# Patient Record
Sex: FEMALE | Race: WHITE | NOT HISPANIC OR LATINO
[De-identification: names, ages, dates, MRNs, and addresses within clinical notes are randomized per-mention and may not be internally consistent; named-entity substitution may affect disease eponyms.]

---

## 2018-12-28 ENCOUNTER — APPOINTMENT (OUTPATIENT)
Dept: GASTROENTEROLOGY | Facility: CLINIC | Age: 28
End: 2018-12-28

## 2019-07-17 ENCOUNTER — INPATIENT (INPATIENT)
Facility: HOSPITAL | Age: 29
LOS: 4 days | Discharge: LEFT BEFORE DISCHARGE | End: 2019-07-22
Attending: HOSPITALIST | Admitting: HOSPITALIST
Payer: MEDICAID

## 2019-07-17 VITALS
SYSTOLIC BLOOD PRESSURE: 97 MMHG | OXYGEN SATURATION: 96 % | TEMPERATURE: 101 F | HEART RATE: 121 BPM | DIASTOLIC BLOOD PRESSURE: 64 MMHG | RESPIRATION RATE: 18 BRPM

## 2019-07-17 LAB
ALBUMIN SERPL ELPH-MCNC: 3.5 G/DL — SIGNIFICANT CHANGE UP (ref 3.5–5.2)
ALP SERPL-CCNC: 78 U/L — SIGNIFICANT CHANGE UP (ref 30–115)
ALT FLD-CCNC: 9 U/L — SIGNIFICANT CHANGE UP (ref 0–41)
ANION GAP SERPL CALC-SCNC: 16 MMOL/L — HIGH (ref 7–14)
APPEARANCE UR: CLEAR — SIGNIFICANT CHANGE UP
APTT BLD: 28.6 SEC — SIGNIFICANT CHANGE UP (ref 27–39.2)
AST SERPL-CCNC: 11 U/L — SIGNIFICANT CHANGE UP (ref 0–41)
BACTERIA # UR AUTO: ABNORMAL
BASE EXCESS BLDV CALC-SCNC: 0.4 MMOL/L — SIGNIFICANT CHANGE UP (ref -2–2)
BASOPHILS # BLD AUTO: 0.02 K/UL — SIGNIFICANT CHANGE UP (ref 0–0.2)
BASOPHILS NFR BLD AUTO: 0.1 % — SIGNIFICANT CHANGE UP (ref 0–1)
BILIRUB SERPL-MCNC: 0.3 MG/DL — SIGNIFICANT CHANGE UP (ref 0.2–1.2)
BILIRUB UR-MCNC: NEGATIVE — SIGNIFICANT CHANGE UP
BUN SERPL-MCNC: 15 MG/DL — SIGNIFICANT CHANGE UP (ref 10–20)
CA-I SERPL-SCNC: 1.11 MMOL/L — LOW (ref 1.12–1.3)
CALCIUM SERPL-MCNC: 9 MG/DL — SIGNIFICANT CHANGE UP (ref 8.5–10.1)
CHLORIDE SERPL-SCNC: 99 MMOL/L — SIGNIFICANT CHANGE UP (ref 98–110)
CO2 SERPL-SCNC: 22 MMOL/L — SIGNIFICANT CHANGE UP (ref 17–32)
COLOR SPEC: SIGNIFICANT CHANGE UP
CREAT SERPL-MCNC: 0.7 MG/DL — SIGNIFICANT CHANGE UP (ref 0.7–1.5)
DIFF PNL FLD: ABNORMAL
EOSINOPHIL # BLD AUTO: 0.06 K/UL — SIGNIFICANT CHANGE UP (ref 0–0.7)
EOSINOPHIL NFR BLD AUTO: 0.4 % — SIGNIFICANT CHANGE UP (ref 0–8)
EPI CELLS # UR: 1 /HPF — SIGNIFICANT CHANGE UP (ref 0–5)
GAS PNL BLDV: 135 MMOL/L — LOW (ref 136–145)
GAS PNL BLDV: SIGNIFICANT CHANGE UP
GLUCOSE SERPL-MCNC: 108 MG/DL — HIGH (ref 70–99)
GLUCOSE UR QL: NEGATIVE — SIGNIFICANT CHANGE UP
HCG SERPL QL: NEGATIVE — SIGNIFICANT CHANGE UP
HCO3 BLDV-SCNC: 24 MMOL/L — SIGNIFICANT CHANGE UP (ref 22–29)
HCT VFR BLD CALC: 34.4 % — LOW (ref 37–47)
HCT VFR BLDA CALC: 52.4 % — HIGH (ref 34–44)
HGB BLD CALC-MCNC: 17.1 G/DL — SIGNIFICANT CHANGE UP (ref 14–18)
HGB BLD-MCNC: 10.7 G/DL — LOW (ref 12–16)
HYALINE CASTS # UR AUTO: 2 /LPF — SIGNIFICANT CHANGE UP (ref 0–7)
IMM GRANULOCYTES NFR BLD AUTO: 0.5 % — HIGH (ref 0.1–0.3)
INR BLD: 1.11 RATIO — SIGNIFICANT CHANGE UP (ref 0.65–1.3)
KETONES UR-MCNC: ABNORMAL
LACTATE BLDV-MCNC: 1.7 MMOL/L — HIGH (ref 0.5–1.6)
LEUKOCYTE ESTERASE UR-ACNC: ABNORMAL
LYMPHOCYTES # BLD AUTO: 1 K/UL — LOW (ref 1.2–3.4)
LYMPHOCYTES # BLD AUTO: 7.1 % — LOW (ref 20.5–51.1)
MCHC RBC-ENTMCNC: 22 PG — LOW (ref 27–31)
MCHC RBC-ENTMCNC: 31.1 G/DL — LOW (ref 32–37)
MCV RBC AUTO: 70.8 FL — LOW (ref 81–99)
MONOCYTES # BLD AUTO: 0.8 K/UL — HIGH (ref 0.1–0.6)
MONOCYTES NFR BLD AUTO: 5.7 % — SIGNIFICANT CHANGE UP (ref 1.7–9.3)
NEUTROPHILS # BLD AUTO: 12.04 K/UL — HIGH (ref 1.4–6.5)
NEUTROPHILS NFR BLD AUTO: 86.2 % — HIGH (ref 42.2–75.2)
NITRITE UR-MCNC: NEGATIVE — SIGNIFICANT CHANGE UP
NRBC # BLD: 0 /100 WBCS — SIGNIFICANT CHANGE UP (ref 0–0)
PCO2 BLDV: 34 MMHG — LOW (ref 41–51)
PH BLDV: 7.45 — HIGH (ref 7.26–7.43)
PH UR: 6.5 — SIGNIFICANT CHANGE UP (ref 5–8)
PLATELET # BLD AUTO: 522 K/UL — HIGH (ref 130–400)
PO2 BLDV: 21 MMHG — SIGNIFICANT CHANGE UP (ref 20–40)
POTASSIUM BLDV-SCNC: 3.5 MMOL/L — SIGNIFICANT CHANGE UP (ref 3.3–5.6)
POTASSIUM SERPL-MCNC: 4.1 MMOL/L — SIGNIFICANT CHANGE UP (ref 3.5–5)
POTASSIUM SERPL-SCNC: 4.1 MMOL/L — SIGNIFICANT CHANGE UP (ref 3.5–5)
PROT SERPL-MCNC: 7.5 G/DL — SIGNIFICANT CHANGE UP (ref 6–8)
PROT UR-MCNC: SIGNIFICANT CHANGE UP
PROTHROM AB SERPL-ACNC: 12.8 SEC — SIGNIFICANT CHANGE UP (ref 9.95–12.87)
RBC # BLD: 4.86 M/UL — SIGNIFICANT CHANGE UP (ref 4.2–5.4)
RBC # FLD: 16.2 % — HIGH (ref 11.5–14.5)
RBC CASTS # UR COMP ASSIST: 2 /HPF — SIGNIFICANT CHANGE UP (ref 0–4)
SAO2 % BLDV: 32 % — SIGNIFICANT CHANGE UP
SODIUM SERPL-SCNC: 137 MMOL/L — SIGNIFICANT CHANGE UP (ref 135–146)
SP GR SPEC: >1.05 (ref 1.01–1.02)
TROPONIN T SERPL-MCNC: <0.01 NG/ML — SIGNIFICANT CHANGE UP
UROBILINOGEN FLD QL: SIGNIFICANT CHANGE UP
WBC # BLD: 13.99 K/UL — HIGH (ref 4.8–10.8)
WBC # FLD AUTO: 13.99 K/UL — HIGH (ref 4.8–10.8)
WBC UR QL: 25 /HPF — HIGH (ref 0–5)

## 2019-07-17 PROCEDURE — 71045 X-RAY EXAM CHEST 1 VIEW: CPT | Mod: 26

## 2019-07-17 PROCEDURE — 99285 EMERGENCY DEPT VISIT HI MDM: CPT

## 2019-07-17 PROCEDURE — 93010 ELECTROCARDIOGRAM REPORT: CPT

## 2019-07-17 PROCEDURE — 74177 CT ABD & PELVIS W/CONTRAST: CPT | Mod: 26

## 2019-07-17 RX ORDER — ACETAMINOPHEN 500 MG
650 TABLET ORAL EVERY 6 HOURS
Refills: 0 | Status: DISCONTINUED | OUTPATIENT
Start: 2019-07-17 | End: 2019-07-22

## 2019-07-17 RX ORDER — METRONIDAZOLE 500 MG
TABLET ORAL
Refills: 0 | Status: DISCONTINUED | OUTPATIENT
Start: 2019-07-18 | End: 2019-07-22

## 2019-07-17 RX ORDER — ONDANSETRON 8 MG/1
4 TABLET, FILM COATED ORAL ONCE
Refills: 0 | Status: COMPLETED | OUTPATIENT
Start: 2019-07-17 | End: 2019-07-17

## 2019-07-17 RX ORDER — CIPROFLOXACIN LACTATE 400MG/40ML
400 VIAL (ML) INTRAVENOUS EVERY 12 HOURS
Refills: 0 | Status: DISCONTINUED | OUTPATIENT
Start: 2019-07-18 | End: 2019-07-22

## 2019-07-17 RX ORDER — SODIUM CHLORIDE 9 MG/ML
1000 INJECTION, SOLUTION INTRAVENOUS ONCE
Refills: 0 | Status: COMPLETED | OUTPATIENT
Start: 2019-07-17 | End: 2019-07-17

## 2019-07-17 RX ORDER — METRONIDAZOLE 500 MG
500 TABLET ORAL EVERY 8 HOURS
Refills: 0 | Status: DISCONTINUED | OUTPATIENT
Start: 2019-07-18 | End: 2019-07-22

## 2019-07-17 RX ORDER — CHLORHEXIDINE GLUCONATE 213 G/1000ML
1 SOLUTION TOPICAL
Refills: 0 | Status: DISCONTINUED | OUTPATIENT
Start: 2019-07-17 | End: 2019-07-22

## 2019-07-17 RX ORDER — CIPROFLOXACIN LACTATE 400MG/40ML
VIAL (ML) INTRAVENOUS
Refills: 0 | Status: DISCONTINUED | OUTPATIENT
Start: 2019-07-18 | End: 2019-07-22

## 2019-07-17 RX ORDER — METRONIDAZOLE 500 MG
500 TABLET ORAL ONCE
Refills: 0 | Status: COMPLETED | OUTPATIENT
Start: 2019-07-17 | End: 2019-07-18

## 2019-07-17 RX ORDER — PANTOPRAZOLE SODIUM 20 MG/1
40 TABLET, DELAYED RELEASE ORAL
Refills: 0 | Status: DISCONTINUED | OUTPATIENT
Start: 2019-07-17 | End: 2019-07-22

## 2019-07-17 RX ORDER — MORPHINE SULFATE 50 MG/1
4 CAPSULE, EXTENDED RELEASE ORAL ONCE
Refills: 0 | Status: DISCONTINUED | OUTPATIENT
Start: 2019-07-17 | End: 2019-07-18

## 2019-07-17 RX ORDER — SODIUM CHLORIDE 9 MG/ML
1000 INJECTION INTRAMUSCULAR; INTRAVENOUS; SUBCUTANEOUS
Refills: 0 | Status: DISCONTINUED | OUTPATIENT
Start: 2019-07-17 | End: 2019-07-22

## 2019-07-17 RX ORDER — CIPROFLOXACIN LACTATE 400MG/40ML
400 VIAL (ML) INTRAVENOUS ONCE
Refills: 0 | Status: COMPLETED | OUTPATIENT
Start: 2019-07-17 | End: 2019-07-17

## 2019-07-17 RX ORDER — ONDANSETRON 8 MG/1
4 TABLET, FILM COATED ORAL THREE TIMES A DAY
Refills: 0 | Status: DISCONTINUED | OUTPATIENT
Start: 2019-07-17 | End: 2019-07-22

## 2019-07-17 RX ORDER — MORPHINE SULFATE 50 MG/1
6 CAPSULE, EXTENDED RELEASE ORAL ONCE
Refills: 0 | Status: DISCONTINUED | OUTPATIENT
Start: 2019-07-17 | End: 2019-07-17

## 2019-07-17 RX ORDER — ENOXAPARIN SODIUM 100 MG/ML
40 INJECTION SUBCUTANEOUS DAILY
Refills: 0 | Status: DISCONTINUED | OUTPATIENT
Start: 2019-07-17 | End: 2019-07-19

## 2019-07-17 RX ORDER — ALBUTEROL 90 UG/1
2 AEROSOL, METERED ORAL EVERY 6 HOURS
Refills: 0 | Status: DISCONTINUED | OUTPATIENT
Start: 2019-07-17 | End: 2019-07-22

## 2019-07-17 RX ORDER — METRONIDAZOLE 500 MG
500 TABLET ORAL EVERY 8 HOURS
Refills: 0 | Status: DISCONTINUED | OUTPATIENT
Start: 2019-07-17 | End: 2019-07-17

## 2019-07-17 RX ORDER — MORPHINE SULFATE 50 MG/1
2 CAPSULE, EXTENDED RELEASE ORAL
Refills: 0 | Status: DISCONTINUED | OUTPATIENT
Start: 2019-07-17 | End: 2019-07-18

## 2019-07-17 RX ORDER — CIPROFLOXACIN LACTATE 400MG/40ML
400 VIAL (ML) INTRAVENOUS ONCE
Refills: 0 | Status: COMPLETED | OUTPATIENT
Start: 2019-07-17 | End: 2019-07-18

## 2019-07-17 RX ORDER — ACETAMINOPHEN 500 MG
650 TABLET ORAL ONCE
Refills: 0 | Status: COMPLETED | OUTPATIENT
Start: 2019-07-17 | End: 2019-07-17

## 2019-07-17 RX ADMIN — Medication 200 MILLIGRAM(S): at 16:33

## 2019-07-17 RX ADMIN — Medication 125 MILLIGRAM(S): at 20:39

## 2019-07-17 RX ADMIN — Medication 650 MILLIGRAM(S): at 15:27

## 2019-07-17 RX ADMIN — MORPHINE SULFATE 2 MILLIGRAM(S): 50 CAPSULE, EXTENDED RELEASE ORAL at 23:07

## 2019-07-17 RX ADMIN — MORPHINE SULFATE 6 MILLIGRAM(S): 50 CAPSULE, EXTENDED RELEASE ORAL at 17:56

## 2019-07-17 RX ADMIN — SODIUM CHLORIDE 1000 MILLILITER(S): 9 INJECTION, SOLUTION INTRAVENOUS at 15:25

## 2019-07-17 RX ADMIN — ONDANSETRON 4 MILLIGRAM(S): 8 TABLET, FILM COATED ORAL at 15:27

## 2019-07-17 RX ADMIN — ONDANSETRON 4 MILLIGRAM(S): 8 TABLET, FILM COATED ORAL at 17:17

## 2019-07-17 RX ADMIN — SODIUM CHLORIDE 1000 MILLILITER(S): 9 INJECTION, SOLUTION INTRAVENOUS at 16:33

## 2019-07-17 RX ADMIN — Medication 100 MILLIGRAM(S): at 17:17

## 2019-07-17 NOTE — ED ADULT NURSE NOTE - NSIMPLEMENTINTERV_GEN_ALL_ED
Implemented All Universal Safety Interventions:  [place] to call system. Call bell, personal items and telephone within reach. Instruct patient to call for assistance. Room bathroom lighting operational. Non-slip footwear when patient is off stretcher. Physically safe environment: no spills, clutter or unnecessary equipment. Stretcher in lowest position, wheels locked, appropriate side rails in place.

## 2019-07-17 NOTE — ED PROVIDER NOTE - ATTENDING CONTRIBUTION TO CARE
29 year old female hx UC, asthma presenting with n/v/d x 5 days. patient ndorses multiple episodes of nb nb vomiting and nb diarrha, no hemoptysis, no loc    CONSTITUTIONAL: Well-developed; well-nourished; in no acute distress. Sitting up and providing appropriate history and physical examination  SKIN: skin exam is warm and dry, no acute rash.  HEAD: Normocephalic; atraumatic.  EYES: PERRL, 3 mm bilateral, no nystagmus, EOM intact; conjunctiva and sclera clear.  ENT: + DRY MM, No nasal discharge; airway clear.  NECK: Supple; non tender. + full passive ROM in all directions. No JVD  CARD: S1, S2 normal; no murmurs, gallops, or rubs. Regular rate and rhythm. + Symmetric Strong Pulses  RESP: No wheezes, rales or rhonchi. Good air movement bilaterally  ABD: soft; non-distended; + LLQ and Mid epigastric tenderness. No Rebound, No Guarding, No signs of peritonitis, No CVA tenderness. No pulsatile abdominal mass. + Strong and Symmetric Pulses  EXT: Normal ROM. No clubbing, cyanosis or edema. Dp and Pt Pulses intact. Cap refill less than 3 seconds  NEURO: Alert, oriented, grossly unremarkable. No Focal deficits. GCS 15. NIH 0  PSYCH: Cooperative, appropriate.      Patient signed out to Dr. Jarrett to follow labs and imaging and reassess

## 2019-07-17 NOTE — H&P ADULT - HISTORY OF PRESENT ILLNESS
29 year old female with past medical history of asthma ( uses albuterol as needed) and history of UC (as per the patient was diagnosed on colonoscopy at [facility] or [facility]?- have asked  to bring papers from home tomorrow morning) presents with complains of nausea, vomiting and loose diarrhea for past 5 days. She says she has been continuously on prednisone 20 mg daily for UC, she says she does not follow any particular physician, does not have any GI doctor has been using 's prednisone to control UC. She also complains of having frequent chest pain sharp in nature and she usually takes aspirin (1-2tablets from her grandmother or other family members) with some partial relief.   Currently she feels tired, says she has been trying to wean off the prednisone and for 5 days has been having loose stools almost every 5 minutes, small in amount and watery. She feels abdominal pain, has been vomiting and at one occasion small amount of blood came out. Complains of mild chills and weakness, she has poor appetite and has not been eating for 2-3 days.

## 2019-07-17 NOTE — ED PROVIDER NOTE - NS ED ROS FT
Review of Systems         Constitutional: (+) fever, chills (-) weakness       EENT:  (-) sore throat       Cardiovascular: (-) chest pain (-) syncope       Respiratory: (-) cough, (-) shortness of breath       Gastrointestinal: (+) abdominal pain, vomiting, diarrhea, nausea (-) constipation       Genitourinary: (-) dysuria       Musculoskeletal: (-) neck pain (-) back pain (-) joint pain       Integumentary: (-) rash       Neurological: (-) headache (-) altered mental status (-) dizziness (-) paresthesias       Psych: (-) psych history

## 2019-07-17 NOTE — ED PROVIDER NOTE - OBJECTIVE STATEMENT
29 year old female hx UC, asthma presenting with n/v/d x 5 days. Patient admits to subj. fevers, chills, maroon blood in stool, LLQ pain. Blood streak in vomit. Decreased PO intake. No dysuria, chest pain, shortness of breath, sore throat, headache. No hx abd surgeries. Patient takes prednisone 20 mg daily for UC.

## 2019-07-17 NOTE — ED ADULT TRIAGE NOTE - CHIEF COMPLAINT QUOTE
"I have ulcerative colitis and I've been vomiting and having diarrhea for 4-5 days. I was taking Prednisone that I shouldn't have and I haven't had it in a week.

## 2019-07-17 NOTE — H&P ADULT - NSHPPHYSICALEXAM_GEN_ALL_CORE
GENERAL: In acute distress, Alert oriented X3  HEENT: Mucosa moist. No lymphadenopathy  CHEST: Clear to auscultate bilaterally  HEART: Normal S1 and S2  ABDOMEN: Tenderness to palpation in epigastrium. Guarding +  EXTREMITIES: No edema  SKIN: No rash

## 2019-07-17 NOTE — H&P ADULT - NSHPLABSRESULTS_GEN_ALL_CORE
Complete Blood Count + Automated Diff (07.17.19 @ 15:35)    WBC Count: 13.99 K/uL    RBC Count: 4.86 M/uL    Hemoglobin: 10.7 g/dL    Hematocrit: 34.4 %    Mean Cell Volume: 70.8 fL    Mean Cell Hemoglobin: 22.0 pg    Mean Cell Hemoglobin Conc: 31.1 g/dL    Red Cell Distrib Width: 16.2 %    Platelet Count - Automated: 522 K/uL    Auto Neutrophil #: 12.04 K/uL    Auto Lymphocyte #: 1.00 K/uL    Auto Monocyte #: 0.80 K/uL    Auto Eosinophil #: 0.06 K/uL    Auto Basophil #: 0.02 K/uL    Auto Neutrophil %: 86.2: Differential percentages must be correlated with absolute numbers for  clinical significance. %    Auto Lymphocyte %: 7.1 %    Auto Monocyte %: 5.7 %    Auto Eosinophil %: 0.4 %    Auto Basophil %: 0.1 %    Auto Immature Granulocyte %: 0.5 %    Nucleated RBC: 0 /100 WBCs  Comprehensive Metabolic Panel (07.17.19 @ 15:35)    Sodium, Serum: 137 mmol/L    Potassium, Serum: 4.1 mmol/L    Chloride, Serum: 99 mmol/L    Carbon Dioxide, Serum: 22 mmol/L    Anion Gap, Serum: 16 mmol/L    Blood Urea Nitrogen, Serum: 15 mg/dL    Creatinine, Serum: 0.7 mg/dL    Glucose, Serum: 108 mg/dL    Calcium, Total Serum: 9.0 mg/dL    Protein Total, Serum: 7.5 g/dL    Albumin, Serum: 3.5 g/dL    Bilirubin Total, Serum: 0.3 mg/dL    Alkaline Phosphatase, Serum: 78 U/L    Aspartate Aminotransferase (AST/SGOT): 11 U/L    Alanine Aminotransferase (ALT/SGPT): 9 U/L    eGFR if Non : 115: Interpretative comment  The units for eGFR are mL/min/1.73M2 (normalized body surface area). The  eGFR is calculated from a serum creatinine using the CKD-EPI equation.  Other variables required for calculation are race, age and sex. Among  patients with chronic kidney disease (CKD), the eGFR is useful in  determining the stage of disease according to KDOQI CKD classification.  All eGFR results are reported numerically with the following  interpretation.          GFR                    With                 Without     (ml/min/1.73 m2)    Kidney Damage       Kidney Damage        >= 90                    Stage 1                     Normal        60-89                    Stage 2                     Decreased GFR        30-59     Stage 3                     Stage 3        15-29                    Stage 4                     Stage 4        < 15                      Stage 5                     Stage 5  Each stage of CKD assumes that the associated GFR level has been in  effect for at least 3 months. Determination of stages one and two (with  eGFR > 59 ml/min/m2) requires estimation of kidney damage for at least 3  months as defined by structural or functional abnormalities.  Limitations: All estimates of GFR will be less accurate for patients at  extremes of muscle mass (including but not limited to frail elderly,  critically ill, or cancer patients), those with unusual diets, and those  with conditions associated with reduced secretion or extrarenal  elimination of creatinine. The eGFR equation is not recommended for use  in patients with unstable creatinine levels. mL/min/1.73M2    eGFR if African American: 134 mL/min/1.73M2

## 2019-07-17 NOTE — H&P ADULT - ATTENDING COMMENTS
29 year old female with past medical history of asthma ( uses albuterol as needed) and history of UC (as per the patient was [facility] have asked  to bring papers from home tomorrow morning) presents with complains of nausea, vomiting and loose diarrhea for past 5 days. She says she has been continuously on prednisone 20 mg daily for UC, she says she does not follow any particular physician, does not have any GI doctor has been using 's prednisone to control UC. She also complains of having frequent chest pain sharp in nature and she usually takes aspirin (1-2tablets from her grandmother or other family members) with some partial relief.   Currently she feels tired, says she has been trying to wean off the prednisone and for 5 days has been having loose stools almost every 5 minutes, small in amount and watery. She feels abdominal pain, has been vomiting and at one occasion small amount of blood came out. Complains of mild chills and weakness, she has poor appetite and has not been eating for 2-3 days.    REVIEW OF SYSTEMS:    CONSTITUTIONAL: No weakness, fevers or chills  EYES/ENT: No visual changes;  No vertigo or throat pain   NECK: No pain or stiffness  RESPIRATORY: No cough, wheezing, hemoptysis; No shortness of breath  CARDIOVASCULAR: No chest pain or palpitations  GASTROINTESTINAL: No abdominal or epigastric pain. No nausea, vomiting, or hematemesis; No diarrhea or constipation. No melena or hematochezia.  GENITOURINARY: No dysuria, frequency or hematuria  NEUROLOGICAL: No numbness or weakness  SKIN: No itching, rashes    Physical Exam:    General: WN/WD NAD  Neurology: A&Ox3, nonfocal, follows commands  Eyes: PERRLA/ EOMI  ENT/Neck: Neck supple, trachea midline, No JVD  Respiratory: CTA B/L, No wheezing, rales, rhonchi  CV: Normal rate regular rhythm, S1S2, no murmurs, rubs or gallops  Abdominal: Soft, NT, ND +BS,   Extremities: No edema, + peripheral pulses  Skin: No Rashes, Hematoma, Ecchymosis  Incisions:   Tubes: 29 year old female with past medical history of asthma ( uses albuterol as needed) and history of UC (as per the patient [facility]- have asked  to bring papers from home tomorrow morning) presents with complains of nausea, vomiting and loose diarrhea for past 5 days. She says she has been continuously on prednisone 20 mg daily for UC, she says she does not follow any particular physician, does not have any GI doctor has been using 's prednisone to control UC. She also complains of having frequent chest pain sharp in nature and she usually takes aspirin (1-2tablets from her grandmother or other family members) with some partial relief.   Currently she feels tired, says she has been trying to wean off the prednisone and for 5 days has been having loose stools almost every 5 minutes, small in amount and watery. She feels abdominal pain, has been vomiting and at one occasion small amount of blood came out. Complains of mild chills and weakness, she has poor appetite and has not been eating for 2-3 days.    REVIEW OF SYSTEMS: see cc/HPI  CONSTITUTIONAL: No weakness, fevers or chills  EYES/ENT: No visual changes;  No vertigo or throat pain   NECK: No pain or stiffness  RESPIRATORY: No cough, wheezing, hemoptysis; No shortness of breath  CARDIOVASCULAR: No chest pain or palpitations  GASTROINTESTINAL: (+) abdominal pain- diffuse. (+) nausea and vomiting, NO hematemesis; (+) diarrhea, NO constipation. No melena or hematochezia.  GENITOURINARY: No dysuria, frequency or hematuria  NEUROLOGICAL: No numbness or weakness  SKIN: No itching, rashes    Physical Exam:  General: WN/WD NAD  Neurology: A&Ox3, nonfocal, follows commands  Eyes: PERRLA/ EOMI  ENT/Neck: Neck supple, trachea midline, No JVD  Respiratory: CTA B/L, No wheezing, rales, rhonchi  CV: Normal rate regular rhythm, S1S2, no murmurs, rubs or gallops  Abdominal: Soft, ND +BS, tender in all quadrants but worse in the LLQ  Extremities: No edema, + peripheral pulses  Skin: No Rashes, Hematoma, Ecchymosis  Incisions:   Tubes:    A/P  Ulcerative Colitis - exacerbation   -NPO and IV fluids  -IV steroids and IV abx for now  -agree w/ checking stool samples  -GI eval inpatient   -d/w patient the need for GI and PMD care / follow up post discharge and the danger of self medicating /prolonged pred use ( using her 's supply)    h/o Epigastric / atypical chest pain - currently asymptomatic r/o PUD r/o gastritis  -PPI   -may needs EGD (+/-) cardiac w/u if persistent or re-occurs    Asthma   -PRN MDI    L4 vertebral hemangioma  -outpatient f/u PRN

## 2019-07-17 NOTE — ED PROVIDER NOTE - PHYSICAL EXAMINATION
Physical Exam    Vital Signs: I have reviewed the initial vital signs  Constitutional: well-nourished, appears stated age, no acute distress  EENT: Conjunctiva pink, Sclera clear, PERRLA, EOMI. Mucous membranes dry, no exudates or lesions noted, uvula midline.  Cardiovascular: S1 and S2 present, tachycardic, regular rhythm. Well perfused extremities, no peripheral edema  Respiratory: unlabored respiratory effort, clear to auscultation bilaterally no wheezing, rales and rhonchi  Gastrointestinal: abdomen TTP in LLQ maximally, diffuse mild tenderness. no pulsatile mass,  + guarding, no rebound tenderness  Musculoskeletal: supple nontender neck, no midline tenderness, no joint pain  Integumentary: warm, dry, no rash  Psychiatric: appropriate mood, appropriate affect Physical Exam    Vital Signs: I have reviewed the initial vital signs  Constitutional: well-nourished, crying, in pain, distressed  EENT: Conjunctiva pink, Sclera clear, PERRLA, EOMI. Mucous membranes dry, no exudates or lesions noted, uvula midline.  Cardiovascular: S1 and S2 present, tachycardic, regular rhythm. Well perfused extremities, no peripheral edema  Respiratory: unlabored respiratory effort, clear to auscultation bilaterally no wheezing, rales and rhonchi  Gastrointestinal: abdomen TTP in LLQ maximally, diffuse mild tenderness. no pulsatile mass,  + guarding, no rebound tenderness  Musculoskeletal: supple nontender neck, no midline tenderness, no joint pain  Integumentary: warm, dry, no rash  Psychiatric: appropriate mood, appropriate affect

## 2019-07-17 NOTE — ED PROVIDER NOTE - CLINICAL SUMMARY MEDICAL DECISION MAKING FREE TEXT BOX
29 year old female hx UC, asthma presenting with n/v/d, and llq abd x 5 days. Pt was noted to be febrile here and states she stopped taking her steroids 2 days prior. Vs - note to have fever. BP 98/60, HR-62. Given cipro/flagyl. steroids, and fluids. Labs noted have leukocytosis. CT revealed diffuse colitis. Admitted to [facility].

## 2019-07-17 NOTE — ED PROVIDER NOTE - NS ED ATTENDING STATEMENT MOD
I have personally performed a face to face diagnostic evaluation on this patient. I have reviewed the ACP note and agree with the history, exam and plan of care, except as noted. Adequate: hears normal conversation without difficulty

## 2019-07-18 LAB
ANION GAP SERPL CALC-SCNC: 14 MMOL/L — SIGNIFICANT CHANGE UP (ref 7–14)
BLD GP AB SCN SERPL QL: SIGNIFICANT CHANGE UP
BUN SERPL-MCNC: 10 MG/DL — SIGNIFICANT CHANGE UP (ref 10–20)
C DIFF BY PCR RESULT: NEGATIVE — SIGNIFICANT CHANGE UP
C DIFF TOX GENS STL QL NAA+PROBE: SIGNIFICANT CHANGE UP
CALCIUM SERPL-MCNC: 8.5 MG/DL — SIGNIFICANT CHANGE UP (ref 8.5–10.1)
CHLORIDE SERPL-SCNC: 101 MMOL/L — SIGNIFICANT CHANGE UP (ref 98–110)
CO2 SERPL-SCNC: 21 MMOL/L — SIGNIFICANT CHANGE UP (ref 17–32)
CREAT SERPL-MCNC: 0.6 MG/DL — LOW (ref 0.7–1.5)
CRP SERPL-MCNC: 11.61 MG/DL — HIGH (ref 0–0.4)
CULTURE RESULTS: SIGNIFICANT CHANGE UP
ERYTHROCYTE [SEDIMENTATION RATE] IN BLOOD: 58 MM/HR — HIGH (ref 0–20)
FERRITIN SERPL-MCNC: 52 NG/ML — SIGNIFICANT CHANGE UP (ref 15–150)
FOLATE SERPL-MCNC: 13.2 NG/ML — SIGNIFICANT CHANGE UP
GLUCOSE SERPL-MCNC: 122 MG/DL — HIGH (ref 70–99)
HCT VFR BLD CALC: 28.5 % — LOW (ref 37–47)
HGB BLD-MCNC: 8.6 G/DL — LOW (ref 12–16)
IRON SATN MFR SERPL: 11 UG/DL — LOW (ref 35–150)
IRON SATN MFR SERPL: 4 % — LOW (ref 15–50)
LACTATE SERPL-SCNC: 0.9 MMOL/L — SIGNIFICANT CHANGE UP (ref 0.5–2.2)
MCHC RBC-ENTMCNC: 21.6 PG — LOW (ref 27–31)
MCHC RBC-ENTMCNC: 30.2 G/DL — LOW (ref 32–37)
MCV RBC AUTO: 71.6 FL — LOW (ref 81–99)
NRBC # BLD: 0 /100 WBCS — SIGNIFICANT CHANGE UP (ref 0–0)
PLATELET # BLD AUTO: 374 K/UL — SIGNIFICANT CHANGE UP (ref 130–400)
POTASSIUM SERPL-MCNC: 4 MMOL/L — SIGNIFICANT CHANGE UP (ref 3.5–5)
POTASSIUM SERPL-SCNC: 4 MMOL/L — SIGNIFICANT CHANGE UP (ref 3.5–5)
RBC # BLD: 3.98 M/UL — LOW (ref 4.2–5.4)
RBC # FLD: 15.9 % — HIGH (ref 11.5–14.5)
SODIUM SERPL-SCNC: 136 MMOL/L — SIGNIFICANT CHANGE UP (ref 135–146)
SPECIMEN SOURCE: SIGNIFICANT CHANGE UP
TIBC SERPL-MCNC: 283 UG/DL — SIGNIFICANT CHANGE UP (ref 220–430)
TRANSFERRIN SERPL-MCNC: 232 MG/DL — SIGNIFICANT CHANGE UP (ref 200–360)
UIBC SERPL-MCNC: 272 UG/DL — SIGNIFICANT CHANGE UP (ref 110–370)
VIT B12 SERPL-MCNC: 636 PG/ML — SIGNIFICANT CHANGE UP (ref 232–1245)
WBC # BLD: 10.59 K/UL — SIGNIFICANT CHANGE UP (ref 4.8–10.8)
WBC # FLD AUTO: 10.59 K/UL — SIGNIFICANT CHANGE UP (ref 4.8–10.8)

## 2019-07-18 PROCEDURE — 99222 1ST HOSP IP/OBS MODERATE 55: CPT

## 2019-07-18 PROCEDURE — 99223 1ST HOSP IP/OBS HIGH 75: CPT

## 2019-07-18 RX ORDER — MORPHINE SULFATE 50 MG/1
2 CAPSULE, EXTENDED RELEASE ORAL EVERY 4 HOURS
Refills: 0 | Status: DISCONTINUED | OUTPATIENT
Start: 2019-07-18 | End: 2019-07-22

## 2019-07-18 RX ADMIN — Medication 100 MILLIGRAM(S): at 00:23

## 2019-07-18 RX ADMIN — Medication 200 MILLIGRAM(S): at 00:23

## 2019-07-18 RX ADMIN — PANTOPRAZOLE SODIUM 40 MILLIGRAM(S): 20 TABLET, DELAYED RELEASE ORAL at 06:44

## 2019-07-18 RX ADMIN — SODIUM CHLORIDE 100 MILLILITER(S): 9 INJECTION INTRAMUSCULAR; INTRAVENOUS; SUBCUTANEOUS at 06:51

## 2019-07-18 RX ADMIN — Medication 100 MILLIGRAM(S): at 22:29

## 2019-07-18 RX ADMIN — Medication 20 MILLIGRAM(S): at 13:55

## 2019-07-18 RX ADMIN — MORPHINE SULFATE 2 MILLIGRAM(S): 50 CAPSULE, EXTENDED RELEASE ORAL at 07:55

## 2019-07-18 RX ADMIN — Medication 40 MILLIGRAM(S): at 06:50

## 2019-07-18 RX ADMIN — Medication 100 MILLIGRAM(S): at 06:41

## 2019-07-18 RX ADMIN — Medication 200 MILLIGRAM(S): at 06:44

## 2019-07-18 RX ADMIN — CHLORHEXIDINE GLUCONATE 1 APPLICATION(S): 213 SOLUTION TOPICAL at 05:05

## 2019-07-18 RX ADMIN — PANTOPRAZOLE SODIUM 40 MILLIGRAM(S): 20 TABLET, DELAYED RELEASE ORAL at 18:17

## 2019-07-18 RX ADMIN — MORPHINE SULFATE 2 MILLIGRAM(S): 50 CAPSULE, EXTENDED RELEASE ORAL at 19:42

## 2019-07-18 RX ADMIN — Medication 100 MILLIGRAM(S): at 13:55

## 2019-07-18 RX ADMIN — Medication 20 MILLIGRAM(S): at 22:29

## 2019-07-18 RX ADMIN — MORPHINE SULFATE 2 MILLIGRAM(S): 50 CAPSULE, EXTENDED RELEASE ORAL at 14:42

## 2019-07-18 RX ADMIN — ONDANSETRON 4 MILLIGRAM(S): 8 TABLET, FILM COATED ORAL at 06:54

## 2019-07-18 RX ADMIN — Medication 200 MILLIGRAM(S): at 19:41

## 2019-07-18 NOTE — PROGRESS NOTE ADULT - ASSESSMENT
29 year old female with past medical history of asthma ( uses albuterol as needed) and history of UC (as per the patient was [facility] [place] [facility]- have asked  to bring papers from home tomorrow morning) presents with complains of nausea, vomiting and loose diarrhea for past 5 days.     # Ulcerative colitis flare  - Loose stools (=6 per day) with severe cramps accompanied with fever 100.9 C,  tachycardia 121, anemia  - Stopped taking Prednisone (20 mg from father a few weeks)  - CT scan evident for colitis.  - C. diff negative  - ESR 58,   - Decrease Solumedrol to 20 mg IV q8  - Continue Cipro and Flagyl  - Start clears and advance as tolerated  - f/u QuantiFeron, Hep B and C  - GI following - schedule for flexible sigmoidoscopy tomorrow, 7/19/19  - Pain control   - Will contact [facility] [place] for colonoscopy records    # Asthma - Controlled  - Albuterol PRN    # L4 vertebral body hemangioma  - Incidental finding on CT scan. Asymptomatic  - Outpatient follow up if required.     GI ppx - Protonix  DVT ppx - Lovenox    Dispo: from home

## 2019-07-18 NOTE — PROGRESS NOTE ADULT - SUBJECTIVE AND OBJECTIVE BOX
SUBJECTIVE:    Patient is a 29y old Female who presents with a chief complaint of Diarrhea and Vomiting (18 Jul 2019 08:16)    Currently admitted to medicine with the primary diagnosis of Colitis     Today is hospital day 1d. This morning she is resting comfortably in bed and reports no new issues or overnight events.     INTERVAL EVENTS: Improving pain    PAST MEDICAL & SURGICAL HISTORY  Asthma  Ulcerative colitis  No significant past surgical history      ALLERGIES:  penicillin (Rash)    MEDICATIONS:  STANDING MEDICATIONS  chlorhexidine 4% Liquid 1 Application(s) Topical <User Schedule>  ciprofloxacin   IVPB 400 milliGRAM(s) IV Intermittent every 12 hours  ciprofloxacin   IVPB      enoxaparin Injectable 40 milliGRAM(s) SubCutaneous daily  methylPREDNISolone sodium succinate Injectable 20 milliGRAM(s) IV Push every 8 hours  metroNIDAZOLE  IVPB      metroNIDAZOLE  IVPB 500 milliGRAM(s) IV Intermittent every 8 hours  pantoprazole  Injectable 40 milliGRAM(s) IV Push two times a day  sodium chloride 0.9%. 1000 milliLiter(s) IV Continuous <Continuous>    PRN MEDICATIONS  acetaminophen   Tablet .. 650 milliGRAM(s) Oral every 6 hours PRN  ALBUTerol    90 MICROgram(s) HFA Inhaler 2 Puff(s) Inhalation every 6 hours PRN  morphine  - Injectable 2 milliGRAM(s) IV Push every 4 hours PRN  ondansetron Injectable 4 milliGRAM(s) IV Push three times a day PRN    VITALS:   T(F): 96.2  HR: 81  BP: 91/50  RR: 18  SpO2: 100%    LABS:                        8.6    10.59 )-----------( 374      ( 18 Jul 2019 05:19 )             28.5     07-18    136  |  101  |  10  ----------------------------<  122<H>  4.0   |  21  |  0.6<L>    Ca    8.5      18 Jul 2019 05:19    TPro  7.5  /  Alb  3.5  /  TBili  0.3  /  DBili  x   /  AST  11  /  ALT  9   /  AlkPhos  78  07-17    PT/INR - ( 17 Jul 2019 15:35 )   PT: 12.80 sec;   INR: 1.11 ratio         PTT - ( 17 Jul 2019 15:35 )  PTT:28.6 sec  Urinalysis Basic - ( 17 Jul 2019 20:10 )    Color: Light Yellow / Appearance: Clear / SG: >1.050 / pH: x  Gluc: x / Ketone: Small  / Bili: Negative / Urobili: <2 mg/dL   Blood: x / Protein: Trace / Nitrite: Negative   Leuk Esterase: Small / RBC: 2 /HPF / WBC 25 /HPF   Sq Epi: x / Non Sq Epi: 1 /HPF / Bacteria: Few        Sedimentation Rate, Erythrocyte: 58 mm/Hr <H> (07-18-19 @ 05:19)  Lactate, Blood: 0.9 mmol/L (07-18-19 @ 05:19)  Troponin T, Serum: <0.01 ng/mL (07-17-19 @ 15:35)      CARDIAC MARKERS ( 17 Jul 2019 15:35 )  x     / <0.01 ng/mL / x     / x     / x        RADIOLOGY:    < from: CT Abdomen and Pelvis w/ IV Cont (07.17.19 @ 18:42) >  IMPRESSION:   Diffuse colonic wall thickening with hyper-enhanced mucosa, colitis    < end of copied text >    < from: Xray Chest 1 View-PORTABLE IMMEDIATE (07.17.19 @ 16:39) >  Impression:      No radiographic evidence of acute cardiopulmonary disease.    < end of copied text >    PHYSICAL EXAM:  GEN: No acute distress  PULM/CHEST: Clear to auscultation bilaterally, no rales, rhonchi or wheezes   CVS: Regular rate and rhythm, S1-S2, no murmurs  ABD: Soft, mildly tender in LLQ, non-distended, +BS  EXT: No edema  NEURO: AAOx3

## 2019-07-18 NOTE — CONSULT NOTE ADULT - SUBJECTIVE AND OBJECTIVE BOX
Please contact me with any questions on my pager [phone].  Hospital Day # 1d     HPI:   29 year old female with past medical history of asthma ( uses albuterol as needed) and history of UC (as per the patient was diagnosed on colonoscopy at [facility] 3 years ago ? presents with complains of nausea, vomiting and loose diarrhea for past 5 days. She says she has been continuously on prednisone 20 mg daily for UC for 3 years, she says she does not follow any particular physician, does not have any GI doctor has been using 's prednisone to control UC which she ran out one week ago , patient started having symptoms 2 days after stoping the prednisone. stool is watery to  loose, with intermittently bloody , almost every 5 minutes she has a bowel movement She feels abdominal pain, which is diffuse , moderate in intensity , which she used to take marijuana to control , has been vomiting , non bloody non biliary vomiting.  Complains of mild chills and weakness, she has poor appetite and has not been eating for 2-3 days. She also complains of having frequent chest pain sharp in nature and she usually takes aspirin (1-2tablets from her grandmother or other family members) with some partial relief.       PAST MEDICAL & SURGICAL HISTORY  Asthma  Ulcerative colitis  No significant past surgical history          SOCIAL HISTORY:  smoker: tom  Drug: Denies    ALLERGIES:  penicillin (Rash)      MEDICATIONS:  MEDICATIONS  (STANDING):  chlorhexidine 4% Liquid 1 Application(s) Topical <User Schedule>  ciprofloxacin   IVPB 400 milliGRAM(s) IV Intermittent every 12 hours  ciprofloxacin   IVPB      enoxaparin Injectable 40 milliGRAM(s) SubCutaneous daily  methylPREDNISolone sodium succinate Injectable 40 milliGRAM(s) IV Push every 8 hours  metroNIDAZOLE  IVPB      metroNIDAZOLE  IVPB 500 milliGRAM(s) IV Intermittent every 8 hours  pantoprazole  Injectable 40 milliGRAM(s) IV Push two times a day  sodium chloride 0.9%. 1000 milliLiter(s) (100 mL/Hr) IV Continuous <Continuous>    MEDICATIONS  (PRN):  acetaminophen   Tablet .. 650 milliGRAM(s) Oral every 6 hours PRN Temp greater or equal to 38C (100.4F)  ALBUTerol    90 MICROgram(s) HFA Inhaler 2 Puff(s) Inhalation every 6 hours PRN Shortness of Breath and/or Wheezing  morphine  - Injectable 2 milliGRAM(s) IV Push every 4 hours PRN Severe Pain (7 - 10)  ondansetron Injectable 4 milliGRAM(s) IV Push three times a day PRN Nausea and/or Vomiting      HOME MEDICATIONS:  Home Medications:  albuterol 90 mcg/inh inhalation aerosol with adapter: inhaled 4 times a day, As Needed (17 Jul 2019 22:13)      ROS:     REVIEW OF SYSTEMS:    CONSTITUTIONAL: low grade fever  EYES/ENT: No scleral icterus  RESPIRATORY: No shortness of breath  CARDIOVASCULAR: No chest pain  now  GASTROINTESTINAL: as listed above   GENITOURINARY: No dysuria  NEUROLOGICAL: No dizziness  SKIN: No cyanosis      VITALS:   T(F): 96.2 (07-18 @ 07:26), Max: 100.9 (07-17 @ 15:05)  HR: 81 (07-18 @ 07:26) (76 - 121)  BP: 91/50 (07-18 @ 07:26) (91/50 - 109/62)  BP(mean): --  RR: 18 (07-18 @ 07:26) (16 - 18)  SpO2: 100% (07-18 @ 07:26) (96% - 100%)    I&O's Summary      PHYSICAL EXAM:  Physical Exam:  GENERAL: pale   HEAD:  Atraumatic, Normocephalic  EYES: EOMI, PERRLA, conjunctiva and sclera clear  NECK: No thyromegaly  CHEST/LUNG: No accessory use of muscle  HEART: S1S2 Normal  ABDOMEN: Soft, mild tenderness at left lower quadrant ,  Nondistended; Bowel sounds present, no guarding or rigidity.  EXTREMITIES:  2+ Peripheral Pulses, No cyanosis  PSYCH: AAOx3  NEUROLOGY: non-focal      LABS:                        8.6    10.59 )-----------( 374      ( 18 Jul 2019 05:19 )             28.5       PT/INR - ( 17 Jul 2019 15:35 )  INR: 1.11          PTT - ( 17 Jul 2019 15:35 )  PTT:28.6   LIVER FUNCTIONS - ( 17 Jul 2019 15:35 )  Alb: 3.5 g/dL / Pro: 7.5 g/dL / ALK PHOS: 78 U/L / ALT: 9 U/L / AST: 11 U/L / GGT: x           LIVER FUNCTIONS - ( 17 Jul 2019 15:35 )  Alb: 3.5 [3.5 - 5.2] / Pro: 7.5 [6.0 - 8.0] / ALK PHOS: 78 [30 - 115] / ALT: 9 [0 - 41] / AST: 11 [0 - 41] / GGT: x       07-18    136  |  101  |  10  ----------------------------<  122<H>  4.0   |  21  |  0.6<L>    Ca    8.5      18 Jul 2019 05:19      CARDIAC MARKERS ( 17 Jul 2019 15:35 )  x     / <0.01 ng/mL / x     / x     / x          < from: CT Abdomen and Pelvis w/ IV Cont (07.17.19 @ 18:42) >  IMPRESSION:   Diffuse colonic wall thickening with hyper-enhanced mucosa, colitis        < end of copied text >    last colonoscopy : in [facility] one year ago , result not available yet

## 2019-07-18 NOTE — CONSULT NOTE ADULT - ASSESSMENT
29 year old female with past medical history of asthma ( uses albuterol as needed) and history of UC diagnosed 3 years ago , steroid dependent since 3 years , presented with 5 days of  nausea , vomiting and intermittent bloody diarrhea  after she stopped prednisone one week ago.       ct scan : reviewed 29 year old female with past medical history of asthma ( uses albuterol as needed) and history of UC diagnosed 3 years ago , steroid dependent since 3 years , presented with 5 days of  nausea , vomiting and intermittent bloody diarrhea  after she stopped prednisone one week ago.       ct scan : reviewed     ulcerative colitis flare :   please check for C. DIFF , Crp , Fecal calprotectin , ova and parasite , GI pcr ,stool culture   solumedrol 20 mg IV q 8 hrs if C. diff is negative  continue ciprofloxacin and flagyl  can resume diet , start on clear liquid and advance as tolerated  Zofran PRN for nausea  IV hydration  pain control  please check QuantiFeron , HBS AG , HBC IG , HBS AB, HIV , HCV AB   patient will need to be started on anti- TNF.  patient will need repeat of colonoscopy as outpatient after resolution of acute flare.  please obtain records from [facility]    incomplete , discussion with attending to follow. 29 year old female with past medical history of asthma ( uses albuterol as needed) and history of UC diagnosed 3 years ago , steroid dependent since 3 years , presented with 5 days of  nausea , vomiting and intermittent bloody diarrhea  after she stopped prednisone one week ago.       ct scan : reviewed   C. DIFF is negative     ulcerative colitis flare :   check  Fecal calprotectin , ova and parasite , GI PCR  , entameba serology,  stool culture   solumedrol 20 mg IV q 8 hrs   continue ciprofloxacin and flagyl  can resume diet , start on clear liquid and advance as tolerated  Zofran PRN for nausea  IV hydration  pain control  please check QuantiFeron , HBS AG , HBC IG , HBS AB  patient will need to be started on anti- TNF.  patient will need repeat of colonoscopy as outpatient after resolution of acute flare.  please obtain records from [facility] 29 year old female with past medical history of asthma ( uses albuterol as needed) and history of UC diagnosed 3 years ago , steroid dependent since 3 years , presented with 5 days of  nausea , vomiting and intermittent bloody diarrhea  after she stopped prednisone one week ago.       ct scan : reviewed   C. DIFF is negative     ulcerative colitis flare :   check  Fecal calprotectin , ova and parasite , GI PCR  , entameba serology,  stool culture   solumedrol 20 mg IV q 8 hrs   continue ciprofloxacin and flagyl  can resume diet , start on clear liquid and advance as tolerated  Zofran PRN for nausea  IV hydration  pain control  please check QuantiFeron , HBS AG , HBC IG , HBS AB  patient will need to be started on anti- TNF.  Flex Sigmoidoscopy to assses diseases activity in a.m  patient will need repeat of colonoscopy as outpatient after resolution of acute flare.  please obtain records from [facility]

## 2019-07-19 ENCOUNTER — TRANSCRIPTION ENCOUNTER (OUTPATIENT)
Age: 29
End: 2019-07-19

## 2019-07-19 ENCOUNTER — RESULT REVIEW (OUTPATIENT)
Age: 29
End: 2019-07-19

## 2019-07-19 LAB
ANION GAP SERPL CALC-SCNC: 11 MMOL/L — SIGNIFICANT CHANGE UP (ref 7–14)
BASOPHILS # BLD AUTO: 0.01 K/UL — SIGNIFICANT CHANGE UP (ref 0–0.2)
BASOPHILS NFR BLD AUTO: 0.1 % — SIGNIFICANT CHANGE UP (ref 0–1)
BUN SERPL-MCNC: 11 MG/DL — SIGNIFICANT CHANGE UP (ref 10–20)
CALCIUM SERPL-MCNC: 8.3 MG/DL — LOW (ref 8.5–10.1)
CHLORIDE SERPL-SCNC: 108 MMOL/L — SIGNIFICANT CHANGE UP (ref 98–110)
CO2 SERPL-SCNC: 20 MMOL/L — SIGNIFICANT CHANGE UP (ref 17–32)
CREAT SERPL-MCNC: 0.6 MG/DL — LOW (ref 0.7–1.5)
EOSINOPHIL # BLD AUTO: 0 K/UL — SIGNIFICANT CHANGE UP (ref 0–0.7)
EOSINOPHIL NFR BLD AUTO: 0 % — SIGNIFICANT CHANGE UP (ref 0–8)
GLUCOSE SERPL-MCNC: 139 MG/DL — HIGH (ref 70–99)
HAV IGM SER-ACNC: SIGNIFICANT CHANGE UP
HBV CORE IGM SER-ACNC: SIGNIFICANT CHANGE UP
HBV SURFACE AG SER-ACNC: SIGNIFICANT CHANGE UP
HCT VFR BLD CALC: 26.9 % — LOW (ref 37–47)
HCV AB S/CO SERPL IA: 0.1 S/CO — SIGNIFICANT CHANGE UP (ref 0–0.99)
HCV AB SERPL-IMP: SIGNIFICANT CHANGE UP
HGB BLD-MCNC: 8.1 G/DL — LOW (ref 12–16)
IMM GRANULOCYTES NFR BLD AUTO: 0.7 % — HIGH (ref 0.1–0.3)
LYMPHOCYTES # BLD AUTO: 0.47 K/UL — LOW (ref 1.2–3.4)
LYMPHOCYTES # BLD AUTO: 4.3 % — LOW (ref 20.5–51.1)
MAGNESIUM SERPL-MCNC: 1.9 MG/DL — SIGNIFICANT CHANGE UP (ref 1.8–2.4)
MCHC RBC-ENTMCNC: 21.8 PG — LOW (ref 27–31)
MCHC RBC-ENTMCNC: 30.1 G/DL — LOW (ref 32–37)
MCV RBC AUTO: 72.5 FL — LOW (ref 81–99)
MONOCYTES # BLD AUTO: 0.28 K/UL — SIGNIFICANT CHANGE UP (ref 0.1–0.6)
MONOCYTES NFR BLD AUTO: 2.6 % — SIGNIFICANT CHANGE UP (ref 1.7–9.3)
NEUTROPHILS # BLD AUTO: 10.05 K/UL — HIGH (ref 1.4–6.5)
NEUTROPHILS NFR BLD AUTO: 92.3 % — HIGH (ref 42.2–75.2)
NRBC # BLD: 0 /100 WBCS — SIGNIFICANT CHANGE UP (ref 0–0)
PLATELET # BLD AUTO: 302 K/UL — SIGNIFICANT CHANGE UP (ref 130–400)
POTASSIUM SERPL-MCNC: 3.9 MMOL/L — SIGNIFICANT CHANGE UP (ref 3.5–5)
POTASSIUM SERPL-SCNC: 3.9 MMOL/L — SIGNIFICANT CHANGE UP (ref 3.5–5)
RBC # BLD: 3.71 M/UL — LOW (ref 4.2–5.4)
RBC # FLD: 16 % — HIGH (ref 11.5–14.5)
SODIUM SERPL-SCNC: 139 MMOL/L — SIGNIFICANT CHANGE UP (ref 135–146)
WBC # BLD: 10.89 K/UL — HIGH (ref 4.8–10.8)
WBC # FLD AUTO: 10.89 K/UL — HIGH (ref 4.8–10.8)

## 2019-07-19 PROCEDURE — 99232 SBSQ HOSP IP/OBS MODERATE 35: CPT

## 2019-07-19 PROCEDURE — 88305 TISSUE EXAM BY PATHOLOGIST: CPT | Mod: 26

## 2019-07-19 PROCEDURE — 45331 SIGMOIDOSCOPY AND BIOPSY: CPT

## 2019-07-19 RX ORDER — OXYCODONE AND ACETAMINOPHEN 5; 325 MG/1; MG/1
1 TABLET ORAL ONCE
Refills: 0 | Status: DISCONTINUED | OUTPATIENT
Start: 2019-07-19 | End: 2019-07-19

## 2019-07-19 RX ORDER — IPRATROPIUM/ALBUTEROL SULFATE 18-103MCG
3 AEROSOL WITH ADAPTER (GRAM) INHALATION ONCE
Refills: 0 | Status: COMPLETED | OUTPATIENT
Start: 2019-07-19 | End: 2019-07-19

## 2019-07-19 RX ADMIN — Medication 200 MILLIGRAM(S): at 05:36

## 2019-07-19 RX ADMIN — Medication 100 MILLIGRAM(S): at 15:32

## 2019-07-19 RX ADMIN — PANTOPRAZOLE SODIUM 40 MILLIGRAM(S): 20 TABLET, DELAYED RELEASE ORAL at 05:36

## 2019-07-19 RX ADMIN — MORPHINE SULFATE 2 MILLIGRAM(S): 50 CAPSULE, EXTENDED RELEASE ORAL at 16:08

## 2019-07-19 RX ADMIN — Medication 3 MILLILITER(S): at 01:19

## 2019-07-19 RX ADMIN — OXYCODONE AND ACETAMINOPHEN 1 TABLET(S): 5; 325 TABLET ORAL at 10:39

## 2019-07-19 RX ADMIN — Medication 100 MILLIGRAM(S): at 21:30

## 2019-07-19 RX ADMIN — Medication 20 MILLIGRAM(S): at 15:33

## 2019-07-19 RX ADMIN — Medication 100 MILLIGRAM(S): at 05:35

## 2019-07-19 RX ADMIN — Medication 200 MILLIGRAM(S): at 17:23

## 2019-07-19 RX ADMIN — CHLORHEXIDINE GLUCONATE 1 APPLICATION(S): 213 SOLUTION TOPICAL at 05:35

## 2019-07-19 RX ADMIN — MORPHINE SULFATE 2 MILLIGRAM(S): 50 CAPSULE, EXTENDED RELEASE ORAL at 05:43

## 2019-07-19 RX ADMIN — PANTOPRAZOLE SODIUM 40 MILLIGRAM(S): 20 TABLET, DELAYED RELEASE ORAL at 17:22

## 2019-07-19 RX ADMIN — MORPHINE SULFATE 2 MILLIGRAM(S): 50 CAPSULE, EXTENDED RELEASE ORAL at 01:52

## 2019-07-19 RX ADMIN — SODIUM CHLORIDE 100 MILLILITER(S): 9 INJECTION INTRAMUSCULAR; INTRAVENOUS; SUBCUTANEOUS at 10:39

## 2019-07-19 RX ADMIN — Medication 20 MILLIGRAM(S): at 21:30

## 2019-07-19 RX ADMIN — MORPHINE SULFATE 2 MILLIGRAM(S): 50 CAPSULE, EXTENDED RELEASE ORAL at 15:36

## 2019-07-19 RX ADMIN — OXYCODONE AND ACETAMINOPHEN 1 TABLET(S): 5; 325 TABLET ORAL at 11:10

## 2019-07-19 RX ADMIN — MORPHINE SULFATE 2 MILLIGRAM(S): 50 CAPSULE, EXTENDED RELEASE ORAL at 23:33

## 2019-07-19 RX ADMIN — Medication 20 MILLIGRAM(S): at 05:36

## 2019-07-19 NOTE — PROGRESS NOTE ADULT - ASSESSMENT
30yo F with Past Medical History Asthma and Ulcerative Colitis (previously diagnosed via colonoscopy at [facility] or [facility]) admitted with nausea, vomiting, and loose diarrhea for x5 days secondary to ulcerative colitis exacerbation.     Nausea/vomiting/diarrhea secondary to ulcerative colitis exacerbation: the patient remains symptomatic with diffuse abdominal tenderness upon examination this AM.  GI recommendations were reviewed; the patient was kept NPO prior to sigmoid scope this afternoon.  Continue gentle IV hydration with Morphine PRN for pain.  Continue Zofran PRN for nausea.  GI recommended treatment with IV Solumedrol 20mg q8h for suspected exacerbation.  For possible treatment with biologic medications in near future, follow-up results from Quantiferon and hepatitis viral panel.  Check fecal calprotectin, ova and parasite, and GI PCR to exclude infections.   Asthma:  no active wheezing appreciated.  Add Albuterol PRN  GI/DVT prophylaxis    #Progress Note Handoff    Pending:  Consults: GI follow-up  Tests: GI stool markers, Quantiferon Gold for LTB  Test Results: Follow-up sigmoidscopy results    Future Disposition: Home

## 2019-07-19 NOTE — PROGRESS NOTE ADULT - SUBJECTIVE AND OBJECTIVE BOX
Hospital Day:  2d    Subjective:    Patient is a 29y old  Female who presents with a chief complaint of Diarrhea and Vomiting (18 Jul 2019 13:35)29 year old female with past medical history of asthma ( uses albuterol as needed) and history of UC (as per the patient [facility] have asked  to bring papers from home tomorrow morning) presents with complains of nausea, vomiting and loose diarrhea for past 5 days. She says she has been continuously on prednisone 20 mg daily for UC, she says she does not follow any particular physician, does not have any GI doctor has been using 's prednisone to control UC. She also complains of having frequent chest pain sharp in nature and she usually takes aspirin (1-2tablets from her grandmother or other family members) with some partial relief.   Currently she feels tired, says she has been trying to wean off the prednisone and for 5 days has been having loose stools almost every 5 minutes, small in amount and watery. She feels abdominal pain, has been vomiting and at one occasion small amount of blood came out. Complains of mild chills and weakness, she has poor appetite and has not been eating for 2-3 days.             Past Medical Hx:   Asthma  Ulcerative colitis    Past Sx:  No significant past surgical history    Allergies:  penicillin (Rash)    Current Meds:   Standng Meds:  chlorhexidine 4% Liquid 1 Application(s) Topical <User Schedule>  ciprofloxacin   IVPB 400 milliGRAM(s) IV Intermittent every 12 hours  ciprofloxacin   IVPB      methylPREDNISolone sodium succinate Injectable 20 milliGRAM(s) IV Push every 8 hours  metroNIDAZOLE  IVPB      metroNIDAZOLE  IVPB 500 milliGRAM(s) IV Intermittent every 8 hours  pantoprazole  Injectable 40 milliGRAM(s) IV Push two times a day  sodium chloride 0.9%. 1000 milliLiter(s) (100 mL/Hr) IV Continuous <Continuous>    PRN Meds:  acetaminophen   Tablet .. 650 milliGRAM(s) Oral every 6 hours PRN Temp greater or equal to 38C (100.4F)  ALBUTerol    90 MICROgram(s) HFA Inhaler 2 Puff(s) Inhalation every 6 hours PRN Shortness of Breath and/or Wheezing  morphine  - Injectable 2 milliGRAM(s) IV Push every 4 hours PRN Severe Pain (7 - 10)  ondansetron Injectable 4 milliGRAM(s) IV Push three times a day PRN Nausea and/or Vomiting    HOME MEDICATIONS:  albuterol 90 mcg/inh inhalation aerosol with adapter: inhaled 4 times a day, As Needed      Vital Signs:   T(F): 98 (07-19-19 @ 12:13), Max: 98 (07-19-19 @ 12:13)  HR: 61 (07-19-19 @ 14:20) (49 - 97)  BP: 121/80 (07-19-19 @ 14:20) (88/53 - 121/80)  RR: 16 (07-19-19 @ 14:20) (15 - 18)  SpO2: 100% (07-19-19 @ 14:00) (97% - 100%)      07-18-19 @ 07:01  -  07-19-19 @ 07:00  --------------------------------------------------------  IN: 100 mL / OUT: 250 mL / NET: -150 mL    07-19-19 @ 07:01  -  07-19-19 @ 15:11  --------------------------------------------------------  IN: 500 mL / OUT: 0 mL / NET: 500 mL        Physical Exam:   GENERAL: NAD  HEENT: NCAT  CHEST/LUNG: CTAB  HEART: Regular rate and rhythm; s1 s2 appreciated, No murmurs, rubs, or gallops  ABDOMEN: Soft, Nontender, Nondistended; Bowel sounds present  EXTREMITIES: No LE edema b/l  NERVOUS SYSTEM:  Alert & Oriented X3        Labs:                         8.1    10.89 )-----------( 302      ( 19 Jul 2019 05:42 )             26.9     Neutophil% 92.3, Lymphocyte% 4.3, Monocyte% 2.6, Bands% 0.7 07-19-19 @ 05:42    19 Jul 2019 05:42    139    |  108    |  11     ----------------------------<  139    3.9     |  20     |  0.6      Ca    8.3        19 Jul 2019 05:42  Mg     1.9       19 Jul 2019 05:42    TPro  7.5    /  Alb  3.5    /  TBili  0.3    /  DBili  x      /  AST  11     /  ALT  9      /  AlkPhos  78     17 Jul 2019 15:35              Troponin <0.01, CKMB --, CK -- 07-17-19 @ 15:35    Iron 11, TIBC 283, %Sat 4 Ferritin 52 07-18-19 @ 05:19      Urinalysis Basic - ( 17 Jul 2019 20:10 )    Color: Light Yellow / Appearance: Clear / SG: >1.050 / pH: x  Gluc: x / Ketone: Small  / Bili: Negative / Urobili: <2 mg/dL   Blood: x / Protein: Trace / Nitrite: Negative   Leuk Esterase: Small / RBC: 2 /HPF / WBC 25 /HPF   Sq Epi: x / Non Sq Epi: 1 /HPF / Bacteria: Few          Culture - Blood (collected 07-17-19 @ 15:14)  Source: .Blood Blood-Peripheral  Preliminary Report (07-18-19 @ 22:01):    No growth to date.    Culture - Blood (collected 07-17-19 @ 15:13)  Source: .Blood Blood-Peripheral  Preliminary Report (07-18-19 @ 22:01):    No growth to date. Hospital Day:  2d    Subjective:    Patient is a 29y old  female with past medical history of asthma (uses albuterol as needed) and UC (as per the patient was diagnosed on colonoscopy at [facility]) who came to ED with complains of nausea, vomiting and loose diarrhea for past 5 days. She says she has been continuously on prednisone 20 mg daily for UC for 3 years, she says she does not follow any particular physician, does not have any GI doctor has been using 's prednisone to control UC. States she has been trying to wean off prednisone and for 5 days has been having loose stools almost every 5 minutes, small in amount and watery. She feels abdominal pain, has been vomiting and at one occasion small amount of blood came out. Overnight the pt had an episode of asthma, for which she received Duoneb with improvement. This morning, the pt was resting comfortably and c/o of abdominal pain not controlled with morphine, endorses nausea and weakness. Denied c/p, sob, h/a, dizziness.        Past Medical Hx:   Asthma  Ulcerative colitis    Past Sx:  No significant past surgical history    Allergies:  penicillin (Rash)    Current Meds:   Standng Meds:  chlorhexidine 4% Liquid 1 Application(s) Topical <User Schedule>  ciprofloxacin   IVPB 400 milliGRAM(s) IV Intermittent every 12 hours  ciprofloxacin   IVPB      methylPREDNISolone sodium succinate Injectable 20 milliGRAM(s) IV Push every 8 hours  metroNIDAZOLE  IVPB      metroNIDAZOLE  IVPB 500 milliGRAM(s) IV Intermittent every 8 hours  pantoprazole  Injectable 40 milliGRAM(s) IV Push two times a day  sodium chloride 0.9%. 1000 milliLiter(s) (100 mL/Hr) IV Continuous <Continuous>    PRN Meds:  acetaminophen   Tablet .. 650 milliGRAM(s) Oral every 6 hours PRN Temp greater or equal to 38C (100.4F)  ALBUTerol    90 MICROgram(s) HFA Inhaler 2 Puff(s) Inhalation every 6 hours PRN Shortness of Breath and/or Wheezing  morphine  - Injectable 2 milliGRAM(s) IV Push every 4 hours PRN Severe Pain (7 - 10)  ondansetron Injectable 4 milliGRAM(s) IV Push three times a day PRN Nausea and/or Vomiting    HOME MEDICATIONS:  albuterol 90 mcg/inh inhalation aerosol with adapter: inhaled 4 times a day, As Needed      Vital Signs:   T(F): 98 (07-19-19 @ 12:13), Max: 98 (07-19-19 @ 12:13)  HR: 61 (07-19-19 @ 14:20) (49 - 97)  BP: 121/80 (07-19-19 @ 14:20) (88/53 - 121/80)  RR: 16 (07-19-19 @ 14:20) (15 - 18)  SpO2: 100% (07-19-19 @ 14:00) (97% - 100%)      07-18-19 @ 07:01  -  07-19-19 @ 07:00  --------------------------------------------------------  IN: 100 mL / OUT: 250 mL / NET: -150 mL    07-19-19 @ 07:01  -  07-19-19 @ 15:11  --------------------------------------------------------  IN: 500 mL / OUT: 0 mL / NET: 500 mL        Physical Exam:   GENERAL: NAD, thin, weak, tired, younger than stated age  HEENT: NCAT  CHEST/LUNG: CTAB  HEART: Regular rate and rhythm; s1 s2 appreciated, No murmurs, rubs, or gallops  ABDOMEN: Soft, mildly tender to palpation, Nondistended; Bowel sounds present  EXTREMITIES: No LE edema b/l  NERVOUS SYSTEM:  Alert & Oriented X3        Labs:                         8.1    10.89 )-----------( 302      ( 19 Jul 2019 05:42 )             26.9     Neutophil% 92.3, Lymphocyte% 4.3, Monocyte% 2.6, Bands% 0.7 07-19-19 @ 05:42    19 Jul 2019 05:42    139    |  108    |  11     ----------------------------<  139    3.9     |  20     |  0.6      Ca    8.3        19 Jul 2019 05:42  Mg     1.9       19 Jul 2019 05:42    TPro  7.5    /  Alb  3.5    /  TBili  0.3    /  DBili  x      /  AST  11     /  ALT  9      /  AlkPhos  78     17 Jul 2019 15:35              Troponin <0.01, CKMB --, CK -- 07-17-19 @ 15:35    Iron 11, TIBC 283, %Sat 4 Ferritin 52 07-18-19 @ 05:19      Urinalysis Basic - ( 17 Jul 2019 20:10 )    Color: Light Yellow / Appearance: Clear / SG: >1.050 / pH: x  Gluc: x / Ketone: Small  / Bili: Negative / Urobili: <2 mg/dL   Blood: x / Protein: Trace / Nitrite: Negative   Leuk Esterase: Small / RBC: 2 /HPF / WBC 25 /HPF   Sq Epi: x / Non Sq Epi: 1 /HPF / Bacteria: Few          Culture - Blood (collected 07-17-19 @ 15:14)  Source: .Blood Blood-Peripheral  Preliminary Report (07-18-19 @ 22:01):    No growth to date.    Culture - Blood (collected 07-17-19 @ 15:13)  Source: .Blood Blood-Peripheral  Preliminary Report (07-18-19 @ 22:01):    No growth to date.

## 2019-07-19 NOTE — PROGRESS NOTE ADULT - SUBJECTIVE AND OBJECTIVE BOX
GREYSON GARZA MRN-[id number]    Hospitalist Note  28yo F with Past Medical History Asthma and Ulcerative Colitis ([facility] admitted with nausea, vomiting, and loose diarrhea for x5 days secondary to ulcerative colitis exacerbation.  The patient admits that she stopped taking Prednisone x1 week prior to admission.  She has been non-compliant with regular GI follow-ups.    Overnight events/Updates: She underwent diagnostic sigmoid scope this afternoon (report pending).    Vital Signs Last 24 Hrs  T(C): 36.3 (19 Jul 2019 16:03), Max: 36.7 (19 Jul 2019 12:13)  T(F): 97.3 (19 Jul 2019 16:03), Max: 98 (19 Jul 2019 12:13)  HR: 70 (19 Jul 2019 16:03) (49 - 76)  BP: 101/70 (19 Jul 2019 16:03) (88/53 - 121/80)  BP(mean): --  RR: 17 (19 Jul 2019 16:03) (15 - 18)  SpO2: 100% (19 Jul 2019 14:00) (99% - 100%)    Physical Examination:  General: AAO x 3  HEENT: PERRLA, EOMI  CV= S1 & S2 appreciated  Lungs= CTA BL  Abdominal Examination= + BS, Soft, + lower abdominal tenderness  Extremity Examination= No C/C/E    ROS: No chest pain, no shortness of breath.  All other systems reviewed and are within normal limits except for the complaints in the HPI.    MEDICATIONS  (STANDING):  chlorhexidine 4% Liquid 1 Application(s) Topical <User Schedule>  ciprofloxacin   IVPB 400 milliGRAM(s) IV Intermittent every 12 hours   methylPREDNISolone sodium succinate Injectable 20 milliGRAM(s) IV Push every 8 hours  metroNIDAZOLE  IVPB 500 milliGRAM(s) IV Intermittent every 8 hours  pantoprazole  Injectable 40 milliGRAM(s) IV Push two times a day  sodium chloride 0.9%. 1000 milliLiter(s) (100 mL/Hr) IV Continuous <Continuous>    MEDICATIONS  (PRN):  acetaminophen   Tablet .. 650 milliGRAM(s) Oral every 6 hours PRN Temp greater or equal to 38C (100.4F)  ALBUTerol    90 MICROgram(s) HFA Inhaler 2 Puff(s) Inhalation every 6 hours PRN Shortness of Breath and/or Wheezing  morphine  - Injectable 2 milliGRAM(s) IV Push every 4 hours PRN Severe Pain (7 - 10)  ondansetron Injectable 4 milliGRAM(s) IV Push three times a day PRN Nausea and/or Vomiting                            8.1    10.89 )-----------( 302      ( 19 Jul 2019 05:42 )             26.9     07-19    139  |  108  |  11  ----------------------------<  139<H>  3.9   |  20  |  0.6<L>    Ca    8.3<L>      19 Jul 2019 05:42  Mg     1.9     07-19        Case discussed with housestaff & family  SILVIO Maciel [id number]

## 2019-07-19 NOTE — PROGRESS NOTE ADULT - ASSESSMENT
29 year old female with past medical history of asthma ( uses albuterol as needed) and history of UC (as per the patient [facility]- have asked  to bring papers from home tomorrow morning) presents with complains of nausea, vomiting and loose diarrhea for past 5 days.     # Ulcerative colitis flare  - Loose stools (=6 per day) with severe cramps accompanied with fever 100.9 C,  tachycardia 121, anemia  - Stopped taking Prednisone (20 mg from father a few weeks)  - CT scan evident for colitis.  - C. diff negative  - ESR 58,   - Decrease Solumedrol to 20 mg IV q8  - Continue Cipro and Flagyl  - Start clears and advance as tolerated  - f/u QuantiFeron, Hep B and C  - GI following - schedule for flexible sigmoidoscopy tomorrow, 7/19/19  - Pain control   - Will contact [facility] for colonoscopy records    # Asthma - Controlled  - Albuterol PRN    # L4 vertebral body hemangioma  - Incidental finding on CT scan. Asymptomatic  - Outpatient follow up if required.     GI ppx - Protonix  DVT ppx - Lovenox    Dispo: from home 29 year old female with past medical history of asthma ( uses albuterol as needed) and history of UC (as per the patient [facility]- have asked  to bring papers from home tomorrow morning) presents with complains of nausea, vomiting and loose diarrhea for past 5 days.     # Ulcerative colitis flare  - Loose stools (=6 per day) with severe cramps, afebrile since admitted to medicine  - Stopped taking Prednisone (20 mg from father a few weeks)  - CT scan evident for colitis.  - C. diff negative  - ESR 58,   - Decrease Solumedrol to 20 mg IV q8  - Continue Cipro and Flagyl  - Start clears and advance as tolerated  - f/u QuantiFeron,  - Hep B and C nonreactive  - GI following  -schedule for flexible sigmoidoscopy today  - Pain control   - Will contact [facility] for colonoscopy records  -H/H 8.1 <- 10.7, monitor cbc  -UCx neg, bl cx NTD  -UA small bac, small leuk  -Pending stool cx      # Asthma - Controlled  - Albuterol PRN    # L4 vertebral body hemangioma  - Incidental finding on CT scan. Asymptomatic  - Outpatient follow up if required.     GI ppx - Protonix  DVT ppx - Lovenox    Dispo: from home

## 2019-07-20 LAB
ANION GAP SERPL CALC-SCNC: 11 MMOL/L — SIGNIFICANT CHANGE UP (ref 7–14)
BASOPHILS # BLD AUTO: 0.01 K/UL — SIGNIFICANT CHANGE UP (ref 0–0.2)
BASOPHILS NFR BLD AUTO: 0.1 % — SIGNIFICANT CHANGE UP (ref 0–1)
BUN SERPL-MCNC: 16 MG/DL — SIGNIFICANT CHANGE UP (ref 10–20)
CALCIUM SERPL-MCNC: 7.8 MG/DL — LOW (ref 8.5–10.1)
CHLORIDE SERPL-SCNC: 107 MMOL/L — SIGNIFICANT CHANGE UP (ref 98–110)
CO2 SERPL-SCNC: 22 MMOL/L — SIGNIFICANT CHANGE UP (ref 17–32)
CREAT SERPL-MCNC: 0.5 MG/DL — LOW (ref 0.7–1.5)
EOSINOPHIL # BLD AUTO: 0 K/UL — SIGNIFICANT CHANGE UP (ref 0–0.7)
EOSINOPHIL NFR BLD AUTO: 0 % — SIGNIFICANT CHANGE UP (ref 0–8)
GAMMA INTERFERON BACKGROUND BLD IA-ACNC: 0.01 IU/ML — SIGNIFICANT CHANGE UP
GLUCOSE SERPL-MCNC: 200 MG/DL — HIGH (ref 70–99)
HCT VFR BLD CALC: 23.3 % — LOW (ref 37–47)
HGB BLD-MCNC: 7.2 G/DL — LOW (ref 12–16)
IMM GRANULOCYTES NFR BLD AUTO: 0.4 % — HIGH (ref 0.1–0.3)
LYMPHOCYTES # BLD AUTO: 0.37 K/UL — LOW (ref 1.2–3.4)
LYMPHOCYTES # BLD AUTO: 5.1 % — LOW (ref 20.5–51.1)
M TB IFN-G BLD-IMP: ABNORMAL
M TB IFN-G CD4+ BCKGRND COR BLD-ACNC: 0 IU/ML — SIGNIFICANT CHANGE UP
M TB IFN-G CD4+CD8+ BCKGRND COR BLD-ACNC: 0 IU/ML — SIGNIFICANT CHANGE UP
MCHC RBC-ENTMCNC: 22 PG — LOW (ref 27–31)
MCHC RBC-ENTMCNC: 30.9 G/DL — LOW (ref 32–37)
MCV RBC AUTO: 71.3 FL — LOW (ref 81–99)
MONOCYTES # BLD AUTO: 0.19 K/UL — SIGNIFICANT CHANGE UP (ref 0.1–0.6)
MONOCYTES NFR BLD AUTO: 2.6 % — SIGNIFICANT CHANGE UP (ref 1.7–9.3)
NEUTROPHILS # BLD AUTO: 6.64 K/UL — HIGH (ref 1.4–6.5)
NEUTROPHILS NFR BLD AUTO: 91.8 % — HIGH (ref 42.2–75.2)
NRBC # BLD: 0 /100 WBCS — SIGNIFICANT CHANGE UP (ref 0–0)
PLATELET # BLD AUTO: 266 K/UL — SIGNIFICANT CHANGE UP (ref 130–400)
POTASSIUM SERPL-MCNC: 3.4 MMOL/L — LOW (ref 3.5–5)
POTASSIUM SERPL-SCNC: 3.4 MMOL/L — LOW (ref 3.5–5)
QUANT TB PLUS MITOGEN MINUS NIL: 0.01 IU/ML — SIGNIFICANT CHANGE UP
RBC # BLD: 3.27 M/UL — LOW (ref 4.2–5.4)
RBC # FLD: 16.2 % — HIGH (ref 11.5–14.5)
SODIUM SERPL-SCNC: 140 MMOL/L — SIGNIFICANT CHANGE UP (ref 135–146)
WBC # BLD: 7.24 K/UL — SIGNIFICANT CHANGE UP (ref 4.8–10.8)
WBC # FLD AUTO: 7.24 K/UL — SIGNIFICANT CHANGE UP (ref 4.8–10.8)

## 2019-07-20 PROCEDURE — 99233 SBSQ HOSP IP/OBS HIGH 50: CPT

## 2019-07-20 PROCEDURE — 99232 SBSQ HOSP IP/OBS MODERATE 35: CPT

## 2019-07-20 RX ORDER — SENNA PLUS 8.6 MG/1
2 TABLET ORAL AT BEDTIME
Refills: 0 | Status: DISCONTINUED | OUTPATIENT
Start: 2019-07-20 | End: 2019-07-21

## 2019-07-20 RX ORDER — MESALAMINE 400 MG
800 TABLET, DELAYED RELEASE (ENTERIC COATED) ORAL
Refills: 0 | Status: DISCONTINUED | OUTPATIENT
Start: 2019-07-20 | End: 2019-07-22

## 2019-07-20 RX ORDER — PHENYLEPHRINE-SHARK LIVER OIL-MINERAL OIL-PETROLATUM RECTAL OINTMENT
1 OINTMENT (GRAM) RECTAL
Refills: 0 | Status: DISCONTINUED | OUTPATIENT
Start: 2019-07-20 | End: 2019-07-20

## 2019-07-20 RX ORDER — HYDROCORTISONE 1 %
1 OINTMENT (GRAM) TOPICAL EVERY 12 HOURS
Refills: 0 | Status: DISCONTINUED | OUTPATIENT
Start: 2019-07-20 | End: 2019-07-22

## 2019-07-20 RX ORDER — POTASSIUM CHLORIDE 20 MEQ
40 PACKET (EA) ORAL ONCE
Refills: 0 | Status: COMPLETED | OUTPATIENT
Start: 2019-07-20 | End: 2019-07-20

## 2019-07-20 RX ADMIN — SENNA PLUS 2 TABLET(S): 8.6 TABLET ORAL at 21:13

## 2019-07-20 RX ADMIN — Medication 20 MILLIGRAM(S): at 05:20

## 2019-07-20 RX ADMIN — Medication 800 MILLIGRAM(S): at 23:06

## 2019-07-20 RX ADMIN — Medication 100 MILLIGRAM(S): at 21:13

## 2019-07-20 RX ADMIN — MORPHINE SULFATE 2 MILLIGRAM(S): 50 CAPSULE, EXTENDED RELEASE ORAL at 22:30

## 2019-07-20 RX ADMIN — MORPHINE SULFATE 2 MILLIGRAM(S): 50 CAPSULE, EXTENDED RELEASE ORAL at 22:16

## 2019-07-20 RX ADMIN — PANTOPRAZOLE SODIUM 40 MILLIGRAM(S): 20 TABLET, DELAYED RELEASE ORAL at 17:21

## 2019-07-20 RX ADMIN — MORPHINE SULFATE 2 MILLIGRAM(S): 50 CAPSULE, EXTENDED RELEASE ORAL at 13:46

## 2019-07-20 RX ADMIN — Medication 200 MILLIGRAM(S): at 05:21

## 2019-07-20 RX ADMIN — Medication 1 SUPPOSITORY(S): at 17:22

## 2019-07-20 RX ADMIN — Medication 40 MILLIEQUIVALENT(S): at 13:18

## 2019-07-20 RX ADMIN — MORPHINE SULFATE 2 MILLIGRAM(S): 50 CAPSULE, EXTENDED RELEASE ORAL at 09:34

## 2019-07-20 RX ADMIN — Medication 800 MILLIGRAM(S): at 17:21

## 2019-07-20 RX ADMIN — CHLORHEXIDINE GLUCONATE 1 APPLICATION(S): 213 SOLUTION TOPICAL at 05:21

## 2019-07-20 RX ADMIN — Medication 200 MILLIGRAM(S): at 17:21

## 2019-07-20 RX ADMIN — Medication 20 MILLIGRAM(S): at 21:14

## 2019-07-20 RX ADMIN — MORPHINE SULFATE 2 MILLIGRAM(S): 50 CAPSULE, EXTENDED RELEASE ORAL at 18:10

## 2019-07-20 RX ADMIN — Medication 100 MILLIGRAM(S): at 05:21

## 2019-07-20 RX ADMIN — MORPHINE SULFATE 2 MILLIGRAM(S): 50 CAPSULE, EXTENDED RELEASE ORAL at 05:28

## 2019-07-20 RX ADMIN — Medication 20 MILLIGRAM(S): at 13:11

## 2019-07-20 RX ADMIN — PANTOPRAZOLE SODIUM 40 MILLIGRAM(S): 20 TABLET, DELAYED RELEASE ORAL at 05:27

## 2019-07-20 RX ADMIN — Medication 100 MILLIGRAM(S): at 13:10

## 2019-07-20 NOTE — PROGRESS NOTE ADULT - ASSESSMENT
Assessment and Plan:    29 year old female with past medical history of asthma ( uses albuterol as needed) and history of UC (as per the patient was [facility] presents with complains of nausea, vomiting and loose diarrhea for past 5 days admitted for UC flare.    # Ulcerative colitis flare s/p flex sigmoidoscopy, improving  - less frequent BM that are more formed  - flex sig shows:  no perirectal disease and was suggestive more of Crohn's disease rather than UC. Biopsy taken.  - patient tolerating food well, diet advanced to regular   - c/w Solumedrol to 20 mg IV q8 (previously on 20mg prednisone which she stopped taking)  - Continue Cipro and Flagyl  - CT scan evident for colitis.  - C. diff negative  - ESR 58  - f/u QuantiFeron,  - Hep B and C nonreactive  - Pain control   - Will contact [facility] for colonoscopy records  -UCx neg, bl cx NTD  -UA small bac, small leuk      # Asthma - Controlled  - Albuterol PRN    # L4 vertebral body hemangioma  - Incidental finding on CT scan. Asymptomatic  - Outpatient follow up if required.     GI ppx - Protonix  DVT ppx - Lovenox    Dispo: from home

## 2019-07-20 NOTE — PROGRESS NOTE ADULT - ASSESSMENT
29F PMHx ulcerative colitis, asthma here with SIRS, now resolved, suspected due to UC flare.    1. UC flare, pt diagnosed 2016, noncompliant with medications except for prednisone due to side effects  much improved today, advance to reg diet  cont solumedrol 20 q8 f/u GI re: change to po  s/p flex sigmoidoscopy pending report  serology sent in preparation for remicaide  stool cx pending, cont cipro, flagyl plan for 7 day course  pain control  2. Asthma  controlled on albuterol prn  3. DVT ppx  ambulation    #Progress Note Handoff:  Pending (specify):  Consults_________, Tests________, Test Results_______, Other____iv steroids_____  Family discussion:d/w pt at bedside  Disposition: Home_x__/SNF___/Other________/Unknown at this time________

## 2019-07-20 NOTE — PROGRESS NOTE ADULT - SUBJECTIVE AND OBJECTIVE BOX
Patient is a 29y old  Female who presents with a chief complaint of Diarrhea and Vomiting (20 Jul 2019 08:51)      SUBJECTIVE / OVERNIGHT EVENTS:  no cp, sob, fever  abd pain improving, sharp, crampy, nonradiating, worsen to touch    MEDICATIONS  (STANDING):  chlorhexidine 4% Liquid 1 Application(s) Topical <User Schedule>  ciprofloxacin   IVPB      ciprofloxacin   IVPB 400 milliGRAM(s) IV Intermittent every 12 hours  methylPREDNISolone sodium succinate Injectable 20 milliGRAM(s) IV Push every 8 hours  metroNIDAZOLE  IVPB      metroNIDAZOLE  IVPB 500 milliGRAM(s) IV Intermittent every 8 hours  pantoprazole  Injectable 40 milliGRAM(s) IV Push two times a day  senna 2 Tablet(s) Oral at bedtime  sodium chloride 0.9%. 1000 milliLiter(s) (100 mL/Hr) IV Continuous <Continuous>    MEDICATIONS  (PRN):  acetaminophen   Tablet .. 650 milliGRAM(s) Oral every 6 hours PRN Temp greater or equal to 38C (100.4F)  ALBUTerol    90 MICROgram(s) HFA Inhaler 2 Puff(s) Inhalation every 6 hours PRN Shortness of Breath and/or Wheezing  hemorrhoidal Ointment 1 Application(s) Rectal four times a day PRN pain  morphine  - Injectable 2 milliGRAM(s) IV Push every 4 hours PRN Severe Pain (7 - 10)  ondansetron Injectable 4 milliGRAM(s) IV Push three times a day PRN Nausea and/or Vomiting        CAPILLARY BLOOD GLUCOSE        I&O's Summary    19 Jul 2019 07:01  -  20 Jul 2019 07:00  --------------------------------------------------------  IN: 1820 mL / OUT: 400 mL / NET: 1420 mL        PHYSICAL EXAM:  GENERAL: nad, a+o x3  EYES:  NECK:   CHEST/LUNG: ctab no w/r/r  HEART: rrr no m/g/r  ABDOMEN: soft ttp LLQ, nd  EXTREMITIES:  no edema bl  PSYCH:   NEUROLOGY:   SKIN:    LABS:                        7.2    7.24  )-----------( 266      ( 20 Jul 2019 07:17 )             23.3     07-20    140  |  107  |  16  ----------------------------<  200<H>  3.4<L>   |  22  |  0.5<L>    Ca    7.8<L>      20 Jul 2019 07:17  Mg     1.9     07-19                RADIOLOGY & ADDITIONAL TESTS:    Imaging Personally Reviewed:  Yes    Consultant(s) Notes Reviewed:      Care Discussed with Consultants/Other Providers:    Assessment and Plan   PAST MEDICAL & SURGICAL HISTORY:  Asthma  Ulcerative colitis  No significant past surgical history

## 2019-07-20 NOTE — PROGRESS NOTE ADULT - ASSESSMENT
29 year old female with past medical history of UC diagnosed 3 years ago, steroid dependent since 3 years , presented with 5 days of vomiting and intermittent bloody diarrhea  after she stopped prednisone one week ago. WBC on admission 14k, now normal. Hg on admission was 10.7 and today is 7.2. C diff neg, GI PCR pending, CT abdomen showed diffuse colitis.      Ulcerative colitis flare   advance diet as tolerated  check  Fecal calprotectin , ova and parasite , GI PCR  , entameba serology,  stool culture   Zofran PRN for nausea  pain control as needed  c/w IV Cipro and Flagyl  c/w Solumedrol 20mg Q8hrs.  Screening for HAV, HBV, HCV neg, quantifieron pending  S/p Flexible sigmoidoscopy on 7/20/2019 and reports pending. 29 year old female with past medical history of UC diagnosed 3 years ago, steroid dependent since 3 years , presented with 5 days of vomiting and intermittent bloody diarrhea  after she stopped prednisone one week ago. WBC on admission 14k, now normal. Hg on admission was 10.7 and today is 7.2. C diff neg, GI PCR pending, CT abdomen showed diffuse colitis. Flex sig showed no perirectal disease and was suggestive more of Crohn's disease rather than UC. Biopsy taken.      IBD flare: Undiagnosed Crohn's vs UC flare (Undiagnosed)  advance diet as tolerated  Pending  Fecal calprotectin , ova and parasite , GI PCR .  Zofran PRN for nausea  pain control as needed  c/w IV Cipro and Flagyl  c/w Solumedrol 20mg Q8hrs.  Screening for HAV, HBV, HCV neg but quantifieron pending  will follow up. 29 year old female with past medical history of UC diagnosed 3 years ago, steroid dependent since 3 years , presented with 5 days of vomiting and intermittent bloody diarrhea  after she stopped prednisone one week ago. WBC on admission 14k, now normal. Hg on admission was 10.7 and today is 7.2. C diff neg, GI PCR pending, CT abdomen showed diffuse colitis. Flex sig showed no perirectal disease and was suggestive more of Crohn's disease rather than UC. Biopsy taken.      IBD flare: Undiagnosed Crohn's vs UC flare (Undiagnosed)  switch to clear liquids  start Delzicol 800 mg 4 times a day  Pending  Fecal calprotectin , ova and parasite , GI PCR .  Zofran PRN for nausea  pain control as needed  c/w IV Cipro and Flagyl  c/w Solumedrol 20mg Q8hrs.  Screening for HAV, HBV, HCV neg but quantifieron pending  will follow up.

## 2019-07-20 NOTE — PROGRESS NOTE ADULT - SUBJECTIVE AND OBJECTIVE BOX
Hospital Day:  3d    Subjective:    Patient is a 29y old  Female who presents with a chief complaint of Diarrhea and Vomiting admitted for UC flare. Overnight no acute events. This morning resting comfortably. She states her diarrhea is improving - less BM and more formed. She denies fevers, chills, CP, SOB, n/v, urinary sxs.      Past Medical Hx:   Asthma  Ulcerative colitis    Past Sx:  No significant past surgical history    Allergies:  penicillin (Rash)    Current Meds:   Standng Meds:  chlorhexidine 4% Liquid 1 Application(s) Topical <User Schedule>  ciprofloxacin   IVPB      ciprofloxacin   IVPB 400 milliGRAM(s) IV Intermittent every 12 hours  hydrocortisone hemorrhoidal Suppository 1 Suppository(s) Rectal every 12 hours  mesalamine DR Capsule 800 milliGRAM(s) Oral four times a day  methylPREDNISolone sodium succinate Injectable 20 milliGRAM(s) IV Push every 8 hours  metroNIDAZOLE  IVPB      metroNIDAZOLE  IVPB 500 milliGRAM(s) IV Intermittent every 8 hours  pantoprazole  Injectable 40 milliGRAM(s) IV Push two times a day  senna 2 Tablet(s) Oral at bedtime  sodium chloride 0.9%. 1000 milliLiter(s) (100 mL/Hr) IV Continuous <Continuous>    PRN Meds:  acetaminophen   Tablet .. 650 milliGRAM(s) Oral every 6 hours PRN Temp greater or equal to 38C (100.4F)  ALBUTerol    90 MICROgram(s) HFA Inhaler 2 Puff(s) Inhalation every 6 hours PRN Shortness of Breath and/or Wheezing  morphine  - Injectable 2 milliGRAM(s) IV Push every 4 hours PRN Severe Pain (7 - 10)  ondansetron Injectable 4 milliGRAM(s) IV Push three times a day PRN Nausea and/or Vomiting    HOME MEDICATIONS:  albuterol 90 mcg/inh inhalation aerosol with adapter: inhaled 4 times a day, As Needed      Vital Signs:   T(F): 96.6 (07-20-19 @ 07:36), Max: 98.3 (07-20-19 @ 00:00)  HR: 62 (07-20-19 @ 07:36) (62 - 87)  BP: 93/55 (07-20-19 @ 07:36) (93/55 - 101/52)  RR: 18 (07-20-19 @ 07:36) (17 - 18)  SpO2: --      07-19-19 @ 07:01  -  07-20-19 @ 07:00  --------------------------------------------------------  IN: 1820 mL / OUT: 400 mL / NET: 1420 mL        Physical Exam:   GENERAL: NAD  HEENT: NCAT  CHEST/LUNG: CTA bl  HEART: Regular rate and rhythm; s1 s2 appreciated, No murmurs, rubs, or gallops  ABDOMEN: tenderness to LLQ, Soft, Nondistended; Bowel sounds present  EXTREMITIES: No LE edema b/l  NERVOUS SYSTEM:  Alert & Oriented X3        Labs:                         7.2    7.24  )-----------( 266      ( 20 Jul 2019 07:17 )             23.3     Neutophil% 91.8, Lymphocyte% 5.1, Monocyte% 2.6, Bands% 0.4 07-20-19 @ 07:17    20 Jul 2019 07:17    140    |  107    |  16     ----------------------------<  200    3.4     |  22     |  0.5      Ca    7.8        20 Jul 2019 07:17  Mg     1.9       19 Jul 2019 05:42                Troponin <0.01, CKMB --, CK -- 07-17-19 @ 15:35    Iron 11, TIBC 283, %Sat 4 Ferritin 52 07-18-19 @ 05:19      Urinalysis Basic - ( 17 Jul 2019 20:10 )    Color: Light Yellow / Appearance: Clear / SG: >1.050 / pH: x  Gluc: x / Ketone: Small  / Bili: Negative / Urobili: <2 mg/dL   Blood: x / Protein: Trace / Nitrite: Negative   Leuk Esterase: Small / RBC: 2 /HPF / WBC 25 /HPF   Sq Epi: x / Non Sq Epi: 1 /HPF / Bacteria: Few          Culture - Blood (collected 07-17-19 @ 15:14)  Source: .Blood Blood-Peripheral  Preliminary Report (07-18-19 @ 22:01):    No growth to date.    Culture - Blood (collected 07-17-19 @ 15:13)  Source: .Blood Blood-Peripheral  Preliminary Report (07-18-19 @ 22:01):    No growth to date.        Radiology:

## 2019-07-20 NOTE — PROGRESS NOTE ADULT - SUBJECTIVE AND OBJECTIVE BOX
Hospital Day # 3d    Gastroenterology team is following this patient for UC flare    Interval Events:     PAST MEDICAL & SURGICAL HISTORY  Asthma  Ulcerative colitis  No significant past surgical history      FAMILY HISTORY:  No H/O IBD in family.    SOCIAL HISTORY:  Marijuana use      ALLERGIES:  penicillin (Rash)      MEDICATIONS:  MEDICATIONS  (STANDING):  chlorhexidine 4% Liquid 1 Application(s) Topical <User Schedule>  ciprofloxacin   IVPB      ciprofloxacin   IVPB 400 milliGRAM(s) IV Intermittent every 12 hours  methylPREDNISolone sodium succinate Injectable 20 milliGRAM(s) IV Push every 8 hours  metroNIDAZOLE  IVPB      metroNIDAZOLE  IVPB 500 milliGRAM(s) IV Intermittent every 8 hours  pantoprazole  Injectable 40 milliGRAM(s) IV Push two times a day  sodium chloride 0.9%. 1000 milliLiter(s) (100 mL/Hr) IV Continuous <Continuous>    MEDICATIONS  (PRN):  acetaminophen   Tablet .. 650 milliGRAM(s) Oral every 6 hours PRN Temp greater or equal to 38C (100.4F)  ALBUTerol    90 MICROgram(s) HFA Inhaler 2 Puff(s) Inhalation every 6 hours PRN Shortness of Breath and/or Wheezing  morphine  - Injectable 2 milliGRAM(s) IV Push every 4 hours PRN Severe Pain (7 - 10)  ondansetron Injectable 4 milliGRAM(s) IV Push three times a day PRN Nausea and/or Vomiting      REVIEW OF SYSTEMS:    CONSTITUTIONAL: No fever  EYES/ENT: No scleral icterus  RESPIRATORY: No shortness of breath  CARDIOVASCULAR: No chest pain   GASTROINTESTINAL: loose stools.  GENITOURINARY: No dysuria  NEUROLOGICAL: No dizziness  SKIN: No cyanosis        VITALS:   T(F): 96.6 (07-20 @ 07:36), Max: 100.9 (07-17 @ 15:05)  HR: 62 (07-20 @ 07:36) (49 - 121)  BP: 93/55 (07-20 @ 07:36) (88/53 - 121/80)  BP(mean): --  RR: 18 (07-20 @ 07:36) (15 - 18)  SpO2: 100% (07-19 @ 14:00) (96% - 100%)    I&O's Summary    19 Jul 2019 07:01  -  20 Jul 2019 07:00  --------------------------------------------------------  IN: 1820 mL / OUT: 400 mL / NET: 1420 mL        Physical Exam:  GENERAL: NAD, well-developed  HEAD:  Atraumatic, Normocephalic  EYES: EOMI, PERRLA, conjunctiva and sclera clear  NECK: No thyromegaly  CHEST/LUNG: No accessory use of muscle  HEART: S1S2 Normal  ABDOMEN: Soft, Nontender, Nondistended; Bowel sounds present, no guarding or rigidity.  EXTREMITIES:  2+ Peripheral Pulses, No cyanosis  PSYCH: AAOx3  NEUROLOGY: non-focal      LABS:                        7.2    7.24  )-----------( 266      ( 20 Jul 2019 07:17 )             23.3       PT/INR - ( 17 Jul 2019 15:35 )  INR: 1.11          PTT - ( 17 Jul 2019 15:35 )  PTT:28.6   LIVER FUNCTIONS - ( 17 Jul 2019 15:35 )  Alb: 3.5 g/dL / Pro: 7.5 g/dL / ALK PHOS: 78 U/L / ALT: 9 U/L / AST: 11 U/L / GGT: x           LIVER FUNCTIONS - ( 17 Jul 2019 15:35 )  Alb: 3.5 [3.5 - 5.2] / Pro: 7.5 [6.0 - 8.0] / ALK PHOS: 78 [30 - 115] / ALT: 9 [0 - 41] / AST: 11 [0 - 41] / GGT: x       07-20    140  |  107  |  16  ----------------------------<  200<H>  3.4<L>   |  22  |  0.5<L>    Ca    7.8<L>      20 Jul 2019 07:17  Mg     1.9     07-19                  RADIOLOGY:    CT Abdomen and Pelvis with contrast: Diffuse colonic wall thickening with hyper-enhanced mucosa, colitis. Hospital Day # 3d    Gastroenterology team is following this patient for IBD flare     Interval Events: Patient had a flex sigmoidoscopy yesterday and now her abdominal pain is better. Vomiting resolved and eating soft food.    PAST MEDICAL & SURGICAL HISTORY  Asthma  Ulcerative colitis  No significant past surgical history      FAMILY HISTORY:  Crohns diagnosed recently in mom    SOCIAL HISTORY:  Marijuana use      ALLERGIES:  penicillin (Rash)      MEDICATIONS:  MEDICATIONS  (STANDING):  chlorhexidine 4% Liquid 1 Application(s) Topical <User Schedule>  ciprofloxacin   IVPB      ciprofloxacin   IVPB 400 milliGRAM(s) IV Intermittent every 12 hours  methylPREDNISolone sodium succinate Injectable 20 milliGRAM(s) IV Push every 8 hours  metroNIDAZOLE  IVPB      metroNIDAZOLE  IVPB 500 milliGRAM(s) IV Intermittent every 8 hours  pantoprazole  Injectable 40 milliGRAM(s) IV Push two times a day  sodium chloride 0.9%. 1000 milliLiter(s) (100 mL/Hr) IV Continuous <Continuous>    MEDICATIONS  (PRN):  acetaminophen   Tablet .. 650 milliGRAM(s) Oral every 6 hours PRN Temp greater or equal to 38C (100.4F)  ALBUTerol    90 MICROgram(s) HFA Inhaler 2 Puff(s) Inhalation every 6 hours PRN Shortness of Breath and/or Wheezing  morphine  - Injectable 2 milliGRAM(s) IV Push every 4 hours PRN Severe Pain (7 - 10)  ondansetron Injectable 4 milliGRAM(s) IV Push three times a day PRN Nausea and/or Vomiting      REVIEW OF SYSTEMS:    CONSTITUTIONAL: No fever  EYES/ENT: No scleral icterus  RESPIRATORY: No shortness of breath  CARDIOVASCULAR: No chest pain   GASTROINTESTINAL: loose stools.  GENITOURINARY: No dysuria  NEUROLOGICAL: No dizziness  SKIN: No cyanosis        VITALS:   T(F): 96.6 (07-20 @ 07:36), Max: 100.9 (07-17 @ 15:05)  HR: 62 (07-20 @ 07:36) (49 - 121)  BP: 93/55 (07-20 @ 07:36) (88/53 - 121/80)  BP(mean): --  RR: 18 (07-20 @ 07:36) (15 - 18)  SpO2: 100% (07-19 @ 14:00) (96% - 100%)    I&O's Summary    19 Jul 2019 07:01  -  20 Jul 2019 07:00  --------------------------------------------------------  IN: 1820 mL / OUT: 400 mL / NET: 1420 mL        Physical Exam:  GENERAL: NAD, well-developed  HEAD:  Atraumatic, Normocephalic  EYES: EOMI, PERRLA, conjunctiva and sclera clear  NECK: No thyromegaly  CHEST/LUNG: No accessory use of muscle  HEART: S1S2 Normal  ABDOMEN: Soft, Nondistended; Bowel sounds present, no guarding or rigidity, mild tenderness in LLQ.  EXTREMITIES:  2+ Peripheral Pulses, No cyanosis  PSYCH: AAOx3  NEUROLOGY: non-focal      LABS:                        7.2    7.24  )-----------( 266      ( 20 Jul 2019 07:17 )             23.3       PT/INR - ( 17 Jul 2019 15:35 )  INR: 1.11          PTT - ( 17 Jul 2019 15:35 )  PTT:28.6   LIVER FUNCTIONS - ( 17 Jul 2019 15:35 )  Alb: 3.5 g/dL / Pro: 7.5 g/dL / ALK PHOS: 78 U/L / ALT: 9 U/L / AST: 11 U/L / GGT: x           LIVER FUNCTIONS - ( 17 Jul 2019 15:35 )  Alb: 3.5 [3.5 - 5.2] / Pro: 7.5 [6.0 - 8.0] / ALK PHOS: 78 [30 - 115] / ALT: 9 [0 - 41] / AST: 11 [0 - 41] / GGT: x       07-20    140  |  107  |  16  ----------------------------<  200<H>  3.4<L>   |  22  |  0.5<L>    Ca    7.8<L>      20 Jul 2019 07:17  Mg     1.9     07-19                  RADIOLOGY:    CT Abdomen and Pelvis with contrast: Diffuse colonic wall thickening with hyper-enhanced mucosa, colitis.

## 2019-07-21 LAB
ANION GAP SERPL CALC-SCNC: 10 MMOL/L — SIGNIFICANT CHANGE UP (ref 7–14)
BLD GP AB SCN SERPL QL: SIGNIFICANT CHANGE UP
BUN SERPL-MCNC: 13 MG/DL — SIGNIFICANT CHANGE UP (ref 10–20)
CALCIUM SERPL-MCNC: 8.4 MG/DL — LOW (ref 8.5–10.1)
CHLORIDE SERPL-SCNC: 106 MMOL/L — SIGNIFICANT CHANGE UP (ref 98–110)
CO2 SERPL-SCNC: 24 MMOL/L — SIGNIFICANT CHANGE UP (ref 17–32)
CREAT SERPL-MCNC: 0.7 MG/DL — SIGNIFICANT CHANGE UP (ref 0.7–1.5)
GLUCOSE SERPL-MCNC: 120 MG/DL — HIGH (ref 70–99)
HCT VFR BLD CALC: 27 % — LOW (ref 37–47)
HGB BLD-MCNC: 8.1 G/DL — LOW (ref 12–16)
MCHC RBC-ENTMCNC: 21.7 PG — LOW (ref 27–31)
MCHC RBC-ENTMCNC: 30 G/DL — LOW (ref 32–37)
MCV RBC AUTO: 72.2 FL — LOW (ref 81–99)
NRBC # BLD: 0 /100 WBCS — SIGNIFICANT CHANGE UP (ref 0–0)
PLATELET # BLD AUTO: 306 K/UL — SIGNIFICANT CHANGE UP (ref 130–400)
POTASSIUM SERPL-MCNC: 4.2 MMOL/L — SIGNIFICANT CHANGE UP (ref 3.5–5)
POTASSIUM SERPL-SCNC: 4.2 MMOL/L — SIGNIFICANT CHANGE UP (ref 3.5–5)
RBC # BLD: 3.74 M/UL — LOW (ref 4.2–5.4)
RBC # FLD: 16.1 % — HIGH (ref 11.5–14.5)
SODIUM SERPL-SCNC: 140 MMOL/L — SIGNIFICANT CHANGE UP (ref 135–146)
WBC # BLD: 9.09 K/UL — SIGNIFICANT CHANGE UP (ref 4.8–10.8)
WBC # FLD AUTO: 9.09 K/UL — SIGNIFICANT CHANGE UP (ref 4.8–10.8)

## 2019-07-21 PROCEDURE — 99232 SBSQ HOSP IP/OBS MODERATE 35: CPT

## 2019-07-21 RX ADMIN — MORPHINE SULFATE 2 MILLIGRAM(S): 50 CAPSULE, EXTENDED RELEASE ORAL at 05:00

## 2019-07-21 RX ADMIN — Medication 100 MILLIGRAM(S): at 21:09

## 2019-07-21 RX ADMIN — Medication 100 MILLIGRAM(S): at 05:44

## 2019-07-21 RX ADMIN — MORPHINE SULFATE 2 MILLIGRAM(S): 50 CAPSULE, EXTENDED RELEASE ORAL at 04:48

## 2019-07-21 RX ADMIN — Medication 800 MILLIGRAM(S): at 23:13

## 2019-07-21 RX ADMIN — Medication 800 MILLIGRAM(S): at 17:42

## 2019-07-21 RX ADMIN — Medication 200 MILLIGRAM(S): at 05:44

## 2019-07-21 RX ADMIN — MORPHINE SULFATE 2 MILLIGRAM(S): 50 CAPSULE, EXTENDED RELEASE ORAL at 19:34

## 2019-07-21 RX ADMIN — SODIUM CHLORIDE 100 MILLILITER(S): 9 INJECTION INTRAMUSCULAR; INTRAVENOUS; SUBCUTANEOUS at 21:09

## 2019-07-21 RX ADMIN — Medication 200 MILLIGRAM(S): at 17:46

## 2019-07-21 RX ADMIN — MORPHINE SULFATE 2 MILLIGRAM(S): 50 CAPSULE, EXTENDED RELEASE ORAL at 19:16

## 2019-07-21 RX ADMIN — PANTOPRAZOLE SODIUM 40 MILLIGRAM(S): 20 TABLET, DELAYED RELEASE ORAL at 05:42

## 2019-07-21 RX ADMIN — Medication 20 MILLIGRAM(S): at 13:43

## 2019-07-21 RX ADMIN — Medication 100 MILLIGRAM(S): at 13:45

## 2019-07-21 RX ADMIN — Medication 20 MILLIGRAM(S): at 21:09

## 2019-07-21 RX ADMIN — Medication 800 MILLIGRAM(S): at 11:20

## 2019-07-21 RX ADMIN — MORPHINE SULFATE 2 MILLIGRAM(S): 50 CAPSULE, EXTENDED RELEASE ORAL at 10:04

## 2019-07-21 RX ADMIN — Medication 800 MILLIGRAM(S): at 05:42

## 2019-07-21 RX ADMIN — PANTOPRAZOLE SODIUM 40 MILLIGRAM(S): 20 TABLET, DELAYED RELEASE ORAL at 17:41

## 2019-07-21 RX ADMIN — Medication 20 MILLIGRAM(S): at 05:42

## 2019-07-21 RX ADMIN — MORPHINE SULFATE 2 MILLIGRAM(S): 50 CAPSULE, EXTENDED RELEASE ORAL at 15:03

## 2019-07-21 NOTE — PROGRESS NOTE ADULT - ATTENDING COMMENTS
Pt seen and examined with Dr Field.  Diarrhea still present but has decreased to 2/d from 4/d.  Abdominal pain continues.  I recommend continued in house management.
Pt seen and examined with Dr Wang.  Pt has indeterminant IBD and compliance and follow up have been issues.  Currently on Solumedrol she notes an improvement in her diarrheal frequency but only a slight change in her abdominal pain.  On exam she has significant tenderness.  I recommend clear liquid diet only for now and add Delzicol.  Follow for an improvement in stool frequency and consistency.

## 2019-07-21 NOTE — PROGRESS NOTE ADULT - ASSESSMENT
29F PMHx ulcerative colitis, asthma here with SIRS, now resolved, suspected due to UC flare.    1. IBD flare,   much improved today, advance to reg diet  cont solumedrol 20 q8 f/u GI re: change to po  s/p flex sigmoidoscopy pending report  serology sent in preparation for remicaide  stool cx pending, cont cipro, flagyl plan for 7 day course  pain control  2. Asthma  controlled on albuterol prn  3. DVT ppx  ambulation    #Progress Note Handoff:  Pending (specify):  Consults_________, Tests________, Test Results_______, Other____iv steroids_____  Family discussion:d/w pt at bedside  Disposition: Home_x__/SNF___/Other________/Unknown at this time________ 29F PMHx ulcerative colitis, asthma here with SIRS, now resolved, suspected due to UC flare.    1. IBD flare  much improved although still complains of occasional abdominal discomfort and bloating with 2 nonbloodly BM today  cont solumedrol 20 q8 and Delzicol  s/p flex sigmoidoscopy pending report  Hepatitis negative, quant gold indeterminate  stool cx pending, cont cipro, flagyl plan for 7 day course  discussed with GI today: c/w IV steroids today, re-eval tomorrow if pain better    2. Asthma  controlled on albuterol prn  3. DVT ppx  ambulation    #Progress Note Handoff:  Pending (specify): resolution of pain  Family discussion: d/w pt at bedside  Disposition: Home_x__/SNF___/Other________/Unknown at this time________

## 2019-07-21 NOTE — PROGRESS NOTE ADULT - ASSESSMENT
29 year old female with past medical history of UC diagnosed 3 years ago, steroid dependent since 3 years , presented with 5 days of vomiting and intermittent bloody diarrhea  after she stopped prednisone one week ago. WBC on admission 14k, now normal. Hg on admission was 10.7 and today is 7.2. C diff neg, GI PCR pending, CT abdomen showed diffuse colitis. Flex sig showed no perirectal disease and was suggestive more of Crohn's disease rather than UC. Biopsy taken. ESR 58, CRP 11,       IBD flare: Undiagnosed Crohn's vs UC flare (Undiagnosed)  advance diet as tolerated  c/w Delzicol 800 mg 4 times a day  Pending  Fecal calprotectin, GI PCR .  Zofran PRN for nausea  pain control as needed  c/w IV Cipro and Flagyl  c/w Solumedrol 20mg Q8hrs.  Screening for HAV, HBV, HCV neg but quantifieron pending.  will follow up.

## 2019-07-21 NOTE — PROGRESS NOTE ADULT - SUBJECTIVE AND OBJECTIVE BOX
Hospital Day # 4d    Gastroenterology team is following this patient for IBD flare.    Interval Events: patient had 3 BM since last 24 hrs, soft, brown, eating regular diet.    PAST MEDICAL & SURGICAL HISTORY  Asthma  Ulcerative colitis  No significant past surgical history      FAMILY HISTORY:      SOCIAL HISTORY:  smoker:   Alcohol:  Drug:     ALLERGIES:  penicillin (Rash)      MEDICATIONS:  MEDICATIONS  (STANDING):  chlorhexidine 4% Liquid 1 Application(s) Topical <User Schedule>  ciprofloxacin   IVPB      ciprofloxacin   IVPB 400 milliGRAM(s) IV Intermittent every 12 hours  hydrocortisone hemorrhoidal Suppository 1 Suppository(s) Rectal every 12 hours  mesalamine DR Capsule 800 milliGRAM(s) Oral four times a day  methylPREDNISolone sodium succinate Injectable 20 milliGRAM(s) IV Push every 8 hours  metroNIDAZOLE  IVPB      metroNIDAZOLE  IVPB 500 milliGRAM(s) IV Intermittent every 8 hours  pantoprazole  Injectable 40 milliGRAM(s) IV Push two times a day  sodium chloride 0.9%. 1000 milliLiter(s) (100 mL/Hr) IV Continuous <Continuous>    MEDICATIONS  (PRN):  acetaminophen   Tablet .. 650 milliGRAM(s) Oral every 6 hours PRN Temp greater or equal to 38C (100.4F)  ALBUTerol    90 MICROgram(s) HFA Inhaler 2 Puff(s) Inhalation every 6 hours PRN Shortness of Breath and/or Wheezing  morphine  - Injectable 2 milliGRAM(s) IV Push every 4 hours PRN Severe Pain (7 - 10)  ondansetron Injectable 4 milliGRAM(s) IV Push three times a day PRN Nausea and/or Vomiting      REVIEW OF SYSTEMS:    CONSTITUTIONAL: No fever  EYES/ENT: No scleral icterus  RESPIRATORY: No shortness of breath  CARDIOVASCULAR: No chest pain   GASTROINTESTINAL: abdominal pain  GENITOURINARY: No dysuria  NEUROLOGICAL: No dizziness  SKIN: No cyanosis        VITALS:   T(F): 96.3 (07-21 @ 00:00), Max: 98.3 (07-20 @ 00:00)  HR: 61 (07-21 @ 00:00) (49 - 97)  BP: 103/59 (07-21 @ 00:00) (88/53 - 121/80)  BP(mean): --  RR: 18 (07-21 @ 00:00) (15 - 18)  SpO2: 100% (07-19 @ 14:00) (97% - 100%)    I&O's Summary    20 Jul 2019 07:01  -  21 Jul 2019 07:00  --------------------------------------------------------  IN: 1600 mL / OUT: 0 mL / NET: 1600 mL        Physical Exam:  GENERAL: NAD, well-developed  HEAD:  Atraumatic, Normocephalic  EYES: EOMI, PERRLA, conjunctiva and sclera clear  NECK: No thyromegaly  CHEST/LUNG: No accessory use of muscle  HEART: S1S2 Normal  ABDOMEN: Soft, Nondistended; Bowel sounds present, no guarding or rigidity, tenderness in LLQ  EXTREMITIES:  2+ Peripheral Pulses, No cyanosis  PSYCH: AAOx3  NEUROLOGY: non-focal      LABS:                        8.1    9.09  )-----------( 306      ( 21 Jul 2019 07:55 )             27.0       PT/INR - ( 17 Jul 2019 15:35 )  INR: 1.11          PTT - ( 17 Jul 2019 15:35 )  PTT:28.6   LIVER FUNCTIONS - ( 17 Jul 2019 15:35 )  Alb: 3.5 g/dL / Pro: 7.5 g/dL / ALK PHOS: 78 U/L / ALT: 9 U/L / AST: 11 U/L / GGT: x           LIVER FUNCTIONS - ( 17 Jul 2019 15:35 )  Alb: 3.5 [3.5 - 5.2] / Pro: 7.5 [6.0 - 8.0] / ALK PHOS: 78 [30 - 115] / ALT: 9 [0 - 41] / AST: 11 [0 - 41] / GGT: x       07-20    140  |  107  |  16  ----------------------------<  200<H>  3.4<L>   |  22  |  0.5<L>    Ca    7.8<L>      20 Jul 2019 07:17  Mg     1.9     07-19                  RADIOLOGY:    CT Abdomen and Pelvis:    USG Abdomen

## 2019-07-21 NOTE — PROGRESS NOTE ADULT - SUBJECTIVE AND OBJECTIVE BOX
GREYSON GARZA  29y Female    CHIEF COMPLAINT:    Patient is a 29y old  Female who presents with a chief complaint of Diarrhea and Vomiting (21 Jul 2019 15:36)      INTERVAL HPI/OVERNIGHT EVENTS:    Patient seen and examined. No acute events overnight. Pain improving    ROS: All other systems are negative.    Vital Signs:    T(F): 97.2 (07-21-19 @ 07:34), Max: 97.2 (07-21-19 @ 07:34)  HR: 67 (07-21-19 @ 07:34) (61 - 67)  BP: 115/64 (07-21-19 @ 07:34) (103/59 - 115/64)  RR: 18 (07-21-19 @ 07:34) (18 - 18)  20 Jul 2019 07:01  -  21 Jul 2019 07:00  --------------------------------------------------------  IN: 1600 mL / OUT: 0 mL / NET: 1600 mL    PHYSICAL EXAM:    GENERAL:  NAD  SKIN: No rashes or lesions  HEENT: Atraumatic. Normocephalic.  NECK: Supple, No JVD. No lymphadenopathy.  PULMONARY: CTA B/L. No wheezing. No rales  CVS: Normal S1, S2. Rate and Rhythm are regular.  ABDOMEN/GI: Soft, mild RLQ TTP, Nondistended; BS present  MSK:  No edema B/L LE. No clubbing or cyanosis  NEUROLOGIC:  No motor or sensory deficit.  PSYCH: Alert & oriented x 3, normal affect    Consultant(s) Notes Reviewed:  [x ] YES  [ ] NO  Care Discussed with Consultants/Other Providers [ x] YES  [ ] NO    LABS:                        8.1    9.09  )-----------( 306      ( 21 Jul 2019 07:55 )             27.0     07-21    140  |  106  |  13  ----------------------------<  120<H>  4.2   |  24  |  0.7    Ca    8.4<L>      21 Jul 2019 07:55    RADIOLOGY & ADDITIONAL TESTS:    Imaging or report Personally Reviewed:  [x] YES  [ ] NO  EKG reviewed: [x] YES  [ ] NO    Medications:  Standing  chlorhexidine 4% Liquid 1 Application(s) Topical <User Schedule>  ciprofloxacin   IVPB      ciprofloxacin   IVPB 400 milliGRAM(s) IV Intermittent every 12 hours  hydrocortisone hemorrhoidal Suppository 1 Suppository(s) Rectal every 12 hours  mesalamine DR Capsule 800 milliGRAM(s) Oral four times a day  methylPREDNISolone sodium succinate Injectable 20 milliGRAM(s) IV Push every 8 hours  metroNIDAZOLE  IVPB      metroNIDAZOLE  IVPB 500 milliGRAM(s) IV Intermittent every 8 hours  pantoprazole  Injectable 40 milliGRAM(s) IV Push two times a day  sodium chloride 0.9%. 1000 milliLiter(s) IV Continuous <Continuous>    PRN Meds  acetaminophen   Tablet .. 650 milliGRAM(s) Oral every 6 hours PRN  ALBUTerol    90 MICROgram(s) HFA Inhaler 2 Puff(s) Inhalation every 6 hours PRN  morphine  - Injectable 2 milliGRAM(s) IV Push every 4 hours PRN  ondansetron Injectable 4 milliGRAM(s) IV Push three times a day PRN      Charis Aquino MD  s. [phone]

## 2019-07-22 ENCOUNTER — TRANSCRIPTION ENCOUNTER (OUTPATIENT)
Age: 29
End: 2019-07-22

## 2019-07-22 VITALS
TEMPERATURE: 97 F | RESPIRATION RATE: 18 BRPM | SYSTOLIC BLOOD PRESSURE: 110 MMHG | DIASTOLIC BLOOD PRESSURE: 71 MMHG | HEART RATE: 70 BPM

## 2019-07-22 LAB
ANION GAP SERPL CALC-SCNC: 12 MMOL/L — SIGNIFICANT CHANGE UP (ref 7–14)
AUTO DIFF PNL BLD: NEGATIVE — SIGNIFICANT CHANGE UP
BUN SERPL-MCNC: 12 MG/DL — SIGNIFICANT CHANGE UP (ref 10–20)
C-ANCA SER-ACNC: NEGATIVE — SIGNIFICANT CHANGE UP
CALCIUM SERPL-MCNC: 8.2 MG/DL — LOW (ref 8.5–10.1)
CHLORIDE SERPL-SCNC: 104 MMOL/L — SIGNIFICANT CHANGE UP (ref 98–110)
CO2 SERPL-SCNC: 24 MMOL/L — SIGNIFICANT CHANGE UP (ref 17–32)
CREAT SERPL-MCNC: 0.6 MG/DL — LOW (ref 0.7–1.5)
CULTURE RESULTS: SIGNIFICANT CHANGE UP
GLUCOSE SERPL-MCNC: 162 MG/DL — HIGH (ref 70–99)
HCT VFR BLD CALC: 27.5 % — LOW (ref 37–47)
HGB BLD-MCNC: 8.4 G/DL — LOW (ref 12–16)
MCHC RBC-ENTMCNC: 21.8 PG — LOW (ref 27–31)
MCHC RBC-ENTMCNC: 30.5 G/DL — LOW (ref 32–37)
MCV RBC AUTO: 71.4 FL — LOW (ref 81–99)
NRBC # BLD: 0 /100 WBCS — SIGNIFICANT CHANGE UP (ref 0–0)
P-ANCA SER-ACNC: NEGATIVE — SIGNIFICANT CHANGE UP
PLATELET # BLD AUTO: 310 K/UL — SIGNIFICANT CHANGE UP (ref 130–400)
POTASSIUM SERPL-MCNC: 4.2 MMOL/L — SIGNIFICANT CHANGE UP (ref 3.5–5)
POTASSIUM SERPL-SCNC: 4.2 MMOL/L — SIGNIFICANT CHANGE UP (ref 3.5–5)
RBC # BLD: 3.85 M/UL — LOW (ref 4.2–5.4)
RBC # FLD: 16.4 % — HIGH (ref 11.5–14.5)
SODIUM SERPL-SCNC: 140 MMOL/L — SIGNIFICANT CHANGE UP (ref 135–146)
SPECIMEN SOURCE: SIGNIFICANT CHANGE UP
SURGICAL PATHOLOGY STUDY: SIGNIFICANT CHANGE UP
WBC # BLD: 10.47 K/UL — SIGNIFICANT CHANGE UP (ref 4.8–10.8)
WBC # FLD AUTO: 10.47 K/UL — SIGNIFICANT CHANGE UP (ref 4.8–10.8)

## 2019-07-22 PROCEDURE — 99239 HOSP IP/OBS DSCHRG MGMT >30: CPT

## 2019-07-22 RX ORDER — METRONIDAZOLE 500 MG
1 TABLET ORAL
Qty: 6 | Refills: 0
Start: 2019-07-22 | End: 2019-07-23

## 2019-07-22 RX ORDER — MESALAMINE 400 MG
2 TABLET, DELAYED RELEASE (ENTERIC COATED) ORAL
Qty: 80 | Refills: 0
Start: 2019-07-22 | End: 2019-07-31

## 2019-07-22 RX ORDER — MOXIFLOXACIN HYDROCHLORIDE TABLETS, 400 MG 400 MG/1
1 TABLET, FILM COATED ORAL
Qty: 4 | Refills: 0
Start: 2019-07-22 | End: 2019-07-23

## 2019-07-22 RX ADMIN — MORPHINE SULFATE 2 MILLIGRAM(S): 50 CAPSULE, EXTENDED RELEASE ORAL at 09:32

## 2019-07-22 RX ADMIN — Medication 100 MILLIGRAM(S): at 05:23

## 2019-07-22 RX ADMIN — MORPHINE SULFATE 2 MILLIGRAM(S): 50 CAPSULE, EXTENDED RELEASE ORAL at 01:12

## 2019-07-22 RX ADMIN — Medication 20 MILLIGRAM(S): at 05:25

## 2019-07-22 RX ADMIN — MORPHINE SULFATE 2 MILLIGRAM(S): 50 CAPSULE, EXTENDED RELEASE ORAL at 05:40

## 2019-07-22 RX ADMIN — PANTOPRAZOLE SODIUM 40 MILLIGRAM(S): 20 TABLET, DELAYED RELEASE ORAL at 05:25

## 2019-07-22 RX ADMIN — MORPHINE SULFATE 2 MILLIGRAM(S): 50 CAPSULE, EXTENDED RELEASE ORAL at 01:30

## 2019-07-22 RX ADMIN — Medication 200 MILLIGRAM(S): at 05:23

## 2019-07-22 RX ADMIN — MORPHINE SULFATE 2 MILLIGRAM(S): 50 CAPSULE, EXTENDED RELEASE ORAL at 05:25

## 2019-07-22 RX ADMIN — Medication 800 MILLIGRAM(S): at 11:04

## 2019-07-22 RX ADMIN — Medication 800 MILLIGRAM(S): at 05:25

## 2019-07-22 NOTE — PROGRESS NOTE ADULT - REASON FOR ADMISSION
Diarrhea and Vomiting

## 2019-07-22 NOTE — PROGRESS NOTE ADULT - ASSESSMENT
28yo F with Past Medical History Asthma and Ulcerative Colitis (previously diagnosed via colonoscopy at [facility] or [facility]) admitted with nausea, vomiting, and loose diarrhea for x5 days secondary to ulcerative colitis exacerbation.     Nausea/vomiting/diarrhea secondary to ulcerative colitis exacerbation: status post sigmoid scope.  Follow-up pathology results with GI as outpatient.  Continue Zofran PRN for nausea.  The patient tolerated a low residue diet.  Continue Prednisone 40mg PO q24 upon discharge, and taper to 20mg PO q24 after five days.  Continue Cipro & Flagyl for a total of 1 week.  She was advised to seek GI follow-up to assist with UC management, and requested to leave against medical advice.  Asthma:  no active wheezing appreciated.  Albuterol PRN  GI/DVT prophylaxis    #Progress Note Handoff    Pending:  Patient left against medical advice

## 2019-07-22 NOTE — DISCHARGE NOTE PROVIDER - HOSPITAL COURSE
29 year old female with past medical history of asthma (uses albuterol as needed) and history of UC (as per the patient was diagnosed on colonoscopy at [facility] presents with complains of nausea, vomiting and loose diarrhea for past 5 days admitted for UC flare.        # Ulcerative colitis flare s/p flex sigmoidoscopy, improving    - less frequent BM that are more formed    - flex sig shows:  no perirectal disease and was suggestive more of Crohn's disease rather than UC. Biopsy taken.    - patient tolerating food well, diet advanced to regular     - c/w Solumedrol to 20 mg IV q8 (previously on 20mg prednisone which she stopped taking)    - Continue Cipro and Flagyl    - CT scan evident for colitis.    - C. diff negative    - ESR 58    - f/u QuantiFeron,    - Hep B and C nonreactive    - Pain control     - Will contact [facility] for colonoscopy records    -UCx neg, bl cx NTD    -UA small bac, small leuk        # Asthma - Controlled    - Albuterol PRN        # L4 vertebral body hemangioma    - Incidental finding on CT scan. Asymptomatic    - Outpatient follow up if required.         GI ppx - Protonix    DVT ppx - Lovenox        Dispo: from home 29 year old female with past medical history of asthma (uses albuterol as needed) and history of UC (as per the patient was diagnosed on colonoscopy at [facility] or [facility]) presents with complains of nausea, vomiting and loose diarrhea for past 5 days admitted for UC flare. Patient admitted for Ulcerative colitis flare. She was being evaluated by GI.  s/p Flex sigmoidoscopy. Results show No perirectal disease and suggestive of Crohn's disease rather than UC.  Biopsy is pending. Patient had Hepatitis B, C and Quantiferon done to start on biologics. Quantiferon is indeterminate and Hep B and C is non reactive.  Pt symptoms controlled on prednisone, mesalamine, iv cipro and flagyl. On 7/22 patient requested to leave against medical advice as she had prior engagements. Prednisone, Mesalamine, Cipro and flagyl were sent to pharmacy. She was recommended to follow up with Gastroenterology to re-evaluate her treatment.  She will follow up outpatient.

## 2019-07-22 NOTE — PROGRESS NOTE ADULT - SUBJECTIVE AND OBJECTIVE BOX
GREYSON GARZA [id number]    Hospitalist Note  30yo F with Past Medical History Asthma and Ulcerative Colitis (previously diagnosed via colonoscopy at [facility] [place] or [facility]) admitted with nausea, vomiting, and loose diarrhea for x5 days secondary to ulcerative colitis exacerbation.  The patient admits that she stopped taking Prednisone x1 week prior to admission.  She has been non-compliant with regular GI follow-ups.    Overnight events/Updates: The patient reports resolution in her diarrhea from admission following treatment with steroids.  GI recommended evaluating the patient for treatment with Remicade, however, she opted to leave against medical advice    Vital Signs Last 24 Hrs  T(C): 36.3 (22 Jul 2019 07:35), Max: 36.3 (21 Jul 2019 23:00)  T(F): 97.4 (22 Jul 2019 07:35), Max: 97.4 (22 Jul 2019 07:35)  HR: 70 (22 Jul 2019 07:35) (57 - 70)  BP: 110/71 (22 Jul 2019 07:35) (110/71 - 129/74)  BP(mean): --  RR: 18 (22 Jul 2019 07:35) (18 - 18)  SpO2: --    Physical Examination:  General: AAO x 3  HEENT: PERRLA, EOMI  CV= S1 & S2 appreciated  Lungs=CTA BL  Abdominal Examination= + BS, Soft, NT/ND  Extremity Examination= No C/C/E     ROS: No chest pain, no shortness of breath.  All other systems reviewed and are within normal limits except for the complaints in the HPI.    MEDICATIONS  (STANDING):  chlorhexidine 4% Liquid 1 Application(s) Topical <User Schedule>  ciprofloxacin   IVPB      ciprofloxacin   IVPB 400 milliGRAM(s) IV Intermittent every 12 hours  hydrocortisone hemorrhoidal Suppository 1 Suppository(s) Rectal every 12 hours  mesalamine DR Capsule 800 milliGRAM(s) Oral four times a day  metroNIDAZOLE  IVPB      metroNIDAZOLE  IVPB 500 milliGRAM(s) IV Intermittent every 8 hours  pantoprazole  Injectable 40 milliGRAM(s) IV Push two times a day  predniSONE   Tablet 40 milliGRAM(s) Oral daily  sodium chloride 0.9%. 1000 milliLiter(s) (100 mL/Hr) IV Continuous <Continuous>    MEDICATIONS  (PRN):  acetaminophen   Tablet .. 650 milliGRAM(s) Oral every 6 hours PRN Temp greater or equal to 38C (100.4F)  ALBUTerol    90 MICROgram(s) HFA Inhaler 2 Puff(s) Inhalation every 6 hours PRN Shortness of Breath and/or Wheezing  morphine  - Injectable 2 milliGRAM(s) IV Push every 4 hours PRN Severe Pain (7 - 10)  ondansetron Injectable 4 milliGRAM(s) IV Push three times a day PRN Nausea and/or Vomiting                            8.4    10.47 )-----------( 310      ( 22 Jul 2019 05:39 )             27.5     07-22    140  |  104  |  12  ----------------------------<  162<H>  4.2   |  24  |  0.6<L>    Ca    8.2<L>      22 Jul 2019 05:39        Case discussed with housestaff & family  SILVIO Maciel [id number]

## 2019-07-22 NOTE — DISCHARGE NOTE PROVIDER - CARE PROVIDER_API CALL
Negrito Foote)  Gastroenterology; Internal Medicine  [address] [place] [id number]  Phone: [phone]  Fax: [phone]  Follow Up Time:

## 2019-07-22 NOTE — DISCHARGE NOTE NURSING/CASE MANAGEMENT/SOCIAL WORK - NSDCDPATPORTLINK_GEN_ALL_CORE
You can access the [facility] Patient Portal, offered by [facility], by registering with the following website: [email or web address]

## 2019-07-22 NOTE — DISCHARGE NOTE PROVIDER - NSDCCPCAREPLAN_GEN_ALL_CORE_FT
PRINCIPAL DISCHARGE DIAGNOSIS  Diagnosis: Colitis  Assessment and Plan of Treatment: You came to the hospital on 06/17 after experiencing abdominal pain and fever. You were found to have a colitis flare after imaging was performed. A scope performed (flexible sigmoidoscopy) showed no anna-rectal disease, a finding consistent with Crohn's disease. You reported a previous diagnosis of Ulcerative colitis, which is a different form of inflammatory bowel disease. Biopsy was taken during this hiospitalization. Hepatitis B and C panel was non-reactive (negative). Quantiferon testing (to test for TB) was found to be indeterminate. You were given IV steroids and later transitioned to oral steroids. You were prescribed prednisone to be taken on discharge. Please follow instructions on prescription and take as prescribed until completion. Please follow-up with Gastroenterology in one week for continued care.      SECONDARY DISCHARGE DIAGNOSES  Diagnosis: Hemangioma  Assessment and Plan of Treatment: During your hospitalization, imaging incidentally revealed a hemangioma (blood vessel growth) in a bone of your lower back (L4 vertebral body). This was incidentally found while you underwent imaging for your colitis flare and you did not complain of any symptoms. Please follow-up with your primary care provider in one week for continued care. PRINCIPAL DISCHARGE DIAGNOSIS  Diagnosis: Colitis  Assessment and Plan of Treatment: You came to the hospital on 06/17 after experiencing abdominal pain and fever. You were found to have a colitis flare after imaging was performed. A scope performed (flexible sigmoidoscopy) showed no anna-rectal disease, a finding consistent with Crohn's disease. You reported a previous diagnosis of Ulcerative colitis, which is a different form of inflammatory bowel disease. Biopsy was taken during this hiospitalization. Hepatitis B and C panel was non-reactive (negative). Quantiferon testing (to test for TB) was found to be indeterminate. You were given IV steroids and later transitioned to oral steroids. You were prescribed prednisone to be taken on discharge. Please follow instructions on prescription and take as prescribed until completion. Please follow-up with Gastroenterology AS SOON AS POSSIBLE. You have left against medical advice and it is imperative you follow up at your earliest convenience to keep your symptoms under control.      SECONDARY DISCHARGE DIAGNOSES  Diagnosis: Hemangioma  Assessment and Plan of Treatment: During your hospitalization, imaging incidentally revealed a hemangioma (blood vessel growth) in a bone of your lower back (L4 vertebral body). This was incidentally found while you underwent imaging for your colitis flare and you did not complain of any symptoms. Please follow-up with your primary care provider in one week for continued care.

## 2019-07-22 NOTE — PROGRESS NOTE ADULT - PROVIDER SPECIALTY LIST ADULT
Gastroenterology
Hospitalist
Hospitalist
Internal Medicine
Gastroenterology

## 2019-07-24 ENCOUNTER — TRANSCRIPTION ENCOUNTER (OUTPATIENT)
Age: 29
End: 2019-07-24

## 2019-07-28 DIAGNOSIS — K50.10 CROHN'S DISEASE OF LARGE INTESTINE WITHOUT COMPLICATIONS: ICD-10-CM

## 2019-07-28 DIAGNOSIS — Z53.21 PROCEDURE AND TREATMENT NOT CARRIED OUT DUE TO PATIENT LEAVING PRIOR TO BEING SEEN BY HEALTH CARE PROVIDER: ICD-10-CM

## 2019-07-28 DIAGNOSIS — D18.09 HEMANGIOMA OF OTHER SITES: ICD-10-CM

## 2019-07-28 DIAGNOSIS — K62.6 ULCER OF ANUS AND RECTUM: ICD-10-CM

## 2019-07-28 DIAGNOSIS — K63.5 POLYP OF COLON: ICD-10-CM

## 2019-07-28 DIAGNOSIS — J45.909 UNSPECIFIED ASTHMA, UNCOMPLICATED: ICD-10-CM

## 2019-07-28 DIAGNOSIS — R63.0 ANOREXIA: ICD-10-CM

## 2019-07-28 DIAGNOSIS — Z91.14 PATIENT'S OTHER NONCOMPLIANCE WITH MEDICATION REGIMEN: ICD-10-CM

## 2019-07-28 DIAGNOSIS — R10.9 UNSPECIFIED ABDOMINAL PAIN: ICD-10-CM

## 2019-08-14 PROBLEM — J45.909 UNSPECIFIED ASTHMA, UNCOMPLICATED: Chronic | Status: ACTIVE | Noted: 2019-07-17

## 2019-09-23 ENCOUNTER — INPATIENT (INPATIENT)
Facility: HOSPITAL | Age: 29
LOS: 0 days | Discharge: LEFT BEFORE DISCHARGE | End: 2019-09-24
Attending: INTERNAL MEDICINE | Admitting: INTERNAL MEDICINE
Payer: MEDICAID

## 2019-09-23 VITALS
HEART RATE: 111 BPM | RESPIRATION RATE: 18 BRPM | DIASTOLIC BLOOD PRESSURE: 60 MMHG | TEMPERATURE: 97 F | SYSTOLIC BLOOD PRESSURE: 101 MMHG | OXYGEN SATURATION: 100 %

## 2019-09-23 LAB
ALBUMIN SERPL ELPH-MCNC: 4.2 G/DL — SIGNIFICANT CHANGE UP (ref 3.5–5.2)
ALP SERPL-CCNC: 88 U/L — SIGNIFICANT CHANGE UP (ref 30–115)
ALT FLD-CCNC: 6 U/L — SIGNIFICANT CHANGE UP (ref 0–41)
ANION GAP SERPL CALC-SCNC: 23 MMOL/L — HIGH (ref 7–14)
APPEARANCE UR: CLEAR — SIGNIFICANT CHANGE UP
AST SERPL-CCNC: 13 U/L — SIGNIFICANT CHANGE UP (ref 0–41)
BACTERIA # UR AUTO: ABNORMAL
BASE EXCESS BLDV CALC-SCNC: -6 MMOL/L — LOW (ref -2–2)
BASOPHILS # BLD AUTO: 0.03 K/UL — SIGNIFICANT CHANGE UP (ref 0–0.2)
BASOPHILS NFR BLD AUTO: 0.2 % — SIGNIFICANT CHANGE UP (ref 0–1)
BILIRUB SERPL-MCNC: 0.2 MG/DL — SIGNIFICANT CHANGE UP (ref 0.2–1.2)
BILIRUB UR-MCNC: NEGATIVE — SIGNIFICANT CHANGE UP
BUN SERPL-MCNC: 11 MG/DL — SIGNIFICANT CHANGE UP (ref 10–20)
CA-I SERPL-SCNC: 1.16 MMOL/L — SIGNIFICANT CHANGE UP (ref 1.12–1.3)
CALCIUM SERPL-MCNC: 9.2 MG/DL — SIGNIFICANT CHANGE UP (ref 8.5–10.1)
CHLORIDE SERPL-SCNC: 95 MMOL/L — LOW (ref 98–110)
CO2 SERPL-SCNC: 16 MMOL/L — LOW (ref 17–32)
COLOR SPEC: YELLOW — SIGNIFICANT CHANGE UP
CREAT SERPL-MCNC: 0.6 MG/DL — LOW (ref 0.7–1.5)
DIFF PNL FLD: SIGNIFICANT CHANGE UP
EOSINOPHIL # BLD AUTO: 0.06 K/UL — SIGNIFICANT CHANGE UP (ref 0–0.7)
EOSINOPHIL NFR BLD AUTO: 0.5 % — SIGNIFICANT CHANGE UP (ref 0–8)
EPI CELLS # UR: 6 /HPF — HIGH (ref 0–5)
GAS PNL BLDV: 138 MMOL/L — SIGNIFICANT CHANGE UP (ref 136–145)
GAS PNL BLDV: SIGNIFICANT CHANGE UP
GLUCOSE SERPL-MCNC: 63 MG/DL — LOW (ref 70–99)
GLUCOSE UR QL: NEGATIVE — SIGNIFICANT CHANGE UP
HCG SERPL QL: NEGATIVE — SIGNIFICANT CHANGE UP
HCO3 BLDV-SCNC: 20 MMOL/L — LOW (ref 22–29)
HCT VFR BLD CALC: 35.3 % — LOW (ref 37–47)
HCT VFR BLDA CALC: 34.2 % — SIGNIFICANT CHANGE UP (ref 34–44)
HGB BLD CALC-MCNC: 11.2 G/DL — LOW (ref 14–18)
HGB BLD-MCNC: 10.7 G/DL — LOW (ref 12–16)
HYALINE CASTS # UR AUTO: 6 /LPF — SIGNIFICANT CHANGE UP (ref 0–7)
IMM GRANULOCYTES NFR BLD AUTO: 0.3 % — SIGNIFICANT CHANGE UP (ref 0.1–0.3)
KETONES UR-MCNC: ABNORMAL
LACTATE BLDV-MCNC: 1 MMOL/L — SIGNIFICANT CHANGE UP (ref 0.5–1.6)
LACTATE SERPL-SCNC: 2.2 MMOL/L — SIGNIFICANT CHANGE UP (ref 0.5–2.2)
LEUKOCYTE ESTERASE UR-ACNC: NEGATIVE — SIGNIFICANT CHANGE UP
LIDOCAIN IGE QN: 18 U/L — SIGNIFICANT CHANGE UP (ref 7–60)
LYMPHOCYTES # BLD AUTO: 1.41 K/UL — SIGNIFICANT CHANGE UP (ref 1.2–3.4)
LYMPHOCYTES # BLD AUTO: 11 % — LOW (ref 20.5–51.1)
MCHC RBC-ENTMCNC: 22.3 PG — LOW (ref 27–31)
MCHC RBC-ENTMCNC: 30.3 G/DL — LOW (ref 32–37)
MCV RBC AUTO: 73.5 FL — LOW (ref 81–99)
MONOCYTES # BLD AUTO: 0.65 K/UL — HIGH (ref 0.1–0.6)
MONOCYTES NFR BLD AUTO: 5.1 % — SIGNIFICANT CHANGE UP (ref 1.7–9.3)
NEUTROPHILS # BLD AUTO: 10.6 K/UL — HIGH (ref 1.4–6.5)
NEUTROPHILS NFR BLD AUTO: 82.9 % — HIGH (ref 42.2–75.2)
NITRITE UR-MCNC: NEGATIVE — SIGNIFICANT CHANGE UP
NRBC # BLD: 0 /100 WBCS — SIGNIFICANT CHANGE UP (ref 0–0)
PCO2 BLDV: 38 MMHG — LOW (ref 41–51)
PH BLDV: 7.32 — SIGNIFICANT CHANGE UP (ref 7.26–7.43)
PH UR: 6 — SIGNIFICANT CHANGE UP (ref 5–8)
PLATELET # BLD AUTO: 504 K/UL — HIGH (ref 130–400)
PO2 BLDV: 23 MMHG — SIGNIFICANT CHANGE UP (ref 20–40)
POTASSIUM BLDV-SCNC: 3.4 MMOL/L — SIGNIFICANT CHANGE UP (ref 3.3–5.6)
POTASSIUM SERPL-MCNC: 3.8 MMOL/L — SIGNIFICANT CHANGE UP (ref 3.5–5)
POTASSIUM SERPL-SCNC: 3.8 MMOL/L — SIGNIFICANT CHANGE UP (ref 3.5–5)
PROT SERPL-MCNC: 7.8 G/DL — SIGNIFICANT CHANGE UP (ref 6–8)
PROT UR-MCNC: ABNORMAL
RBC # BLD: 4.8 M/UL — SIGNIFICANT CHANGE UP (ref 4.2–5.4)
RBC # FLD: 18.5 % — HIGH (ref 11.5–14.5)
RBC CASTS # UR COMP ASSIST: 2 /HPF — SIGNIFICANT CHANGE UP (ref 0–4)
SAO2 % BLDV: 24 % — SIGNIFICANT CHANGE UP
SODIUM SERPL-SCNC: 134 MMOL/L — LOW (ref 135–146)
SP GR SPEC: 1.03 — HIGH (ref 1.01–1.02)
UROBILINOGEN FLD QL: ABNORMAL
WBC # BLD: 12.79 K/UL — HIGH (ref 4.8–10.8)
WBC # FLD AUTO: 12.79 K/UL — HIGH (ref 4.8–10.8)
WBC UR QL: 7 /HPF — HIGH (ref 0–5)

## 2019-09-23 PROCEDURE — 71045 X-RAY EXAM CHEST 1 VIEW: CPT | Mod: 26

## 2019-09-23 PROCEDURE — 74177 CT ABD & PELVIS W/CONTRAST: CPT | Mod: 26

## 2019-09-23 PROCEDURE — 99285 EMERGENCY DEPT VISIT HI MDM: CPT

## 2019-09-23 RX ORDER — MORPHINE SULFATE 50 MG/1
2 CAPSULE, EXTENDED RELEASE ORAL ONCE
Refills: 0 | Status: DISCONTINUED | OUTPATIENT
Start: 2019-09-23 | End: 2019-09-23

## 2019-09-23 RX ORDER — MAGNESIUM SULFATE 500 MG/ML
1 VIAL (ML) INJECTION ONCE
Refills: 0 | Status: COMPLETED | OUTPATIENT
Start: 2019-09-23 | End: 2019-09-23

## 2019-09-23 RX ORDER — CHLORHEXIDINE GLUCONATE 213 G/1000ML
1 SOLUTION TOPICAL
Refills: 0 | Status: DISCONTINUED | OUTPATIENT
Start: 2019-09-23 | End: 2019-09-24

## 2019-09-23 RX ORDER — SODIUM CHLORIDE 9 MG/ML
1000 INJECTION INTRAMUSCULAR; INTRAVENOUS; SUBCUTANEOUS ONCE
Refills: 0 | Status: COMPLETED | OUTPATIENT
Start: 2019-09-23 | End: 2019-09-23

## 2019-09-23 RX ORDER — OXYCODONE AND ACETAMINOPHEN 5; 325 MG/1; MG/1
1 TABLET ORAL ONCE
Refills: 0 | Status: DISCONTINUED | OUTPATIENT
Start: 2019-09-23 | End: 2019-09-23

## 2019-09-23 RX ORDER — POTASSIUM CHLORIDE 20 MEQ
20 PACKET (EA) ORAL ONCE
Refills: 0 | Status: COMPLETED | OUTPATIENT
Start: 2019-09-23 | End: 2019-09-23

## 2019-09-23 RX ORDER — CIPROFLOXACIN LACTATE 400MG/40ML
400 VIAL (ML) INTRAVENOUS ONCE
Refills: 0 | Status: COMPLETED | OUTPATIENT
Start: 2019-09-23 | End: 2019-09-23

## 2019-09-23 RX ORDER — METRONIDAZOLE 500 MG
500 TABLET ORAL ONCE
Refills: 0 | Status: COMPLETED | OUTPATIENT
Start: 2019-09-23 | End: 2019-09-23

## 2019-09-23 RX ORDER — ALBUTEROL 90 UG/1
2 AEROSOL, METERED ORAL EVERY 6 HOURS
Refills: 0 | Status: DISCONTINUED | OUTPATIENT
Start: 2019-09-23 | End: 2019-09-24

## 2019-09-23 RX ORDER — ONDANSETRON 8 MG/1
4 TABLET, FILM COATED ORAL ONCE
Refills: 0 | Status: COMPLETED | OUTPATIENT
Start: 2019-09-23 | End: 2019-09-23

## 2019-09-23 RX ORDER — CIPROFLOXACIN LACTATE 400MG/40ML
400 VIAL (ML) INTRAVENOUS EVERY 12 HOURS
Refills: 0 | Status: DISCONTINUED | OUTPATIENT
Start: 2019-09-23 | End: 2019-09-24

## 2019-09-23 RX ORDER — SODIUM CHLORIDE 9 MG/ML
1000 INJECTION INTRAMUSCULAR; INTRAVENOUS; SUBCUTANEOUS
Refills: 0 | Status: DISCONTINUED | OUTPATIENT
Start: 2019-09-23 | End: 2019-09-24

## 2019-09-23 RX ORDER — IOHEXOL 300 MG/ML
30 INJECTION, SOLUTION INTRAVENOUS ONCE
Refills: 0 | Status: COMPLETED | OUTPATIENT
Start: 2019-09-23 | End: 2019-09-23

## 2019-09-23 RX ORDER — OXYCODONE AND ACETAMINOPHEN 5; 325 MG/1; MG/1
1 TABLET ORAL EVERY 4 HOURS
Refills: 0 | Status: DISCONTINUED | OUTPATIENT
Start: 2019-09-23 | End: 2019-09-24

## 2019-09-23 RX ORDER — SODIUM CHLORIDE 9 MG/ML
1000 INJECTION, SOLUTION INTRAVENOUS ONCE
Refills: 0 | Status: COMPLETED | OUTPATIENT
Start: 2019-09-23 | End: 2019-09-23

## 2019-09-23 RX ORDER — MESALAMINE 400 MG
800 TABLET, DELAYED RELEASE (ENTERIC COATED) ORAL
Refills: 0 | Status: DISCONTINUED | OUTPATIENT
Start: 2019-09-23 | End: 2019-09-24

## 2019-09-23 RX ORDER — METRONIDAZOLE 500 MG
500 TABLET ORAL EVERY 8 HOURS
Refills: 0 | Status: DISCONTINUED | OUTPATIENT
Start: 2019-09-23 | End: 2019-09-24

## 2019-09-23 RX ORDER — IPRATROPIUM/ALBUTEROL SULFATE 18-103MCG
3 AEROSOL WITH ADAPTER (GRAM) INHALATION ONCE
Refills: 0 | Status: COMPLETED | OUTPATIENT
Start: 2019-09-23 | End: 2019-09-23

## 2019-09-23 RX ORDER — ASPIRIN/CALCIUM CARB/MAGNESIUM 324 MG
81 TABLET ORAL DAILY
Refills: 0 | Status: DISCONTINUED | OUTPATIENT
Start: 2019-09-23 | End: 2019-09-24

## 2019-09-23 RX ORDER — MORPHINE SULFATE 50 MG/1
4 CAPSULE, EXTENDED RELEASE ORAL ONCE
Refills: 0 | Status: DISCONTINUED | OUTPATIENT
Start: 2019-09-23 | End: 2019-09-23

## 2019-09-23 RX ADMIN — SODIUM CHLORIDE 2000 MILLILITER(S): 9 INJECTION INTRAMUSCULAR; INTRAVENOUS; SUBCUTANEOUS at 16:48

## 2019-09-23 RX ADMIN — Medication 100 MILLIGRAM(S): at 21:55

## 2019-09-23 RX ADMIN — ONDANSETRON 4 MILLIGRAM(S): 8 TABLET, FILM COATED ORAL at 13:40

## 2019-09-23 RX ADMIN — OXYCODONE AND ACETAMINOPHEN 1 TABLET(S): 5; 325 TABLET ORAL at 19:05

## 2019-09-23 RX ADMIN — SODIUM CHLORIDE 75 MILLILITER(S): 9 INJECTION INTRAMUSCULAR; INTRAVENOUS; SUBCUTANEOUS at 21:55

## 2019-09-23 RX ADMIN — Medication 50 MILLIEQUIVALENT(S): at 16:19

## 2019-09-23 RX ADMIN — MORPHINE SULFATE 4 MILLIGRAM(S): 50 CAPSULE, EXTENDED RELEASE ORAL at 13:40

## 2019-09-23 RX ADMIN — MORPHINE SULFATE 2 MILLIGRAM(S): 50 CAPSULE, EXTENDED RELEASE ORAL at 23:38

## 2019-09-23 RX ADMIN — Medication 3 MILLILITER(S): at 13:41

## 2019-09-23 RX ADMIN — SODIUM CHLORIDE 75 MILLILITER(S): 9 INJECTION INTRAMUSCULAR; INTRAVENOUS; SUBCUTANEOUS at 23:38

## 2019-09-23 RX ADMIN — ONDANSETRON 4 MILLIGRAM(S): 8 TABLET, FILM COATED ORAL at 23:40

## 2019-09-23 RX ADMIN — MORPHINE SULFATE 2 MILLIGRAM(S): 50 CAPSULE, EXTENDED RELEASE ORAL at 20:56

## 2019-09-23 RX ADMIN — Medication 125 MILLIGRAM(S): at 18:13

## 2019-09-23 RX ADMIN — IOHEXOL 30 MILLILITER(S): 300 INJECTION, SOLUTION INTRAVENOUS at 13:40

## 2019-09-23 RX ADMIN — Medication 200 MILLIGRAM(S): at 19:24

## 2019-09-23 RX ADMIN — Medication 20 MILLIEQUIVALENT(S): at 18:15

## 2019-09-23 RX ADMIN — OXYCODONE AND ACETAMINOPHEN 1 TABLET(S): 5; 325 TABLET ORAL at 18:07

## 2019-09-23 RX ADMIN — SODIUM CHLORIDE 1000 MILLILITER(S): 9 INJECTION, SOLUTION INTRAVENOUS at 13:41

## 2019-09-23 RX ADMIN — Medication 100 MILLIGRAM(S): at 18:13

## 2019-09-23 NOTE — ED PROVIDER NOTE - CARE PLAN
Principal Discharge DX:	Pancolitis  Secondary Diagnosis:	Intractable abdominal pain  Secondary Diagnosis:	Ketosis

## 2019-09-23 NOTE — ED PROVIDER NOTE - PHYSICAL EXAMINATION
CONSTITUTIONAL: Well-developed; well-nourished; in acute distress.   SKIN: warm, dry.  HEAD: Normocephalic; atraumatic.  EYES: PERRL, EOMI, no conjunctival erythema.  ENT: No nasal discharge; airway clear.  NECK: Supple; non tender.  CARD: S1, S2 normal; no murmurs, gallops, or rubs. tachycardic.   RESP: diffuse expiratory wheeze; normal respiratory effort.   ABD: soft, nondistended, very tender to palpation in the LLQ with guarding, no rebound.   : Left CVA tenderness.   EXT: Normal ROM.  No clubbing, cyanosis or edema.   NEURO: Alert, oriented, grossly unremarkable  PSYCH: Cooperative, appropriate.

## 2019-09-23 NOTE — H&P ADULT - ATTENDING COMMENTS
29 years old female from home with PMHx of IBD, asthma, presented with persistent abdominal pain, vomiting and bloody diarrhea for 1 week due to acute flare of IBD    Pt seen and examined in ER- uncomfortable; no recent bleeding- just loose stool, and no fever or vomiting    Chart reviewed- agree with above    IBD meds    IVF    GI eval today    monitor CBC    diet as tolerated    Surgery/ICU eval if any change in status

## 2019-09-23 NOTE — H&P ADULT - HISTORY OF PRESENT ILLNESS
30 yo F w/ hx of UC, asthma p/w abdominal pain. Patient states he has had LLQ abdominal pain, radiating to the LUQ, intermittent cramps, severe, x 2 weeks associated with over 10 episodes of watery bloody diarrhea and not tolerating PO x 1 week. Patient states she finished prednisone; could not afford mesalamine and has not followed up with GI since discharged 07/2019. No dysuria, no vaginal discharge. No fevers. Endorses mild SOB x 1 day; given 2 duonebs enroute to ED by EMS 29 years old female from home with PMHx of IBD, asthma, presented with persistent abdominal pain, vomiting and bloody diarrhea for 1 week. Patient reports LLQ abdominal pain, 10/10, non-radiating, intermittent cramps, severe, associated with over 10 episodes of watery dark diarrhea and not tolerating PO diet for 1 week. Patient states that she stopped her prednisone 2 weeks ago because she ran out of medication however did not have a chance to renew her meds. She was also discharged with mesalamine but she could not afford it. She also has not followed up with GI. She reports fever/chills. but denies dysuria, no vaginal discharge, urinary symptoms.       T(C): 35.8 (23 Sep 2019 15:18), Max: 36.1 (23 Sep 2019 10:37)  T(F): 96.4 (23 Sep 2019 15:18), Max: 97 (23 Sep 2019 10:37)  HR: 84 (23 Sep 2019 17:34) (84 - 111)  BP: 93/67 (23 Sep 2019 17:34) (93/67 - 101/60)  RR: 18 (23 Sep 2019 15:18) (18 - 18)  SpO2: 98% (23 Sep 2019 15:18) (98% - 100%)

## 2019-09-23 NOTE — H&P ADULT - NSHPREVIEWOFSYSTEMS_GEN_ALL_CORE
CONSTITUTIONAL: No unintentional weight loss; no weakness; no fevers or chills  RESPIRATORY: No cough, wheezing, hemoptysis; no shortness of breath/shortness of breath on exertion; no orthopnea  CARDIOVASCULAR: No chest pain or palpitations  GASTROINTESTINAL: No abdominal or epigastric pain; no change in appetite; no early satiety; no nausea, vomiting, or hematemesis; no diarrhea or constipation; no melena or hematochezia; no change of stool caliber  GENITOURINARY: No dysuria, increased in urinary frequency or hematuria; no urethral discharge  NEUROLOGICAL: No headache/dizziness; no focal numbness or motor weakness  SKIN: No itching, rashes; no recent insect bites CONSTITUTIONAL: Reports fever/chills, no unintentional weight loss; no weakness  RESPIRATORY: No cough, wheezing, hemoptysis; no shortness of breath/shortness of breath on exertion; no orthopnea  CARDIOVASCULAR: No chest pain or palpitations  GASTROINTESTINAL: Reports LLQ pain, vomiting, and diarrhea with dark stool  GENITOURINARY: No dysuria, increased in urinary frequency or hematuria; no urethral discharge  NEUROLOGICAL: No headache/dizziness; no focal numbness or motor weakness  SKIN: No itching, rashes; no recent insect bites

## 2019-09-23 NOTE — H&P ADULT - NSHPLABSRESULTS_GEN_ALL_CORE
10.7   12.79 )-----------( 504      ( 23 Sep 2019 12:02 )             35.3           134<L>  |  95<L>  |  11  ----------------------------<  63<L>  3.8   |  16<L>  |  0.6<L>    Ca    9.2      23 Sep 2019 12:02    TPro  7.8  /  Alb  4.2  /  TBili  0.2  /  DBili  x   /  AST  13  /  ALT  6   /  AlkPhos  88          Urinalysis Basic - ( 23 Sep 2019 12:02 )    Color: Yellow / Appearance: Clear / S.031 / pH: x  Gluc: x / Ketone: Large  / Bili: Negative / Urobili: 3 mg/dL   Blood: x / Protein: 100 mg/dL / Nitrite: Negative   Leuk Esterase: Negative / RBC: 2 /HPF / WBC 7 /HPF   Sq Epi: x / Non Sq Epi: 6 /HPF / Bacteria: Few      Lactate Trend   @ 12:02 Lactate:2.2     POCT Blood Glucose.: 71 mg/dL (23 Sep 2019 15:19)      < from: CT Abdomen and Pelvis w/ Oral Cont and w/ IV Cont (19 @ 16:08) >    Colonic wall thickening of the entire colon consistent with pancolitis.   Appearing similar compared with 2019, rule out IBD.    < end of copied text >

## 2019-09-23 NOTE — H&P ADULT - NSHPSOCIALHISTORY_GEN_ALL_CORE
Denies tobacco, alcohol, or illicit drug use      Lives at home with family, functionally independent

## 2019-09-23 NOTE — ED PROVIDER NOTE - OBJECTIVE STATEMENT
Patient is a 28 yo F w/ hx of UC, asthma p/w abdominal pain. Patient states he has had LLQ abdominal pain, radiating to the LUQ, intermittent cramps, severe, x 2 weeks associated with over 10 episodes of watery bloody diarrhea and not tolerating PO x 1 week. Patient states she finished prednisone; could not afford mesalamine and has not followed up with GI since discharged 07/2019. No dysuria, no vaginal discharge. No fevers. Endorses mild SOB x 1 day; given 2 duonebs enroute to ED by EMS.

## 2019-09-23 NOTE — ED PROVIDER NOTE - ATTENDING CONTRIBUTION TO CARE
30 yo f with pmh of UC hasn't taken meds in a few weeks, asthma presents with 2 weeks of worsening abd pain.  + nausea and vomiting and diarrhe.a  no cp, no sob.  no headache.  no fever.  awake, alert.  abd soft with diffuse tenderness and guarding.  no rebound.  mmm.    p:  labs, cxr, ct, supportive care, likely admission

## 2019-09-23 NOTE — H&P ADULT - NSHPPHYSICALEXAM_GEN_ALL_CORE
GENERAL: NAD, lying in bed comfortably  HEAD: Atraumatic, Normocephalic  EYES: EOMI, PERRLA, conjunctiva pink and cornea white  ENT: Normal external ears and nose, no discharges; moist mucous membranes, no erythema on posterior oropharynx  NECK: Supple, nontender to palpation; no JVD  CHEST/LUNG: Clear to auscultation bilaterally; No rales, rhonchi, wheezing, or rubs. Unlabored respirations  HEART: Regular rate and rhythm; No murmurs, rubs, or gallops  ABDOMEN: Soft, nontender, nondistended; no rebound tenderness, no guarding; no hepatomegaly; normoactive/hyperactive/hypoactive bowel sounds  EXTREMITIES:  2+ Peripheral Pulses, brisk capillary refill. No clubbing, cyanosis, or petal edema  NERVOUS SYSTEM: Alert and oriented to person, time, place and situation, speech clear. No focal deficits   MSK: FROM all 4 extremities, full and equal strength  SKIN: No rashes or lesions GENERAL: NAD, lying in bed in pain  HEAD: Atraumatic, Normocephalic  EYES: EOMI, PERRLA, conjunctiva pink and cornea white  ENT: Normal external ears and nose, no discharges; moist mucous membranes, no oral lesion  NECK: Supple, nontender to palpation; no JVD  CHEST/LUNG: Clear to auscultation bilaterally; No rales, rhonchi, wheezing, or rubs. Unlabored respirations  HEART: Regular rate and rhythm; No murmurs, rubs, or gallops  ABDOMEN: Patient refused to be examined due to severe pain  EXTREMITIES:  2+ Peripheral Pulses, brisk capillary refill. No clubbing, cyanosis, or petal edema  NERVOUS SYSTEM: Alert and oriented to person, time, place and situation, speech clear. No focal deficits   MSK: FROM all 4 extremities, full and equal strength  SKIN: No rashes or lesions

## 2019-09-23 NOTE — ED PROVIDER NOTE - CLINICAL SUMMARY MEDICAL DECISION MAKING FREE TEXT BOX
pt with history of IBD.  pt hasn't taken medications in 2 weeks.  pt with abd pain, nausea, vomiting.  no cp, no sob, no fevers.  CT with pancolitis.  pt with presumed IBD flare.  pt given ivf, iv abx, steroids.  pt admitted to medicine for further management and treatment.  Pt can get inpatient gi evaluation as well.
95

## 2019-09-23 NOTE — H&P ADULT - ASSESSMENT
# Acute flare of IBD  - C. Diff, fecal calprotectin, GI PCR  - Solumedrol  - Delzicol  - Pain control    # Asthma # Acute flare of IBD  - As per GI, undiagnosed Crohn's vs UC  - CT shows pancolitis, WBC 12.79, nonseptic on admission  - Continue Cipro 400mg IV q8hrs and Flagyl 500mg IV q8hrs for pancolitis  - Get C. Diff, GI PCR, then start solumedrol 60mg IV q24hrs  - Start mesalamine 800mg q6hrs  - Check ESR, CRP  - Pain control with Percocet and Morphine PRN  - Consult GI (Dr. Foote)    # Asthma  - Not in acute exacerbation  - Albuterol PRN    # High anion gap metabolic acidosis  - Ketone in urine, AG 23, blood glucose 63  - Blood Gas Profile - Venous (09.23.19 @ 13:26)    pH, Venous: 7.32    pCO2, Venous: 38 mmHg    pO2, Venous: 23 mmHg    HCO3, Venous: 20 mmoL/L  - Suspect alcohol use, however patient denies  - Will continue to monitor and continue hydration      DVT ppx: subQ heparin  GI ppx: Protonix  Diet: clear liquid diet for now  Activity: ambulate as tolerated  Lines: Peripheral IVs  Code status: full code  Dispo: acute, pending GI recs 29 years old female from home with PMHx of IBD, asthma, presented with persistent abdominal pain, vomiting and bloody diarrhea for 1 week    # Acute flare of IBD  - As per GI, undiagnosed Crohn's vs UC  - CT shows pancolitis, WBC 12.79, nonseptic on admission  - Continue Cipro 400mg IV q12hrs and Flagyl 500mg IV q8hrs for pancolitis  - Get C. Diff, GI PCR, then start solumedrol 60mg IV q24hrs  - Start mesalamine 800mg q6hrs  - Check ESR, CRP  - Pain control with Percocet and Morphine PRN  - Consult GI (Dr. Foote)    # Asthma  - Not in acute exacerbation  - Albuterol PRN    # High anion gap metabolic acidosis  - Ketone in urine, AG 23, blood glucose 63  - Blood Gas Profile - Venous (09.23.19 @ 13:26)    pH, Venous: 7.32    pCO2, Venous: 38 mmHg    pO2, Venous: 23 mmHg    HCO3, Venous: 20 mmoL/L  - Suspect alcohol use, however patient denies  - Will continue to monitor and continue hydration      DVT ppx: subQ heparin  GI ppx: Protonix  Diet: clear liquid diet for now  Activity: ambulate as tolerated  Lines: Peripheral IVs  Code status: full code  Dispo: acute, pending GI recs

## 2019-09-23 NOTE — ED PROVIDER NOTE - NS ED ROS FT
Constitutional: No fevers.   Eyes:  No visual changes, eye pain or discharge.  ENMT:  No hearing changes, pain, no sore throat or runny nose, no difficulty swallowing  Cardiac:  No chest pain, SOB or edema. No chest pain with exertion.  Respiratory:  +SOB. Hx of asthma.   GI:  +abdominal pain. +bloody diarrhea. +vomiting.   :  No dysuria, frequency or burning.  MS:  No myalgia, muscle weakness, joint pain or back pain.  Neuro:  No headache or weakness.  No LOC.  Skin:  No skin rash.   Endocrine: No history of thyroid disease or diabetes.

## 2019-09-23 NOTE — ED PROVIDER NOTE - PROGRESS NOTE DETAILS
Patient still uncomfortable in tears; states her BP is always low.  States only morphine or percocet work for her pain.

## 2019-09-24 VITALS
HEART RATE: 53 BPM | DIASTOLIC BLOOD PRESSURE: 54 MMHG | OXYGEN SATURATION: 100 % | RESPIRATION RATE: 18 BRPM | SYSTOLIC BLOOD PRESSURE: 82 MMHG

## 2019-09-24 LAB
ALBUMIN SERPL ELPH-MCNC: 3 G/DL — LOW (ref 3.5–5.2)
ALP SERPL-CCNC: 60 U/L — SIGNIFICANT CHANGE UP (ref 30–115)
ALT FLD-CCNC: <5 U/L — SIGNIFICANT CHANGE UP (ref 0–41)
ANION GAP SERPL CALC-SCNC: 13 MMOL/L — SIGNIFICANT CHANGE UP (ref 7–14)
AST SERPL-CCNC: 8 U/L — SIGNIFICANT CHANGE UP (ref 0–41)
BASOPHILS # BLD AUTO: 0.02 K/UL — SIGNIFICANT CHANGE UP (ref 0–0.2)
BASOPHILS NFR BLD AUTO: 0.2 % — SIGNIFICANT CHANGE UP (ref 0–1)
BILIRUB SERPL-MCNC: 0.2 MG/DL — SIGNIFICANT CHANGE UP (ref 0.2–1.2)
BUN SERPL-MCNC: <3 MG/DL — LOW (ref 10–20)
C DIFF BY PCR RESULT: NEGATIVE — SIGNIFICANT CHANGE UP
C DIFF TOX GENS STL QL NAA+PROBE: SIGNIFICANT CHANGE UP
CALCIUM SERPL-MCNC: 8.2 MG/DL — LOW (ref 8.5–10.1)
CHLORIDE SERPL-SCNC: 104 MMOL/L — SIGNIFICANT CHANGE UP (ref 98–110)
CO2 SERPL-SCNC: 16 MMOL/L — LOW (ref 17–32)
CREAT SERPL-MCNC: 0.6 MG/DL — LOW (ref 0.7–1.5)
CRP SERPL-MCNC: 9.95 MG/DL — HIGH (ref 0–0.4)
CULTURE RESULTS: SIGNIFICANT CHANGE UP
CULTURE RESULTS: SIGNIFICANT CHANGE UP
EOSINOPHIL # BLD AUTO: 0.08 K/UL — SIGNIFICANT CHANGE UP (ref 0–0.7)
EOSINOPHIL NFR BLD AUTO: 0.9 % — SIGNIFICANT CHANGE UP (ref 0–8)
ERYTHROCYTE [SEDIMENTATION RATE] IN BLOOD: 54 MM/HR — HIGH (ref 0–20)
GLUCOSE SERPL-MCNC: 108 MG/DL — HIGH (ref 70–99)
HCT VFR BLD CALC: 28.6 % — LOW (ref 37–47)
HGB BLD-MCNC: 8.6 G/DL — LOW (ref 12–16)
IMM GRANULOCYTES NFR BLD AUTO: 0.4 % — HIGH (ref 0.1–0.3)
LYMPHOCYTES # BLD AUTO: 0.41 K/UL — LOW (ref 1.2–3.4)
LYMPHOCYTES # BLD AUTO: 4.5 % — LOW (ref 20.5–51.1)
MAGNESIUM SERPL-MCNC: 1.4 MG/DL — LOW (ref 1.8–2.4)
MCHC RBC-ENTMCNC: 22.1 PG — LOW (ref 27–31)
MCHC RBC-ENTMCNC: 30.1 G/DL — LOW (ref 32–37)
MCV RBC AUTO: 73.3 FL — LOW (ref 81–99)
MONOCYTES # BLD AUTO: 0.37 K/UL — SIGNIFICANT CHANGE UP (ref 0.1–0.6)
MONOCYTES NFR BLD AUTO: 4.1 % — SIGNIFICANT CHANGE UP (ref 1.7–9.3)
NEUTROPHILS # BLD AUTO: 8.1 K/UL — HIGH (ref 1.4–6.5)
NEUTROPHILS NFR BLD AUTO: 89.9 % — HIGH (ref 42.2–75.2)
NRBC # BLD: 0 /100 WBCS — SIGNIFICANT CHANGE UP (ref 0–0)
PLATELET # BLD AUTO: 323 K/UL — SIGNIFICANT CHANGE UP (ref 130–400)
POTASSIUM SERPL-MCNC: 4.1 MMOL/L — SIGNIFICANT CHANGE UP (ref 3.5–5)
POTASSIUM SERPL-SCNC: 4.1 MMOL/L — SIGNIFICANT CHANGE UP (ref 3.5–5)
PROT SERPL-MCNC: 5.4 G/DL — LOW (ref 6–8)
RBC # BLD: 3.9 M/UL — LOW (ref 4.2–5.4)
RBC # FLD: 17.5 % — HIGH (ref 11.5–14.5)
SODIUM SERPL-SCNC: 133 MMOL/L — LOW (ref 135–146)
SPECIMEN SOURCE: SIGNIFICANT CHANGE UP
SPECIMEN SOURCE: SIGNIFICANT CHANGE UP
WBC # BLD: 9.02 K/UL — SIGNIFICANT CHANGE UP (ref 4.8–10.8)
WBC # FLD AUTO: 9.02 K/UL — SIGNIFICANT CHANGE UP (ref 4.8–10.8)

## 2019-09-24 PROCEDURE — 99223 1ST HOSP IP/OBS HIGH 75: CPT

## 2019-09-24 RX ORDER — MAGNESIUM SULFATE 500 MG/ML
2 VIAL (ML) INJECTION ONCE
Refills: 0 | Status: COMPLETED | OUTPATIENT
Start: 2019-09-24 | End: 2019-09-24

## 2019-09-24 RX ORDER — MORPHINE SULFATE 50 MG/1
2 CAPSULE, EXTENDED RELEASE ORAL ONCE
Refills: 0 | Status: DISCONTINUED | OUTPATIENT
Start: 2019-09-24 | End: 2019-09-24

## 2019-09-24 RX ORDER — TUBERCULIN PURIFIED PROTEIN DERIVATIVE 5 [IU]/.1ML
5 INJECTION, SOLUTION INTRADERMAL ONCE
Refills: 0 | Status: COMPLETED | OUTPATIENT
Start: 2019-09-24 | End: 2019-09-24

## 2019-09-24 RX ORDER — SODIUM CHLORIDE 9 MG/ML
1000 INJECTION INTRAMUSCULAR; INTRAVENOUS; SUBCUTANEOUS
Refills: 0 | Status: DISCONTINUED | OUTPATIENT
Start: 2019-09-24 | End: 2019-09-24

## 2019-09-24 RX ORDER — MORPHINE SULFATE 50 MG/1
2 CAPSULE, EXTENDED RELEASE ORAL EVERY 6 HOURS
Refills: 0 | Status: DISCONTINUED | OUTPATIENT
Start: 2019-09-24 | End: 2019-09-24

## 2019-09-24 RX ADMIN — Medication 200 MILLIGRAM(S): at 05:21

## 2019-09-24 RX ADMIN — Medication 800 MILLIGRAM(S): at 06:19

## 2019-09-24 RX ADMIN — Medication 25 GRAM(S): at 10:36

## 2019-09-24 RX ADMIN — Medication 20 MILLIGRAM(S): at 14:17

## 2019-09-24 RX ADMIN — Medication 81 MILLIGRAM(S): at 14:17

## 2019-09-24 RX ADMIN — MORPHINE SULFATE 2 MILLIGRAM(S): 50 CAPSULE, EXTENDED RELEASE ORAL at 05:21

## 2019-09-24 RX ADMIN — Medication 800 MILLIGRAM(S): at 14:16

## 2019-09-24 RX ADMIN — Medication 60 MILLIGRAM(S): at 05:22

## 2019-09-24 RX ADMIN — Medication 100 MILLIGRAM(S): at 05:21

## 2019-09-24 RX ADMIN — SODIUM CHLORIDE 75 MILLILITER(S): 9 INJECTION INTRAMUSCULAR; INTRAVENOUS; SUBCUTANEOUS at 06:19

## 2019-09-24 RX ADMIN — SODIUM CHLORIDE 125 MILLILITER(S): 9 INJECTION INTRAMUSCULAR; INTRAVENOUS; SUBCUTANEOUS at 14:21

## 2019-09-24 RX ADMIN — Medication 800 MILLIGRAM(S): at 00:24

## 2019-09-24 RX ADMIN — SODIUM CHLORIDE 75 MILLILITER(S): 9 INJECTION INTRAMUSCULAR; INTRAVENOUS; SUBCUTANEOUS at 05:23

## 2019-09-24 RX ADMIN — MORPHINE SULFATE 2 MILLIGRAM(S): 50 CAPSULE, EXTENDED RELEASE ORAL at 10:37

## 2019-09-24 RX ADMIN — SODIUM CHLORIDE 125 MILLILITER(S): 9 INJECTION INTRAMUSCULAR; INTRAVENOUS; SUBCUTANEOUS at 10:36

## 2019-09-24 NOTE — PROGRESS NOTE ADULT - SUBJECTIVE AND OBJECTIVE BOX
DAILY PROGRESS NOTE  ===========================================================  Patient Information:   Hospital Day:</GREYSON KAYLA  /  29y  /  Female  /b> 1d /  MRN#: [id number]  Working / Admitting Diagnosis:  1. IBD flare    |:::::::::::::::::::::::::::::| SUBJECTIVE |:::::::::::::::::::::::::::::::|  OVERNIGHT EVENTS:   No acute events noted.  Denies chest pain / SOB / palpitations / Nausea / Vomitting / constipation / diarrhea or abdominal pain.   ROS otherwise negative.    Past Medical History:   Asthma   Ulcerative colitis.   ALLERGIES:  penicillin (Rash)    HOME MEDICATIONS:  albuterol 90 mcg/inh inhalation aerosol with adapter: inhaled 4 times a day, As Needed (23 Sep 2019 20:55)  aspirin 81 mg oral tablet, chewable: 1 tab(s) orally once a day (23 Sep 2019 20:55)      |:::::::::::::::::::::::::::| OBJECTIVE |:::::::::::::::::::::::::::|    VITAL SIGNS: Last 24 Hours  T(C): 36.3 (24 Sep 2019 00:34), Max: 36.7 (23 Sep 2019 21:59)  T(F): 97.3 (24 Sep 2019 00:34), Max: 98.1 (23 Sep 2019 21:59)  HR: 53 (24 Sep 2019 07:42) (53 - 111)  BP: 82/54 (24 Sep 2019 07:42) (82/54 - 102/50)  RR: 18 (24 Sep 2019 07:42) (18 - 18)  SpO2: 100% (24 Sep 2019 07:42) (98% - 100%)    PHYSICAL EXAM:  GENERAL:   Awake, alert; NAD.  HEENT:  Head NC/AT; Conjunctivae pink, Sclera anicteric & non-injected; Oral mucosa moist.  NECK:   Supple.  CARDIO:   RRR; S1 & S2 audible  RESP:  No respiratory distress or accessory muscle use. CTAB  GI:   Soft/ NT/ND / No guarding; No rebound tenderness.  EXT:   Without any cyanosis, clubbing, rash, lesions or edema.     LAB RESULTS:                        8.6    9.02  )-----------( 323      ( 24 Sep 2019 06:48 )             28.6         133<L>  |  104  |  <3<L>  ----------------------------<  108<H>  4.1   |  16<L>  |  0.6<L>    Ca    8.2<L>      24 Sep 2019 06:48  Mg     1.4         TPro  5.4<L>  /  Alb  3.0<L>  /  TBili  0.2  /  DBili  x   /  AST  8   /  ALT  <5  /  AlkPhos  60        Urinalysis Basic - ( 23 Sep 2019 12:02 )    Color: Yellow / Appearance: Clear / S.031 / pH: x  Gluc: x / Ketone: Large  / Bili: Negative / Urobili: 3 mg/dL   Blood: x / Protein: 100 mg/dL / Nitrite: Negative   Leuk Esterase: Negative / RBC: 2 /HPF / WBC 7 /HPF   Sq Epi: x / Non Sq Epi: 6 /HPF / Bacteria: Few    Sedimentation Rate, Erythrocyte: 54 mm/Hr <H> (19 @ 06:48)  Lactate, Blood: 2.2 mmol/L (19 @ 12:02)    MICROBIOLOGY:  C Diff by PCR Result: Negative (19 @ 05:09)    RADIOLOGY:  < from: CT Abdomen and Pelvis w/ Oral Cont and w/ IV Cont (19 @ 16:08) >  IMPRESSION:   Colonic wall thickening of the entire colon consistent with pancolitis.   Appearing similar compared with 2019, rule out IBD.      INPATIENT MEDICATIONS:  aspirin  chewable 81 milliGRAM(s) Oral daily  chlorhexidine 4% Liquid 1 Application(s) Topical <User Schedule>  ciprofloxacin   IVPB 400 milliGRAM(s) IV Intermittent every 12 hours  magnesium sulfate  IVPB 2 Gram(s) IV Intermittent once  mesalamine DR Capsule 800 milliGRAM(s) Oral four times a day  methylPREDNISolone sodium succinate Injectable 60 milliGRAM(s) IV Push daily  metroNIDAZOLE  IVPB 500 milliGRAM(s) IV Intermittent every 8 hours  sodium chloride 0.9%. 1000 milliLiter(s) IV Continuous <Continuous>    PRN MEDICATIONS  ALBUTerol    90 MICROgram(s) HFA Inhaler 2 Puff(s) Inhalation every 6 hours PRN  oxyCODONE    5 mG/acetaminophen 325 mG 1 Tablet(s) Oral every 4 hours PRN    ------------------------------------------------------------------------------------------------------------

## 2019-09-24 NOTE — PROGRESS NOTE ADULT - ASSESSMENT
29 years old female from home with PMHx of IBD, asthma, presented with persistent abdominal pain, vomiting and bloody diarrhea for 1 week    # Severe ulcerative colitis flare  - CT shows pancolitis, WBC 12.79, C. Diff neg, continue solumedrol 20mg IV q8 hrs   - dc abx per gi  - Mesalamine 800mg q6hrs  - ESr elevated, CRP pending . nutrition eval  - Pain control with Percocet and Morphine PRN      # Asthma - Not in acute exacerbation. Albuterol PRN  # High anion gap metabolic acidosis - IVF for now  # DVT ppx: subQ heparin  # GI ppx: Protonix  # Diet: clear liquid diet for now  # Activity: ambulate as tolerated  # Code status: full code  # Dispo: acute

## 2019-09-24 NOTE — CONSULT NOTE ADULT - SUBJECTIVE AND OBJECTIVE BOX
Gastroenterology Consultation: IBD Flare    Patient is a 29y old  Female who presents with a chief complaint of abdominal pain and bloody diarrhea (23 Sep 2019 20:13)    Admitted on: 09-23-19  HPI:  29 years old female from home with PMHx of UC , asthma, presented with persistent abdominal pain, vomiting and bloody diarrhea for 1 week. Patient reports LLQ abdominal pain, 10/10, non-radiating, intermittent cramps, severe, associated with over 10 episodes of watery dark diarrhea and not tolerating PO diet for 1 week. she reports subjective fever, chills, nausea and vomiting. she was unable to tolerate po intake. denies urgency,  recent antibiotics, sick contact or recent travel  Patient has UC for 3 years now, she was steroids dependant. last flare in july. s/p sigmoidoscopy: skip lesions (was on steroids). pathology showed focal acute cryptitis with crypt abscess but no granuloma. she was discharged on prednisone and mesalamine. she ran out of her prednisone 2 weeks ago and could not fill it. she could not afford mesalamine. she has appointment with GI on 9/27th.     this morning her pain is more diffuse (after taking mesalamine), she went once to the bathroom (green, no blood)    T(C): 35.8 (23 Sep 2019 15:18), Max: 36.1 (23 Sep 2019 10:37)  T(F): 96.4 (23 Sep 2019 15:18), Max: 97 (23 Sep 2019 10:37)  HR: 84 (23 Sep 2019 17:34) (84 - 111)  BP: 93/67 (23 Sep 2019 17:34) (93/67 - 101/60)  RR: 18 (23 Sep 2019 15:18) (18 - 18)  SpO2: 98% (23 Sep 2019 15:18) (98% - 100%) (23 Sep 2019 20:13)    Prior records Reviewed: yes, flare july 2019  History obtained from person other than patient (Y/N): N    Prior EGD: none  Prior Colonoscopy: reports having 2 colonoscopies, none in the system  < from: Flexible Sigmoidoscopy (07.19.19 @ 12:15) >  Impressions:    The maximum extent of this study was 60cm.    The rectum appeared normal up until 15 cm. Biopsies of the rectum were taken.  However on retroflexion there was small (<1cm) ulceration in the perianal area.  No fistulas could be seen (Biopsy).    Between 15 and 20 cm, mild colitis (erythema) without guillaume ulceration was seen  (Biopsy).    From 20cm to 60cm, severe linear ulcerations with skipped areas and pseudopolyp  formationwithout bleeding was seen. Multiple biopsies were taken (Biopsy).    These findings are suggestive of IBD favoring Crohn's disease over ulcerative  colitis.     < end of copied text >      PAST MEDICAL & SURGICAL HISTORY:  Asthma  Ulcerative colitis  No significant past surgical history      FAMILY HISTORY:  IBD in maternal cousin      Social History:  Tobacco:neg  Alcohol:neg  Drugs:neg    Home Medications:  albuterol 90 mcg/inh inhalation aerosol with adapter: inhaled 4 times a day, As Needed (23 Sep 2019 20:55)  aspirin 81 mg oral tablet, chewable: 1 tab(s) orally once a day (23 Sep 2019 20:55)    MEDICATIONS  (STANDING):  aspirin  chewable 81 milliGRAM(s) Oral daily (primary prophylaxis, not prescribed)  chlorhexidine 4% Liquid 1 Application(s) Topical <User Schedule>  ciprofloxacin   IVPB 400 milliGRAM(s) IV Intermittent every 12 hours  mesalamine DR Capsule 800 milliGRAM(s) Oral four times a day  methylPREDNISolone sodium succinate Injectable 60 milliGRAM(s) IV Push daily  metroNIDAZOLE  IVPB 500 milliGRAM(s) IV Intermittent every 8 hours  sodium chloride 0.9%. 1000 milliLiter(s) (75 mL/Hr) IV Continuous <Continuous>    MEDICATIONS  (PRN):  ALBUTerol    90 MICROgram(s) HFA Inhaler 2 Puff(s) Inhalation every 6 hours PRN Shortness of Breath and/or Wheezing  oxyCODONE    5 mG/acetaminophen 325 mG 1 Tablet(s) Oral every 4 hours PRN Severe Pain (7 - 10)      Allergies  penicillin (Rash)      Review of Systems:   Constitutional: + Fever, No Chills  ENT/Mouth:  No Hearing Changes,  No Difficulty Swallowing  Eyes:  No Eye Pain, No Vision Changes  Cardiovascular:  intermittent Chest Pain, No Palpitations  Respiratory:  No Cough, No Dyspnea  Gastrointestinal:  As described in HPI  Musculoskeletal:  No Joint Swelling, No Back Pain  Skin:  No Skin Lesions, No Jaundice  Neuro:  No Syncope, No Dizziness  Heme/Lymph:  No Bruising, No Bleeding.    Physical Examination:  T(C): 36.3 (09-24-19 @ 00:34), Max: 36.7 (09-23-19 @ 21:59)  HR: 53 (09-24-19 @ 07:42) (53 - 111)  BP: 82/54 (09-24-19 @ 07:42) (82/54 - 102/50)  RR: 18 (09-24-19 @ 07:42) (18 - 18)  SpO2: 100% (09-24-19 @ 07:42) (98% - 100%)    Constitutional: No acute distress.  Eyes:. Conjunctivae are clear, Sclera is non-icteric.  Ears Nose and Throat: The external ears are normal appearing,  Oral mucosa is dry.  Respiratory:  No signs of respiratory distress. Lung sounds are clear bilaterally.  Cardiovascular:  S1 S2, Regular rate and rhythm.  GI: Abdomen is soft, symmetric, and tender mainly LLQ. There are no visible lesions or scars. Bowel sounds are hypoactive. No masses, hepatomegaly, or splenomegaly are noted.   Neuro: No Tremor, No involuntary movements  Skin: No rashes, No Jaundice.    Data: (reviewed by attending)                        8.6    9.02  )-----------( 323      ( 24 Sep 2019 06:48 )             28.6     Hgb Trend:  8.6  09-24-19 @ 06:48  10.7  09-23-19 @ 12:02      09-24    133<L>  |  104  |  <3<L>  ----------------------------<  108<H>  4.1   |  16<L>  |  0.6<L>    Ca    8.2<L>      24 Sep 2019 06:48  Mg     1.4     09-24    TPro  5.4<L>  /  Alb  3.0<L>  /  TBili  0.2  /  DBili  x   /  AST  8   /  ALT  <5  /  AlkPhos  60  09-24    Liver panel trend:  TBili 0.2   /   AST 8   /   ALT <5   /   AlkP 60   /   Tptn 5.4   /   Alb 3.0    /   DBili --      09-24  TBili 0.2   /   AST 13   /   ALT 6   /   AlkP 88   /   Tptn 7.8   /   Alb 4.2    /   DBili --      09-23      Radiology:(reviewed by attending)  CT Abdomen and Pelvis w/ Oral Cont and w/ IV Cont:   EXAM:  CT ABDOMEN AND PELVIS OC IC          PROCEDURE DATE:  09/23/2019      INTERPRETATION:  CLINICAL STATEMENT: Left lower quadrant pain.      TECHNIQUE: Contiguous axial CT images were obtained from the lower chest   to the pubic symphysis following administration of 100cc Optiray 320   intravenous contrast.  Oral contrast was not administered.  Reformatted   images in the coronal and sagittal planes were acquired.    COMPARISON CT: 7/17/2019.    OTHER STUDIES USED FOR CORRELATION: None.       FINDINGS:    LOWER CHEST: Unremarkable.    HEPATOBILIARY: Unremarkable.    SPLEEN: Unremarkable.    PANCREAS: Unremarkable.    ADRENAL GLANDS: Unremarkable.    KIDNEYS: Unremarkable.    ABDOMINOPELVIC NODES: Prominent right lower quadrant lymph nodes likely   reactive.    PELVIC ORGANS: Unremarkable.    PERITONEUM/MESENTERY/BOWEL: Colonic wall thickening of the entire colon.   The appendix is mildly prominent however this is more reflective of the   colonic inflammation. No evidence of bowel obstruction.    BONES/SOFT TISSUES: L4 hemangioma. No acute abnormalities. Rectus   abdominis diastases.    OTHER:      IMPRESSION:     Colonic wall thickening of the entire colon consistent with pancolitis.   Appearing similar compared with 7/17/2019, rule out IBD.    AMPARO SULLIVAN M.D., ATTENDING RADIOLOGIST  This document has been electronically signed. Sep 23 2019  4:17PM     (09-23-19 @ 16:08) Gastroenterology Consultation: IBD Flare    Patient is a 29y old  Female who presents with a chief complaint of abdominal pain and bloody diarrhea (23 Sep 2019 20:13)    Admitted on: 09-23-19  HPI:  29 years old female from home with PMHx of UC , asthma, presented with persistent abdominal pain, vomiting and bloody diarrhea for 1 week. Patient reports LLQ abdominal pain, 10/10, non-radiating, intermittent cramps, severe, associated with over 10 episodes of watery dark diarrhea and not tolerating PO diet for 1 week. she reports subjective fever, chills, nausea and vomiting. she was unable to tolerate po intake. denies urgency,  recent antibiotics, sick contact or recent travel  Patient has UC for 3 years now, she is steroids dependant. last flare in july. s/p sigmoidoscopy: skip lesions (was on steroids). pathology showed focal acute cryptitis with crypt abscess but no granuloma. she was discharged on prednisone and mesalamine. she ran out of her prednisone 2 weeks ago . she could not afford mesalamine. she has appointment with GI on 9/27th.     this morning her pain is more diffuse (after taking mesalamine), she went once to the bathroom (green, no blood)    T(C): 35.8 (23 Sep 2019 15:18), Max: 36.1 (23 Sep 2019 10:37)  T(F): 96.4 (23 Sep 2019 15:18), Max: 97 (23 Sep 2019 10:37)  HR: 84 (23 Sep 2019 17:34) (84 - 111)  BP: 93/67 (23 Sep 2019 17:34) (93/67 - 101/60)  RR: 18 (23 Sep 2019 15:18) (18 - 18)  SpO2: 98% (23 Sep 2019 15:18) (98% - 100%) (23 Sep 2019 20:13)    Prior records Reviewed: yes, flare july 2019  History obtained from person other than patient (Y/N): N    Prior EGD: none  Prior Colonoscopy: reports having 2 colonoscopies, none in the system  < from: Flexible Sigmoidoscopy (07.19.19 @ 12:15) >  Impressions:    The maximum extent of this study was 60cm.    The rectum appeared normal up until 15 cm. Biopsies of the rectum were taken.  However on retroflexion there was small (<1cm) ulceration in the perianal area.  No fistulas could be seen (Biopsy).    Between 15 and 20 cm, mild colitis (erythema) without guillaume ulceration was seen  (Biopsy).    From 20cm to 60cm, severe linear ulcerations with skipped areas and pseudopolyp  formationwithout bleeding was seen. Multiple biopsies were taken (Biopsy).    These findings are suggestive of IBD favoring Crohn's disease over ulcerative  colitis.     < end of copied text >      PAST MEDICAL & SURGICAL HISTORY:  Asthma  Ulcerative colitis  No significant past surgical history      FAMILY HISTORY:  IBD in maternal cousin      Social History:  Tobacco:neg  Alcohol:neg  Drugs:neg    Home Medications:  albuterol 90 mcg/inh inhalation aerosol with adapter: inhaled 4 times a day, As Needed (23 Sep 2019 20:55)  aspirin 81 mg oral tablet, chewable: 1 tab(s) orally once a day (23 Sep 2019 20:55)    MEDICATIONS  (STANDING):  aspirin  chewable 81 milliGRAM(s) Oral daily (primary prophylaxis, not prescribed)  chlorhexidine 4% Liquid 1 Application(s) Topical <User Schedule>  ciprofloxacin   IVPB 400 milliGRAM(s) IV Intermittent every 12 hours  mesalamine DR Capsule 800 milliGRAM(s) Oral four times a day  methylPREDNISolone sodium succinate Injectable 60 milliGRAM(s) IV Push daily  metroNIDAZOLE  IVPB 500 milliGRAM(s) IV Intermittent every 8 hours  sodium chloride 0.9%. 1000 milliLiter(s) (75 mL/Hr) IV Continuous <Continuous>    MEDICATIONS  (PRN):  ALBUTerol    90 MICROgram(s) HFA Inhaler 2 Puff(s) Inhalation every 6 hours PRN Shortness of Breath and/or Wheezing  oxyCODONE    5 mG/acetaminophen 325 mG 1 Tablet(s) Oral every 4 hours PRN Severe Pain (7 - 10)      Allergies  penicillin (Rash)      Review of Systems:   Constitutional: + Fever, No Chills  ENT/Mouth:  No Hearing Changes,  No Difficulty Swallowing  Eyes:  No Eye Pain, No Vision Changes  Cardiovascular:  intermittent Chest Pain, No Palpitations  Respiratory:  No Cough, No Dyspnea  Gastrointestinal:  As described in HPI  Musculoskeletal:  No Joint Swelling, No Back Pain  Skin:  No Skin Lesions, No Jaundice  Neuro:  No Syncope, No Dizziness  Heme/Lymph:  No Bruising, No Bleeding.    Physical Examination:  T(C): 36.3 (09-24-19 @ 00:34), Max: 36.7 (09-23-19 @ 21:59)  HR: 53 (09-24-19 @ 07:42) (53 - 111)  BP: 82/54 (09-24-19 @ 07:42) (82/54 - 102/50)  RR: 18 (09-24-19 @ 07:42) (18 - 18)  SpO2: 100% (09-24-19 @ 07:42) (98% - 100%)    Constitutional: No acute distress.  Eyes:. Conjunctivae are clear, Sclera is non-icteric.  Ears Nose and Throat: The external ears are normal appearing,  Oral mucosa is dry.  Respiratory:  No signs of respiratory distress. Lung sounds are clear bilaterally.  Cardiovascular:  S1 S2, Regular rate and rhythm.  GI: Abdomen is soft, symmetric, and tender mainly LLQ. There are no visible lesions or scars. Bowel sounds are hypoactive. No masses, hepatomegaly, or splenomegaly are noted.   Neuro: No Tremor, No involuntary movements  Skin: No rashes, No Jaundice.    Data: (reviewed by attending)                        8.6    9.02  )-----------( 323      ( 24 Sep 2019 06:48 )             28.6     Hgb Trend:  8.6  09-24-19 @ 06:48  10.7  09-23-19 @ 12:02      09-24    133<L>  |  104  |  <3<L>  ----------------------------<  108<H>  4.1   |  16<L>  |  0.6<L>    Ca    8.2<L>      24 Sep 2019 06:48  Mg     1.4     09-24    TPro  5.4<L>  /  Alb  3.0<L>  /  TBili  0.2  /  DBili  x   /  AST  8   /  ALT  <5  /  AlkPhos  60  09-24    Liver panel trend:  TBili 0.2   /   AST 8   /   ALT <5   /   AlkP 60   /   Tptn 5.4   /   Alb 3.0    /   DBili --      09-24  TBili 0.2   /   AST 13   /   ALT 6   /   AlkP 88   /   Tptn 7.8   /   Alb 4.2    /   DBili --      09-23      Radiology:(reviewed by attending)  CT Abdomen and Pelvis w/ Oral Cont and w/ IV Cont:   EXAM:  CT ABDOMEN AND PELVIS OC IC          PROCEDURE DATE:  09/23/2019      INTERPRETATION:  CLINICAL STATEMENT: Left lower quadrant pain.      TECHNIQUE: Contiguous axial CT images were obtained from the lower chest   to the pubic symphysis following administration of 100cc Optiray 320   intravenous contrast.  Oral contrast was not administered.  Reformatted   images in the coronal and sagittal planes were acquired.    COMPARISON CT: 7/17/2019.    OTHER STUDIES USED FOR CORRELATION: None.       FINDINGS:    LOWER CHEST: Unremarkable.    HEPATOBILIARY: Unremarkable.    SPLEEN: Unremarkable.    PANCREAS: Unremarkable.    ADRENAL GLANDS: Unremarkable.    KIDNEYS: Unremarkable.    ABDOMINOPELVIC NODES: Prominent right lower quadrant lymph nodes likely   reactive.    PELVIC ORGANS: Unremarkable.    PERITONEUM/MESENTERY/BOWEL: Colonic wall thickening of the entire colon.   The appendix is mildly prominent however this is more reflective of the   colonic inflammation. No evidence of bowel obstruction.    BONES/SOFT TISSUES: L4 hemangioma. No acute abnormalities. Rectus   abdominis diastases.    OTHER:      IMPRESSION:     Colonic wall thickening of the entire colon consistent with pancolitis.   Appearing similar compared with 7/17/2019, rule out IBD.    AMPARO SULLIVAN M.D., ATTENDING RADIOLOGIST  This document has been electronically signed. Sep 23 2019  4:17PM     (09-23-19 @ 16:08)

## 2019-09-24 NOTE — PATIENT PROFILE ADULT - TOBACCO USE
HPI     Odessa Vaughn is a 72 y.o. female who returns  To have her glasses   rechecked.  Her last exam with me was on 5/11/18.  She was given a new   prescription for glasses at that time. She had the prescription filled in   June and has been having trouble with the vision in her left eye. She adds   that vision through left lens of her previous glasses is significantly   better.     (+)blurred vision --> left eye   (--)Headaches  (--)diplopia  (--)flashes  (--)floaters  (--)pain  (--)Itching  (--)tearing  (--)burning  (--)Dryness  (--) OTC Drops  (--)Photophobia      Last edited by Thais Culp, OD on 8/30/2018  9:33 AM. (History)        Review of Systems   Constitutional: Negative for chills, fever and malaise/fatigue.   HENT: Negative for congestion and hearing loss.    Eyes: Positive for blurred vision. Negative for double vision, photophobia, pain, discharge and redness.   Respiratory: Negative.    Cardiovascular: Negative.    Gastrointestinal: Negative.    Genitourinary: Negative.    Musculoskeletal: Negative.    Skin: Negative.    Neurological: Negative for seizures.   Endo/Heme/Allergies: Negative for environmental allergies.   Psychiatric/Behavioral: Negative.        Assessment /Plan     For exam results, see Encounter Report.    Blurred vision, left eye --> lens made incorrectly  - Remake left lens per spec rx below  Glasses Prescription (8/30/2018)        Sphere Cylinder Add    Right +3.50 Sphere +3.25    Left +2.50 Sphere +3.25    Type:  PAL    Expiration Date:  8/31/2019    Comments:  Remake - Left lens made incorrectly              Patient education; RTC as scheduled in May 2019, sooner, as needed                 
Never smoker

## 2019-09-24 NOTE — PROVIDER CONTACT NOTE (OTHER) - SITUATION
Pt does not wait for the paperwork for AMA and d/c instruction despite education. IV removed per pt's request. Pt eloped despite education. MD Benson made aware. Pt a&ox4, ambulated as tolerated.

## 2019-09-24 NOTE — CONSULT NOTE ADULT - ASSESSMENT
29 years old female from home with PMHx of UC steroids dependant , asthma, presented with persistent abdominal pain, vomiting and bloody diarrhea for 1 week.     *Severe ulcerative colitis flare  -anemia < 10.5, > 6 episodes of diarrhea, ESR=54 (elevated for age), was tachycardic 111 upon admission, BP borderline (at baseline per patient but feels dizzy when out of bed)  -IV hydration  -electrolytes correction (hypomagnesemia)  -check CRP (pending)  -check C diff (pending), GI PCR and stool culture  -continue solumedrol but change to 20mg IV every 8 hours  -discontinue mesalamine (used for mild to moderate)  -no need for antibiotics  -nutrition evaluation  -please document BM, frequency and consistency  -if worsening pain, consider abdominal xray to r/o toxic megacolon  -DVT prophylaxis (there is increased risk of VTE in severe UC) 29 years old female from home with PMHx of steroids dependant pan ulcerative colitis , asthma, presented with persistent abdominal pain, vomiting and bloody diarrhea for 1 week.     *Severe pan ulcerative colitis flare, steroids dependant   -anemia < 10.5, > 6 episodes of diarrhea, ESR=54 (elevated for age), was tachycardic 111 upon admission, BP borderline (at baseline per patient but feels dizzy when out of bed)  -Clostridium difficile negative   -IV hydration  -electrolytes correction (hypomagnesemia)  -check CRP (pending)  -check GI PCR and stool culture  -continue solumedrol but change to 20mg IV every 8 hours  -no benefit from mesalamine now (used for mild to moderate)  -no need for antibiotics  -nutrition evaluation  -please document BM, frequency and consistency  -if worsening pain, consider abdominal xray to r/o toxic megacolon  -OP colonoscopy  -QuantiFeron indeterminate july 18, hep B negative, check TPMT ENZYME, will need biologic as OP  -DVT prophylaxis (there is increased risk of VTE in severe UC)    discussed with the patient and medical team 29 years old female from home with PMHx of steroids dependant pan ulcerative colitis , asthma, presented with persistent abdominal pain, vomiting and bloody diarrhea for 1 week.     *Severe pan ulcerative colitis flare, steroids dependant   -anemia < 10.5, > 6 episodes of diarrhea, ESR=54 (elevated for age), was tachycardic 111 upon admission, BP borderline (at baseline per patient but feels dizzy when out of bed)  -Clostridium difficile negative   -IV hydration  -electrolytes correction (hypomagnesemia)  -check CRP (pending)  -check GI PCR and stool culture  -Can change solumedrol to prednisone 40mg daily   -no benefit from mesalamine now (used for mild to moderate)  -no need for antibiotics  -nutrition evaluation  -please document BM, frequency and consistency  -if worsening pain, consider abdominal xray to r/o toxic megacolon  -OP colonoscopy  -QuantiFeron indeterminate july 18 --> check PPD so we can check it on friday (has an appointment in [facility])  -hep B negative, check TPMT ENZYME, will need biologic as OP  -DVT prophylaxis (there is increased risk of VTE in severe UC)    discussed with the patient and medical team 29 years old female from home with PMHx of steroids dependant pan ulcerative colitis , asthma, presented with persistent abdominal pain, vomiting and bloody diarrhea for 1 week.     *Severe pan ulcerative colitis flare, steroids dependant   -anemia < 10.5, > 6 episodes of diarrhea, ESR=54 (elevated for age), was tachycardic 111 upon admission, BP borderline (at baseline per patient but feels dizzy when out of bed)  -Clostridium difficile negative   -IV hydration, low residue lactose free diet  -electrolytes correction (hypomagnesemia)  -check CRP (pending)  -check GI PCR and stool culture  -Can change solumedrol to prednisone 40mg daily   -no benefit from mesalamine now (used for mild to moderate)  -no need for antibiotics  -nutrition evaluation  -please document BM, frequency and consistency  -if worsening pain, consider abdominal xray to r/o toxic megacolon  -OP colonoscopy  -QuantiFeron indeterminate july 18 --> check PPD so we can check it on friday (has an appointment in [facility])  -hep B negative, check TPMT ENZYME, will need biologic as OP  -DVT prophylaxis (there is increased risk of VTE in severe UC)    discussed with the patient and medical team 29 years old female from home with PMHx of steroids dependant pan ulcerative colitis , asthma, presented with persistent abdominal pain, vomiting and bloody diarrhea for 1 week.     *Severe pan ulcerative colitis flare, steroids dependant   -anemia < 10.5, > 6 episodes of diarrhea, ESR=54  , was tachycardic 111 upon admission, BP borderline (at baseline per patient but feels dizzy when out of bed)  -Clostridium difficile negative     -IV hydration, low residue lactose free diet  -electrolytes correction (hypomagnesemia)  -check GI PCR  -Can change solumedrol to prednisone 40mg daily   -no benefit from mesalamine now (used for mild to moderate) and patient won't be able to get it as an outpatient  -no need for antibiotics  -nutrition evaluation  -please document BM, frequency and consistency  -if worsening pain, consider abdominal xray to r/o toxic megacolon  -consider OP colonoscopy  -QuantiFeron indeterminate july 18 --> place PPD so we can check it on friday (has an appointment in [facility])  -hep B negative, check TPMT ENZYME ACTIVITY, in anticipation of advanced therapy (infection risks discussed with patient  - Patient is interested in a colectomy and may request a referral to colorectal surgery when she is seen in the [facility] clinic  -DVT prophylaxis (there is increased risk of VTE in severe UC)    discussed with the patient and medical team

## 2019-09-24 NOTE — PROVIDER CONTACT NOTE (OTHER) - SITUATION
MD Benson made aware pt requesting to leave AMA despite education. MD stated he will follow up with pt after the conference. Will cont to monitor.

## 2019-09-26 DIAGNOSIS — D64.9 ANEMIA, UNSPECIFIED: ICD-10-CM

## 2019-09-26 DIAGNOSIS — E87.2 ACIDOSIS: ICD-10-CM

## 2019-09-26 DIAGNOSIS — K52.9 NONINFECTIVE GASTROENTERITIS AND COLITIS, UNSPECIFIED: ICD-10-CM

## 2019-09-26 DIAGNOSIS — Z79.52 LONG TERM (CURRENT) USE OF SYSTEMIC STEROIDS: ICD-10-CM

## 2019-09-26 DIAGNOSIS — J45.909 UNSPECIFIED ASTHMA, UNCOMPLICATED: ICD-10-CM

## 2019-09-26 DIAGNOSIS — Z88.0 ALLERGY STATUS TO PENICILLIN: ICD-10-CM

## 2019-09-26 DIAGNOSIS — K51.00 ULCERATIVE (CHRONIC) PANCOLITIS WITHOUT COMPLICATIONS: ICD-10-CM

## 2019-09-26 DIAGNOSIS — Z79.82 LONG TERM (CURRENT) USE OF ASPIRIN: ICD-10-CM

## 2019-09-26 DIAGNOSIS — Z91.14 PATIENT'S OTHER NONCOMPLIANCE WITH MEDICATION REGIMEN: ICD-10-CM

## 2019-09-26 LAB — CALPROTECTIN STL-MCNT: 304 UG/G — HIGH (ref 0–120)

## 2019-11-01 ENCOUNTER — OUTPATIENT (OUTPATIENT)
Dept: OUTPATIENT SERVICES | Facility: HOSPITAL | Age: 29
LOS: 1 days | Discharge: HOME | End: 2019-11-01

## 2019-11-01 ENCOUNTER — APPOINTMENT (OUTPATIENT)
Dept: GASTROENTEROLOGY | Facility: CLINIC | Age: 29
End: 2019-11-01
Payer: MEDICAID

## 2019-11-01 PROCEDURE — 99214 OFFICE O/P EST MOD 30 MIN: CPT

## 2019-11-01 NOTE — HISTORY OF PRESENT ILLNESS
[de-identified] : 29 years old female from home with PMHx of steroids dependant pan ulcerative colitis , asthma, presented with persistent abdominal pain, she was recently hospitalized in September for an UC flare, severe, and left AMA.\par her pain is constant, worse than before, LLQ, RLQ and RUQ, associated with BM 5-10 per day (5 is her normal for 3 years now) associated with dark blood 3 times per month. she is loosing weight, no appetite (unless she drinks CBD oil) \par \par \par during the last hospitalization, hb < 10.5, > 6 episodes of diarrhea, ESR=54, was tachycardic 111 upon admission, BP borderline, Clostridium difficile negative and GI PCR negative.\par quantiferon was indeterminate but declined PPD \par \par Prior EGD: none\par Prior Colonoscopy: reports having 2 colonoscopies, none in the system\par < from: Flexible Sigmoidoscopy (07.19.19 @ 12:15) >\par Impressions: \par  The maximum extent of this study was 60cm. \par  The rectum appeared normal up until 15 cm. Biopsies of the rectum were taken.\par However on retroflexion there was small (<1cm) ulceration in the perianal area.\par No fistulas could be seen (Biopsy). \par  Between 15 and 20 cm, mild colitis (erythema) without guillaume ulceration was seen\par (Biopsy). \par  From 20cm to 60cm, severe linear ulcerations with skipped areas and pseudopolyp\par formationwithout bleeding was seen. Multiple biopsies were taken (Biopsy). \par  These findings are suggestive of IBD favoring Crohn's disease over ulcerative\par colitis. \par \par < end of copied text >\par \par 1. Colon biopsy at 55 cm:\par - Fragments of colonic mucosa showing moderate architectural changes, focal acute cryptitis and crypt abscess formation.\par - No granulomata are seen.\par - No dysplasia is seen.\par \par 2. Sigmoid biopsy:\par - Fragments of colonic mucosa showing moderate architectural\par changes.\par - No granulomata are seen.\par - No dysplasia is seen.\par \par 3. Rectum biopsy:\par - Fragments of colorectal mucosa showing mild to moderate\par architectural changes.\par - No granulomata are seen.\par - No dysplasia is seen.\par

## 2019-11-01 NOTE — ASSESSMENT
[FreeTextEntry1] : 29 years old female from home with PMHx of steroids dependant pan ulcerative colitis , asthma, presented with persistent abdominal pain, she was recently hospitalized in September for an UC flare, severe, and left AMA.\par \par *Pan Ulcerative colitis versus Crohn's disease on prednisone\par -non compliant\par -MR enterography\par -CBC, BMP, vitamin D, TPMT, hep AigG, hep B sAb \par -QuantiFeron indeterminate july 18 --> PPD\par -GI PCR, Cdiff, calprotectin...\par -follow up in 1 week to r/o infection\par -keep prednisone for now\par -declined vaccination (flu)\par -plan for complete colonoscopy later on\par -consider [facility] SGI IBD\par \par

## 2019-11-01 NOTE — END OF VISIT
[] : Resident [FreeTextEntry3] : 28 y/o F with IBD (was told UC in the past, though rectum normal from anus to 15 cm from the anus), currently with worsening diarrhea and abd pain. Still on 40 mg prednisone since last hospitalization. Send stool studies today to r/o infection. If negative will need increase steroids to 60 mg. Need to differentiate whether pt has crohn's vs. UC - will perform colonoscopy and MRE and IBD SGI. Send of Hep serologies, PPD today and immunize prn. Likely will need to be started on biologics.

## 2019-11-01 NOTE — REVIEW OF SYSTEMS
[Feeling Poorly] : feeling poorly [Feeling Tired] : feeling tired [Recent Weight Loss (___ Lbs)] : recent [unfilled] ~Ulb weight loss [Palpitations] : palpitations [As Noted in HPI] : as noted in HPI [Arthralgias] : arthralgias [Dizziness] : dizziness [Sleep Disturbances] : sleep disturbances [Easy Bruising] : a tendency for easy bruising [Fever] : no fever [Chills] : no chills [Dry Eyes] : no dryness of the eyes [Loss Of Hearing] : no hearing loss [Sore Throat] : no sore throat [Chest Pain] : no chest pain [Lower Ext Edema] : no lower extremity edema [Wheezing] : no wheezing [Cough] : no cough [Dysuria] : no dysuria [Incontinence] : no incontinence [Skin Lesions] : no skin lesions [Confused] : no confusion [Fainting] : no fainting [Suicidal] : not suicidal [Proptosis] : no proptosis

## 2020-08-25 ENCOUNTER — EMERGENCY (EMERGENCY)
Facility: HOSPITAL | Age: 30
LOS: 0 days | Discharge: HOME | End: 2020-08-25
Attending: STUDENT IN AN ORGANIZED HEALTH CARE EDUCATION/TRAINING PROGRAM | Admitting: STUDENT IN AN ORGANIZED HEALTH CARE EDUCATION/TRAINING PROGRAM
Payer: MEDICAID

## 2020-08-25 VITALS
HEART RATE: 110 BPM | OXYGEN SATURATION: 98 % | DIASTOLIC BLOOD PRESSURE: 84 MMHG | RESPIRATION RATE: 18 BRPM | SYSTOLIC BLOOD PRESSURE: 135 MMHG | TEMPERATURE: 99 F

## 2020-08-25 VITALS — HEART RATE: 90 BPM

## 2020-08-25 DIAGNOSIS — Z79.52 LONG TERM (CURRENT) USE OF SYSTEMIC STEROIDS: ICD-10-CM

## 2020-08-25 DIAGNOSIS — H57.11 OCULAR PAIN, RIGHT EYE: ICD-10-CM

## 2020-08-25 DIAGNOSIS — J45.909 UNSPECIFIED ASTHMA, UNCOMPLICATED: ICD-10-CM

## 2020-08-25 DIAGNOSIS — H57.10 OCULAR PAIN, UNSPECIFIED EYE: ICD-10-CM

## 2020-08-25 DIAGNOSIS — Z88.0 ALLERGY STATUS TO PENICILLIN: ICD-10-CM

## 2020-08-25 PROCEDURE — 99283 EMERGENCY DEPT VISIT LOW MDM: CPT

## 2020-08-25 RX ORDER — FLUORESCEIN SODIUM 9 MG
1 STRIP OPHTHALMIC (EYE) ONCE
Refills: 0 | Status: DISCONTINUED | OUTPATIENT
Start: 2020-08-25 | End: 2020-08-25

## 2020-08-25 RX ORDER — POLYMYXIN B SULF/TRIMETHOPRIM 10000-1/ML
1 DROPS OPHTHALMIC (EYE) ONCE
Refills: 0 | Status: DISCONTINUED | OUTPATIENT
Start: 2020-08-25 | End: 2020-08-25

## 2020-08-25 NOTE — ED PROVIDER NOTE - PROGRESS NOTE DETAILS
I, LILIANE Santos, was directly involved in the care of this patient. PA Fellow Ivory note/plan reviewed and agreed. pt is refusing tetanus vaccine.

## 2020-08-25 NOTE — ED PROVIDER NOTE - CLINICAL SUMMARY MEDICAL DECISION MAKING FREE TEXT BOX
30 year old female with a pmh of asthma & ulcerative colitis presents here c/o right eye pain. VS reviewed. patient with conjunctival injection no blurry vision no corneal abrasion seen on fluorecin testing. Patient given prophylactic antibiotics and discussed follow up with optho clinic. Return precautions given.

## 2020-08-25 NOTE — ED PROVIDER NOTE - PHYSICAL EXAMINATION
Physical Exam    Vital Signs: I have reviewed the initial vital signs.  Constitutional: well-nourished, appears stated age, no acute distress  Eyes: Conjunctiva pink, Sclera clear, PERRLA, EOMI without pain. negative hyphema. negative subconjunctival hemorrhage. visual fields intact. visual acuity is: 20/20 b/l  fluoroscein stain reveals: negative for corneal abrasion. negative sidel's sign tonometry is: 14mmhg of the right eye and 19mmhg of the left. no orbital tenderness. no swelling or erythema of the eyelids b/l.   Integumentary: warm, dry, no rash  Neurologic: awake, alert  Psychiatric: appropriate mood, appropriate affect

## 2020-08-25 NOTE — ED PROVIDER NOTE - PATIENT PORTAL LINK FT
You can access the FollowMyHealth Patient Portal offered by [facility] by registering at the following website: [email or web address]. By joining [employer]’s FollowMyHealth portal, you will also be able to view your health information using other applications (apps) compatible with our system.

## 2020-08-25 NOTE — ED PROVIDER NOTE - NSFOLLOWUPINSTRUCTIONS_ED_ALL_ED_FT
Eye Pain    WHAT YOU NEED TO KNOW:    Eye pain may be caused by a problem within your eye. A problem or condition in another body area can also cause pain that travels to your eye. Some causes of eye pain include dry eyes, inflammation, a sinus infection, or a cluster headache.    DISCHARGE INSTRUCTIONS:    Medicines: You may need any of the following:     Artificial tears are eyedrops that can help moisturize your eyes and relieve your pain. Ask your healthcare provider how often to use artificial tears.       NSAIDs, such as ibuprofen, help decrease swelling, pain, and fever. This medicine is available with or without a doctor's order. NSAIDs can cause stomach bleeding or kidney problems in certain people. If you take blood thinner medicine, always ask if NSAIDs are safe for you. Always read the medicine label and follow directions. Do not give these medicines to children under 6 months of age without direction from your child's healthcare provider.      Take your medicine as directed. Contact your healthcare provider if you think your medicine is not helping or if you have side effects. Tell him of her if you are allergic to any medicine. Keep a list of the medicines, vitamins, and herbs you take. Include the amounts, and when and why you take them. Bring the list or the pill bottles to follow-up visits. Carry your medicine list with you in case of an emergency.    Follow up with your healthcare provider as directed: You may be referred to an eye specialist. Write down your questions so you remember to ask them during your visits.    Contact your healthcare provider if:     You have a fever.       Your eye pain gets worse when you move your eyes.       You have questions or concerns about your condition or care.    Return to the emergency department if:     You have any vision loss.       You have sudden vision changes such as blurred vision, double vision, or seeing halos around lights.       You develop severe eye pain.          © Copyright [employer] 2019 All illustrations and images included in CareNotes are the copyrighted property of A.D.A.M., Inc[employer] or [employer].

## 2020-08-25 NOTE — ED PROVIDER NOTE - ATTENDING CONTRIBUTION TO CARE
30 year old female with a pmh of asthma & ulcerative colitis presents here c/o right eye pain. Patient was using her eye drops " zerrate" when the plastic from the eye drop hit her eye. Since then she's been c/o pain in the eye. No fevers.     On exam  CONSTITUTIONAL: WA / WN / NAD  HEAD: NCAT  EYES: PERRL; EOMI; + conjunctival injection of right eye  MSK/EXT: No gross deformities; full range of motion.  SKIN: Warm and dry;   NEURO: AAOx3  PSYCH: Memory Intact, Normal Affect 30 year old female with a pmh of asthma & ulcerative colitis presents here c/o right eye pain. Patient was using her eye drops " zerrate" when the plastic from the eye drop hit her eye. Since then she's been c/o pain in the eye. No fevers no blurry vision    On exam  CONSTITUTIONAL: WA / WN / NAD  HEAD: NCAT  EYES: PERRL; EOMI; + conjunctival injection of right eye  MSK/EXT: No gross deformities; full range of motion.  SKIN: Warm and dry;   NEURO: AAOx3  PSYCH: Memory Intact, Normal Affect 30 year old female with a pmh of asthma & ulcerative colitis presents here c/o right eye pain. Patient was using her eye drops " zerrate" when the plastic from the eye drop hit her eye. Since then she's been c/o pain in the eye. No fevers no blurry vision    On exam  CONSTITUTIONAL: WA / WN / NAD  HEAD: NCAT  EYES: PERRL; EOMI; + conjunctival injection of right eye visual acuity 20/20. Occular pressure 14 of right and 19 on left   MSK/EXT: No gross deformities; full range of motion.  SKIN: Warm and dry;   NEURO: AAOx3  PSYCH: Memory Intact, Normal Affect

## 2020-08-25 NOTE — ED PROVIDER NOTE - NSFOLLOWUPCLINICS_GEN_ALL_ED_FT
[facility]  Ophthalmolgy  [address], [address]  [place] [address]  Phone: [phone]  Fax:   Follow Up Time: 1-3 Days

## 2020-08-25 NOTE — ED PROVIDER NOTE - OBJECTIVE STATEMENT
29 y/o female with a PMH of asthma, and UC on prednisone presents to the ED for evaluation of right eye pain that began earlier today s/p applying allergy eye drops in her right eye, and hitting her eye with the applicator. Pt reports tearing from the right eye. pt is not up to date on tetanus. Pt denies blurry vision, double vision, fever, chills, headaches, drainage from the eye, use of contacts or glasses, or loss of vision.

## 2020-10-02 ENCOUNTER — EMERGENCY (EMERGENCY)
Facility: HOSPITAL | Age: 30
LOS: 0 days | Discharge: HOME | End: 2020-10-02
Attending: EMERGENCY MEDICINE | Admitting: EMERGENCY MEDICINE
Payer: MEDICAID

## 2020-10-02 VITALS
WEIGHT: 95.9 LBS | TEMPERATURE: 98 F | HEIGHT: 61 IN | OXYGEN SATURATION: 99 % | RESPIRATION RATE: 16 BRPM | HEART RATE: 99 BPM | SYSTOLIC BLOOD PRESSURE: 108 MMHG | DIASTOLIC BLOOD PRESSURE: 53 MMHG

## 2020-10-02 DIAGNOSIS — K08.89 OTHER SPECIFIED DISORDERS OF TEETH AND SUPPORTING STRUCTURES: ICD-10-CM

## 2020-10-02 DIAGNOSIS — Z79.82 LONG TERM (CURRENT) USE OF ASPIRIN: ICD-10-CM

## 2020-10-02 DIAGNOSIS — Z88.0 ALLERGY STATUS TO PENICILLIN: ICD-10-CM

## 2020-10-02 DIAGNOSIS — Z79.899 OTHER LONG TERM (CURRENT) DRUG THERAPY: ICD-10-CM

## 2020-10-02 PROCEDURE — 99282 EMERGENCY DEPT VISIT SF MDM: CPT

## 2020-10-02 NOTE — ED PROVIDER NOTE - CLINICAL SUMMARY MEDICAL DECISION MAKING FREE TEXT BOX
31 yo F presented to ED for tooth pain due to crack tooth. No sign of infection.   Sent to dental clinic

## 2020-10-02 NOTE — ED PROVIDER NOTE - PHYSICAL EXAMINATION
VITALS:  I have reviewed the initial vital signs.  GENERAL: Well-developed, well-nourished, in no acute distress. Nontoxic.  HEENT: MMM, tolerating oral secretions. No trismus. Tooth #30 is cracked with ttp. No abscess. No floor of mouth or submandibular swelling. No tongue elevation.    NECK: supple w FROM.   PULM: Normal effort. No tachypnea or retractions. No stridor.   SKIN: Warm, dry.  NEURO: A&Ox3. Speech clear. CN II-XII intact. No focal deficits.

## 2020-10-02 NOTE — ED PROVIDER NOTE - ATTENDING CONTRIBUTION TO CARE
29 yo F presents to ED for R lower dental pain. Patient has had worsening pain since she cracked her tooth. No fever or chills. She took motrin and tylenol.     On PE patient tearful.  Lungs CTA   Cardio RRR  HENT: cracked tooth 30. No abscess No hoarse voice     No sign of infection

## 2020-10-02 NOTE — ED PROVIDER NOTE - OBJECTIVE STATEMENT
30 year old female w no pmhx presents to the ED for evaluation of constant, mild, non-radiating right lower dental pain x 2 days. Pt states pain began when she was eating 2 nights ago, felt a piece of tooth #30 crack off. Taking motrin and tylenol w/o improvement. Denies fevers/chills, facial swelling, sore throat, difficulty swallowing, voice change, difficulty breathing, n/v, recent dental procedures.

## 2020-10-02 NOTE — ED ADULT TRIAGE NOTE - CHIEF COMPLAINT QUOTE
pt c/o right lower dental pain since last night. states she has been having right lower dental pain "for a while".

## 2020-10-02 NOTE — CONSULT NOTE ADULT - SUBJECTIVE AND OBJECTIVE BOX
Emergency #30 [id number] Blood Pressure: 88/48 Temperature:98.3  Patient is a 30 year old patient with a chief complaint of dental pain in lower right area.    HPI: Patient reports her lower right tooth broke 2 years ago and another piece broke off 2 days ago and she has been in pain since. Patient reports pain on biting and excruciating pain in lower right area along jawline. Patient does not have difficulty breathing or swallowing.    PAST MEDICAL & SURGICAL HISTORY:  ulcerative colitis  asthma  hypotension    MEDICATIONS  (STANDING):  birth control  prednisone 20mg bid    MEDICATIONS  (PRN):  albuterol 2 puffs as needed for pain    Allergies: seasonal    FAMILY HISTORY:    *SOCIAL HISTORY: (-   ) Tobacco; (  - ) ETOH    *Last Dental Visit: over 6 months ago    EOE:  TMJ ( -  ) clicks                     ( -  ) pops                     ( -  ) crepitus             Mandible <<FROM>>             Facial bones and MOM <<grossly intact>>             ( -  ) trismus             ( -  ) lymphadenopathy             (  - ) swelling             ( -  ) asymmetry             (  - ) palpation             ( -  ) dyspnea             ( -  ) dysphagia             ( -  ) loss of consciousness    IOE:  <<permanent dentition     Findings on #30           (+  ) percussion           (+   ) palpation           ( -  ) swelling            (  - ) abscess           (-   ) sinus tract  Grossly decayed #30 below gumline, nonrestorable     *DENTAL RADIOGRAPHS: One periapical radiograph indicating Grossly decayed #30, nonrestorable with periapical pathosis    *ASSESSMENT: necrotic #30 with periapical pathosis needing extraction    *PLAN: Extraction #30, over the counter pain medication     PROCEDURE:   Extraction Tooth #30  Verbal and written consent given. All risks and benefits discussed as per OS Sheet dated 07/13/00. Consents signed. Side Site marked. All questions answered.   Betadine/hydrogen peroxide scrub.  Anesthesia: Topical anesthesia applied. 2 carpule of 2% Lidocaine 1:100,000 Epinephrine given via right inferior alveolar nerve block and long buccal block.  Treatment:  Flap was raised using 15 blade and periosteal elevator. Surgical handpiece was used with water. Final extraction with elevators. Socket curetted and irrigated with copious saline. Post operative radiograph negative. Two 3.0 chromic gut suture placed. Hemostasis achieved. Post operative instructions given both verbal and written. No complications.       RECOMMENDATIONS:  1) Over the counter pain medication  2) Dental F/U with outpatient dentist for comprehensive dental care.   3) If any difficulty swallowing/breathing, fever occur, return to ER.     Tricia Khan,[id number]

## 2021-04-01 ENCOUNTER — OUTPATIENT (OUTPATIENT)
Dept: OUTPATIENT SERVICES | Facility: HOSPITAL | Age: 31
LOS: 1 days | End: 2021-04-01
Payer: MEDICAID

## 2021-04-04 ENCOUNTER — EMERGENCY (EMERGENCY)
Facility: HOSPITAL | Age: 31
LOS: 0 days | Discharge: HOME | End: 2021-04-04
Attending: EMERGENCY MEDICINE | Admitting: EMERGENCY MEDICINE
Payer: MEDICAID

## 2021-04-04 VITALS
DIASTOLIC BLOOD PRESSURE: 65 MMHG | RESPIRATION RATE: 20 BRPM | TEMPERATURE: 96 F | SYSTOLIC BLOOD PRESSURE: 112 MMHG | OXYGEN SATURATION: 98 % | HEART RATE: 95 BPM | HEIGHT: 61 IN | WEIGHT: 9.92 LBS

## 2021-04-04 VITALS
SYSTOLIC BLOOD PRESSURE: 110 MMHG | DIASTOLIC BLOOD PRESSURE: 71 MMHG | RESPIRATION RATE: 18 BRPM | OXYGEN SATURATION: 99 % | HEART RATE: 79 BPM | TEMPERATURE: 98 F

## 2021-04-04 DIAGNOSIS — M54.2 CERVICALGIA: ICD-10-CM

## 2021-04-04 PROCEDURE — 99284 EMERGENCY DEPT VISIT MOD MDM: CPT

## 2021-04-04 PROCEDURE — 72040 X-RAY EXAM NECK SPINE 2-3 VW: CPT | Mod: 26

## 2021-04-04 RX ORDER — KETOROLAC TROMETHAMINE 30 MG/ML
30 SYRINGE (ML) INJECTION ONCE
Refills: 0 | Status: DISCONTINUED | OUTPATIENT
Start: 2021-04-04 | End: 2021-04-04

## 2021-04-04 RX ORDER — TIZANIDINE 4 MG/1
2 TABLET ORAL
Qty: 18 | Refills: 0
Start: 2021-04-04 | End: 2021-04-06

## 2021-04-04 RX ORDER — MORPHINE SULFATE 50 MG/1
4 CAPSULE, EXTENDED RELEASE ORAL ONCE
Refills: 0 | Status: DISCONTINUED | OUTPATIENT
Start: 2021-04-04 | End: 2021-04-04

## 2021-04-04 RX ADMIN — Medication 30 MILLIGRAM(S): at 05:06

## 2021-04-04 RX ADMIN — MORPHINE SULFATE 4 MILLIGRAM(S): 50 CAPSULE, EXTENDED RELEASE ORAL at 05:06

## 2021-04-04 NOTE — ED PROVIDER NOTE - NSFOLLOWUPINSTRUCTIONS_ED_ALL_ED_FT
Acute Neck Pain    WHAT YOU NEED TO KNOW:    Acute neck pain starts suddenly, increases quickly, and goes away in a few days. The pain may come and go, or be worse with certain movements. The pain may be only in your neck, or it may move to your arms, back, or shoulders. You may also have pain that starts in another body area and moves to your neck. Vertebral Column         DISCHARGE INSTRUCTIONS:    Return to the emergency department if:     You have an injury that causes neck pain and shooting pain down your arms or legs.      Your neck pain suddenly becomes severe.      You have neck pain along with numbness, tingling, or weakness in your arms or legs.      You have a stiff neck, a headache, and a fever.    Contact your healthcare provider if:     You have new or worsening symptoms.      Your symptoms continue even after treatment.      You have questions or concerns about your condition or care.    Medicines:     NSAIDs, such as ibuprofen, help decrease swelling, pain, and fever. This medicine is available without a doctor's order. Ask your healthcare provider which medicine to take and how often to take it. Follow directions. NSAIDs can cause stomach bleeding or kidney problems if not taken correctly. If you take blood thinner medicine, always ask if NSAIDs are safe for you.      Acetaminophen helps decrease pain and fever. Ask your healthcare provider how much to take and how often to take it. Follow directions. Acetaminophen can cause liver damage if not taken correctly.      Steroid medicine may be used to reduce inflammation. This can help relieve pain caused by swelling.      Take your medicine as directed. Contact your healthcare provider if you think your medicine is not helping or if you have side effects. Tell him or her if you are allergic to any medicine. Keep a list of the medicines, vitamins, and herbs you take. Include the amounts, and when and why you take them. Bring the list or the pill bottles to follow-up visits. Carry your medicine list with you in case of an emergency.    Manage or prevent acute neck pain:     Rest your neck as directed. Do not make sudden movements, such as turning your head quickly. Your healthcare provider may recommend you wear a cervical collar for a short time. The collar will prevent you from moving your head. This will give your neck time to heal if an injury is causing your neck pain. Ask your healthcare provider when you can return to sports or other normal daily activities.      Apply heat as directed. Heat helps relieve pain and swelling. Use a heat wrap, or soak a small towel in warm water. Wring out the extra water. Apply the heat wrap or towel for 20 minutes every hour, or as directed.      Apply ice as directed. Ice helps relieve pain and swelling, and can help prevent tissue damage. Use an ice pack, or put ice in a bag. Cover the ice pack or back with a towel before you apply it to your neck. Apply the ice pack or ice for 15 minutes every hour, or as directed. Your healthcare provider can tell you how often to apply ice.      Do neck exercises as directed. Neck exercises help strengthen the muscles and increase range of motion. Your healthcare provider will tell you which exercises are right for you. He may give you instructions, or he may recommend that you work with a physical therapist. Your healthcare provider or therapist can make sure you are doing the exercises correctly.       Maintain good posture. Try to keep your head and shoulders lifted when you sit. If you work in front of a computer, make sure the monitor is at the right level. You should not need to look up down to see the screen. You should also not have to lean forward to be able to read what is on the screen. Make sure your keyboard, mouse, and other computer items are placed where you do not have to extend your shoulder to reach them. Get up often if you work in front of a computer or sit for long periods of time. Stretch or walk around to keep your neck muscles loose.    Follow up with your healthcare provider as directed: Your healthcare provider may refer you to a specialist if your pain does not get better with treatment. Write down your questions so you remember to ask them during your visits.       © Copyright [employer] 2019 All illustrations and images included in CareNotes are the copyrighted property of [employer] or [employer].

## 2021-04-04 NOTE — ED PROVIDER NOTE - PATIENT PORTAL LINK FT
You can access the FollowMyHealth Patient Portal offered by [facility] by registering at the following website: [email or web address]. By joining [employer]’s FollowMyHealth portal, you will also be able to view your health information using other applications (apps) compatible with our system.

## 2021-04-04 NOTE — CHART NOTE - NSCHARTNOTEFT_GEN_A_CORE
Pt is a 30 year old female who was BIBEMS with a c/o right side neck pain.  Pt reported that her PCP is Dr. Bain in [place].  Pt accepted a referral to [facility] for assistance in finding a GI doctor.  Supportive counseling was provided to pt.

## 2021-04-04 NOTE — ED PROVIDER NOTE - OBJECTIVE STATEMENT
30F with right sided neck pain after doing laundry and picking up laundry. Patient states pain worse with ROM and hurts to palpation, denies trauma. no fevers/chills, no headache, no n/v.

## 2021-04-04 NOTE — ED PROVIDER NOTE - NS ED ROS FT
Constitutional: See HPI.  Eyes: No visual changes, eye pain or discharge. No Photophobia  ENMT: see hpi   Respiratory: No cough or respiratory distress.   GI: No nausea, vomiting,n.  MS: No myalgia, muscle weakness, joint pain or back pain.  Neuro: No headache or weakness. No LOC.  Skin: No skin rash.  Except as documented in the HPI, all other systems are negative.

## 2021-04-04 NOTE — ED ADULT NURSE NOTE - NSIMPLEMENTINTERV_GEN_ALL_ED
Implemented All Universal Safety Interventions:  [place] to call system. Call bell, personal items and telephone within reach. Instruct patient to call for assistance. Room bathroom lighting operational. Non-slip footwear when patient is off stretcher. Physically safe environment: no spills, clutter or unnecessary equipment. Stretcher in lowest position, wheels locked, appropriate side rails in place.

## 2021-04-04 NOTE — ED PROVIDER NOTE - PHYSICAL EXAMINATION
VITAL SIGNS: I have reviewed nursing notes and confirm.  CONSTITUTIONAL: well-appearing, non-toxic  SKIN: Warm dry, normal skin turgor  HEAD: NCAT  EYES: EOMI, PERRLA, no scleral icterus  ENT: Moist mucous membranes, normal pharynx with no erythema or exudates  NECK: Supple; right SCM tenderness, neck sidebent   CARD: RRR, no murmurs, rubs or gallops  RESP: clear to ausculation b/l.  No rales, rhonchi, or wheezing.  EXT: Full ROM, no bony tenderness  NEURO: normal motor. normal sensory. Normal gait.  PSYCH: Cooperative, appropriate.

## 2021-04-04 NOTE — ED PROVIDER NOTE - ATTENDING CONTRIBUTION TO CARE
I personally evaluated the patient. I reviewed the Resident’s or Physician Assistant’s note (as assigned above), and agree with the findings and plan except as documented in my note.  29 yo woman with right sided neck pain after picking up a laundry basket.  Has torticollis.  Neurologically normal otherwise.  Due to chronic prednisone use, XRAY was ordered to ensure there was no spontaneous compression fx.  This was ok.  Stable for discharge with analgesia.

## 2021-04-04 NOTE — ED ADULT TRIAGE NOTE - CHIEF COMPLAINT QUOTE
Pt with BIBA with R side neck pain/lift laundry bag last night Pt with BIBA with R side neck pain after lifting laundry bag last night

## 2021-04-12 ENCOUNTER — EMERGENCY (EMERGENCY)
Facility: HOSPITAL | Age: 31
LOS: 0 days | Discharge: HOME | End: 2021-04-12
Attending: EMERGENCY MEDICINE | Admitting: EMERGENCY MEDICINE
Payer: MEDICAID

## 2021-04-12 VITALS
SYSTOLIC BLOOD PRESSURE: 119 MMHG | RESPIRATION RATE: 18 BRPM | HEIGHT: 61 IN | TEMPERATURE: 98 F | OXYGEN SATURATION: 98 % | HEART RATE: 110 BPM | DIASTOLIC BLOOD PRESSURE: 63 MMHG

## 2021-04-12 DIAGNOSIS — J45.909 UNSPECIFIED ASTHMA, UNCOMPLICATED: ICD-10-CM

## 2021-04-12 DIAGNOSIS — K02.9 DENTAL CARIES, UNSPECIFIED: ICD-10-CM

## 2021-04-12 DIAGNOSIS — Z88.0 ALLERGY STATUS TO PENICILLIN: ICD-10-CM

## 2021-04-12 DIAGNOSIS — K08.89 OTHER SPECIFIED DISORDERS OF TEETH AND SUPPORTING STRUCTURES: ICD-10-CM

## 2021-04-12 DIAGNOSIS — Z87.19 PERSONAL HISTORY OF OTHER DISEASES OF THE DIGESTIVE SYSTEM: ICD-10-CM

## 2021-04-12 PROCEDURE — 99284 EMERGENCY DEPT VISIT MOD MDM: CPT

## 2021-04-12 RX ORDER — AZITHROMYCIN 500 MG/1
1 TABLET, FILM COATED ORAL
Qty: 5 | Refills: 0
Start: 2021-04-12 | End: 2021-04-16

## 2021-04-12 NOTE — ED PROVIDER NOTE - NSFOLLOWUPINSTRUCTIONS_ED_ALL_ED_FT
Please return to the emergency department early in the day, as soon as possible, for referral to the dental clinic. Return sooner for any new or worsening symptoms.       Toothache    WHAT YOU NEED TO KNOW:    A toothache is pain that is caused by irritation of the nerves in the center of your tooth. The irritation may be caused by several problems, such as a cavity, an infection, a cracked tooth, or gum disease. Tooth Anatomy         DISCHARGE INSTRUCTIONS:    Return to the emergency department if:     You have trouble breathing or swallowing.       You have swelling in your face or neck.     Contact your dentist if:     You have a fever and chills.       You have trouble opening or closing your mouth.       You have swelling around your tooth.       You have questions or concerns about your condition or care.    Medicines: You may need any of the following:     NSAIDs, such as ibuprofen, help decrease swelling, pain, and fever. This medicine is available with or without a doctor's order. NSAIDs can cause stomach bleeding or kidney problems in certain people. If you take blood thinner medicine, always ask if NSAIDs are safe for you. Always read the medicine label and follow directions. Do not give these medicines to children under 6 months of age without direction from your child's healthcare provider.      Acetaminophen decreases pain and fever. It is available without a doctor's order. Ask how much to take and how often to take it. Follow directions. Acetaminophen can cause liver damage if not taken correctly.      Prescription pain medicine may be given. Ask your healthcare provider how to take this medicine safely. Some prescription pain medicines contain acetaminophen. Do not take other medicines that contain acetaminophen without talking to your healthcare provider. Too much acetaminophen may cause liver damage. Prescription pain medicine may cause constipation. Ask your healthcare provider how to prevent or treat constipation.       Antibiotics help treat or prevent a bacterial infection.       Take your medicine as directed. Contact your healthcare provider if you think your medicine is not helping or if you have side effects. Tell him of her if you are allergic to any medicine. Keep a list of the medicines, vitamins, and herbs you take. Include the amounts, and when and why you take them. Bring the list or the pill bottles to follow-up visits. Carry your medicine list with you in case of an emergency.    Self-care:     Rinse your mouth with warm salt water 4 times a day or as directed.       Eat soft foods to help relieve pain caused by chewing.       Apply ice on your jaw or cheek for 15 to 20 minutes every hour or as directed. Use an ice pack, or put crushed ice in a plastic bag. Cover it with a towel before you apply it. Ice helps prevent tissue damage and decreases swelling and pain.    Help prevent a toothache:     Brush your teeth at least 2 times a day.      Use dental floss to clean between your teeth at least 1 time a day.      See your dentist regularly every 6 months for dental cleanings and oral exams.    Follow up with your dentist as directed: You may be referred to a dental surgeon. Write down your questions so you remember to ask them during your visits.

## 2021-04-12 NOTE — ED PROVIDER NOTE - PATIENT PORTAL LINK FT
You can access the FollowMyHealth Patient Portal offered by [facility] by registering at the following website: [email or web address]. By joining [employer]’s FollowMyHealth portal, you will also be able to view your health information using other applications (apps) compatible with our system.

## 2021-04-12 NOTE — ED ADULT NURSE NOTE - NSIMPLEMENTINTERV_GEN_ALL_ED
Implemented All Universal Safety Interventions:  [place] to call system. Call bell, personal items and telephone within reach. Instruct patient to call for assistance. Room bathroom lighting operational. Non-slip footwear when patient is off stretcher. Physically safe environment: no spills, clutter or unnecessary equipment. Stretcher in lowest position, wheels locked, appropriate side rails in place.

## 2021-04-12 NOTE — ED ADULT NURSE NOTE - OBJECTIVE STATEMENT
Pt with hx of asthma and Crohn's presents c/o left lower tooth pain x1 month. No fever, no other symptoms.

## 2021-04-12 NOTE — ED PROVIDER NOTE - PROGRESS NOTE DETAILS
AG: dental consulted AG: dental consulted as clinic after hours. Resident administered dental block, rec azithromycin for abx as pt allergic to PCN and clinda harsh for pt w/ crohn's. PROCEDURE ATTESTATION: I supervised and was present for key aspects of the procedure.

## 2021-04-12 NOTE — ED ADULT NURSE NOTE - DISCHARGE DATE/TIME
Re: Botox     and [id number]   APPROVED: 10/28/19-4/27/2020    Elizabeth Gregorio [phone]    Pharmacy Name  Phone    Fax    [facility]   [phone]   [facility]  [phone]     S/w Christine Presume, per Jen Briseno, both SSP are listed in patient's benefit summary. [employer]'s website also had both SSP listed. Forward to nurse.
Re: Botox    PA request for [id number] and  submitted to [facility]. Status is pending.
12-Apr-2021 19:37

## 2021-04-12 NOTE — ED PROVIDER NOTE - OBJECTIVE STATEMENT
30yF pmhx crohn's disease, asthma c/o tooth pain x1mo, constant, worsening since this morning; pt states she felt her tooth crack a month ago, was able to deal with it but today felt it chip more and since pain has been unbearable, interfering with her ability to eat, and can't take medication for crohn's on an empty stomach. Had another tooth pulled in ED clinic and states she wants this one pulled as well. Denies fever/chills, purulent discharge, facial swelling, chest pain, SOB.

## 2021-04-12 NOTE — CONSULT NOTE ADULT - SUBJECTIVE AND OBJECTIVE BOX
Patient is a 30y old  Female who presents with a chief complaint of tooth pain     HPI: Patient presents with pain on lower left side persisting for a month.     PAST MEDICAL & SURGICAL HISTORY:  Ulcerative colitis  Asthma   Crohns disease of small intestine    No significant past surgical history    MEDICATIONS  (STANDING): prednisone     MEDICATIONS  (PRN): none     Allergies  penicillin (Rash)    FAMILY HISTORY:  No pertinent family history in first degree relatives    *SOCIAL HISTORY: ( -  ) Tobacco; ( -  ) ETOH    *Last Dental Visit: 10/2020 for extraction #30     Vital Signs Last 24 Hrs  T(C): 36.8 (12 Apr 2021 16:42), Max: 36.8 (12 Apr 2021 16:42)  T(F): 98.2 (12 Apr 2021 16:42), Max: 98.2 (12 Apr 2021 16:42)  HR: 110 (12 Apr 2021 16:42) (110 - 110)  BP: 119/63 (12 Apr 2021 16:42) (119/63 - 119/63)  BP(mean): --  RR: 18 (12 Apr 2021 16:42) (18 - 18)  SpO2: 98% (12 Apr 2021 16:42) (98% - 98%)    EOE:  TMJ ( -  ) clicks                     ( -  ) pops                     ( -  ) crepitus             Mandible <<FROM>>             Facial bones and MOM <<grossly intact>>             ( -  ) trismus             ( -  ) lymphadenopathy             ( -  ) swelling             ( -  ) asymmetry             ( -  ) palpation             ( -  ) dyspnea             ( -  ) dysphagia             ( -  ) loss of consciousness    IOE:  <<permanent>> dentition:  <<multiple carious teeth>>           hard/soft palate:  ( -  ) palatal torus, <<No pathology noted>>           tongue/FOM <<No pathology noted>>           labial/buccal mucosa <<No pathology noted>>           ( +  ) percussion #19            ( -  ) palpation           ( -  ) swelling            ( -  ) abscess           ( -  ) sinus tract    *DENTAL RADIOGRAPHS: N/A     *ASSESSMENT: #19 decayed     *PLAN: Local anesthesia for temporary pain relief. Antibiotics. Return to private dentist for comprehensive care and evaluation of #19.     PROCEDURE:   Verbal consent given.  Anesthesia: << 2 carpules of 0.5% marcaine with 1: 200,000 epinephrine given as block     >>     RECOMMENDATIONS:  1) << Azithromycin 500mg daily x 5 days.  >>  2) Pain medication at discretion of ED attending   3) Dental F/U with outpatient dentist for comprehensive dental care.   4) If any difficulty swallowing/breathing, fever occur, return to ER.     Resident Name, pager #  Liberty Bradley, DDS   [phone]

## 2021-04-12 NOTE — ED PROVIDER NOTE - PHYSICAL EXAMINATION
Vital Signs: Reviewed  GEN: alert, NAD, speaks full sentences  HEAD:  normocephalic, atraumatic  EYES:  PERRLA; conjunctivae without injection, drainage or discharge  ENMT:  nasal mucosa moist; mouth moist without ulcerations or lesions, approx tooth 19 w/ decay and chip no pulp or root exposed, no apical abscess, no gingival erythema or edema; throat moist without erythema, exudate, ulcerations or lesions  NECK:  supple  CARDIAC:  regular rate, normal S1 and S2, no murmurs  RESP:  respiratory rate and effort appear normal for age; lungs are clear to auscultation bilaterally; no rales or wheezes  ABDOMEN:  soft, nontender, nondistended  MUSCULOSKELETAL/NEURO:  normal movement, normal tone  SKIN:  normal skin color for age and race, well-perfused; warm and dry

## 2021-04-12 NOTE — ED PROVIDER NOTE - NSFOLLOWUPCLINICS_GEN_ALL_ED_FT
[facility]  Dental  [address] [place] [address]  Phone: [phone]  Fax:   Follow Up Time: Urgent

## 2021-04-12 NOTE — ED PROVIDER NOTE - ATTENDING CONTRIBUTION TO CARE
31 yo female here requesting tooth extraction.  She reports pain for a long time due to a cracked tooth, it fractured again recently.  No facial swelling or cellulitis, airway patent, no drooling or trismus, no visible abscess.  Dental clinic is closed now, but dental resident will evaluated the patient in ED.

## 2021-04-14 ENCOUNTER — EMERGENCY (EMERGENCY)
Facility: HOSPITAL | Age: 31
LOS: 0 days | Discharge: HOME | End: 2021-04-14
Attending: STUDENT IN AN ORGANIZED HEALTH CARE EDUCATION/TRAINING PROGRAM | Admitting: STUDENT IN AN ORGANIZED HEALTH CARE EDUCATION/TRAINING PROGRAM
Payer: MEDICAID

## 2021-04-14 VITALS
HEART RATE: 97 BPM | RESPIRATION RATE: 18 BRPM | SYSTOLIC BLOOD PRESSURE: 101 MMHG | WEIGHT: 93.04 LBS | TEMPERATURE: 98 F | DIASTOLIC BLOOD PRESSURE: 65 MMHG | OXYGEN SATURATION: 95 % | HEIGHT: 62 IN

## 2021-04-14 PROBLEM — K50.00 CROHN'S DISEASE OF SMALL INTESTINE WITHOUT COMPLICATIONS: Chronic | Status: ACTIVE | Noted: 2021-04-12

## 2021-04-14 PROCEDURE — 99282 EMERGENCY DEPT VISIT SF MDM: CPT

## 2021-04-14 NOTE — CONSULT NOTE ADULT - SUBJECTIVE AND OBJECTIVE BOX
Patient is a 30y old  Female who presents with a chief complaint of tooth pain from #19    HPI: Patient has been having severe pain for the last 2 days    PAST MEDICAL & SURGICAL HISTORY:  Ulcerative colitis    Asthma    Crohns disease of small intestine    No significant past surgical history    (   ) heart valve replacement  (   ) joint replacement  (   ) pregnancy    MEDICATIONS  (STANDING):    MEDICATIONS  (PRN):    Allergies    penicillin (Rash)    Intolerances    FAMILY HISTORY:  No pertinent family history in first degree relatives    *SOCIAL HISTORY: (   ) Tobacco; (   ) ETOH    *Last Dental Visit:    Vital Signs Last 24 Hrs  T(C): 36.6 (14 Apr 2021 09:12), Max: 36.6 (14 Apr 2021 09:12)  T(F): 97.9 (14 Apr 2021 09:12), Max: 97.9 (14 Apr 2021 09:12)  HR: 97 (14 Apr 2021 09:12) (97 - 97)  BP: 101/65 (14 Apr 2021 09:12) (101/65 - 101/65)  BP(mean): --  RR: 18 (14 Apr 2021 09:12) (18 - 18)  SpO2: 95% (14 Apr 2021 09:12) (95% - 95%)    EOE:  TMJ ( -  ) clicks                     ( -  ) pops                     ( -  ) crepitus             Mandible <<FROM>>             Facial bones and MOM <<grossly intact>>             ( -  ) trismus             ( -  ) lymphadenopathy             ( -  ) swelling             ( -  ) asymmetry             ( -  ) palpation             ( -  ) dyspnea             ( -  ) dysphagia             ( -  ) loss of consciousness    IOE:  permanent dentition: grossly intact           hard/soft palate:  ( - ) palatal torus, <<No pathology noted>>           tongue/FOM <<No pathology noted>>           labial/buccal mucosa <<No pathology noted>>           ( +  ) percussion - to #19           ( +  ) palpation           ( -  ) swelling            ( -  ) abscess           ( -  ) sinus tract    *DENTAL RADIOGRAPHS: 1 periapical radiograph of #19    RADIOLOGY & ADDITIONAL STUDIES:    *ASSESSMENT: Non-restorable #19    *PLAN: Extraction of #19    PROCEDURE:   All treatment risks and benefits discussed as per OS sheet dated 7/13/00. Consent obtained. Side site marked. Administered 2 carpules of 2% lidocaine with 1:100,000 epinephrine and 1 carpule of 4% septocaine with 1:100,000 epinephrine. Elevated and extracted. Curetted and irrigated. Postoperative radiograph and instructions. Hemostasis achieved. Advised patient to continue taking antibiotics previously prescribed.    RECOMMENDATIONS:  1) Take prescribed medication as indicated, OTC pain medication as needed  2) Dental F/U with outpatient dentist for comprehensive dental care.   3) If any difficulty swallowing/breathing, fever occur, return to ER.     Resident Name, pager #

## 2021-04-14 NOTE — ED PROVIDER NOTE - OBJECTIVE STATEMENT
31 y/o female with hx Asthma, Crohns presents to the ED c/o "I have a cracked tooth left lower for over a month. I was seen here 2 days ago and they started me on antibiotics and pain medication. I would like the tooth taken out." no fever/chills

## 2021-04-14 NOTE — ED PROVIDER NOTE - ATTENDING CONTRIBUTION TO CARE
31 y/o female with hx Asthma, Crohns presents to the ED for cracked left lower tooth for >1 month. pt was seen in ED 2 days ago and started on abx and pain medication. pt presents for dental eval. pain worse w/ eating/drinking. no difficulty breathing/swallowing    vss  gen- NAD, aaox3  HENT- oropharynx clear, no drooling/stridor, tolerating secretions, normal voice, normal base of tongue, no facial swelling  Dental- TTP to tooth 18, no periapical abscess.   card-rrr  lungs-no resp distress, CTAP    [facility] clinic for further eval and tx

## 2021-04-14 NOTE — ED PROVIDER NOTE - CLINICAL SUMMARY MEDICAL DECISION MAKING FREE TEXT BOX
dentalgia, will send to dental clinic  no airway complaints, pt nontoxic appearing w/o systemic complaints. pt tolerating secretions

## 2021-04-15 ENCOUNTER — EMERGENCY (EMERGENCY)
Facility: HOSPITAL | Age: 31
LOS: 0 days | Discharge: HOME | End: 2021-04-16
Attending: STUDENT IN AN ORGANIZED HEALTH CARE EDUCATION/TRAINING PROGRAM | Admitting: STUDENT IN AN ORGANIZED HEALTH CARE EDUCATION/TRAINING PROGRAM
Payer: MEDICAID

## 2021-04-15 VITALS
OXYGEN SATURATION: 98 % | HEART RATE: 112 BPM | DIASTOLIC BLOOD PRESSURE: 65 MMHG | HEIGHT: 62 IN | SYSTOLIC BLOOD PRESSURE: 105 MMHG | WEIGHT: 130.07 LBS | RESPIRATION RATE: 20 BRPM | TEMPERATURE: 97 F

## 2021-04-15 DIAGNOSIS — Z88.0 ALLERGY STATUS TO PENICILLIN: ICD-10-CM

## 2021-04-15 DIAGNOSIS — Z79.82 LONG TERM (CURRENT) USE OF ASPIRIN: ICD-10-CM

## 2021-04-15 DIAGNOSIS — J45.909 UNSPECIFIED ASTHMA, UNCOMPLICATED: ICD-10-CM

## 2021-04-15 DIAGNOSIS — Z87.19 PERSONAL HISTORY OF OTHER DISEASES OF THE DIGESTIVE SYSTEM: ICD-10-CM

## 2021-04-15 DIAGNOSIS — K64.4 RESIDUAL HEMORRHOIDAL SKIN TAGS: ICD-10-CM

## 2021-04-15 DIAGNOSIS — R10.30 LOWER ABDOMINAL PAIN, UNSPECIFIED: ICD-10-CM

## 2021-04-15 DIAGNOSIS — Z79.899 OTHER LONG TERM (CURRENT) DRUG THERAPY: ICD-10-CM

## 2021-04-15 DIAGNOSIS — K62.89 OTHER SPECIFIED DISEASES OF ANUS AND RECTUM: ICD-10-CM

## 2021-04-15 DIAGNOSIS — Z71.89 OTHER SPECIFIED COUNSELING: ICD-10-CM

## 2021-04-15 LAB
BASOPHILS # BLD AUTO: 0.01 K/UL — SIGNIFICANT CHANGE UP (ref 0–0.2)
BASOPHILS NFR BLD AUTO: 0.1 % — SIGNIFICANT CHANGE UP (ref 0–1)
EOSINOPHIL # BLD AUTO: 0.08 K/UL — SIGNIFICANT CHANGE UP (ref 0–0.7)
EOSINOPHIL NFR BLD AUTO: 0.8 % — SIGNIFICANT CHANGE UP (ref 0–8)
HCG SERPL QL: NEGATIVE — SIGNIFICANT CHANGE UP
HCT VFR BLD CALC: 35.6 % — LOW (ref 37–47)
HGB BLD-MCNC: 11 G/DL — LOW (ref 12–16)
IMM GRANULOCYTES NFR BLD AUTO: 0.5 % — HIGH (ref 0.1–0.3)
LYMPHOCYTES # BLD AUTO: 1.61 K/UL — SIGNIFICANT CHANGE UP (ref 1.2–3.4)
LYMPHOCYTES # BLD AUTO: 17.1 % — LOW (ref 20.5–51.1)
MCHC RBC-ENTMCNC: 24.3 PG — LOW (ref 27–31)
MCHC RBC-ENTMCNC: 30.9 G/DL — LOW (ref 32–37)
MCV RBC AUTO: 78.8 FL — LOW (ref 81–99)
MONOCYTES # BLD AUTO: 0.67 K/UL — HIGH (ref 0.1–0.6)
MONOCYTES NFR BLD AUTO: 7.1 % — SIGNIFICANT CHANGE UP (ref 1.7–9.3)
NEUTROPHILS # BLD AUTO: 7.01 K/UL — HIGH (ref 1.4–6.5)
NEUTROPHILS NFR BLD AUTO: 74.4 % — SIGNIFICANT CHANGE UP (ref 42.2–75.2)
NRBC # BLD: 0 /100 WBCS — SIGNIFICANT CHANGE UP (ref 0–0)
PLATELET # BLD AUTO: 379 K/UL — SIGNIFICANT CHANGE UP (ref 130–400)
RBC # BLD: 4.52 M/UL — SIGNIFICANT CHANGE UP (ref 4.2–5.4)
RBC # FLD: 18 % — HIGH (ref 11.5–14.5)
WBC # BLD: 9.43 K/UL — SIGNIFICANT CHANGE UP (ref 4.8–10.8)
WBC # FLD AUTO: 9.43 K/UL — SIGNIFICANT CHANGE UP (ref 4.8–10.8)

## 2021-04-15 PROCEDURE — 99285 EMERGENCY DEPT VISIT HI MDM: CPT

## 2021-04-15 RX ORDER — IOHEXOL 300 MG/ML
30 INJECTION, SOLUTION INTRAVENOUS ONCE
Refills: 0 | Status: COMPLETED | OUTPATIENT
Start: 2021-04-15 | End: 2021-04-15

## 2021-04-15 RX ORDER — MORPHINE SULFATE 50 MG/1
4 CAPSULE, EXTENDED RELEASE ORAL ONCE
Refills: 0 | Status: DISCONTINUED | OUTPATIENT
Start: 2021-04-15 | End: 2021-04-15

## 2021-04-15 RX ADMIN — MORPHINE SULFATE 4 MILLIGRAM(S): 50 CAPSULE, EXTENDED RELEASE ORAL at 22:31

## 2021-04-15 RX ADMIN — IOHEXOL 30 MILLILITER(S): 300 INJECTION, SOLUTION INTRAVENOUS at 22:31

## 2021-04-15 NOTE — ED PROVIDER NOTE - CARE PROVIDER_API CALL
Valdemar Leon J  GASTROENTEROLOGY  [address] [place] [address]  Phone: [phone]  Fax: [phone]  Follow Up Time: 1-3 Days

## 2021-04-15 NOTE — ED ADULT NURSE NOTE - OBJECTIVE STATEMENT
pt 31 yo F pmHx Chrons c/o generalized abdominal pain and hemorrhoid pain and discharge. pt stated she has had decreased PO intake and has experienced weight loss.

## 2021-04-15 NOTE — ED PROVIDER NOTE - NSFOLLOWUPINSTRUCTIONS_ED_ALL_ED_FT
Abdominal Pain    Many things can cause abdominal pain. Many times, abdominal pain is not caused by a disease and will improve without treatment. Your health care provider will do a physical exam to determine if there is a dangerous cause of your pain; blood tests and imaging may help determine the cause of your pain. However, in many cases, no cause may be found and you may need further testing as an outpatient. Monitor your abdominal pain for any changes.     SEEK IMMEDIATE MEDICAL CARE IF YOU HAVE ANY OF THE FOLLOWING SYMPTOMS: worsening abdominal pain, uncontrollable vomiting, profuse diarrhea, inability to have bowel movements or pass gas, black or bloody stools, fever accompanying chest pain or back pain, or fainting. These symptoms may represent a serious problem that is an emergency. Do not wait to see if the symptoms will go away. Get medical help right away. Call 911 and do not drive yourself to the hospital. Take medications as prescribed. Follow up with gastroenterology within 1 week. Return for intractable pain, vomiting, fevers or other concerning symptoms.       Abdominal Pain    Many things can cause abdominal pain. Many times, abdominal pain is not caused by a disease and will improve without treatment. Your health care provider will do a physical exam to determine if there is a dangerous cause of your pain; blood tests and imaging may help determine the cause of your pain. However, in many cases, no cause may be found and you may need further testing as an outpatient. Monitor your abdominal pain for any changes.     SEEK IMMEDIATE MEDICAL CARE IF YOU HAVE ANY OF THE FOLLOWING SYMPTOMS: worsening abdominal pain, uncontrollable vomiting, profuse diarrhea, inability to have bowel movements or pass gas, black or bloody stools, fever accompanying chest pain or back pain, or fainting. These symptoms may represent a serious problem that is an emergency. Do not wait to see if the symptoms will go away. Get medical help right away. Call 911 and do not drive yourself to the hospital.

## 2021-04-15 NOTE — ED PROVIDER NOTE - NS ED ROS FT
Review of Systems:  	•	CONSTITUTIONAL - no fever, no diaphoresis, no chills  	•	SKIN - no rash  	•	HEMATOLOGIC - no bleeding, no bruising  	•	EYES - no eye pain, no blurry vision  	•	ENT - no congestion  	•	RESPIRATORY - no shortness of breath, no cough  	•	CARDIAC - no chest pain, no palpitations  	•	GI - + abd pain, +rectal pain, no nausea, no vomiting, no diarrhea, no constipation  	•	GENITO-URINARY - no dysuria; no hematuria, no increased urinary frequency  	•	MUSCULOSKELETAL - no joint paint, no swelling, no redness  	•	NEUROLOGIC - no weakness, no headache, no paresthesias, no LOC  	•	PSYCH - no anxiety, no depression  	All other ROS are negative except as documented in HPI.

## 2021-04-15 NOTE — ED PROVIDER NOTE - CLINICAL SUMMARY MEDICAL DECISION MAKING FREE TEXT BOX
ED w/u negative, mild anemia improved from previous cbc, CMP unremarkable, ctap w/ iv and oral con negative, no blood on rectal exam. pt requires f/u for management of chrons. analgesia sent. pt previously on prednisone 20mg PO which was sent. gi referral provided as well as return precautions

## 2021-04-15 NOTE — ED PROVIDER NOTE - PATIENT PORTAL LINK FT
You can access the FollowMyHealth Patient Portal offered by [facility] by registering at the following website: [email or web address]. By joining [employer]’s FollowMyHealth portal, you will also be able to view your health information using other applications (apps) compatible with our system.

## 2021-04-15 NOTE — ED ADULT TRIAGE NOTE - CHIEF COMPLAINT QUOTE
c/o abdominal pain with nausea x few days. +rectal pain with pus discharge, states there are 2 lumps in rectum Hx Chron's disease

## 2021-04-15 NOTE — ED PROVIDER NOTE - PHYSICAL EXAMINATION
VITAL SIGNS: I have reviewed nursing notes and confirm.  CONSTITUTIONAL: Well-developed; well-nourished; in no acute distress.  SKIN: Skin exam is warm and dry, no acute rash.  HEAD: Normocephalic; atraumatic.  EYES: PERRL, EOM intact; conjunctiva and sclera clear.  ENT: No nasal discharge; airway clear.   NECK: Supple; non tender.  CARD: S1, S2 normal; no murmurs, gallops, or rubs. Regular rate and rhythm.  RESP: Clear to auscultation bilaterally. No wheezes, rales or rhonchi.  ABD: Normal bowel sounds; soft; non-distended; +lower abdominal tenderness. No rebound tenderness or guarding.  RECTAL: Normal sphincter tone. +External hemorrhoids. Unable to complete internal exam patient very tender upon insertion. No blood or purulent discharge noted.   EXT: Normal ROM. No edema.  LYMPH: No acute cervical adenopathy.  NEURO: Alert, oriented. Grossly unremarkable. No focal deficits.  PSYCH: Cooperative, appropriate.

## 2021-04-15 NOTE — ED PROVIDER NOTE - OBJECTIVE STATEMENT
31 yo F with pmhx of Crohns disease for 5 years has been on prednisone presenting with lower abdominal pain associated with rectal pain over the last few days. Symptoms are moderate and worsening. No alleviating factors. Pain is worse with bowel movements. Reports having bloody and purulent discharge from rectum. Is currently not on any steroids. Was at dentist yesterday given antibiotics. No cp, sob, fever, chills, nausea, vomiting, diarrhea, back pain, urinary symptoms, headache, dizziness, paresthesias, or weakness.

## 2021-04-15 NOTE — ED PROVIDER NOTE - ATTENDING CONTRIBUTION TO CARE
31 yo f hx chrons (previously on prednisone), asthma  pt presents for worsening abdominal pain and rectal pain.  pt endorsing bloody and purulent discharge w/ possible rectal abscess. pt in a lot of pain. pt has not had good f/u because of insurance issues. pt on abx for dental pain. no n/v/f/c/diarrhea.     vss  gen- NAD, aaox3  card-rrr  lungs-ctab, no wheezing or rhonchi  abd-  neuro- full str/sensation, cn ii-xii grossly intact, normal coordination

## 2021-04-16 VITALS
SYSTOLIC BLOOD PRESSURE: 99 MMHG | OXYGEN SATURATION: 99 % | DIASTOLIC BLOOD PRESSURE: 61 MMHG | HEART RATE: 77 BPM | TEMPERATURE: 98 F | RESPIRATION RATE: 20 BRPM

## 2021-04-16 LAB
ALBUMIN SERPL ELPH-MCNC: 3.7 G/DL — SIGNIFICANT CHANGE UP (ref 3.5–5.2)
ALP SERPL-CCNC: 60 U/L — SIGNIFICANT CHANGE UP (ref 30–115)
ALT FLD-CCNC: 13 U/L — SIGNIFICANT CHANGE UP (ref 0–41)
ANION GAP SERPL CALC-SCNC: 12 MMOL/L — SIGNIFICANT CHANGE UP (ref 7–14)
APPEARANCE UR: CLEAR — SIGNIFICANT CHANGE UP
AST SERPL-CCNC: 17 U/L — SIGNIFICANT CHANGE UP (ref 0–41)
BACTERIA # UR AUTO: ABNORMAL
BILIRUB SERPL-MCNC: <0.2 MG/DL — SIGNIFICANT CHANGE UP (ref 0.2–1.2)
BILIRUB UR-MCNC: NEGATIVE — SIGNIFICANT CHANGE UP
BUN SERPL-MCNC: 27 MG/DL — HIGH (ref 10–20)
CALCIUM SERPL-MCNC: 8.4 MG/DL — LOW (ref 8.5–10.1)
CHLORIDE SERPL-SCNC: 101 MMOL/L — SIGNIFICANT CHANGE UP (ref 98–110)
CO2 SERPL-SCNC: 25 MMOL/L — SIGNIFICANT CHANGE UP (ref 17–32)
COLOR SPEC: YELLOW — SIGNIFICANT CHANGE UP
CREAT SERPL-MCNC: 0.7 MG/DL — SIGNIFICANT CHANGE UP (ref 0.7–1.5)
DIFF PNL FLD: NEGATIVE — SIGNIFICANT CHANGE UP
EPI CELLS # UR: 8 /HPF — HIGH (ref 0–5)
GLUCOSE SERPL-MCNC: 84 MG/DL — SIGNIFICANT CHANGE UP (ref 70–99)
GLUCOSE UR QL: NEGATIVE — SIGNIFICANT CHANGE UP
HYALINE CASTS # UR AUTO: 9 /LPF — HIGH (ref 0–7)
KETONES UR-MCNC: NEGATIVE — SIGNIFICANT CHANGE UP
LACTATE SERPL-SCNC: 1.4 MMOL/L — SIGNIFICANT CHANGE UP (ref 0.7–2)
LEUKOCYTE ESTERASE UR-ACNC: ABNORMAL
LIDOCAIN IGE QN: 38 U/L — SIGNIFICANT CHANGE UP (ref 7–60)
MAGNESIUM SERPL-MCNC: 2.1 MG/DL — SIGNIFICANT CHANGE UP (ref 1.8–2.4)
NITRITE UR-MCNC: NEGATIVE — SIGNIFICANT CHANGE UP
PH UR: 7 — SIGNIFICANT CHANGE UP (ref 5–8)
POTASSIUM SERPL-MCNC: 4.2 MMOL/L — SIGNIFICANT CHANGE UP (ref 3.5–5)
POTASSIUM SERPL-SCNC: 4.2 MMOL/L — SIGNIFICANT CHANGE UP (ref 3.5–5)
PROT SERPL-MCNC: 6.9 G/DL — SIGNIFICANT CHANGE UP (ref 6–8)
PROT UR-MCNC: ABNORMAL
RBC CASTS # UR COMP ASSIST: 1 /HPF — SIGNIFICANT CHANGE UP (ref 0–4)
SODIUM SERPL-SCNC: 138 MMOL/L — SIGNIFICANT CHANGE UP (ref 135–146)
SP GR SPEC: 1.03 — SIGNIFICANT CHANGE UP (ref 1.01–1.03)
UROBILINOGEN FLD QL: SIGNIFICANT CHANGE UP
WBC UR QL: 15 /HPF — HIGH (ref 0–5)

## 2021-04-16 PROCEDURE — 74177 CT ABD & PELVIS W/CONTRAST: CPT | Mod: 26,MA

## 2021-04-16 RX ORDER — OXYCODONE AND ACETAMINOPHEN 5; 325 MG/1; MG/1
1 TABLET ORAL ONCE
Refills: 0 | Status: DISCONTINUED | OUTPATIENT
Start: 2021-04-16 | End: 2021-04-16

## 2021-04-16 RX ORDER — KETOROLAC TROMETHAMINE 30 MG/ML
1 SYRINGE (ML) INJECTION
Qty: 16 | Refills: 0
Start: 2021-04-16 | End: 2021-04-19

## 2021-04-16 RX ORDER — KETOROLAC TROMETHAMINE 30 MG/ML
30 SYRINGE (ML) INJECTION ONCE
Refills: 0 | Status: DISCONTINUED | OUTPATIENT
Start: 2021-04-16 | End: 2021-04-16

## 2021-04-16 RX ADMIN — Medication 30 MILLIGRAM(S): at 03:27

## 2021-04-16 RX ADMIN — OXYCODONE AND ACETAMINOPHEN 1 TABLET(S): 5; 325 TABLET ORAL at 03:28

## 2021-04-16 RX ADMIN — Medication 20 MILLIGRAM(S): at 04:06

## 2021-04-17 LAB
CULTURE RESULTS: SIGNIFICANT CHANGE UP
SPECIMEN SOURCE: SIGNIFICANT CHANGE UP

## 2021-04-18 DIAGNOSIS — Z79.2 LONG TERM (CURRENT) USE OF ANTIBIOTICS: ICD-10-CM

## 2021-04-18 DIAGNOSIS — Z79.899 OTHER LONG TERM (CURRENT) DRUG THERAPY: ICD-10-CM

## 2021-04-18 DIAGNOSIS — Z88.0 ALLERGY STATUS TO PENICILLIN: ICD-10-CM

## 2021-04-18 DIAGNOSIS — K08.89 OTHER SPECIFIED DISORDERS OF TEETH AND SUPPORTING STRUCTURES: ICD-10-CM

## 2021-04-18 DIAGNOSIS — J45.909 UNSPECIFIED ASTHMA, UNCOMPLICATED: ICD-10-CM

## 2021-04-18 DIAGNOSIS — K02.9 DENTAL CARIES, UNSPECIFIED: ICD-10-CM

## 2021-04-18 DIAGNOSIS — Z87.19 PERSONAL HISTORY OF OTHER DISEASES OF THE DIGESTIVE SYSTEM: ICD-10-CM

## 2021-04-18 DIAGNOSIS — Z79.82 LONG TERM (CURRENT) USE OF ASPIRIN: ICD-10-CM

## 2021-04-20 ENCOUNTER — EMERGENCY (EMERGENCY)
Facility: HOSPITAL | Age: 31
LOS: 0 days | Discharge: HOME | End: 2021-04-20
Attending: EMERGENCY MEDICINE | Admitting: EMERGENCY MEDICINE
Payer: MEDICAID

## 2021-04-20 VITALS
WEIGHT: 91.93 LBS | SYSTOLIC BLOOD PRESSURE: 118 MMHG | HEART RATE: 113 BPM | HEIGHT: 62 IN | RESPIRATION RATE: 18 BRPM | TEMPERATURE: 99 F | DIASTOLIC BLOOD PRESSURE: 65 MMHG | OXYGEN SATURATION: 99 %

## 2021-04-20 DIAGNOSIS — K08.89 OTHER SPECIFIED DISORDERS OF TEETH AND SUPPORTING STRUCTURES: ICD-10-CM

## 2021-04-20 DIAGNOSIS — Z88.0 ALLERGY STATUS TO PENICILLIN: ICD-10-CM

## 2021-04-20 DIAGNOSIS — Z87.19 PERSONAL HISTORY OF OTHER DISEASES OF THE DIGESTIVE SYSTEM: ICD-10-CM

## 2021-04-20 DIAGNOSIS — Z79.82 LONG TERM (CURRENT) USE OF ASPIRIN: ICD-10-CM

## 2021-04-20 PROCEDURE — 99284 EMERGENCY DEPT VISIT MOD MDM: CPT

## 2021-04-20 RX ORDER — KETOROLAC TROMETHAMINE 30 MG/ML
30 SYRINGE (ML) INJECTION ONCE
Refills: 0 | Status: DISCONTINUED | OUTPATIENT
Start: 2021-04-20 | End: 2021-04-20

## 2021-04-20 RX ADMIN — Medication 30 MILLIGRAM(S): at 16:59

## 2021-04-20 NOTE — ED PROVIDER NOTE - NSFOLLOWUPCLINICS_GEN_ALL_ED_FT
[facility]  Dental  [address] [place] [id number]  Phone: [phone]  Fax:   Scheduled Appointment: 4/21/2021

## 2021-04-20 NOTE — ED PROVIDER NOTE - PATIENT PORTAL LINK FT
You can access the FollowMyHealth Patient Portal offered by [facility] by registering at the following website: [email or web address]. By joining [employer]’s FollowMyHealth portal, you will also be able to view your health information using other applications (apps) compatible with our system.

## 2021-04-20 NOTE — ED PROVIDER NOTE - PHYSICAL EXAMINATION
CONST: Well appearing in NAD  EYES: PERRL, EOMI, Sclera and conjunctiva clear.  ENT: tooth 19 missing, ttp to tooth 18, no swelling or drainage noted. No nasal discharge. TM's clear B/L without drainage. Oropharynx normal appearing, no erythema or exudates. No abscess or swelling. Uvula midline.   NECK: Non-tender, no meningeal signs. normal ROM. supple   SKIN: Warm, dry, no acute rashes. Good turgor

## 2021-04-20 NOTE — ED PROVIDER NOTE - NS ED ROS FT
Review of Systems:  	•	CONSTITUTIONAL - no fever, no diaphoresis, no chills  	•	SKIN - no rash  	•	HEMATOLOGIC - no bleeding, no bruising  	•	EYES - no eye pain, no blurry vision  	•	ENT - + dental pain  	•	RESPIRATORY - no shortness of breath, no cough  	•	CARDIAC - no chest pain, no palpitations  	•	GI - no abd pain, no nausea, no vomiting, no diarrhea, no constipation  	•	GENITO-URINARY - no discharge, no dysuria; no hematuria, no increased urinary frequency  	•	MUSCULOSKELETAL - no joint paint, no swelling, no redness  	•	NEUROLOGIC - no weakness, no headache, no paresthesias, no LOC

## 2021-04-20 NOTE — ED PROVIDER NOTE - CLINICAL SUMMARY MEDICAL DECISION MAKING FREE TEXT BOX
30F p/w dental pain s.p tooth extraction 19 days prior. On exam with 19 tooth missing. Given Toradol for pain. With moderate resolution with dental. TX home

## 2021-04-20 NOTE — ED PROVIDER NOTE - NSFOLLOWUPINSTRUCTIONS_ED_ALL_ED_FT
Dental Pain    Dental pain (toothache) may be caused by many things including tooth decay (cavities or caries), abscess or infection, or trauma. If you were prescribed an antibiotic medicine, finish all of it even if you start to feel better. Rinsing your mouth with salt water or applying ice to the painful area of your face may help with the pain. Follow up with a dentist is important in ensuring good oral health and preventing the worsening of dental disease.    SEEK IMMEDIATE MEDICAL CARE IF YOU HAVE ANY OF THE FOLLOWING SYMPTOMS: unable to open your mouth, trouble breathing or swallowing, fever, or swelling of the face, neck, or jaw.     Dental Clinic Hours.Mon-Thurs.8:30am-4:30pm

## 2021-04-20 NOTE — ED PROVIDER NOTE - OBJECTIVE STATEMENT
30 year old female with pmhx of crohns disease presents with dental pain. Patient had tooth 19 pulled a few days ago, and still having pain to area. Pt currently on axbx given by dental. pt denies swelling or fever.

## 2021-04-21 ENCOUNTER — EMERGENCY (EMERGENCY)
Facility: HOSPITAL | Age: 31
LOS: 0 days | Discharge: HOME | End: 2021-04-21
Attending: STUDENT IN AN ORGANIZED HEALTH CARE EDUCATION/TRAINING PROGRAM | Admitting: STUDENT IN AN ORGANIZED HEALTH CARE EDUCATION/TRAINING PROGRAM
Payer: MEDICAID

## 2021-04-21 VITALS
TEMPERATURE: 99 F | HEART RATE: 94 BPM | HEIGHT: 62 IN | WEIGHT: 91.93 LBS | OXYGEN SATURATION: 97 % | SYSTOLIC BLOOD PRESSURE: 106 MMHG | RESPIRATION RATE: 19 BRPM | DIASTOLIC BLOOD PRESSURE: 59 MMHG

## 2021-04-21 DIAGNOSIS — Z88.0 ALLERGY STATUS TO PENICILLIN: ICD-10-CM

## 2021-04-21 DIAGNOSIS — K08.89 OTHER SPECIFIED DISORDERS OF TEETH AND SUPPORTING STRUCTURES: ICD-10-CM

## 2021-04-21 DIAGNOSIS — Z79.899 OTHER LONG TERM (CURRENT) DRUG THERAPY: ICD-10-CM

## 2021-04-21 DIAGNOSIS — J45.909 UNSPECIFIED ASTHMA, UNCOMPLICATED: ICD-10-CM

## 2021-04-21 DIAGNOSIS — Z87.19 PERSONAL HISTORY OF OTHER DISEASES OF THE DIGESTIVE SYSTEM: ICD-10-CM

## 2021-04-21 DIAGNOSIS — Z79.52 LONG TERM (CURRENT) USE OF SYSTEMIC STEROIDS: ICD-10-CM

## 2021-04-21 DIAGNOSIS — Z79.82 LONG TERM (CURRENT) USE OF ASPIRIN: ICD-10-CM

## 2021-04-21 PROBLEM — K64.9 UNSPECIFIED HEMORRHOIDS: Chronic | Status: ACTIVE | Noted: 2021-04-15

## 2021-04-21 PROCEDURE — 99282 EMERGENCY DEPT VISIT SF MDM: CPT

## 2021-04-21 RX ORDER — ACETAMINOPHEN 500 MG
650 TABLET ORAL ONCE
Refills: 0 | Status: COMPLETED | OUTPATIENT
Start: 2021-04-21 | End: 2021-04-21

## 2021-04-21 NOTE — ED PROVIDER NOTE - OBJECTIVE STATEMENT
30y F pmh as listed presents for eval of dental pain. Pt has 1wk of moderate aching L lower molar pain, no aggravating or relieving factors. Denies fever, swelling

## 2021-04-21 NOTE — ED PROVIDER NOTE - ATTENDING CONTRIBUTION TO CARE
30 year old female with a pmh of chrons & asthma presents here c/o tooth pain. Patient had an extraction 4-5 days ago to tooth #19 and since then has been having pain. Intermittent fevers. No trouble swallowing or sob.  On exam  CONSTITUTIONAL: WA / WN / NAD  HEAD: NCAT  EYES: PERRL; EOMI; anicteric.  ENT: Normal pharynx; mucous membranes pink/moist, no erythema. + airway patent s/p extraction to tooth #19 with bony fragment to gum seen.   NECK: Supple; no meningeal sign  SKIN: Warm and dry;   NEURO: AAOx3  PSYCH: Memory Intact, Normal Affect

## 2021-04-21 NOTE — ED PROVIDER NOTE - CLINICAL SUMMARY MEDICAL DECISION MAKING FREE TEXT BOX
30 year old female with a pmh of chrons & asthma presents here c/o tooth pain. Patient had an extraction 4-5 days ago to tooth #19 and since then has been having pain. Intermittent fevers. No trouble swallowing or sob. VS reviewed. Pain medication provided. Patient sent to dental clinic for further evaluation.

## 2021-04-21 NOTE — ED PROVIDER NOTE - NS ED ROS FT
Constitutional: (-) fever  Eyes/ENT: (+) dental pain, (-) blurry vision, (-) epistaxis  Cardiovascular: (-) chest pain, (-) syncope  Respiratory: (-) cough, (-) shortness of breath  Gastrointestinal: (-) vomiting, (-) diarrhea  Gu: (-) dysuria, (-) hematuria  Musculoskeletal: (-) neck pain, (-) back pain, (-) joint pain  Integumentary: (-) rash, (-) edema  Neurological: (-) headache, (-) altered mental status  Allergic/Immunologic: (-) pruritus

## 2021-04-21 NOTE — ED PROVIDER NOTE - PHYSICAL EXAMINATION
CONST: NAD  EYES: Sclera and conjunctiva clear.   ENT: Oropharynx poor dentition. No abscess or swelling. Uvula midline. No nasal discharge.   NECK: Non-tender, no meningeal signs. normal ROM. supple   CARD: S1 S2; No jvd  RESP: Equal BS B/L, No wheezes, rhonchi or rales. No distress  GI: Soft, non-tender, non-distended. normal BS  MS: Normal ROM in all extremities. pulses 2 +. no calf tenderness or swelling  SKIN: Warm, dry, no acute rashes. Good turgor  NEURO: A&Ox3

## 2021-04-21 NOTE — ED ADULT NURSE NOTE - NSIMPLEMENTINTERV_GEN_ALL_ED
Implemented All Universal Safety Interventions:  [place] to call system. Call bell, personal items and telephone within reach. Instruct patient to call for assistance. Room bathroom lighting operational. Non-slip footwear when patient is off stretcher. Physically safe environment: no spills, clutter or unnecessary equipment. Stretcher in lowest position, wheels locked, appropriate side rails in place.

## 2021-05-01 PROCEDURE — G9005: CPT

## 2021-06-01 ENCOUNTER — OUTPATIENT (OUTPATIENT)
Dept: OUTPATIENT SERVICES | Facility: HOSPITAL | Age: 31
LOS: 1 days | End: 2021-06-01
Payer: MEDICAID

## 2021-06-18 DIAGNOSIS — Z71.89 OTHER SPECIFIED COUNSELING: ICD-10-CM

## 2021-08-12 ENCOUNTER — INPATIENT (INPATIENT)
Facility: HOSPITAL | Age: 31
LOS: 1 days | Discharge: HOME HEALTH | End: 2021-08-14
Attending: INTERNAL MEDICINE | Admitting: INTERNAL MEDICINE
Payer: MEDICAID

## 2021-08-12 VITALS
HEART RATE: 101 BPM | TEMPERATURE: 99 F | WEIGHT: 93.92 LBS | HEIGHT: 62 IN | RESPIRATION RATE: 17 BRPM | DIASTOLIC BLOOD PRESSURE: 63 MMHG | SYSTOLIC BLOOD PRESSURE: 99 MMHG

## 2021-08-12 DIAGNOSIS — K50.90 CROHN'S DISEASE, UNSPECIFIED, WITHOUT COMPLICATIONS: ICD-10-CM

## 2021-08-12 LAB
ALBUMIN SERPL ELPH-MCNC: 3.5 G/DL — SIGNIFICANT CHANGE UP (ref 3.5–5.2)
ALP SERPL-CCNC: 49 U/L — SIGNIFICANT CHANGE UP (ref 30–115)
ALT FLD-CCNC: 11 U/L — SIGNIFICANT CHANGE UP (ref 0–41)
ANION GAP SERPL CALC-SCNC: 9 MMOL/L — SIGNIFICANT CHANGE UP (ref 7–14)
APTT BLD: 25.5 SEC — LOW (ref 27–39.2)
AST SERPL-CCNC: 12 U/L — SIGNIFICANT CHANGE UP (ref 0–41)
BASOPHILS # BLD AUTO: 0.02 K/UL — SIGNIFICANT CHANGE UP (ref 0–0.2)
BASOPHILS NFR BLD AUTO: 0.2 % — SIGNIFICANT CHANGE UP (ref 0–1)
BILIRUB DIRECT SERPL-MCNC: <0.2 MG/DL — SIGNIFICANT CHANGE UP (ref 0–0.2)
BILIRUB INDIRECT FLD-MCNC: SIGNIFICANT CHANGE UP MG/DL (ref 0.2–1.2)
BILIRUB SERPL-MCNC: <0.2 MG/DL — SIGNIFICANT CHANGE UP (ref 0.2–1.2)
BLD GP AB SCN SERPL QL: SIGNIFICANT CHANGE UP
BUN SERPL-MCNC: 28 MG/DL — HIGH (ref 10–20)
CALCIUM SERPL-MCNC: 8.5 MG/DL — SIGNIFICANT CHANGE UP (ref 8.5–10.1)
CHLORIDE SERPL-SCNC: 105 MMOL/L — SIGNIFICANT CHANGE UP (ref 98–110)
CO2 SERPL-SCNC: 26 MMOL/L — SIGNIFICANT CHANGE UP (ref 17–32)
CREAT SERPL-MCNC: 0.7 MG/DL — SIGNIFICANT CHANGE UP (ref 0.7–1.5)
EOSINOPHIL # BLD AUTO: 0.11 K/UL — SIGNIFICANT CHANGE UP (ref 0–0.7)
EOSINOPHIL NFR BLD AUTO: 0.9 % — SIGNIFICANT CHANGE UP (ref 0–8)
GLUCOSE SERPL-MCNC: 89 MG/DL — SIGNIFICANT CHANGE UP (ref 70–99)
HCG SERPL QL: NEGATIVE — SIGNIFICANT CHANGE UP
HCT VFR BLD CALC: 32.5 % — LOW (ref 37–47)
HGB BLD-MCNC: 10.4 G/DL — LOW (ref 12–16)
IMM GRANULOCYTES NFR BLD AUTO: 0.7 % — HIGH (ref 0.1–0.3)
INR BLD: 0.91 RATIO — SIGNIFICANT CHANGE UP (ref 0.65–1.3)
LACTATE SERPL-SCNC: 0.7 MMOL/L — SIGNIFICANT CHANGE UP (ref 0.7–2)
LIDOCAIN IGE QN: 42 U/L — SIGNIFICANT CHANGE UP (ref 7–60)
LYMPHOCYTES # BLD AUTO: 1.96 K/UL — SIGNIFICANT CHANGE UP (ref 1.2–3.4)
LYMPHOCYTES # BLD AUTO: 15.9 % — LOW (ref 20.5–51.1)
MCHC RBC-ENTMCNC: 25.3 PG — LOW (ref 27–31)
MCHC RBC-ENTMCNC: 32 G/DL — SIGNIFICANT CHANGE UP (ref 32–37)
MCV RBC AUTO: 79.1 FL — LOW (ref 81–99)
MONOCYTES # BLD AUTO: 0.73 K/UL — HIGH (ref 0.1–0.6)
MONOCYTES NFR BLD AUTO: 5.9 % — SIGNIFICANT CHANGE UP (ref 1.7–9.3)
NEUTROPHILS # BLD AUTO: 9.39 K/UL — HIGH (ref 1.4–6.5)
NEUTROPHILS NFR BLD AUTO: 76.4 % — HIGH (ref 42.2–75.2)
NRBC # BLD: 0 /100 WBCS — SIGNIFICANT CHANGE UP (ref 0–0)
PLATELET # BLD AUTO: 286 K/UL — SIGNIFICANT CHANGE UP (ref 130–400)
POTASSIUM SERPL-MCNC: 4.3 MMOL/L — SIGNIFICANT CHANGE UP (ref 3.5–5)
POTASSIUM SERPL-SCNC: 4.3 MMOL/L — SIGNIFICANT CHANGE UP (ref 3.5–5)
PROT SERPL-MCNC: 5.8 G/DL — LOW (ref 6–8)
PROTHROM AB SERPL-ACNC: 10.5 SEC — SIGNIFICANT CHANGE UP (ref 9.95–12.87)
RBC # BLD: 4.11 M/UL — LOW (ref 4.2–5.4)
RBC # FLD: 16.8 % — HIGH (ref 11.5–14.5)
SODIUM SERPL-SCNC: 140 MMOL/L — SIGNIFICANT CHANGE UP (ref 135–146)
WBC # BLD: 12.3 K/UL — HIGH (ref 4.8–10.8)
WBC # FLD AUTO: 12.3 K/UL — HIGH (ref 4.8–10.8)

## 2021-08-12 PROCEDURE — 71045 X-RAY EXAM CHEST 1 VIEW: CPT | Mod: 26

## 2021-08-12 PROCEDURE — 74177 CT ABD & PELVIS W/CONTRAST: CPT | Mod: 26,MA

## 2021-08-12 PROCEDURE — 93010 ELECTROCARDIOGRAM REPORT: CPT

## 2021-08-12 PROCEDURE — 99285 EMERGENCY DEPT VISIT HI MDM: CPT

## 2021-08-12 PROCEDURE — 99221 1ST HOSP IP/OBS SF/LOW 40: CPT | Mod: 24

## 2021-08-12 RX ORDER — SODIUM CHLORIDE 9 MG/ML
1000 INJECTION, SOLUTION INTRAVENOUS
Refills: 0 | Status: DISCONTINUED | OUTPATIENT
Start: 2021-08-12 | End: 2021-08-13

## 2021-08-12 RX ORDER — MORPHINE SULFATE 50 MG/1
2 CAPSULE, EXTENDED RELEASE ORAL EVERY 6 HOURS
Refills: 0 | Status: DISCONTINUED | OUTPATIENT
Start: 2021-08-12 | End: 2021-08-13

## 2021-08-12 RX ORDER — SODIUM CHLORIDE 9 MG/ML
1000 INJECTION INTRAMUSCULAR; INTRAVENOUS; SUBCUTANEOUS ONCE
Refills: 0 | Status: COMPLETED | OUTPATIENT
Start: 2021-08-12 | End: 2021-08-12

## 2021-08-12 RX ORDER — ENOXAPARIN SODIUM 100 MG/ML
40 INJECTION SUBCUTANEOUS DAILY
Refills: 0 | Status: DISCONTINUED | OUTPATIENT
Start: 2021-08-12 | End: 2021-08-13

## 2021-08-12 RX ORDER — CIPROFLOXACIN LACTATE 400MG/40ML
400 VIAL (ML) INTRAVENOUS EVERY 12 HOURS
Refills: 0 | Status: DISCONTINUED | OUTPATIENT
Start: 2021-08-12 | End: 2021-08-13

## 2021-08-12 RX ORDER — MORPHINE SULFATE 50 MG/1
2 CAPSULE, EXTENDED RELEASE ORAL ONCE
Refills: 0 | Status: DISCONTINUED | OUTPATIENT
Start: 2021-08-12 | End: 2021-08-12

## 2021-08-12 RX ORDER — ALBUTEROL 90 UG/1
2 AEROSOL, METERED ORAL EVERY 6 HOURS
Refills: 0 | Status: DISCONTINUED | OUTPATIENT
Start: 2021-08-12 | End: 2021-08-13

## 2021-08-12 RX ORDER — ONDANSETRON 8 MG/1
4 TABLET, FILM COATED ORAL EVERY 8 HOURS
Refills: 0 | Status: DISCONTINUED | OUTPATIENT
Start: 2021-08-12 | End: 2021-08-13

## 2021-08-12 RX ORDER — ASPIRIN/CALCIUM CARB/MAGNESIUM 324 MG
1 TABLET ORAL
Qty: 0 | Refills: 0 | DISCHARGE

## 2021-08-12 RX ORDER — VANCOMYCIN HCL 1 G
500 VIAL (EA) INTRAVENOUS ONCE
Refills: 0 | Status: COMPLETED | OUTPATIENT
Start: 2021-08-12 | End: 2021-08-12

## 2021-08-12 RX ORDER — IOHEXOL 300 MG/ML
30 INJECTION, SOLUTION INTRAVENOUS ONCE
Refills: 0 | Status: COMPLETED | OUTPATIENT
Start: 2021-08-12 | End: 2021-08-12

## 2021-08-12 RX ORDER — MORPHINE SULFATE 50 MG/1
4 CAPSULE, EXTENDED RELEASE ORAL ONCE
Refills: 0 | Status: DISCONTINUED | OUTPATIENT
Start: 2021-08-12 | End: 2021-08-12

## 2021-08-12 RX ORDER — METRONIDAZOLE 500 MG
500 TABLET ORAL EVERY 8 HOURS
Refills: 0 | Status: DISCONTINUED | OUTPATIENT
Start: 2021-08-12 | End: 2021-08-13

## 2021-08-12 RX ADMIN — MORPHINE SULFATE 2 MILLIGRAM(S): 50 CAPSULE, EXTENDED RELEASE ORAL at 20:06

## 2021-08-12 RX ADMIN — SODIUM CHLORIDE 150 MILLILITER(S): 9 INJECTION, SOLUTION INTRAVENOUS at 18:17

## 2021-08-12 RX ADMIN — MORPHINE SULFATE 4 MILLIGRAM(S): 50 CAPSULE, EXTENDED RELEASE ORAL at 05:22

## 2021-08-12 RX ADMIN — Medication 200 MILLIGRAM(S): at 18:17

## 2021-08-12 RX ADMIN — SODIUM CHLORIDE 1000 MILLILITER(S): 9 INJECTION INTRAMUSCULAR; INTRAVENOUS; SUBCUTANEOUS at 09:00

## 2021-08-12 RX ADMIN — SODIUM CHLORIDE 1000 MILLILITER(S): 9 INJECTION INTRAMUSCULAR; INTRAVENOUS; SUBCUTANEOUS at 07:46

## 2021-08-12 RX ADMIN — MORPHINE SULFATE 4 MILLIGRAM(S): 50 CAPSULE, EXTENDED RELEASE ORAL at 04:30

## 2021-08-12 RX ADMIN — MORPHINE SULFATE 4 MILLIGRAM(S): 50 CAPSULE, EXTENDED RELEASE ORAL at 09:55

## 2021-08-12 RX ADMIN — MORPHINE SULFATE 2 MILLIGRAM(S): 50 CAPSULE, EXTENDED RELEASE ORAL at 15:38

## 2021-08-12 RX ADMIN — SODIUM CHLORIDE 1000 MILLILITER(S): 9 INJECTION INTRAMUSCULAR; INTRAVENOUS; SUBCUTANEOUS at 04:30

## 2021-08-12 RX ADMIN — Medication 100 MILLIGRAM(S): at 09:00

## 2021-08-12 RX ADMIN — IOHEXOL 30 MILLILITER(S): 300 INJECTION, SOLUTION INTRAVENOUS at 04:30

## 2021-08-12 RX ADMIN — ONDANSETRON 4 MILLIGRAM(S): 8 TABLET, FILM COATED ORAL at 20:07

## 2021-08-12 NOTE — H&P ADULT - NSHPLABSRESULTS_GEN_ALL_CORE
LABS/RADIOLOGY RESULTS:                          10.4   12.30 )-----------( 286      ( 12 Aug 2021 05:00 )             32.5   08-12    140  |  105  |  28<H>  ----------------------------<  89  4.3   |  26  |  0.7    Ca    8.5      12 Aug 2021 05:00    TPro  5.8<L>  /  Alb  3.5  /  TBili  <0.2  /  DBili  <0.2  /  AST  12  /  ALT  11  /  AlkPhos  49  08-12  PT/INR - ( 12 Aug 2021 05:00 )   PT: 10.50 sec;   INR: 0.91 ratio         PTT - ( 12 Aug 2021 05:00 )  PTT:25.5 sec          EXAM:  CT ABDOMEN AND PELVIS [facility], 08/12/2021    IMPRESSION:  Right ischioanal fossa 3 x 1.9 x 4.9 cm abscess. For further evaluation of a possible perianal fistula, an outpatient contrast enhanced pelvic MRI can be obtained.

## 2021-08-12 NOTE — ED PROVIDER NOTE - CARE PLAN
1 Principal Discharge DX:	Rectal abscess  Secondary Diagnosis:	Colonic fistula  Secondary Diagnosis:	Ulcerative colitis

## 2021-08-12 NOTE — ED PROVIDER NOTE - OBJECTIVE STATEMENT
31F hx UC presents with rectal pain. Patient notes she has had an abscess there for approx 6 months, has been getting worse, started draining 2 weeks ago with foul-smelling fluid, has had subjective fevers at home, denies abd pain/vomiting, mild diarrhea.

## 2021-08-12 NOTE — ED ADULT NURSE REASSESSMENT NOTE - NS ED NURSE REASSESS COMMENT FT1
endorsed to day shift nurse Jamia KWONG
pt resting comfortably in bed. no complaints at this time. fall precautions in place.

## 2021-08-12 NOTE — H&P ADULT - ATTENDING COMMENTS
Patient seen and examined independently. Agree with resident note/ history / physical exam and plan of care with following exceptions/additions/updates. Case discussed with patient/pt decision maker, house-staff and nursing. My notes supersedes the resident's notes in case of discrepancies.    pt is sp surgery now ( pt seen and examined in PACU) ,   incision and drainage of perianal abscess copious purulent material 50cc. Packed with iodoform 1/2 packing.    no active diarrhea today but has chronic diarrhea, she is not compliant with followup and does not have a GI doctor, she is chronically taking steroids. and uses ER as her main care facility,     GI notes appreciated.     # paerianal abscess, sp I&D cont abx: cipro flagyl , followup cultures.     #Chronic, Ulcerative Colitis   taper prednisone therapy   ESR, CRP, Fecal calprotectin and fecal lactoferrin  GI PCR and C. Diff  avoid NSAIDs, Anticholingerics and opioids  Colonoscopy outpatient in 2-3 weeks    dvt prophylaxis       #Progress Note Handoff  Pending (specify):  Consults___surgery followup, abscess cultures, Tests: gi pcr, esr, crp, cdiff, fecal calprotectin and lactoferrin, cbc and cmp   Family discussion: dw pt , she understands plan of care and agrees with current plan , but does not seem that she is committed to followup regularly, is very reluctant regarding need to see gi regularly   Disposition: Home

## 2021-08-12 NOTE — CONSULT NOTE ADULT - ASSESSMENT
ASSESSMENT:  31yF w/ PMHx of Crohns/UC with 6 mos history of perirectal abscess who presented with acutely worsening 1 week history of perirectal pain with foul smelling discharge c/w perirectal abscess with likely fistulization given purulent output from anus and on KHOA. Physical exam findings, imaging, and labs as documented above.     PLAN:  - GI consult for IBD management  - will be added on to OR scheduled 7:30 AM for EUS and placement of seton  - NPO after midnight, IVF, IV Abx (cipro/flagyl)  - surgery team to follow    Lines/Tubes: PIV    Above plan discussed with Attending Surgeon Dr. Fitzgerald , patient, patient family, and Primary team  08-12-21 @ 12:20

## 2021-08-12 NOTE — H&P ADULT - HISTORY OF PRESENT ILLNESS
Patient is a 30yo female with PMHx of Crohn's disease and asthma who presents to ED with worsening rectal pain secondary to rectal abscess.  Patient has had the abscess for approximately 6 months and states it has been getting increasingly painful.  For the past two weeks, the abscess has been draining pus and causing pain with defecation and ambulation.  She states the skin surrounds the abscess is also painful due to recurrent wiping of the area and she is experiencing increasing abdominal pain and cramping due to fear of pain with defecation.  Patient has had subjective fevers at home but denies any nausea or vomiting.  Patient denies any other ROS including headache, CP, or SOB.    In the ED, VS significant for  and BP 99/63.  Labs significant for hgb of 10.4 (not currently menstruating) and WBC 12.3.  CT abdomen/pelvis showed right ischioanal fossa 3 x 1.9 x 4.9 cm abscess. Patient admitted to Medicine for further management and is pending OR tomorrow with General Surgery for EUS and placement of seton.

## 2021-08-12 NOTE — H&P ADULT - NSHPPHYSICALEXAM_GEN_ALL_CORE
GENERAL: lying in bed on left side, in distress, speaking in full sentences  HEENT: EOMI, clear sclera, neck supple  LUNGS: CTA, no wheezing/rales/rhonchi, no increased WOB, on RA  HEART: RRR, S1/S2, 2+ RP bilaterally  ABDOMEN: soft, mild distension, diffuse tenderness on palpation (yovany LLQ), rectal abscess present  EXT: moves all extremities, no deformities noted  NEURO: AAOx3, CN grossly intact, no sensory or motor deficits  SKIN: warm, dry, no edema or erythema

## 2021-08-12 NOTE — H&P ADULT - ASSESSMENT
30yo female with PMHx of Crohn's disease and asthma who presents to ED with worsening rectal pain secondary to rectal abscess.  In the ED, VS significant for  and BP 99/63.  Labs significant for hgb of 10.4 (not currently menstruating) and WBC 12.3.  CT abdomen/pelvis showed right ischioanal fossa 3 x 1.9 x 4.9 cm abscess. Patient admitted to Medicine for further management and is pending OR tomorrow with General Surgery for EUS and placement of seton.      #Rectal abscess   - CT abdomen/pelvis showed right ischioanal fossa 3 x 1.9 x 4.9 cm abscess  - general surgery following, plan for OR tomorrow AM  - NPO at midnight  - pre-op labs for AM (T+S, coags)  - IVF LR @150cc/hr  - IV abx (flagyl, cipro)    #h/o Crohn's disease  - c/w home prednisone 40mg daily  - c/w IV morphine for pain  - consult GI for further IBD management    #Microcytic anemia, Hgb 10.4 (not menstruating)  - per pt, h/o mild anemia  - f/up iron studies  - monitor CBC    #Leukocytosis, WBC 12.3  - IV abx  - monitor CBC    #h/o Asthma  - c/w home albuterol inhaler PRN        #DVT ppx: holding for OR tomorrow  #GI ppx: not indicated  #Diet: regular, NPO @ midnight  #Activity: IAT  #Code: FULL  #Disposition: from home, admit to floors, OR eris      #Handoff  - f/up AM pre-op labs  - f/up GI recs  - restart diet and dvt ppx post-op  - f/up iron studies

## 2021-08-12 NOTE — CHART NOTE - NSCHARTNOTEFT_GEN_A_CORE
Pre-Op Note    Patient: GREYSON GARZA  MRN: [id number]  31y Female  Location: [facility] 006 A  08-12-21 @ 15:52    Admit Diagnosis: RECTAL ABSCESS; COLONIC FISTULA        Procedure: EUA, placement of seton  Consent in Chart: [  ] Yes [ x ] No  Diet: Diet, NPO after Midnight:      NPO Start Date: 12-Aug-2021,   NPO Start Time: 23:59  Except Medications (08-12-21 @ 15:09)    Fluids: lactated ringers. 1000 milliLiter(s) (150 mL/Hr) IV Continuous <Continuous>    EKG:    CXR:      Vitals:  T(F): 97.5 (08-12-21 @ 08:26), Max: 98.9 (08-12-21 @ 03:11)  HR: 83 (08-12-21 @ 08:26) (83 - 101)  BP: 88/53 (08-12-21 @ 08:26) (88/53 - 99/63)  RR: 18 (08-12-21 @ 08:26) (17 - 18)  SpO2: 98% (08-12-21 @ 08:26) (98% - 98%)    Pre-OP Labs:                      10.4   12.30 )-----------( 286      ( 12 Aug 2021 05:00 )             32.5     08-12    140  |  105  |  28<H>  ----------------------------<  89  4.3   |  26  |  0.7    Ca    8.5      12 Aug 2021 05:00    TPro  5.8<L>  /  Alb  3.5  /  TBili  <0.2  /  DBili  <0.2  /  AST  12  /  ALT  11  /  AlkPhos  49  08-12    PT/INR - ( 12 Aug 2021 05:00 )   PT: 10.50 sec;   INR: 0.91 ratio         PTT - ( 12 Aug 2021 05:00 )  PTT:25.5 sec      Type & Screen: ABO RH Interpretation: A POS (08-12-21 @ 05:00)      Pregnancy Test: *** Serum Pregnancy: Negative (08-12-21 @ 05:00)      COVID: pending

## 2021-08-12 NOTE — ED ADULT TRIAGE NOTE - PAIN RATING/NUMBER SCALE (0-10): REST
Patient:   Raza Key            MRN: [id number]            FIN: [id number]               Age:   38 years     Sex:  FEMALE     :  78   Associated Diagnoses:   None   Author:   JOSE ALFREDO CADENA      Postoperative Information                 Preoperative diagnosis: The patient is a 57-year-old  2, para 1, 0, 0, 1, admitted for repeat low transverse  section and bilateral tubal sterilization with salpingectomy at 44 and one sevenths weeks gestational age. Postoperative diagnosis: Same. Procedure: Repeat low transverse  section and bilateral salpingectomy. Surgeon: Dr. Sheldon Mcintosh. Asst.: Sherron King P.A. Anesthesia: Spinal.    Findings: Viable male infant with Apgars of 9 and 9 and a weight of 8 lbs. 2 oz. Normal-appearing uterus, ovaries and fallopian tubes. There was noted to be mild meconium staining to the amniotic fluid. Specimens: Both fallopian tubes sent to pathology. The patient declined cord blood retrieval for storage. Estimated blood loss: 750 mL. Complications: None. Condition: Both the mother and infant remain stable after delivery. Procedure: The patient was taken to the OR with an IV running. Adequate spinal anesthetic was administered without difficulty. The patient was placed in supine position and prepped and draped in a manner typical for a repeat low transverse  section. A repeat Pfannenstiel skin incision was made and dissection was carried down through the subcutaneous tissue until fascia was reached. The fascia was incised in midline and the incision was extended laterally with the use of sharp dissection. The fascia was dissected off the rectus muscles with the use of sharp and blunt dissection. Rectus muscles were  in the midline. The peritoneum was grasped and entered under direct visualization without difficulty. A bladder blade was placed.  A bladder flap was created with dissection of bladder off of the lower uterine segment. The bladder blade was replaced. A repeat low transverse uterine incision was made. Dissection was carried down until the amniotic sac was reached, and light meconium stained fluid was noted. With the use of moderate fundal pressure surgeon's hands, the infant's head was delivered onto mother's abdomen. The nasal passages and oropharynx were suctioned. Again, with use of moderate fundal pressure surgeon's hands, the infant was delivered onto mother's abdomen. The nasal passages and oropharynx were again suctioned. Umbilical cord was doubly clamped and cut and the infant was handed off to the waiting nursery staff. The infant was noted to be vigorous at delivery. The placenta was manually removed. The uterus was externalized. The uterine incision was closed with use of 0 Vicryl suture using a continuous, locking stitch. A 2nd imbricating layer was then performed using 0 Vicryl suture and a continuous stitch. Attention was then turned to performing the bilateral salpingectomy. With the use of the LigaSure cautery device  a bilateral salpingectomy was performed. The uterus was returned to abdominal cavity. Irrigation was performed. Hemostasis was intact. The fascia was closed with use of 0 Vicryl suture using continuous stitch that was initiated, laterally and met in the midline. A single catheter of the On-Q pump was placed at the skin and positioned above the level of the fascia. The subcutaneous tissue was  reapproximated with use of 2-0 Vicryl suture using a continuous stitch. The skin was closed with use of 4 Vicryl suture using a continuous, subcuticular stitch. The final needle, sponge and instrument count was correct and the patient went to recovery room stable condition.                Electronically Signed On 03/12/2018 09:15  __________________________________________________   [facility]

## 2021-08-12 NOTE — ED PROVIDER NOTE - ATTENDING CONTRIBUTION TO CARE
32 yo F, hx of UC, here for assessment of rectal pain, drainage of pus. Notes pain, abscess x 6 months, now drainage x 2 weeks. Notes subjective fever at home, but no measured fever.     VS notable for , borderline hypotension. However patient is mentating well. Clear lungs, RRR, soft, NT, ND abdomen.     Has likely fissure on rectal exam with purulent drainage -- suspect perianal vs perirectal abscess.    Labs with leukocytosis.    CT with oral and IV contrast pending.    Case signed out to Dr. Krishna.

## 2021-08-12 NOTE — ED ADULT TRIAGE NOTE - CHIEF COMPLAINT QUOTE
Pt has an abscess on her right buttock for six months that has gotten worst tonight. Pt states " there's constant pus coming out."

## 2021-08-12 NOTE — ED PROVIDER NOTE - PROGRESS NOTE DETAILS
PP: Surgery recommends admission to medicine. Surgery to follow. GI paged on teams. Patient signed out to medicine. Updated patient regarding plan.

## 2021-08-12 NOTE — ED PROVIDER NOTE - PHYSICAL EXAMINATION
Vital Signs: I have reviewed the initial vital signs.  Constitutional: well-nourished, appears stated age, no acute distress.  HEENT: Airway patent, moist MM, no erythema/swelling/deformity of oral structures. EOMI, PERRLA.  CV: regular rate, regular rhythm, well-perfused extremities, 2+ b/l DP and radial pulses equal.  Lungs: BCTA, no increased WOB.  ABD: NTND, no guarding or rebound, no pulsatile mass, no hernias, no flank pain. There is active purulent drainge coming from the anus, with ttp surrounding it and fluctuance surrounding it.   MSK: Neck supple, nontender, nl ROM, no stepoff. Chest nontender. Back nontender in TLS spine or to b/l bony structures. Ext nontender, nl rom, no deformity.   INTEG: Skin warm, dry, no rash.  NEURO: A&Ox3, moving all extremities, normal speech  PSYCH: Calm, cooperative, normal affect and interaction.

## 2021-08-13 LAB
RAPID RVP RESULT: SIGNIFICANT CHANGE UP
SARS-COV-2 RNA SPEC QL NAA+PROBE: SIGNIFICANT CHANGE UP
SARS-COV-2 RNA SPEC QL NAA+PROBE: SIGNIFICANT CHANGE UP

## 2021-08-13 PROCEDURE — 99223 1ST HOSP IP/OBS HIGH 75: CPT

## 2021-08-13 PROCEDURE — 46040 I&D ISCHIORCT&/PERIRCT ABSC: CPT

## 2021-08-13 PROCEDURE — 99233 SBSQ HOSP IP/OBS HIGH 50: CPT

## 2021-08-13 RX ORDER — HYDROMORPHONE HYDROCHLORIDE 2 MG/ML
1 INJECTION INTRAMUSCULAR; INTRAVENOUS; SUBCUTANEOUS
Refills: 0 | Status: DISCONTINUED | OUTPATIENT
Start: 2021-08-13 | End: 2021-08-13

## 2021-08-13 RX ORDER — ONDANSETRON 8 MG/1
4 TABLET, FILM COATED ORAL ONCE
Refills: 0 | Status: DISCONTINUED | OUTPATIENT
Start: 2021-08-13 | End: 2021-08-13

## 2021-08-13 RX ORDER — MEPERIDINE HYDROCHLORIDE 50 MG/ML
12.5 INJECTION INTRAMUSCULAR; INTRAVENOUS; SUBCUTANEOUS
Refills: 0 | Status: DISCONTINUED | OUTPATIENT
Start: 2021-08-13 | End: 2021-08-13

## 2021-08-13 RX ORDER — SODIUM CHLORIDE 9 MG/ML
1000 INJECTION, SOLUTION INTRAVENOUS
Refills: 0 | Status: DISCONTINUED | OUTPATIENT
Start: 2021-08-13 | End: 2021-08-13

## 2021-08-13 RX ORDER — CEFEPIME 1 G/1
1000 INJECTION, POWDER, FOR SOLUTION INTRAMUSCULAR; INTRAVENOUS ONCE
Refills: 0 | Status: COMPLETED | OUTPATIENT
Start: 2021-08-13 | End: 2021-08-13

## 2021-08-13 RX ORDER — CEFEPIME 1 G/1
1000 INJECTION, POWDER, FOR SOLUTION INTRAMUSCULAR; INTRAVENOUS EVERY 8 HOURS
Refills: 0 | Status: DISCONTINUED | OUTPATIENT
Start: 2021-08-13 | End: 2021-08-14

## 2021-08-13 RX ORDER — CIPROFLOXACIN LACTATE 400MG/40ML
400 VIAL (ML) INTRAVENOUS EVERY 12 HOURS
Refills: 0 | Status: DISCONTINUED | OUTPATIENT
Start: 2021-08-13 | End: 2021-08-14

## 2021-08-13 RX ORDER — METRONIDAZOLE 500 MG
500 TABLET ORAL EVERY 8 HOURS
Refills: 0 | Status: DISCONTINUED | OUTPATIENT
Start: 2021-08-13 | End: 2021-08-14

## 2021-08-13 RX ORDER — MORPHINE SULFATE 50 MG/1
2 CAPSULE, EXTENDED RELEASE ORAL EVERY 6 HOURS
Refills: 0 | Status: DISCONTINUED | OUTPATIENT
Start: 2021-08-13 | End: 2021-08-14

## 2021-08-13 RX ORDER — ACETAMINOPHEN 500 MG
650 TABLET ORAL EVERY 6 HOURS
Refills: 0 | Status: DISCONTINUED | OUTPATIENT
Start: 2021-08-13 | End: 2021-08-14

## 2021-08-13 RX ORDER — ALBUTEROL 90 UG/1
2 AEROSOL, METERED ORAL EVERY 6 HOURS
Refills: 0 | Status: DISCONTINUED | OUTPATIENT
Start: 2021-08-13 | End: 2021-08-14

## 2021-08-13 RX ORDER — CEFEPIME 1 G/1
INJECTION, POWDER, FOR SOLUTION INTRAMUSCULAR; INTRAVENOUS
Refills: 0 | Status: DISCONTINUED | OUTPATIENT
Start: 2021-08-13 | End: 2021-08-14

## 2021-08-13 RX ORDER — TRAMADOL HYDROCHLORIDE 50 MG/1
50 TABLET ORAL ONCE
Refills: 0 | Status: DISCONTINUED | OUTPATIENT
Start: 2021-08-13 | End: 2021-08-13

## 2021-08-13 RX ORDER — HYDROMORPHONE HYDROCHLORIDE 2 MG/ML
0.5 INJECTION INTRAMUSCULAR; INTRAVENOUS; SUBCUTANEOUS
Refills: 0 | Status: DISCONTINUED | OUTPATIENT
Start: 2021-08-13 | End: 2021-08-13

## 2021-08-13 RX ORDER — ENOXAPARIN SODIUM 100 MG/ML
40 INJECTION SUBCUTANEOUS DAILY
Refills: 0 | Status: DISCONTINUED | OUTPATIENT
Start: 2021-08-13 | End: 2021-08-14

## 2021-08-13 RX ADMIN — TRAMADOL HYDROCHLORIDE 50 MILLIGRAM(S): 50 TABLET ORAL at 06:44

## 2021-08-13 RX ADMIN — MORPHINE SULFATE 2 MILLIGRAM(S): 50 CAPSULE, EXTENDED RELEASE ORAL at 03:11

## 2021-08-13 RX ADMIN — Medication 100 MILLIGRAM(S): at 13:55

## 2021-08-13 RX ADMIN — HYDROMORPHONE HYDROCHLORIDE 0.5 MILLIGRAM(S): 2 INJECTION INTRAMUSCULAR; INTRAVENOUS; SUBCUTANEOUS at 09:55

## 2021-08-13 RX ADMIN — Medication 40 MILLIGRAM(S): at 06:02

## 2021-08-13 RX ADMIN — HYDROMORPHONE HYDROCHLORIDE 1 MILLIGRAM(S): 2 INJECTION INTRAMUSCULAR; INTRAVENOUS; SUBCUTANEOUS at 17:04

## 2021-08-13 RX ADMIN — CEFEPIME 100 MILLIGRAM(S): 1 INJECTION, POWDER, FOR SOLUTION INTRAMUSCULAR; INTRAVENOUS at 18:39

## 2021-08-13 RX ADMIN — Medication 650 MILLIGRAM(S): at 23:24

## 2021-08-13 RX ADMIN — Medication 100 MILLIGRAM(S): at 00:54

## 2021-08-13 RX ADMIN — HYDROMORPHONE HYDROCHLORIDE 0.5 MILLIGRAM(S): 2 INJECTION INTRAMUSCULAR; INTRAVENOUS; SUBCUTANEOUS at 10:25

## 2021-08-13 RX ADMIN — HYDROMORPHONE HYDROCHLORIDE 1 MILLIGRAM(S): 2 INJECTION INTRAMUSCULAR; INTRAVENOUS; SUBCUTANEOUS at 13:15

## 2021-08-13 RX ADMIN — HYDROMORPHONE HYDROCHLORIDE 1 MILLIGRAM(S): 2 INJECTION INTRAMUSCULAR; INTRAVENOUS; SUBCUTANEOUS at 13:00

## 2021-08-13 RX ADMIN — HYDROMORPHONE HYDROCHLORIDE 0.5 MILLIGRAM(S): 2 INJECTION INTRAMUSCULAR; INTRAVENOUS; SUBCUTANEOUS at 09:15

## 2021-08-13 RX ADMIN — HYDROMORPHONE HYDROCHLORIDE 1 MILLIGRAM(S): 2 INJECTION INTRAMUSCULAR; INTRAVENOUS; SUBCUTANEOUS at 18:34

## 2021-08-13 RX ADMIN — HYDROMORPHONE HYDROCHLORIDE 1 MILLIGRAM(S): 2 INJECTION INTRAMUSCULAR; INTRAVENOUS; SUBCUTANEOUS at 15:16

## 2021-08-13 RX ADMIN — SODIUM CHLORIDE 120 MILLILITER(S): 9 INJECTION, SOLUTION INTRAVENOUS at 08:27

## 2021-08-13 RX ADMIN — Medication 100 MILLIGRAM(S): at 22:28

## 2021-08-13 RX ADMIN — HYDROMORPHONE HYDROCHLORIDE 0.5 MILLIGRAM(S): 2 INJECTION INTRAMUSCULAR; INTRAVENOUS; SUBCUTANEOUS at 08:45

## 2021-08-13 RX ADMIN — Medication 100 MILLIGRAM(S): at 06:48

## 2021-08-13 RX ADMIN — Medication 200 MILLIGRAM(S): at 05:59

## 2021-08-13 RX ADMIN — ENOXAPARIN SODIUM 40 MILLIGRAM(S): 100 INJECTION SUBCUTANEOUS at 13:03

## 2021-08-13 NOTE — PROGRESS NOTE ADULT - SUBJECTIVE AND OBJECTIVE BOX
GENERAL SURGERY PROGRESS NOTE    Patient: GREYSON GARZA , 31y ([birth year])Female   MRN: [id number]  Location: [facility] ER Hold 006 A  Visit: 08-12-21 Inpatient  Date: 08-13-21 @ 08:59    Hospital Day #:  Post-Op Day #:    Procedure/Dx/Injuries:    Events of past 24 hours:    PAST MEDICAL & SURGICAL HISTORY:  Asthma    Crohns disease of small intestine    Hemorrhoids    No significant past surgical history        Vitals:   T(F): 97.8 (08-13-21 @ 08:28), Max: 98.2 (08-13-21 @ 07:07)  HR: 64 (08-13-21 @ 08:45)  BP: 101/85 (08-13-21 @ 08:45)  RR: 18 (08-13-21 @ 08:45)  SpO2: 100% (08-13-21 @ 08:45)      Diet, Regular      Fluids: lactated ringers.: Solution, 1000 milliLiter(s) infuse at 120 mL/Hr  Provider's Contact #: [phone]      I & O's:    Bowel Movement: : [] YES [] NO  Flatus: : [] YES [] NO    PHYSICAL EXAM:  General: NAD, AAOx3, calm and cooperative  HEENT: NCAT, EMILIANO, EOMI, Trachea ML, Neck supple  Cardiac: RRR S1, S2, no Murmurs, rubs or gallops  Respiratory: CTAB, normal respiratory effort, breath sounds equal BL, no wheeze, rhonchi or crackles  Abdomen: Soft, non-distended, non-tender, no rebound, no guarding. +BS.  Rectal: Good tone, +stool, no blood, no anna-anal masses/lesions, no fistulas, fissures, hemorrhoids  Musculoskeletal: Strength 5/5 BL UE/LE, ROM intact, compartments soft  Neuro: Sensation grossly intact and equal throughout, no focal deficits  Vascular: Pulses 2+ throughout, extremities well perfused  Skin: Warm/dry, normal color, no jaundice  Incision/wound: healing well, dressings in place, clean, dry and intact    MEDICATIONS  (STANDING):  ciprofloxacin   IVPB 400 milliGRAM(s) IV Intermittent every 12 hours  enoxaparin Injectable 40 milliGRAM(s) SubCutaneous daily  lactated ringers. 1000 milliLiter(s) (120 mL/Hr) IV Continuous <Continuous>  metroNIDAZOLE  IVPB 500 milliGRAM(s) IV Intermittent every 8 hours  predniSONE   Tablet 40 milliGRAM(s) Oral daily    MEDICATIONS  (PRN):  ALBUTerol    90 MICROgram(s) HFA Inhaler 2 Puff(s) Inhalation every 6 hours PRN Shortness of Breath and/or Wheezing  HYDROmorphone  Injectable 0.5 milliGRAM(s) IV Push every 10 minutes PRN Moderate Pain (4 - 6)  HYDROmorphone  Injectable 1 milliGRAM(s) IV Push every 10 minutes PRN Severe Pain (7 - 10)  meperidine     Injectable 12.5 milliGRAM(s) IV Push every 10 minutes PRN Shivering  ondansetron Injectable 4 milliGRAM(s) IV Push once PRN Nausea and/or Vomiting      DVT PROPHYLAXIS: enoxaparin Injectable 40 milliGRAM(s) SubCutaneous daily    GI PROPHYLAXIS:   ANTICOAGULATION:   ANTIBIOTICS:  ciprofloxacin   IVPB 400 milliGRAM(s)  metroNIDAZOLE  IVPB 500 milliGRAM(s)            LAB/STUDIES:  Labs:  CAPILLARY BLOOD GLUCOSE                              10.4   12.30 )-----------( 286      ( 12 Aug 2021 05:00 )             32.5         08-12    140  |  105  |  28<H>  ----------------------------<  89  4.3   |  26  |  0.7          LFTs:             5.8  | <0.2 | 12       ------------------[49      ( 12 Aug 2021 05:00 )  3.5  | <0.2 | 11          Lipase:42     Amylase:x         Lactate, Blood: 0.7 mmol/L (08-12-21 @ 05:00)      Coags:     10.50  ----< 0.91    ( 12 Aug 2021 05:00 )     25.5                            IMAGING:      ACCESS/ DEVICES:  [x] Peripheral IV  [ ] Central Venous Line	[ ] R	[ ] L	[ ] IJ	[ ] Fem	[ ] SC	Placed:   [ ] Arterial Line		[ ] R	[ ] L	[ ] Fem	[ ] Rad	[ ] Ax	Placed:   [ ] PICC:					[ ] Mediport  [ ] Urinary Catheter,  Date Placed:   [ ] Chest tube: [ ] Right, [ ] Left  [ ] ABIGAIL/Vito Drains      ASSESSMENT:  31y F w/ PMHx of Crohn's disease    PLAN:  - packing to be removed in 2 days or on Sunday if the pt is in the hospital   - discharge on 10 days of cipro and flagyl  - GI Follow-up for management of Crohn's disease  - Pt cleared to discharge from a surgical standpoint      Lines/Tubes: PIV,     BLUE TEAM SPECTRA: [id number]     GENERAL SURGERY PROGRESS NOTE    Patient: GREYSON GARZA , 31y ([birth year])Female   MRN: [id number]  Location: [facility] Hold 006 A  Visit: 08-12-21 Inpatient  Date: 08-13-21 @ 08:59    Hospital Day #:2  Post-Op Day #: 0    Procedure/Dx/Injuries: s/p Incision and drainage of Perirectal fistula    Events of past 24 hours:  Pt had a I&D of her Perirectal fistula 50cc of purulent material expressed. Packed with Iodoform gauze can be removed in 2 days and covered with gauze no addition packing needed     PAST MEDICAL & SURGICAL HISTORY:  Asthma    Crohns disease of small intestine    Hemorrhoids    No significant past surgical history        Vitals:   T(F): 97.8 (08-13-21 @ 08:28), Max: 98.2 (08-13-21 @ 07:07)  HR: 64 (08-13-21 @ 08:45)  BP: 101/85 (08-13-21 @ 08:45)  RR: 18 (08-13-21 @ 08:45)  SpO2: 100% (08-13-21 @ 08:45)      Diet, Regular      Fluids: lactated ringers.: Solution, 1000 milliLiter(s) infuse at 120 mL/Hr  Provider's Contact #: [phone]      I & O's:    Bowel Movement: : [x] YES [] NO  Flatus: : [x] YES [] NO    PHYSICAL EXAM:  General: NAD, AAOx3, calm and cooperative  HEENT: NCAT, EMILIANO, EOMI, Trachea ML, Neck supple  Cardiac: RRR   Respiratory: CTAB, normal respiratory effort,   Abdomen: Soft, non-distended, non-tender, no rebound, no guarding. +BS.  Rectal: Good tone, +stool, posterior perirectal fistula draining blood and pus.   Skin: Warm/dry, normal color, no jaundice  Incision/wound: healing well, dressings in place, clean, dry and intact    MEDICATIONS  (STANDING):  ciprofloxacin   IVPB 400 milliGRAM(s) IV Intermittent every 12 hours  enoxaparin Injectable 40 milliGRAM(s) SubCutaneous daily  lactated ringers. 1000 milliLiter(s) (120 mL/Hr) IV Continuous <Continuous>  metroNIDAZOLE  IVPB 500 milliGRAM(s) IV Intermittent every 8 hours  predniSONE   Tablet 40 milliGRAM(s) Oral daily    MEDICATIONS  (PRN):  ALBUTerol    90 MICROgram(s) HFA Inhaler 2 Puff(s) Inhalation every 6 hours PRN Shortness of Breath and/or Wheezing  HYDROmorphone  Injectable 0.5 milliGRAM(s) IV Push every 10 minutes PRN Moderate Pain (4 - 6)  HYDROmorphone  Injectable 1 milliGRAM(s) IV Push every 10 minutes PRN Severe Pain (7 - 10)  meperidine     Injectable 12.5 milliGRAM(s) IV Push every 10 minutes PRN Shivering  ondansetron Injectable 4 milliGRAM(s) IV Push once PRN Nausea and/or Vomiting      DVT PROPHYLAXIS: enoxaparin Injectable 40 milliGRAM(s) SubCutaneous daily    GI PROPHYLAXIS:   ANTICOAGULATION:   ANTIBIOTICS:  ciprofloxacin   IVPB 400 milliGRAM(s)  metroNIDAZOLE  IVPB 500 milliGRAM(s)            LAB/STUDIES:  Labs:  CAPILLARY BLOOD GLUCOSE                              10.4   12.30 )-----------( 286      ( 12 Aug 2021 05:00 )             32.5         08-12    140  |  105  |  28<H>  ----------------------------<  89  4.3   |  26  |  0.7          LFTs:             5.8  | <0.2 | 12       ------------------[49      ( 12 Aug 2021 05:00 )  3.5  | <0.2 | 11          Lipase:42     Amylase:x         Lactate, Blood: 0.7 mmol/L (08-12-21 @ 05:00)      Coags:     10.50  ----< 0.91    ( 12 Aug 2021 05:00 )     25.5              IMAGING:      < from: CT Abdomen and Pelvis w/ Oral Cont and w/ IV Cont (08.12.21 @ 07:56) >  IMPRESSION:  Right ischioanal fossa 3 x 1.9 x 4.9 cm abscess. For further evaluation of a possible perianal fistula, an outpatient contrast enhanced pelvic MRI can be obtained.    < end of copied text >      ACCESS/ DEVICES:  [x] Peripheral IV  [ ] Central Venous Line	[ ] R	[ ] L	[ ] IJ	[ ] Fem	[ ] SC	Placed:   [ ] Arterial Line		[ ] R	[ ] L	[ ] Fem	[ ] Rad	[ ] Ax	Placed:   [ ] PICC:					[ ] Mediport  [ ] Urinary Catheter,  Date Placed:   [ ] Chest tube: [ ] Right, [ ] Left  [ ] ABIGAIL/Vito Drains

## 2021-08-13 NOTE — BRIEF OPERATIVE NOTE - NSICDXBRIEFPREOP_GEN_ALL_CORE_FT
PRE-OP DIAGNOSIS:  Perianal fistula 13-Aug-2021 08:35:16  Rehan Krishna  Crohn's disease 13-Aug-2021 08:34:57  Rehan Krishna

## 2021-08-13 NOTE — CONSULT NOTE ADULT - ATTENDING COMMENTS
Pt seen and evaluated  chart reviewed  CT scan images and report reviewed    Briefly, 31F with PMH of possible Crohns disease (flex sig on 7/2019 showed crypt abscess, focal cryptitis, crypt abscess however no granuloma) presenting with 6 months history of perirectal abscess with increasing pain for the past week prompting ED presentation. Patient states that she has had foul smelling purulent discharge from abscess/anus (pt unsure). Associated subjective fevers/chills at home, nausea no vomiting. Last bout of bloody diarrhea one week prior and she states she has them off and on with associated generalized abdominal pain. Follows with Dr. Chris Bajwa however has been lost to f/up and has not undergone suggested workup. Currently on prednisone 40mg daily for management of IBD.    Afeb, vss    Perineum, erythema, swelling and tenderness in the right posterior quadrant, purulent anal discharge noted.    WBC 12.3    CT Abdomen and Pelvis...Right ischioanal fossa 3 x 1.9 x 4.9 cm abscess.    IMP, perirectal abscess, Crohn's disease.    Plan, EUA, I&D of perirectal abscess possible seton.
30 yo f with UC and rectal abscess.  Plans as noted above

## 2021-08-13 NOTE — CHART NOTE - NSCHARTNOTEFT_GEN_A_CORE
Post Operative Note  Patient: GREYSON GARZA 31y (1990) Female   MRN: [id number]  Location: [place] [address] A  Visit: 08-12-21 Inpatient  Date: 08-13-21 @ 14:56    Procedure: Crohn disease    Perianal fistula     S/P Incision and drainage of perianal fistula        Subjective:   Nausea: no, Vomiting:  no, Ambulating: yes , Flatus:  no  Pain Assessment: Patient is complaining of mild pain that is appropriate for post-operative course.       Objective:  Vitals: T(F): 97.9 (08-13-21 @ 13:15), Max: 98.2 (08-13-21 @ 07:07)  HR: 72 (08-13-21 @ 13:15)  BP: 91/54 (08-13-21 @ 13:15) (88/59 - 116/69)  RR: 20 (08-13-21 @ 13:15)  SpO2: 98% (08-13-21 @ 13:15)  Vent Settings:     In:   08-13-21 @ 07:01  -  08-13-21 @ 14:56  --------------------------------------------------------  IN: 0 mL      IV Fluids: lactated ringers. 1000 milliLiter(s) (120 mL/Hr) IV Continuous <Continuous>      Out:   08-13-21 @ 07:01  -  08-13-21 @ 14:56  --------------------------------------------------------  OUT: 990 mL      EBL:     Voided Urine:   08-13-21 @ 07:01  -  08-13-21 @ 14:56  --------------------------------------------------------  OUT: 990 mL      Martino Catheter:  no   Drains:   ABIGAIL:    ,   Chest Tube:      NG Tube:       Physical Examination:  General Appearance: NAD, A/O x3  HEENT: EOMI, sclera non-icteric.  Heart: RRR  Lungs: CTABL  Abdomen:  Soft, non-tender, appropriate for post-op course, nondistended. No rigidity, guarding, or rebound tenderness.   MSK/Extremities: Warm & well-perfused. Peripheral pulses intact.  Skin: Warm, dry. No jaundice.   Incisions/Wounds: Dressings in place, clean, dry and intact, no signs of infection/active bleeding/drainage    Medications: [Standing]  ALBUTerol    90 MICROgram(s) HFA Inhaler 2 Puff(s) Inhalation every 6 hours PRN  ciprofloxacin   IVPB 400 milliGRAM(s) IV Intermittent every 12 hours  enoxaparin Injectable 40 milliGRAM(s) SubCutaneous daily  HYDROmorphone  Injectable 0.5 milliGRAM(s) IV Push every 10 minutes PRN  HYDROmorphone  Injectable 1 milliGRAM(s) IV Push every 10 minutes PRN  lactated ringers. 1000 milliLiter(s) IV Continuous <Continuous>  meperidine     Injectable 12.5 milliGRAM(s) IV Push every 10 minutes PRN  metroNIDAZOLE  IVPB 500 milliGRAM(s) IV Intermittent every 8 hours  ondansetron Injectable 4 milliGRAM(s) IV Push once PRN  predniSONE   Tablet 40 milliGRAM(s) Oral daily    Medications: [PRN]  ALBUTerol    90 MICROgram(s) HFA Inhaler 2 Puff(s) Inhalation every 6 hours PRN  ciprofloxacin   IVPB 400 milliGRAM(s) IV Intermittent every 12 hours  enoxaparin Injectable 40 milliGRAM(s) SubCutaneous daily  HYDROmorphone  Injectable 0.5 milliGRAM(s) IV Push every 10 minutes PRN  HYDROmorphone  Injectable 1 milliGRAM(s) IV Push every 10 minutes PRN  lactated ringers. 1000 milliLiter(s) IV Continuous <Continuous>  meperidine     Injectable 12.5 milliGRAM(s) IV Push every 10 minutes PRN  metroNIDAZOLE  IVPB 500 milliGRAM(s) IV Intermittent every 8 hours  ondansetron Injectable 4 milliGRAM(s) IV Push once PRN  predniSONE   Tablet 40 milliGRAM(s) Oral daily      DVT PROPHYLAXIS: enoxaparin Injectable 40 milliGRAM(s) SubCutaneous daily    GI PROPHYLAXIS:   ANTICOAGULATION:   ANTIBIOTICS:  ciprofloxacin   IVPB 400 milliGRAM(s)  metroNIDAZOLE  IVPB 500 milliGRAM(s)        Labs:                        10.4   12.30 )-----------( 286      ( 12 Aug 2021 05:00 )             32.5     08-12    140  |  105  |  28<H>  ----------------------------<  89  4.3   |  26  |  0.7    Ca    8.5      12 Aug 2021 05:00    TPro  5.8<L>  /  Alb  3.5  /  TBili  <0.2  /  DBili  <0.2  /  AST  12  /  ALT  11  /  AlkPhos  49  08-12    PT/INR - ( 12 Aug 2021 05:00 )   PT: 10.50 sec;   INR: 0.91 ratio         PTT - ( 12 Aug 2021 05:00 )  PTT:25.5 sec        Imaging:  No post-op imaging studies    Assessment:  31yF s/p Incision and drainage of a perianal fistula    Plan:  - Monitor vitals  - Monitor post-op labs and replete as necessary  - Monitor for bowel function  - Continue Pain Medications if necessary  - Continue Antibiotics if necessary  - Encourage ambulation as tolerated  - Monitor urine output and trial of void once Martino removed  - DVT and GI Prophylaxis  - Monitor wound and dressing for changes, redress as needed.      Date/Time: 08-13-21 @ 14:56

## 2021-08-13 NOTE — CONSULT NOTE ADULT - SUBJECTIVE AND OBJECTIVE BOX
Gastroenterology Consultation:    Patient is a 31y old  Female who presents with a chief complaint of rectal abscess (13 Aug 2021 08:59)        Admitted on: 08-12-21      HPI:  Patient is a 30yo female with PMHx of Steroid Dependent UC of 5 years and asthma who presents to ED with worsening rectal pain secondary to rectal abscess.  Patient has had the abscess for approximately 6 months and states it has been getting increasingly painful.  For the past two weeks, the abscess has been draining pus and causing pain with defecation and ambulation.  She states the skin surrounds the abscess is also painful due to recurrent wiping of the area and she is experiencing increasing abdominal pain and cramping due to fear of pain with defecation.  Patient has had subjective fevers at home but denies any nausea or vomiting.  Patient denies any other ROS including headache, CP, or SOB.    In the ED, VS significant for  and BP 99/63.  Labs significant for hgb of 10.4 (not currently menstruating) and WBC 12.3.  CT abdomen/pelvis showed right ischioanal fossa 3 x 1.9 x 4.9 cm abscess. Patient admitted to Medicine for further management and is pending OR tomorrow with General Surgery for EUS and placement of seton.   (12 Aug 2021 15:23)    GI Hx: Pt was diagnosed with UC 5 years ago at [facility] but has had poor follow up and has been on prednisone therapy from his pcp. Patient at baseline has 6-7 bms per day non bloody.Pt was recently seen by GI Doctor Garett Orosco who wanted to start her on biologic after endoscopic evaluation. But patient did not follow up. Pt lives with , in laws and kids and has good social support but has been having family issues which made it difficult for her to follow up on her IBD care. Currently denies any N/v/D, fever, chills, abdominal pain, diarrhea or red blood per rectum or weight loss    Prior EGD: none    Prior Colonoscopy: (2019): as per patient confirmed pancolitis consistent with UC      PAST MEDICAL & SURGICAL HISTORY:  Asthma    Crohns disease of small intestine    Hemorrhoids    No significant past surgical history          FAMILY HISTORY:      Social History:  Tobacco: denies  Alcohol: denies  Drugs: denies    Home Medications:  albuterol 90 mcg/inh inhalation aerosol with adapter: inhaled 4 times a day, As Needed (23 Sep 2019 20:55)  predniSONE 20 mg oral tablet: 2 tab(s) orally once a day (12 Aug 2021 15:33)        MEDICATIONS  (STANDING):  ciprofloxacin   IVPB 400 milliGRAM(s) IV Intermittent every 12 hours  enoxaparin Injectable 40 milliGRAM(s) SubCutaneous daily  lactated ringers. 1000 milliLiter(s) (120 mL/Hr) IV Continuous <Continuous>  metroNIDAZOLE  IVPB 500 milliGRAM(s) IV Intermittent every 8 hours  predniSONE   Tablet 40 milliGRAM(s) Oral daily    MEDICATIONS  (PRN):  ALBUTerol    90 MICROgram(s) HFA Inhaler 2 Puff(s) Inhalation every 6 hours PRN Shortness of Breath and/or Wheezing  HYDROmorphone  Injectable 0.5 milliGRAM(s) IV Push every 10 minutes PRN Moderate Pain (4 - 6)  HYDROmorphone  Injectable 1 milliGRAM(s) IV Push every 10 minutes PRN Severe Pain (7 - 10)  meperidine     Injectable 12.5 milliGRAM(s) IV Push every 10 minutes PRN Shivering  ondansetron Injectable 4 milliGRAM(s) IV Push once PRN Nausea and/or Vomiting      Allergies  penicillin (Rash)      Review of Systems:   Constitutional:  No Fever, No Chills  ENT/Mouth:  No Hearing Changes,  No Difficulty Swallowing  Eyes:  No Eye Pain, No Vision Changes  Cardiovascular:  No Chest Pain, No Palpitations  Respiratory:  No Cough, No Dyspnea  Gastrointestinal:  As described in HPI  Musculoskeletal:  No Joint Swelling, No Back Pain  Skin:  No Skin Lesions, No Jaundice  Neuro:  No Syncope, No Dizziness  Heme/Lymph:  No Bruising, No Bleeding.          Physical Examination:  T(C): 36.7 (08-13-21 @ 10:00), Max: 36.8 (08-13-21 @ 07:07)  HR: 60 (08-13-21 @ 11:00) (58 - 98)  BP: 103/56 (08-13-21 @ 11:00) (88/59 - 116/69)  RR: 20 (08-13-21 @ 11:00) (13 - 21)  SpO2: 97% (08-13-21 @ 11:00) (97% - 100%)  Height (cm): 157.5 (08-12-21 @ 22:40)  Weight (kg): 42.6 (08-12-21 @ 22:40)    08-13-21 @ 07:01  -  08-13-21 @ 12:08  --------------------------------------------------------  IN: 0 mL / OUT: 690 mL / NET: -690 mL          GENERAL:  Appears stated age, well-groomed, well-nourished, no distress  HEENT:  NC/AT,  conjunctivae clear and pink, no thyromegaly, nodules, adenopathy, sclera -anicteric  CHEST:  Full & symmetric excursion, no increased effort, breath sounds clear  HEART:  Regular rhythm, S1, S2, no murmur/rub/S3/S4, no abdominal bruit, no edema  ABDOMEN:  Soft, non-tender, non-distended, normoactive bowel sounds,  no masses ,no hepato-splenomegaly, no signs of chronic liver disease  EXTEREMITIES:  no cyanosis,clubbing or edema  SKIN:  No rash/erythema/ecchymoses/petechiae/wounds/abscess/warm/dry  NEURO:  Alert, oriented, no asterixis, no tremor, no encephalopathy          Data:                        10.4   12.30 )-----------( 286      ( 12 Aug 2021 05:00 )             32.5     Hgb Trend:  10.4  08-12-21 @ 05:00        08-12    140  |  105  |  28<H>  ----------------------------<  89  4.3   |  26  |  0.7    Ca    8.5      12 Aug 2021 05:00    TPro  5.8<L>  /  Alb  3.5  /  TBili  <0.2  /  DBili  <0.2  /  AST  12  /  ALT  11  /  AlkPhos  49  08-12    Liver panel trend:  TBili <0.2   /   AST 12   /   ALT 11   /   AlkP 49   /   Tptn 5.8   /   Alb 3.5    /   DBili <0.2      08-12      PT/INR - ( 12 Aug 2021 05:00 )   PT: 10.50 sec;   INR: 0.91 ratio         PTT - ( 12 Aug 2021 05:00 )  PTT:25.5 sec        Radiology:      
GENERAL SURGERY CONSULT NOTE    Patient: GREYSON GARZA , 31y ([birth year])Female   MRN: [id number]  [facility]: [facility] ED  Visit: 08-12-21 Emergency  Date: 08-12-21 @ 12:20    HPI: 31F with PMH of possible Crohns disease (flex sig on 7/2019 showed crypt abscess, focal cryptitis, crypt abscess however no granuloma) presenting with 6 months history of perirectal abscess with increasing pain for the past week prompting ED presentation. Patient states that she has had foul smelling purulent discharge from abscess/anus (pt unsure). Associated subjective fevers/chills at home, nausea no vomiting. Last bout of bloody diarrhea one week prior and she states she has them off and on with associated generalized abdominal pain. Follows with Dr. Chris Bajwa however has been lost to f/up and has not undergone suggested workup. Currently on prednisone 40mg daily for management of IBD.    PAST MEDICAL & SURGICAL HISTORY:  Ulcerative colitis  Asthma  Crohns disease of small intestine  Hemorrhoids  No significant past surgical history    Home Medications:  albuterol 90 mcg/inh inhalation aerosol with adapter: inhaled 4 times a day, As Needed (23 Sep 2019 20:55)  aspirin 81 mg oral tablet, chewable: 1 tab(s) orally once a day (23 Sep 2019 20:55)    VITALS:  T(F): 97.5 (08-12-21 @ 08:26), Max: 98.9 (08-12-21 @ 03:11)  HR: 83 (08-12-21 @ 08:26) (83 - 101)  BP: 88/53 (08-12-21 @ 08:26) (88/53 - 99/63)  RR: 18 (08-12-21 @ 08:26) (17 - 18)  SpO2: 98% (08-12-21 @ 08:26) (98% - 98%)    PHYSICAL EXAM:  General: NAD, AAOx3, calm and cooperative  HEENT: NCAT, EMILIANO, EOMI, Trachea ML, Neck supple  Respiratory: CTAB, normal respiratory effort, breath sounds equal BL, no wheeze, rhonchi or crackles  Abdomen: Soft, non-distended, non-tender, no rebound, no guarding. +BS.  Rectal: Good tone, (+)external hemorrhoids, 5cm area of induration/fluctuance R perianal region, (+) purulent discharge from anus, exquisite tenderness on KHOA, purulence on KHOA, no blood    MEDICATIONS  (STANDING):    MEDICATIONS  (PRN):    LAB/STUDIES:                        10.4   12.30 )-----------( 286      ( 12 Aug 2021 05:00 )             32.5     08-12    140  |  105  |  28<H>  ----------------------------<  89  4.3   |  26  |  0.7    Ca    8.5      12 Aug 2021 05:00    TPro  5.8<L>  /  Alb  3.5  /  TBili  <0.2  /  DBili  <0.2  /  AST  12  /  ALT  11  /  AlkPhos  49  08-12  PT/INR - ( 12 Aug 2021 05:00 )   PT: 10.50 sec;   INR: 0.91 ratio    PTT - ( 12 Aug 2021 05:00 )  PTT:25.5 sec    LIVER FUNCTIONS - ( 12 Aug 2021 05:00 )  Alb: 3.5 g/dL / Pro: 5.8 g/dL / ALK PHOS: 49 U/L / ALT: 11 U/L / AST: 12 U/L / GGT: x           IMAGING:  < from: CT Abdomen and Pelvis w/ Oral Cont and w/ IV Cont (08.12.21 @ 07:56) >  IMPRESSION:  Right ischioanal fossa 3 x 1.9 x 4.9 cm abscess. For further evaluation of a possible perianal fistula, an outpatient contrast enhanced pelvic MRI can be obtained.    < end of copied text >      ACCESS DEVICES:  [x ] Peripheral IV

## 2021-08-13 NOTE — CONSULT NOTE ADULT - ASSESSMENT
30yo female with PMHx of Steroid Dependent UC of 5 years and asthma who presents to ED with worsening rectal pain secondary to rectal abscess. Pt was diagnosed with UC 5 years ago at [facility] but has had poor follow up and has been on prednisone therapy from his pcp. Patient at baseline has 6-7 bms per day non bloody.Pt was recently seen by GI Doctor Garett Orosco who wanted to start her on biologic after endoscopic evaluation. But patient did not follow up. Pt lives with , in laws and kids and has good social support but has been having family issues which made it difficult for her to follow up on her IBD care. Currently denies any N/v/D, fever, chills, abdominal pain, diarrhea or red blood per rectum or weight loss. GI was consulted for IBD management    #Chronically steroid dependent Ulcerative Colitis   - Stool Frequency - 6-7 at baseline  - Baseline Hb - 11  - Hb on admission - 10   - IBD therapy workup: hepatitis panel negative. TB negative      Rec  - Please Start DVT PPX w/ either Lovenox or Heparin SQ - IBD patients are at high risk of VTE  - Please send TPMT (thiopruine methyl transferase) enzyme activity level  - Please avoid NSAIDs, Anticholingerics and opioids  - may consider       ###History  - Last Flare - how often  - Last use of Steroids  - Recent Travel  - Recent use of antibiotoics  - Last use of biologic or NSAID  - Last colonoscopy. Terminal ileum reached?   32yo female with PMHx of Steroid Dependent UC of 5 years and asthma who presents to ED with worsening rectal pain secondary to rectal abscess. Pt was diagnosed with UC 5 years ago at [facility] but has had poor follow up and has been on prednisone therapy from his pcp. Patient at baseline has 6-7 bms per day non bloody.Pt was recently seen by GI Doctor Garett Orosco who wanted to start her on biologic after endoscopic evaluation. But patient did not follow up. Pt lives with , in laws and kids and has good social support but has been having family issues which made it difficult for her to follow up on her IBD care. Currently denies any N/v/D, fever, chills, abdominal pain, diarrhea or red blood per rectum or weight loss. GI was consulted for IBD management    #Chronically steroid dependent Ulcerative Colitis   - Stool Frequency - 6-7 at baseline  - Baseline Hb - 11  - Hb on admission - 10   - IBD therapy workup: hepatitis panel negative. TB negative    Rec  - please d/c pt tapered prednisone therapy from 40mg Qdaily to 30mg Qdaily for 1 week -> 20 mgQdaily for 1 week -> 10mg QDaily 1 week and stop  - Please obtain ESR, CRP, Fecal calprotectin and fecal lactoferrin  - Please obtain GI PCR and C. Diff -> if positive please treat w/ abx  - c/w Cipro and Flagyl for 2 weeks for rectal abscess - f/u cultures  - Please Start DVT PPX w/ either Lovenox or Heparin SQ - IBD patients are at high risk of VTE  - Please avoid NSAIDs, Anticholingerics and opioids  - Follow up with our [facility] located at [address] [place] [address]. Phone Number: [phone]  - in 2-3 weeks  - recall prn   32yo female with PMHx of Steroid Dependent UC of 5 years and asthma who presents to ED with worsening rectal pain secondary to rectal abscess. Pt was diagnosed with UC 5 years ago at [facility] but has had poor follow up and has been on prednisone therapy from his pcp. Patient at baseline has 6-7 bms per day non bloody.Pt was recently seen by GI Doctor Garett Orosco who wanted to start her on biologic after endoscopic evaluation. But patient did not follow up. Pt lives with , in laws and kids and has good social support but has been having family issues which made it difficult for her to follow up on her IBD care. Currently denies any N/v/D, fever, chills, abdominal pain, diarrhea or red blood per rectum or weight loss. GI was consulted for IBD management    #Chronically steroid dependent Ulcerative Colitis   - Stool Frequency - 6-7 at baseline  - Baseline Hb - 11  - Hb on admission - 10   - IBD therapy workup: hepatitis panel negative. TB negative    Rec  - please d/c pt tapered prednisone therapy from 40mg Qdaily to 30mg Qdaily for 1 week -> 20 mgQdaily for 1 week -> 10mg QDaily 1 week and stop  - Please obtain ESR, CRP, Fecal calprotectin and fecal lactoferrin  - Please obtain GI PCR and C. Diff -> if positive please treat w/ abx  - c/w Cipro and Flagyl for 2 weeks for rectal abscess - f/u cultures  - Please Start DVT PPX w/ either Lovenox or Heparin SQ - IBD patients are at high risk of VTE  - Please avoid NSAIDs, Anticholingerics and opioids  - Pt will need Colonoscopic evaluation as outpatient in 2-3 weeks  - Follow up with our [facility] located at [address] [place] [address]. Phone Number: [phone]  - in 2-3 weeks  - recall prn

## 2021-08-13 NOTE — CHART NOTE - NSCHARTNOTEFT_GEN_A_CORE
PACU ANESTHESIA ADMISSION NOTE      Procedure:   Post op diagnosis:      ____  Intubated  TV:______       Rate: ______      FiO2: ______    ___x_  Patent Airway    ____ x  Full return of protective reflexes    ___x _  Full recovery from anesthesia / back to baseline status    Vitals:  temp(F) 97  /55  spo2 98  RR 18  pulse 87    Mental Status:  _x___ Awake   _____ Alert   _____ Drowsy   _____ Sedated    Nausea/Vomiting:  __x __ NO  ______Yes,   See Post - Op Orders          Pain Scale (0-10):  _____    Treatment: ____ None    _x ___ See Post - Op/PCA Orders    Post - Operative Fluids:   ____ Oral   __x __ See Post - Op Orders    Plan: Discharge:   ____Home     x   _____Floor     _____Critical Care    _____  Other:_________________    Comments: uneventful anesthesia course no complications. Vitals  stable. Pt transferred to PACU

## 2021-08-13 NOTE — BRIEF OPERATIVE NOTE - OPERATION/FINDINGS
incision and drainage of perianal abscess copious purulent material 50cc. Packed with iodoform 1/2 packing.

## 2021-08-13 NOTE — BRIEF OPERATIVE NOTE - NSICDXBRIEFPOSTOP_GEN_ALL_CORE_FT
POST-OP DIAGNOSIS:  Crohn disease 13-Aug-2021 08:35:48  Rehan Krishna  Perianal fistula 13-Aug-2021 08:36:02  Rehan Krishna

## 2021-08-14 ENCOUNTER — TRANSCRIPTION ENCOUNTER (OUTPATIENT)
Age: 31
End: 2021-08-14

## 2021-08-14 VITALS
RESPIRATION RATE: 18 BRPM | DIASTOLIC BLOOD PRESSURE: 57 MMHG | TEMPERATURE: 97 F | SYSTOLIC BLOOD PRESSURE: 92 MMHG | HEART RATE: 56 BPM

## 2021-08-14 LAB
ALBUMIN SERPL ELPH-MCNC: 3.3 G/DL — LOW (ref 3.5–5.2)
ALP SERPL-CCNC: 51 U/L — SIGNIFICANT CHANGE UP (ref 30–115)
ALT FLD-CCNC: 9 U/L — SIGNIFICANT CHANGE UP (ref 0–41)
ANION GAP SERPL CALC-SCNC: 12 MMOL/L — SIGNIFICANT CHANGE UP (ref 7–14)
AST SERPL-CCNC: 12 U/L — SIGNIFICANT CHANGE UP (ref 0–41)
BASOPHILS # BLD AUTO: 0.01 K/UL — SIGNIFICANT CHANGE UP (ref 0–0.2)
BASOPHILS NFR BLD AUTO: 0.1 % — SIGNIFICANT CHANGE UP (ref 0–1)
BILIRUB SERPL-MCNC: 0.3 MG/DL — SIGNIFICANT CHANGE UP (ref 0.2–1.2)
BILIRUB SERPL-MCNC: 0.3 MG/DL — SIGNIFICANT CHANGE UP (ref 0.2–1.2)
BUN SERPL-MCNC: 13 MG/DL — SIGNIFICANT CHANGE UP (ref 10–20)
CALCIUM SERPL-MCNC: 8.8 MG/DL — SIGNIFICANT CHANGE UP (ref 8.5–10.1)
CHLORIDE SERPL-SCNC: 104 MMOL/L — SIGNIFICANT CHANGE UP (ref 98–110)
CO2 SERPL-SCNC: 23 MMOL/L — SIGNIFICANT CHANGE UP (ref 17–32)
CREAT SERPL-MCNC: 0.5 MG/DL — LOW (ref 0.7–1.5)
CREAT SERPL-MCNC: 0.6 MG/DL — LOW (ref 0.7–1.5)
EOSINOPHIL # BLD AUTO: 0 K/UL — SIGNIFICANT CHANGE UP (ref 0–0.7)
EOSINOPHIL NFR BLD AUTO: 0 % — SIGNIFICANT CHANGE UP (ref 0–8)
GLUCOSE SERPL-MCNC: 114 MG/DL — HIGH (ref 70–99)
HCT VFR BLD CALC: 33.4 % — LOW (ref 37–47)
HGB BLD-MCNC: 10.6 G/DL — LOW (ref 12–16)
IMM GRANULOCYTES NFR BLD AUTO: 0.5 % — HIGH (ref 0.1–0.3)
INR BLD: 1.09 RATIO — SIGNIFICANT CHANGE UP (ref 0.65–1.3)
LYMPHOCYTES # BLD AUTO: 0.65 K/UL — LOW (ref 1.2–3.4)
LYMPHOCYTES # BLD AUTO: 6.1 % — LOW (ref 20.5–51.1)
MCHC RBC-ENTMCNC: 25.2 PG — LOW (ref 27–31)
MCHC RBC-ENTMCNC: 31.7 G/DL — LOW (ref 32–37)
MCV RBC AUTO: 79.3 FL — LOW (ref 81–99)
MELD SCORE WITH DIALYSIS: 22 POINTS — SIGNIFICANT CHANGE UP
MELD SCORE WITHOUT DIALYSIS: 7 POINTS — SIGNIFICANT CHANGE UP
MONOCYTES # BLD AUTO: 0.34 K/UL — SIGNIFICANT CHANGE UP (ref 0.1–0.6)
MONOCYTES NFR BLD AUTO: 3.2 % — SIGNIFICANT CHANGE UP (ref 1.7–9.3)
NEUTROPHILS # BLD AUTO: 9.59 K/UL — HIGH (ref 1.4–6.5)
NEUTROPHILS NFR BLD AUTO: 90.1 % — HIGH (ref 42.2–75.2)
NRBC # BLD: 0 /100 WBCS — SIGNIFICANT CHANGE UP (ref 0–0)
PLATELET # BLD AUTO: 281 K/UL — SIGNIFICANT CHANGE UP (ref 130–400)
POTASSIUM SERPL-MCNC: 4.4 MMOL/L — SIGNIFICANT CHANGE UP (ref 3.5–5)
POTASSIUM SERPL-SCNC: 4.4 MMOL/L — SIGNIFICANT CHANGE UP (ref 3.5–5)
PROT SERPL-MCNC: 5.7 G/DL — LOW (ref 6–8)
PROTHROM AB SERPL-ACNC: 12.5 SEC — SIGNIFICANT CHANGE UP (ref 9.95–12.87)
RBC # BLD: 4.21 M/UL — SIGNIFICANT CHANGE UP (ref 4.2–5.4)
RBC # FLD: 16 % — HIGH (ref 11.5–14.5)
SODIUM SERPL-SCNC: 135 MMOL/L — SIGNIFICANT CHANGE UP (ref 135–146)
SODIUM SERPL-SCNC: 139 MMOL/L — SIGNIFICANT CHANGE UP (ref 135–146)
WBC # BLD: 10.64 K/UL — SIGNIFICANT CHANGE UP (ref 4.8–10.8)
WBC # FLD AUTO: 10.64 K/UL — SIGNIFICANT CHANGE UP (ref 4.8–10.8)

## 2021-08-14 PROCEDURE — 99238 HOSP IP/OBS DSCHRG MGMT 30/<: CPT

## 2021-08-14 PROCEDURE — 99024 POSTOP FOLLOW-UP VISIT: CPT

## 2021-08-14 RX ORDER — HYDROMORPHONE HYDROCHLORIDE 2 MG/ML
0.5 INJECTION INTRAMUSCULAR; INTRAVENOUS; SUBCUTANEOUS ONCE
Refills: 0 | Status: DISCONTINUED | OUTPATIENT
Start: 2021-08-14 | End: 2021-08-14

## 2021-08-14 RX ORDER — CEFPODOXIME PROXETIL 100 MG
1 TABLET ORAL
Qty: 18 | Refills: 0
Start: 2021-08-14 | End: 2021-08-22

## 2021-08-14 RX ORDER — METRONIDAZOLE 500 MG
1 TABLET ORAL
Qty: 27 | Refills: 0
Start: 2021-08-14 | End: 2021-08-22

## 2021-08-14 RX ADMIN — MORPHINE SULFATE 2 MILLIGRAM(S): 50 CAPSULE, EXTENDED RELEASE ORAL at 00:00

## 2021-08-14 RX ADMIN — CEFEPIME 100 MILLIGRAM(S): 1 INJECTION, POWDER, FOR SOLUTION INTRAMUSCULAR; INTRAVENOUS at 06:14

## 2021-08-14 RX ADMIN — MORPHINE SULFATE 2 MILLIGRAM(S): 50 CAPSULE, EXTENDED RELEASE ORAL at 07:31

## 2021-08-14 RX ADMIN — Medication 100 MILLIGRAM(S): at 06:15

## 2021-08-14 RX ADMIN — HYDROMORPHONE HYDROCHLORIDE 0.5 MILLIGRAM(S): 2 INJECTION INTRAMUSCULAR; INTRAVENOUS; SUBCUTANEOUS at 01:53

## 2021-08-14 RX ADMIN — Medication 40 MILLIGRAM(S): at 06:15

## 2021-08-14 NOTE — DISCHARGE NOTE PROVIDER - CARE PROVIDER_API CALL
Lawrence Jimenez  Gastroenterology  [address] [place] [address]  Phone: [phone]  Fax: [phone]  Follow Up Time:     Shalom Fitzgerald)  ColonRectal Surgery  [address] [place] [address], [address] [place] [address]  Phone: [phone]  Fax: [phone]  Follow Up Time:

## 2021-08-14 NOTE — DISCHARGE NOTE PROVIDER - NSDCMRMEDTOKEN_GEN_ALL_CORE_FT
albuterol 90 mcg/inh inhalation aerosol with adapter: inhaled 4 times a day, As Needed  cefpodoxime 200 mg oral tablet: 1 tab(s) orally 2 times a day   ketorolac 10 mg oral tablet: 1 tab(s) orally 4 times a day, As Needed for abdominal pain  metroNIDAZOLE 500 mg oral tablet: 1 tab(s) orally 3 times a day   oxycodone-acetaminophen 5 mg-325 mg oral tablet: 1 tab(s) orally every 4 hours, As Needed for pain. MDD:6 tabs per day   predniSONE 10 mg oral tablet: take 40mg once a day for another 5 days  then take 30 mg once a day for 7 days   then take 20 mg once a day for 7 days  then take 10 mg once a day for 7 days  then stop  predniSONE 10 mg oral tablet: take 40mg once a day for another 5 days  then take 30 mg once a day for 7 days   then take 20 mg once a day for 7 days  then take 10 mg once a day for 7 days  then stop  predniSONE 20 mg oral tablet: take 40mg once a day for another 5 days  then take 30 mg once a day for 7 days   then take 20 mg once a day for 7 days  then take 10 mg once a day for 7 days  then stop  predniSONE 20 mg oral tablet: take 40mg once a day for another 5 days  then take 30 mg once a day for 7 days   then take 20 mg once a day for 7 days  then take 10 mg once a day for 7 days  then stop

## 2021-08-14 NOTE — DISCHARGE NOTE NURSING/CASE MANAGEMENT/SOCIAL WORK - NSDCFUADDAPPT_GEN_ALL_CORE_FT
Please follow up with GI doctor in [facility] clinic located at [address] [place] [address]. Phone Number: [phone] - in 2-3 weeks    Please follow up with colorectal surgeon Dr. Fitzgerald in 2 weeks for your rectal abscess

## 2021-08-14 NOTE — PROGRESS NOTE ADULT - ATTENDING COMMENTS
Pt. seen and examined.  She was sitting up in bed comfortably eating breakfast. Pain much improved since I&D of perirectal abscess    AVSS  A&OX3, NAD  Perianal region examined, packing in place, no fluctuance or erythema or drainage    32 y/o female with Crohn's disease s/p I&D pf perirectal abscess POD#1  Recovering well  Packing to remain and will come out on its own  Pt to be discharged home today on po Abx  F/U with Dr. Fitzgerald as outpatient

## 2021-08-14 NOTE — DISCHARGE NOTE PROVIDER - NSDCCPCAREPLAN_GEN_ALL_CORE_FT
PRINCIPAL DISCHARGE DIAGNOSIS  Diagnosis: Rectal abscess  Assessment and Plan of Treatment: Abscess  An abscess is an infected area that contains a collection of pus and debris. It can occur in almost any part of the body and occurs when the tissue gets infection. Symptoms include a painful mass that is red, warm, tender that might break open and HAVE drainage. If your health care provider gave you antibiotics make sure to take the full course and do not stop even if feeling better.   SEEK IMMEDIATE MEDICAL CARE IF YOU HAVE ANY OF THE FOLLOWING SYMPTOMS: chills, fever, muscle aches, or red streaking from the area.        SECONDARY DISCHARGE DIAGNOSES  Diagnosis: Ulcerative colitis  Assessment and Plan of Treatment: Ulcerative Colitis is inflammation of your colon, also known as your large intestine, this is an autoimmune condition. If you have a flare up, you’ll feel discomfort and pain in your abdomen that may be mild and reoccurring over a long period of time, or severe and appearing suddenly.  There are different types of colitis, and treatment varies depending on what type you have.  See your doctor if you experience a persistent change in your bowel habits or if you have signs and symptoms such as:  - Abdominal pain  - Blood in your stool  - Ongoing diarrhea that doesn't respond to over-the-counter - medications  - Diarrhea that awakens you from sleep  - An unexplained fever lasting more than a day or two

## 2021-08-14 NOTE — PROGRESS NOTE ADULT - ASSESSMENT
ASSESSMENT:  31y F w/ PMHx of Crohn's disease POD#1 s/p i&d of perirectal abscess in the OR    PLAN:  - packing to be removed in 2 days or on Sunday if the pt is in the hospital   - discharge on 10 days of cipro and flagyl -- pt apparently refusing cipro  - GI Follow-up for management of Crohn's disease  - Pt cleared to discharge from a surgical standpoint    Lines/Tubes: PIV    BLUE TEAM SPECTRA: [id number]  
ASSESSMENT:  31y F w/ PMHx of Crohn's disease    PLAN:  - packing to be removed in 2 days or on Sunday if the pt is in the hospital   - discharge on 10 days of cipro and flagyl  - GI Follow-up for management of Crohn's disease  - Pt cleared to discharge from a surgical standpoint      Lines/Tubes: PIV,     BLUE TEAM SPECTRA: [id number]

## 2021-08-14 NOTE — DISCHARGE NOTE NURSING/CASE MANAGEMENT/SOCIAL WORK - PATIENT PORTAL LINK FT
You can access the FollowMyHealth Patient Portal offered by [facility] by registering at the following website: [email or web address]. By joining [employer]’s FollowMyHealth portal, you will also be able to view your health information using other applications (apps) compatible with our system.

## 2021-08-14 NOTE — PROGRESS NOTE ADULT - SUBJECTIVE AND OBJECTIVE BOX
GENERAL SURGERY PROGRESS NOTE    Patient: GREYSON GARZA , 31y ([birth year])Female   MRN: [id number]  Location: [facility] ER Hold 006 A  Visit: 08-12-21 Inpatient    Hospital Day #:3  Post-Op Day #: 1    Procedure/Dx/Injuries: s/p Incision and drainage of Perirectal fistula    Events of past 24 hours:  Pt had a I&D of her Perirectal fistula 50cc of purulent material expressed. Packed with Iodoform gauze can be removed in 2 days and covered with gauze no addition packing needed. doing well post op. refused ciprofloxacin but will take flagyl    PAST MEDICAL & SURGICAL HISTORY:  Asthma    Crohns disease of small intestine    Hemorrhoids    No significant past surgical history      PHYSICAL EXAM:  General: NAD, AAOx3, calm and cooperative  HEENT: NCAT, EMILIANO, EOMI, Trachea ML, Neck supple  Cardiac: RRR   Respiratory: CTAB, normal respiratory effort,   Abdomen: Soft, non-distended, non-tender, no rebound, no guarding. +BS.  Rectal: Good tone, +stool, posterior perirectal fistula draining blood and pus.   Skin: Warm/dry, normal color, no jaundice  Incision/wound: healing well, dressings in place, clean, dry and intact    Vitals:   T(F): 97 (08-13-21 @ 22:14), Max: 98.2 (08-13-21 @ 07:07)  HR: 73 (08-13-21 @ 22:14)  BP: 115/73 (08-13-21 @ 22:14)  RR: 19 (08-13-21 @ 22:14)  SpO2: 99% (08-13-21 @ 20:46)      Diet, Regular      Bowel Movement: : [x] YES [] NO  Flatus: : [x] YES [] NO      MEDICATIONS  (STANDING):  cefepime   IVPB      cefepime   IVPB 1000 milliGRAM(s) IV Intermittent every 8 hours  ciprofloxacin   IVPB 400 milliGRAM(s) IV Intermittent every 12 hours  enoxaparin Injectable 40 milliGRAM(s) SubCutaneous daily  metroNIDAZOLE  IVPB 500 milliGRAM(s) IV Intermittent every 8 hours  predniSONE   Tablet 40 milliGRAM(s) Oral daily    MEDICATIONS  (PRN):  acetaminophen   Tablet .. 650 milliGRAM(s) Oral every 6 hours PRN Temp greater or equal to 38C (100.4F), Mild Pain (1 - 3), Moderate Pain (4 - 6)  ALBUTerol    90 MICROgram(s) HFA Inhaler 2 Puff(s) Inhalation every 6 hours PRN Shortness of Breath and/or Wheezing  morphine  - Injectable 2 milliGRAM(s) IV Push every 6 hours PRN Severe Pain (7 - 10)      DVT PROPHYLAXIS: enoxaparin Injectable 40 milliGRAM(s) SubCutaneous daily  GI PROPHYLAXIS:   ANTICOAGULATION:   ANTIBIOTICS:  cefepime   IVPB    cefepime   IVPB 1000 milliGRAM(s)  ciprofloxacin   IVPB 400 milliGRAM(s)  metroNIDAZOLE  IVPB 500 milliGRAM(s)        Isolation Precautions:     Isolation Type: CONTACT;  Indication for Isolation: Clostridium difficile    Additional Instructions:  Contact Isolation (08-13-21 @ 14:56)      LAB/STUDIES:                        10.4   12.30 )-----------( 286      ( 12 Aug 2021 05:00 )             32.5         08-12    140  |  105  |  28<H>  ----------------------------<  89  4.3   |  26  |  0.7        LFTs:          5.8  | <0.2 | 12       ------------------[49      ( 12 Aug 2021 05:00 )  3.5  | <0.2 | 11          Lipase:42        Lactate, Blood: 0.7 mmol/L (08-12-21 @ 05:00)      Coags:    10.50  ----< 0.91    ( 12 Aug 2021 05:00 )     25.5          Culture - Blood (collected 12 Aug 2021 05:00)  Source: .Blood Blood  Preliminary Report (13 Aug 2021 14:02):    No growth to date.    Culture - Blood (collected 12 Aug 2021 05:00)  Source: .Blood Blood  Preliminary Report (13 Aug 2021 14:02):    No growth to date.      < from: CT Abdomen and Pelvis w/ Oral Cont and w/ IV Cont (08.12.21 @ 07:56) >  IMPRESSION:  Right ischioanal fossa 3 x 1.9 x 4.9 cm abscess. For further evaluation of a possible perianal fistula, an outpatient contrast enhanced pelvic MRI can be obtained.    < end of copied text >      ACCESS/ DEVICES:  [x] Peripheral IV  [ ] Central Venous Line	[ ] R	[ ] L	[ ] IJ	[ ] Fem	[ ] SC	Placed:   [ ] Arterial Line		[ ] R	[ ] L	[ ] Fem	[ ] Rad	[ ] Ax	Placed:   [ ] PICC:					[ ] Mediport  [ ] Urinary Catheter,  Date Placed:   [ ] Chest tube: [ ] Right, [ ] Left  [ ] ABIGAIL/Vito Drains      BLUE TEAM SPECTRA #[id number]

## 2021-08-14 NOTE — DISCHARGE NOTE PROVIDER - NSDCFUADDAPPT_GEN_ALL_CORE_FT
Please follow up with GI doctor in [facility] clinic located at [address] [place] [address]. Phone Number: [phone] - in 2-3 weeks    Please follow up with colorectal surgeon Dr. Fitzgerald in 2 weeks for your rectal abscess

## 2021-08-14 NOTE — DISCHARGE NOTE PROVIDER - CARE PROVIDERS DIRECT ADDRESSES
[email or web address]

## 2021-08-14 NOTE — DISCHARGE NOTE PROVIDER - HOSPITAL COURSE
32yo female with PMHx of Crohn's disease and asthma who presents to ED with worsening rectal pain secondary to rectal abscess.     CT abdomen/pelvis showed right ischioanal fossa 3 x 1.9 x 4.9 cm abscess  Colorectal surg performed an Incision and drainage of Perirectal fistula and cultures were sent pending read.   Per surg packing will come out on its on.   GI was following for UC and recommended prednisone taper and follow up out pt for further management    Pt is being sent home on vantin and flagyl for a total of ten days and prednisone taper.     32yo female with PMHx of Crohn's disease and asthma who presents to ED with worsening rectal pain secondary to rectal abscess.     CT abdomen/pelvis showed right ischioanal fossa 3 x 1.9 x 4.9 cm abscess  Colorectal surg performed an Incision and drainage of Perirectal fistula and cultures were sent pending read.   Per surg packing will come out on its on.   GI was following for UC and recommended prednisone taper and follow up out pt for further management    Pt is being sent home on vantin and flagyl for a total of ten days and prednisone taper.      Attending addendum: Patient seen and examined. Patient is stable for discharge. Med rec reviewed.

## 2021-08-17 ENCOUNTER — APPOINTMENT (OUTPATIENT)
Dept: SURGERY | Facility: CLINIC | Age: 31
End: 2021-08-17
Payer: MEDICAID

## 2021-08-17 VITALS
HEIGHT: 61 IN | SYSTOLIC BLOOD PRESSURE: 88 MMHG | WEIGHT: 94 LBS | TEMPERATURE: 97 F | BODY MASS INDEX: 17.75 KG/M2 | HEART RATE: 90 BPM | DIASTOLIC BLOOD PRESSURE: 54 MMHG

## 2021-08-17 LAB
CULTURE RESULTS: SIGNIFICANT CHANGE UP
CULTURE RESULTS: SIGNIFICANT CHANGE UP
SPECIMEN SOURCE: SIGNIFICANT CHANGE UP
SPECIMEN SOURCE: SIGNIFICANT CHANGE UP

## 2021-08-17 PROCEDURE — 99024 POSTOP FOLLOW-UP VISIT: CPT

## 2021-08-19 DIAGNOSIS — Z79.52 LONG TERM (CURRENT) USE OF SYSTEMIC STEROIDS: ICD-10-CM

## 2021-08-19 DIAGNOSIS — D50.9 IRON DEFICIENCY ANEMIA, UNSPECIFIED: ICD-10-CM

## 2021-08-19 DIAGNOSIS — K61.1 RECTAL ABSCESS: ICD-10-CM

## 2021-08-19 DIAGNOSIS — Z79.82 LONG TERM (CURRENT) USE OF ASPIRIN: ICD-10-CM

## 2021-08-19 DIAGNOSIS — Z88.0 ALLERGY STATUS TO PENICILLIN: ICD-10-CM

## 2021-08-19 DIAGNOSIS — K61.0 ANAL ABSCESS: ICD-10-CM

## 2021-08-19 DIAGNOSIS — K50.90 CROHN'S DISEASE, UNSPECIFIED, WITHOUT COMPLICATIONS: ICD-10-CM

## 2021-08-31 ENCOUNTER — APPOINTMENT (OUTPATIENT)
Dept: SURGERY | Facility: CLINIC | Age: 31
End: 2021-08-31
Payer: MEDICAID

## 2021-08-31 VITALS
TEMPERATURE: 97.3 F | HEIGHT: 61 IN | BODY MASS INDEX: 17.56 KG/M2 | DIASTOLIC BLOOD PRESSURE: 62 MMHG | OXYGEN SATURATION: 96 % | WEIGHT: 93 LBS | HEART RATE: 93 BPM | SYSTOLIC BLOOD PRESSURE: 110 MMHG

## 2021-08-31 PROCEDURE — 99024 POSTOP FOLLOW-UP VISIT: CPT

## 2021-08-31 NOTE — HISTORY OF PRESENT ILLNESS
[FreeTextEntry1] : This is a post op Visit for Ms Godwin who is approximately 4 weeks s/p surgery?

## 2021-08-31 NOTE — PHYSICAL EXAM
[FreeTextEntry1] : the operative site is healing well, there is no erythema induration or bleeding there is continues to be some mild minor drainage.  There is a residual track that can barely fit the head of a cotton tip applicator.

## 2021-09-09 ENCOUNTER — APPOINTMENT (OUTPATIENT)
Dept: SURGERY | Facility: CLINIC | Age: 31
End: 2021-09-09
Payer: MEDICAID

## 2021-09-09 VITALS
HEIGHT: 61 IN | WEIGHT: 94 LBS | SYSTOLIC BLOOD PRESSURE: 99 MMHG | TEMPERATURE: 97.5 F | HEART RATE: 124 BPM | DIASTOLIC BLOOD PRESSURE: 49 MMHG | BODY MASS INDEX: 17.75 KG/M2

## 2021-09-09 PROCEDURE — 99024 POSTOP FOLLOW-UP VISIT: CPT

## 2021-09-09 RX ORDER — CIPROFLOXACIN HYDROCHLORIDE 500 MG/1
500 TABLET, FILM COATED ORAL
Qty: 20 | Refills: 0 | Status: ACTIVE | COMMUNITY
Start: 2021-09-09 | End: 1900-01-01

## 2021-09-09 NOTE — PHYSICAL EXAM
[FreeTextEntry1] : the operative site is healing well, there is no erythema induration or bleeding there is continues to be some  drainage.  There is a residual track that can barely fit the head of a cotton tip applicator.

## 2021-09-09 NOTE — ASSESSMENT
[FreeTextEntry1] :  Ms. Godwin has increased drainage and feels like she has another abscess.  She has increasing discomfort.  I will get her started back on oral antibiotics and send her for a follow-up CT scan of the pelvis to see if there is undrained collection..  She will return to see me in when the scan is completed.

## 2021-09-09 NOTE — HISTORY OF PRESENT ILLNESS
[FreeTextEntry1] : This is a post op Visit for Ms Godwin who is approximately 6 weeks s/p surgery?  Today she reports that she has increasing discomfort in the area and feels like she has a a new abscess.  She does note that there is increasing drainage.

## 2021-09-10 ENCOUNTER — NON-APPOINTMENT (OUTPATIENT)
Age: 31
End: 2021-09-10

## 2021-09-15 ENCOUNTER — APPOINTMENT (OUTPATIENT)
Dept: GASTROENTEROLOGY | Facility: CLINIC | Age: 31
End: 2021-09-15

## 2021-09-24 ENCOUNTER — APPOINTMENT (OUTPATIENT)
Dept: GASTROENTEROLOGY | Facility: CLINIC | Age: 31
End: 2021-09-24

## 2021-09-28 ENCOUNTER — APPOINTMENT (OUTPATIENT)
Dept: SURGERY | Facility: CLINIC | Age: 31
End: 2021-09-28

## 2021-09-30 ENCOUNTER — APPOINTMENT (OUTPATIENT)
Dept: SURGERY | Facility: CLINIC | Age: 31
End: 2021-09-30
Payer: MEDICAID

## 2021-09-30 VITALS
WEIGHT: 93 LBS | HEART RATE: 98 BPM | SYSTOLIC BLOOD PRESSURE: 82 MMHG | HEIGHT: 61 IN | TEMPERATURE: 97.2 F | DIASTOLIC BLOOD PRESSURE: 58 MMHG | BODY MASS INDEX: 17.56 KG/M2

## 2021-09-30 PROCEDURE — 99024 POSTOP FOLLOW-UP VISIT: CPT

## 2021-09-30 NOTE — ASSESSMENT
[FreeTextEntry1] :  Ms. Godwin has continued drainage.  She has not gotten the CT scan.I have advised her to reschedule that so we can evaluate.  She is advised that she may need an MRI in addition as this could be a fistula.  She will return to see me once her scans are complete.

## 2021-09-30 NOTE — HISTORY OF PRESENT ILLNESS
[FreeTextEntry1] : This is a follow-up visit for Ms Godwin who is status post I&D of a large perirectal abscess.  Today she reports that she she is feeling much better than she was at last visit and the wound is nearly closed but continues to drain.  At last visit she was sent for a CT scan of the pelvis to evaluate the area and she reports that she did not get that done.

## 2021-11-04 ENCOUNTER — INPATIENT (INPATIENT)
Facility: HOSPITAL | Age: 31
LOS: 0 days | Discharge: LEFT BEFORE DISCHARGE | End: 2021-11-05
Attending: INTERNAL MEDICINE | Admitting: INTERNAL MEDICINE
Payer: MEDICAID

## 2021-11-04 VITALS
RESPIRATION RATE: 18 BRPM | TEMPERATURE: 99 F | SYSTOLIC BLOOD PRESSURE: 104 MMHG | DIASTOLIC BLOOD PRESSURE: 70 MMHG | HEIGHT: 62 IN | OXYGEN SATURATION: 98 % | WEIGHT: 90.83 LBS | HEART RATE: 118 BPM

## 2021-11-04 LAB
ALBUMIN SERPL ELPH-MCNC: 4.1 G/DL — SIGNIFICANT CHANGE UP (ref 3.5–5.2)
ALP SERPL-CCNC: 61 U/L — SIGNIFICANT CHANGE UP (ref 30–115)
ALT FLD-CCNC: 12 U/L — SIGNIFICANT CHANGE UP (ref 0–41)
ANION GAP SERPL CALC-SCNC: 14 MMOL/L — SIGNIFICANT CHANGE UP (ref 7–14)
APTT BLD: 29.7 SEC — SIGNIFICANT CHANGE UP (ref 27–39.2)
AST SERPL-CCNC: 13 U/L — SIGNIFICANT CHANGE UP (ref 0–41)
BASOPHILS # BLD AUTO: 0.02 K/UL — SIGNIFICANT CHANGE UP (ref 0–0.2)
BASOPHILS NFR BLD AUTO: 0.2 % — SIGNIFICANT CHANGE UP (ref 0–1)
BILIRUB SERPL-MCNC: <0.2 MG/DL — SIGNIFICANT CHANGE UP (ref 0.2–1.2)
BUN SERPL-MCNC: 15 MG/DL — SIGNIFICANT CHANGE UP (ref 10–20)
CALCIUM SERPL-MCNC: 9.1 MG/DL — SIGNIFICANT CHANGE UP (ref 8.5–10.1)
CHLORIDE SERPL-SCNC: 106 MMOL/L — SIGNIFICANT CHANGE UP (ref 98–110)
CO2 SERPL-SCNC: 22 MMOL/L — SIGNIFICANT CHANGE UP (ref 17–32)
CREAT SERPL-MCNC: 0.7 MG/DL — SIGNIFICANT CHANGE UP (ref 0.7–1.5)
EOSINOPHIL # BLD AUTO: 0.07 K/UL — SIGNIFICANT CHANGE UP (ref 0–0.7)
EOSINOPHIL NFR BLD AUTO: 0.7 % — SIGNIFICANT CHANGE UP (ref 0–8)
GLUCOSE SERPL-MCNC: 89 MG/DL — SIGNIFICANT CHANGE UP (ref 70–99)
HCG SERPL QL: NEGATIVE — SIGNIFICANT CHANGE UP
HCT VFR BLD CALC: 36.1 % — LOW (ref 37–47)
HGB BLD-MCNC: 11.6 G/DL — LOW (ref 12–16)
IMM GRANULOCYTES NFR BLD AUTO: 0.5 % — HIGH (ref 0.1–0.3)
INR BLD: 0.97 RATIO — SIGNIFICANT CHANGE UP (ref 0.65–1.3)
LIDOCAIN IGE QN: 86 U/L — HIGH (ref 7–60)
LYMPHOCYTES # BLD AUTO: 1.87 K/UL — SIGNIFICANT CHANGE UP (ref 1.2–3.4)
LYMPHOCYTES # BLD AUTO: 18.8 % — LOW (ref 20.5–51.1)
MCHC RBC-ENTMCNC: 26 PG — LOW (ref 27–31)
MCHC RBC-ENTMCNC: 32.1 G/DL — SIGNIFICANT CHANGE UP (ref 32–37)
MCV RBC AUTO: 80.9 FL — LOW (ref 81–99)
MONOCYTES # BLD AUTO: 0.53 K/UL — SIGNIFICANT CHANGE UP (ref 0.1–0.6)
MONOCYTES NFR BLD AUTO: 5.3 % — SIGNIFICANT CHANGE UP (ref 1.7–9.3)
NEUTROPHILS # BLD AUTO: 7.43 K/UL — HIGH (ref 1.4–6.5)
NEUTROPHILS NFR BLD AUTO: 74.5 % — SIGNIFICANT CHANGE UP (ref 42.2–75.2)
NRBC # BLD: 0 /100 WBCS — SIGNIFICANT CHANGE UP (ref 0–0)
PLATELET # BLD AUTO: 323 K/UL — SIGNIFICANT CHANGE UP (ref 130–400)
POTASSIUM SERPL-MCNC: 3.5 MMOL/L — SIGNIFICANT CHANGE UP (ref 3.5–5)
POTASSIUM SERPL-SCNC: 3.5 MMOL/L — SIGNIFICANT CHANGE UP (ref 3.5–5)
PROT SERPL-MCNC: 7.1 G/DL — SIGNIFICANT CHANGE UP (ref 6–8)
PROTHROM AB SERPL-ACNC: 11.2 SEC — SIGNIFICANT CHANGE UP (ref 9.95–12.87)
RBC # BLD: 4.46 M/UL — SIGNIFICANT CHANGE UP (ref 4.2–5.4)
RBC # FLD: 15.7 % — HIGH (ref 11.5–14.5)
SODIUM SERPL-SCNC: 142 MMOL/L — SIGNIFICANT CHANGE UP (ref 135–146)
WBC # BLD: 9.97 K/UL — SIGNIFICANT CHANGE UP (ref 4.8–10.8)
WBC # FLD AUTO: 9.97 K/UL — SIGNIFICANT CHANGE UP (ref 4.8–10.8)

## 2021-11-04 PROCEDURE — 74177 CT ABD & PELVIS W/CONTRAST: CPT | Mod: 26,MA

## 2021-11-04 PROCEDURE — 99285 EMERGENCY DEPT VISIT HI MDM: CPT

## 2021-11-04 PROCEDURE — 99283 EMERGENCY DEPT VISIT LOW MDM: CPT

## 2021-11-04 RX ORDER — METRONIDAZOLE 500 MG
500 TABLET ORAL ONCE
Refills: 0 | Status: COMPLETED | OUTPATIENT
Start: 2021-11-04 | End: 2021-11-04

## 2021-11-04 RX ORDER — CIPROFLOXACIN LACTATE 400MG/40ML
400 VIAL (ML) INTRAVENOUS ONCE
Refills: 0 | Status: COMPLETED | OUTPATIENT
Start: 2021-11-04 | End: 2021-11-04

## 2021-11-04 RX ORDER — MORPHINE SULFATE 50 MG/1
4 CAPSULE, EXTENDED RELEASE ORAL ONCE
Refills: 0 | Status: DISCONTINUED | OUTPATIENT
Start: 2021-11-04 | End: 2021-11-04

## 2021-11-04 RX ORDER — IOHEXOL 300 MG/ML
30 INJECTION, SOLUTION INTRAVENOUS ONCE
Refills: 0 | Status: COMPLETED | OUTPATIENT
Start: 2021-11-04 | End: 2021-11-04

## 2021-11-04 RX ORDER — SODIUM CHLORIDE 9 MG/ML
1000 INJECTION, SOLUTION INTRAVENOUS ONCE
Refills: 0 | Status: COMPLETED | OUTPATIENT
Start: 2021-11-04 | End: 2021-11-04

## 2021-11-04 RX ADMIN — Medication 200 MILLIGRAM(S): at 23:55

## 2021-11-04 RX ADMIN — MORPHINE SULFATE 4 MILLIGRAM(S): 50 CAPSULE, EXTENDED RELEASE ORAL at 18:57

## 2021-11-04 RX ADMIN — Medication 100 MILLIGRAM(S): at 23:54

## 2021-11-04 RX ADMIN — SODIUM CHLORIDE 1000 MILLILITER(S): 9 INJECTION, SOLUTION INTRAVENOUS at 19:08

## 2021-11-04 RX ADMIN — IOHEXOL 30 MILLILITER(S): 300 INJECTION, SOLUTION INTRAVENOUS at 21:30

## 2021-11-04 NOTE — ED PROVIDER NOTE - NS ED ROS FT
Review of Systems:  CONSTITUTIONAL - No fever  SKIN - No rash  HEMATOLOGIC - No abnormal bleeding or bruising  RESPIRATORY - No shortness of breath, No cough  CARDIAC -No chest pain, No palpitations  GI - No abdominal pain, No nausea, No vomiting  - No dysuria, frequency, hematuria.  MUSCULOSKELETAL - No joint paint, No swelling, No back pain  NEUROLOGIC - No numbness, No focal weakness, No headache, No dizziness  All other systems negative, unless specified in HPI

## 2021-11-04 NOTE — ED PROVIDER NOTE - OBJECTIVE STATEMENT
31Y F with PMH of Chron's/UC?, Asthma, Perirectal abscess complicated by fistula s/p I&D in August 2021 by Dr. Fitzgerald now presenting with worsening discharge. Patient reports that she has been having ongoing worsening discharge and complaining of redevelopment of anna-rectal abscess. Complaining of chills, lack of appetite. Patient has been on bactrim since I&D in August. Denies chest pain, SOB, abdominal pain, N/V/D, no urinary complaints.

## 2021-11-04 NOTE — ED PROVIDER NOTE - PROGRESS NOTE DETAILS
PP: Labs, CT abdomen and Pelvis. Surgical consult placed on teams. PP: Labs, CT abdomen and Pelvis. Surgical consult placed on teams. Patient refused rectal temp to the nurse.

## 2021-11-04 NOTE — CONSULT NOTE ADULT - SUBJECTIVE AND OBJECTIVE BOX
GENERAL SURGERY CONSULT NOTE    Patient: GREYSON GARZA , 31y ([birth year])Female   MRN: [id number]  Location: [facility] ED  Visit: 11-04-21 Emergency  Date: 11-04-21 @ 20:15    HPI: 31F PMHx asthma, crohn's disease, hemorrhoids, perianal fistula s/p Incision and Drainage with Dr. Fitzgerald 08/2021. Patient states that she was supposed to followup for CT scan 10/2021 to make sure that fistula did not recur. Patient states that she normally has occasional Nausea and Vomiting due to hx of Crohn's disease but recently for the past 2 days she has noticed fevers/chills increased nausea/vomiting and inability to tolerate PO intake. Her last meal was 2 days ago. She currently is passing gas and still having bowel movements and the pain has subsided due to the morphine she received in the ED. She states the pain is mostly at the "top of her anus" and there is occasional drainage of purulent fluid from that area. The area is tender especially to tough and there's a "hard" area on the right side of her anus. Patient denies chest pain/sob.    PAST MEDICAL & SURGICAL HISTORY:  Asthma    Crohns disease of small intestine    Hemorrhoids    No significant past surgical history    Home Medications:  albuterol 90 mcg/inh inhalation aerosol with adapter: inhaled 4 times a day, As Needed (23 Sep 2019 20:55)    VITALS:  T(F): 98.6 (11-04-21 @ 16:29), Max: 98.6 (11-04-21 @ 16:29)  HR: 118 (11-04-21 @ 16:29) (118 - 118)  BP: 104/70 (11-04-21 @ 16:29) (104/70 - 104/70)  RR: 18 (11-04-21 @ 16:29) (18 - 18)  SpO2: 98% (11-04-21 @ 16:29) (98% - 98%)    PHYSICAL EXAM:  General: NAD, AAOx3, calm and cooperative  HEENT: EMILIANO, EOMI, Trachea ML, Neck supple  Cardiac: RRR  Respiratory: CTAB, normal respiratory effort  Abdomen: Soft, non-distended, non-tender, no rebound, no guarding  Rectal:   Inspection-minimal external hemorrhoids, posterior-perianal tenderness with some punctum in the pilonidal area, right-lateral perianal induration with some erythema     MEDICATIONS  (STANDING):    MEDICATIONS  (PRN):      LAB/STUDIES:                        11.6   9.97  )-----------( 323      ( 04 Nov 2021 17:17 )             36.1     11-04    142  |  106  |  15  ----------------------------<  89  3.5   |  22  |  0.7    Ca    9.1      04 Nov 2021 17:17    TPro  7.1  /  Alb  4.1  /  TBili  <0.2  /  DBili  x   /  AST  13  /  ALT  12  /  AlkPhos  61  11-04    PT/INR - ( 04 Nov 2021 17:17 )   PT: 11.20 sec;   INR: 0.97 ratio         PTT - ( 04 Nov 2021 17:17 )  PTT:29.7 sec  LIVER FUNCTIONS - ( 04 Nov 2021 17:17 )  Alb: 4.1 g/dL / Pro: 7.1 g/dL / ALK PHOS: 61 U/L / ALT: 12 U/L / AST: 13 U/L / GGT: x           ACCESS DEVICES:  [x] Peripheral IV   GENERAL SURGERY CONSULT NOTE    Patient: GREYSON GARZA , 31y ([birth year])Female   MRN: [id number]  Location: [facility] ED  Visit: 11-04-21 Emergency  Date: 11-04-21 @ 20:15    HPI: 31F PMHx asthma, crohn's disease, hemorrhoids, perianal fistula s/p Incision and Drainage with Dr. Fitzgerald 08/2021. Patient states that she was supposed to followup for CT scan 10/2021 to make sure that fistula did not recur but has been unable to obtain appointment or accomplish imaging. Patient states that she normally has occasional Nausea and Vomiting due to hx of Crohn's disease but recently for the past 2 days she has noticed fevers/chills increased nausea/vomiting and inability to tolerate PO intake. Her last meal was 2 days ago. She currently is passing gas and still having bowel movements and the pain has subsided due to the morphine she received in the ED. She states the pain is mostly at the "top of her anus" and there is occasional drainage of purulent fluid from that area. The area is tender especially to tough and there's a "hard" area on the right side of her anus. Patient denies chest pain/sob.    PAST MEDICAL & SURGICAL HISTORY:  Asthma    Crohns disease of small intestine    Hemorrhoids    No significant past surgical history    Home Medications:  albuterol 90 mcg/inh inhalation aerosol with adapter: inhaled 4 times a day, As Needed (23 Sep 2019 20:55)    VITALS:  T(F): 98.6 (11-04-21 @ 16:29), Max: 98.6 (11-04-21 @ 16:29)  HR: 118 (11-04-21 @ 16:29) (118 - 118)  BP: 104/70 (11-04-21 @ 16:29) (104/70 - 104/70)  RR: 18 (11-04-21 @ 16:29) (18 - 18)  SpO2: 98% (11-04-21 @ 16:29) (98% - 98%)    PHYSICAL EXAM:  General: NAD, AAOx3, calm and cooperative  HEENT: EMILIANO, EOMI, Trachea ML, Neck supple  Cardiac: RRR  Respiratory: CTAB, normal respiratory effort  Abdomen: Soft, non-distended, non-tender, no rebound, no guarding  Rectal:   Inspection-minimal external hemorrhoids, posterior-perianal tenderness with some punctum in the pilonidal area, right-lateral perianal induration with some erythema     MEDICATIONS  (STANDING):    MEDICATIONS  (PRN):      LAB/STUDIES:                        11.6   9.97  )-----------( 323      ( 04 Nov 2021 17:17 )             36.1     11-04    142  |  106  |  15  ----------------------------<  89  3.5   |  22  |  0.7    Ca    9.1      04 Nov 2021 17:17    TPro  7.1  /  Alb  4.1  /  TBili  <0.2  /  DBili  x   /  AST  13  /  ALT  12  /  AlkPhos  61  11-04    PT/INR - ( 04 Nov 2021 17:17 )   PT: 11.20 sec;   INR: 0.97 ratio         PTT - ( 04 Nov 2021 17:17 )  PTT:29.7 sec  LIVER FUNCTIONS - ( 04 Nov 2021 17:17 )  Alb: 4.1 g/dL / Pro: 7.1 g/dL / ALK PHOS: 61 U/L / ALT: 12 U/L / AST: 13 U/L / GGT: x           ACCESS DEVICES:  [x] Peripheral IV   GENERAL SURGERY CONSULT NOTE    Patient: GREYSON GARZA , 31y ([birth year])Female   MRN: [id number]  Location: [facility] ED  Visit: 11-04-21 Emergency  Date: 11-04-21 @ 20:15    HPI: 31F PMHx asthma, crohn's disease, hemorrhoids, perianal fistula s/p Incision and Drainage with Dr. Fitzgerald 08/2021. Patient states that she was supposed to followup for CT scan 10/2021 to make sure that fistula did not recur but has been unable to obtain appointment or accomplish imaging. Patient states that she normally has occasional Nausea and Vomiting due to hx of Crohn's disease but recently for the past 2 days she has noticed fevers/chills increased nausea/vomiting and inability to tolerate PO intake. Her last meal was 2 days ago. She currently is passing gas and still having bowel movements and the pain has subsided due to the morphine she received in the ED. She states the pain is mostly at the "top of her anus" and there is occasional drainage of purulent fluid from that area. The area is tender especially to tough and there's a "hard" area on the right side of her anus. Patient denies chest pain/sob.    PAST MEDICAL & SURGICAL HISTORY:  Asthma    Crohns disease of small intestine    Hemorrhoids    No significant past surgical history    Home Medications:  albuterol 90 mcg/inh inhalation aerosol with adapter: inhaled 4 times a day, As Needed (23 Sep 2019 20:55)    VITALS:  T(F): 98.6 (11-04-21 @ 16:29), Max: 98.6 (11-04-21 @ 16:29)  HR: 118 (11-04-21 @ 16:29) (118 - 118)  BP: 104/70 (11-04-21 @ 16:29) (104/70 - 104/70)  RR: 18 (11-04-21 @ 16:29) (18 - 18)  SpO2: 98% (11-04-21 @ 16:29) (98% - 98%)    PHYSICAL EXAM:  General: NAD, AAOx3, calm and cooperative  HEENT: EMILIANO, EOMI, Trachea ML, Neck supple  Cardiac: RRR  Respiratory: CTAB, normal respiratory effort  Abdomen: Soft, non-distended, non-tender, no rebound, no guarding  Rectal:  Inspection-minimal external hemorrhoids, posterior-perianal tenderness with some punctum in the pilonidal area, right-lateral perianal induration with some erythema     LAB/STUDIES:                        11.6   9.97  )-----------( 323      ( 04 Nov 2021 17:17 )             36.1     11-04    142  |  106  |  15  ----------------------------<  89  3.5   |  22  |  0.7    Ca    9.1      04 Nov 2021 17:17    TPro  7.1  /  Alb  4.1  /  TBili  <0.2  /  DBili  x   /  AST  13  /  ALT  12  /  AlkPhos  61  11-04    PT/INR - ( 04 Nov 2021 17:17 )   PT: 11.20 sec;   INR: 0.97 ratio    PTT - ( 04 Nov 2021 17:17 )  PTT:29.7 sec    LIVER FUNCTIONS - ( 04 Nov 2021 17:17 )  Alb: 4.1 g/dL / Pro: 7.1 g/dL / ALK PHOS: 61 U/L / ALT: 12 U/L / AST: 13 U/L / GGT: x           ACCESS DEVICES:  [x] Peripheral IV

## 2021-11-04 NOTE — ED PROVIDER NOTE - PHYSICAL EXAMINATION
VITALS: Reviewed  CONSTITUTIONAL: well developed, well nourished, in no acute distress, speaking in full sentences, nontoxic appearing  SKIN: warm, dry, no rash  HEAD: normocephalic, atraumatic  ENT: patent airway, moist mucous membranes  NECK: supple, no masses  CV:  tachycardic, 2+ radial pulses bilaterally  RESP: no wheezes, no rales, no rhonchi, normal work of breathing  ABD: soft, nontender, nondistended, no rebound, no guarding  MSK: normal ROM, no cyanosis, no edema  RECTAL: +R perirectal abscess, +discharge from fistula site (Chaperone by Brianda Fuentes)  NEURO: alert, oriented x3  PSYCH: cooperative, appropriate

## 2021-11-04 NOTE — CONSULT NOTE ADULT - ASSESSMENT
ASSESSMENT:   31F PMHx asthma, crohn's disease, hemorrhoids, perianal fistula s/p Incision and Drainage with Dr. Fitzgerald 08/2021 presenting with perianal pain consistent with fistula/abscess.    PLAN:  - F/u CT scan  - Plan to be discussed with Senior Surgical Resident, Dr. Santos  - Plan to be discussed with Attending Surgeon, Dr. Fitzgerald    Lines/Tubes: PIV    11-04-21 @ 20:15    CONSULT SPECTRA: [id number] ASSESSMENT:   31F PMHx asthma, crohn's disease, hemorrhoids, perianal fistula s/p Incision and Drainage with Dr. Fitzgerald 08/2021 presenting with perianal pain consistent with fistula/abscess.    PLAN:  - F/u CT scan  - No acute surgical intervention at this time   - Patient can be admitted to medicine for treatment of Crohn's flare  - GI consult     CONSULT SPECTRA: [id number]

## 2021-11-04 NOTE — ED PROVIDER NOTE - ATTENDING CONTRIBUTION TO CARE
31-year-old female presenting to the ED with lower abdominal pain and perirectal pain.  Patient has a history of ulcerative colitis with fistula formation and drainage perirectally.  Patient has been treated previously for the symptoms with IV antibiotics.  Due to worsening discharge patient has come in for evaluation.  On my evaluation patient is noted to have superior perirectal fistula with draining pus, lower abdominal pain with mild tenderness to palpation, nonperitoneal.  Initial vitals noted to be tachycardic where patient refused rectal temperature.  Patient also refused oral contrast.  Patient agreed to labs, rectal contrast.  Labs reviewed by me, values noted to be within normal limits.  Morphine given for abdominal pain with moderate resolution of pain.  Case signed out to Dr. Land pending CT abdomen pelvis, surgical evaluation, and final disposition.

## 2021-11-04 NOTE — ED ADULT TRIAGE NOTE - CHIEF COMPLAINT QUOTE
Patient had fistula surgery to sacrum 2 months ago in which never closed properly. Patient states in the last 2 days she has had subjective fever and decreased PO intake.

## 2021-11-04 NOTE — ED PROVIDER NOTE - CLINICAL SUMMARY MEDICAL DECISION MAKING FREE TEXT BOX
31 yr old f that presents with fistula s/p I&D who's presenting with worsening discharge. labs, imaging ua, meds given. pt admitted to medicine for further evaluation.

## 2021-11-05 VITALS
HEART RATE: 69 BPM | DIASTOLIC BLOOD PRESSURE: 51 MMHG | RESPIRATION RATE: 18 BRPM | OXYGEN SATURATION: 98 % | TEMPERATURE: 98 F | SYSTOLIC BLOOD PRESSURE: 90 MMHG

## 2021-11-05 LAB — SARS-COV-2 RNA SPEC QL NAA+PROBE: SIGNIFICANT CHANGE UP

## 2021-11-05 PROCEDURE — 99223 1ST HOSP IP/OBS HIGH 75: CPT

## 2021-11-05 PROCEDURE — 99024 POSTOP FOLLOW-UP VISIT: CPT

## 2021-11-05 RX ORDER — SODIUM CHLORIDE 9 MG/ML
1000 INJECTION, SOLUTION INTRAVENOUS
Refills: 0 | Status: DISCONTINUED | OUTPATIENT
Start: 2021-11-05 | End: 2021-11-05

## 2021-11-05 RX ORDER — METRONIDAZOLE 500 MG
500 TABLET ORAL EVERY 8 HOURS
Refills: 0 | Status: DISCONTINUED | OUTPATIENT
Start: 2021-11-05 | End: 2021-11-05

## 2021-11-05 RX ORDER — PANTOPRAZOLE SODIUM 20 MG/1
40 TABLET, DELAYED RELEASE ORAL
Refills: 0 | Status: DISCONTINUED | OUTPATIENT
Start: 2021-11-05 | End: 2021-11-05

## 2021-11-05 RX ORDER — MESALAMINE 400 MG
800 TABLET, DELAYED RELEASE (ENTERIC COATED) ORAL
Refills: 0 | Status: DISCONTINUED | OUTPATIENT
Start: 2021-11-05 | End: 2021-11-05

## 2021-11-05 RX ORDER — CIPROFLOXACIN LACTATE 400MG/40ML
400 VIAL (ML) INTRAVENOUS EVERY 12 HOURS
Refills: 0 | Status: DISCONTINUED | OUTPATIENT
Start: 2021-11-05 | End: 2021-11-05

## 2021-11-05 RX ORDER — HEPARIN SODIUM 5000 [USP'U]/ML
5000 INJECTION INTRAVENOUS; SUBCUTANEOUS EVERY 8 HOURS
Refills: 0 | Status: DISCONTINUED | OUTPATIENT
Start: 2021-11-05 | End: 2021-11-05

## 2021-11-05 RX ORDER — ONDANSETRON 8 MG/1
4 TABLET, FILM COATED ORAL EVERY 6 HOURS
Refills: 0 | Status: DISCONTINUED | OUTPATIENT
Start: 2021-11-05 | End: 2021-11-05

## 2021-11-05 RX ORDER — MORPHINE SULFATE 50 MG/1
4 CAPSULE, EXTENDED RELEASE ORAL EVERY 6 HOURS
Refills: 0 | Status: DISCONTINUED | OUTPATIENT
Start: 2021-11-05 | End: 2021-11-05

## 2021-11-05 RX ADMIN — Medication 800 MILLIGRAM(S): at 17:43

## 2021-11-05 RX ADMIN — Medication 20 MILLIGRAM(S): at 13:25

## 2021-11-05 RX ADMIN — MORPHINE SULFATE 4 MILLIGRAM(S): 50 CAPSULE, EXTENDED RELEASE ORAL at 12:32

## 2021-11-05 RX ADMIN — MORPHINE SULFATE 4 MILLIGRAM(S): 50 CAPSULE, EXTENDED RELEASE ORAL at 07:40

## 2021-11-05 RX ADMIN — ONDANSETRON 4 MILLIGRAM(S): 8 TABLET, FILM COATED ORAL at 07:40

## 2021-11-05 RX ADMIN — Medication 200 MILLIGRAM(S): at 06:49

## 2021-11-05 RX ADMIN — Medication 200 MILLIGRAM(S): at 17:43

## 2021-11-05 RX ADMIN — Medication 100 MILLIGRAM(S): at 13:24

## 2021-11-05 RX ADMIN — Medication 800 MILLIGRAM(S): at 06:32

## 2021-11-05 RX ADMIN — ONDANSETRON 4 MILLIGRAM(S): 8 TABLET, FILM COATED ORAL at 14:48

## 2021-11-05 RX ADMIN — PANTOPRAZOLE SODIUM 40 MILLIGRAM(S): 20 TABLET, DELAYED RELEASE ORAL at 17:43

## 2021-11-05 RX ADMIN — ONDANSETRON 4 MILLIGRAM(S): 8 TABLET, FILM COATED ORAL at 00:56

## 2021-11-05 RX ADMIN — Medication 100 MILLIGRAM(S): at 05:45

## 2021-11-05 RX ADMIN — Medication 20 MILLIGRAM(S): at 06:32

## 2021-11-05 RX ADMIN — MORPHINE SULFATE 4 MILLIGRAM(S): 50 CAPSULE, EXTENDED RELEASE ORAL at 00:56

## 2021-11-05 RX ADMIN — MORPHINE SULFATE 4 MILLIGRAM(S): 50 CAPSULE, EXTENDED RELEASE ORAL at 14:47

## 2021-11-05 RX ADMIN — Medication 800 MILLIGRAM(S): at 13:25

## 2021-11-05 RX ADMIN — HEPARIN SODIUM 5000 UNIT(S): 5000 INJECTION INTRAVENOUS; SUBCUTANEOUS at 06:31

## 2021-11-05 RX ADMIN — PANTOPRAZOLE SODIUM 40 MILLIGRAM(S): 20 TABLET, DELAYED RELEASE ORAL at 06:32

## 2021-11-05 RX ADMIN — HEPARIN SODIUM 5000 UNIT(S): 5000 INJECTION INTRAVENOUS; SUBCUTANEOUS at 13:26

## 2021-11-05 NOTE — PROGRESS NOTE ADULT - SUBJECTIVE AND OBJECTIVE BOX
pt seen and examined, still has rectal pain and diarrhea.     T(F): 96.8 (11-05-21 @ 08:16), Max: 98.6 (11-04-21 @ 16:29)  HR: 95 (11-05-21 @ 08:16) (84 - 118)  BP: 90/50 (11-05-21 @ 08:16) (90/50 - 104/70)  RR: 17 (11-05-21 @ 08:16) (17 - 18)  SpO2: 97% (11-05-21 @ 08:16) (97% - 98%)    Physical exam:   constitutional in mild pain , pale, AAOX3, Respiratory  lungs CTA, CVS heart RRR, GI: abdomen Soft NT, ND, BS+, skin: intact  neuro exam Motor, sensory and CN normal, no deficit     MEDICATIONS  (STANDING):  ciprofloxacin   IVPB 400 milliGRAM(s) IV Intermittent every 12 hours  heparin   Injectable 5000 Unit(s) SubCutaneous every 8 hours  lactated ringers. 1000 milliLiter(s) (150 mL/Hr) IV Continuous <Continuous>  mesalamine DR Capsule 800 milliGRAM(s) Oral four times a day  methylPREDNISolone sodium succinate Injectable 20 milliGRAM(s) IV Push every 8 hours  metroNIDAZOLE  IVPB 500 milliGRAM(s) IV Intermittent every 8 hours  pantoprazole  Injectable 40 milliGRAM(s) IV Push two times a day    MEDICATIONS  (PRN):  morphine  - Injectable 4 milliGRAM(s) IV Push every 6 hours PRN Severe Pain (7 - 10)  ondansetron Injectable 4 milliGRAM(s) IV Push every 6 hours PRN Nausea and/or Vomiting                          11.6   9.97  )-----------( 323      ( 04 Nov 2021 17:17 )             36.1   11-04    142  |  106  |  15  ----------------------------<  89  3.5   |  22  |  0.7    Ca    9.1      04 Nov 2021 17:17    TPro  7.1  /  Alb  4.1  /  TBili  <0.2  /  DBili  x   /  AST  13  /  ALT  12  /  AlkPhos  61  11-04    C Diff by PCR Result: Negative (09.24.19 @ 05:09)        < from: CT Abdomen and Pelvis w/ IV Cont (11.04.21 @ 23:07) >  1. Circumferential wall thickening throughout descending colon through sigmoid, with loss of normal colonic haustral fold pattern, consistent with acute colitis and active inflammatory bowel disease.    2. No evidence of perirectal abscess. Residual focus of gas within right lateral perianal region, which may be within perianalfistula, which can be better evaluated on dedicated MRI pelvis with and without IV contrast, perianal fistula protocol. Previously seen perianal abscess no longer visualized, however most inferior portion of the perineum and medial gluteal folds are not imaged on routine CT abdomen and pelvis.    < end of copied text >    a/p  # Peranal pain, no abscess in ct, but pt has colitis, suggestive of crohn's exac, dw surgery, recommend MRI abd , cont steroids, cipro flagyl, check for cdiff, followup GI and surgery   # anemia due to crohn's disease: chronic disease and chronic GIb, anemia is mild and stable     #Progress Note Handoff  Pending (specify):  Consults____GI, SUrgery , tests: MRI abd  Family discussion: dw pt   Disposition: Home__    
GENERAL SURGERY PROGRESS NOTE    Patient: GREYSON GARZA , 31y ([birth year])Female   MRN: [id number]  Location: [facility] ER Hold 020 A  Visit: 11-04-21 Inpatient  Date: 11-05-21 @ 11:22    Hospital Day #:2    Procedure/Dx/Injuries:  perianal pain consistent with fistula/abscess, crohn's disease flare    Events of past 24 hours: admitted, CT performed    PAST MEDICAL & SURGICAL HISTORY:  Asthma    Crohns disease of small intestine    Hemorrhoids    No significant past surgical history        Vitals:   T(F): 96.8 (11-05-21 @ 08:16), Max: 98.6 (11-04-21 @ 16:29)  HR: 95 (11-05-21 @ 08:16)  BP: 90/50 (11-05-21 @ 08:16)  RR: 17 (11-05-21 @ 08:16)  SpO2: 97% (11-05-21 @ 08:16)      Diet, NPO:   Except Medications      Fluids: lactated ringers.: Solution, 1000 milliLiter(s) infuse at 150 mL/Hr      I & O's:    Bowel Movement: : [] YES [] NO  Flatus: : [] YES [] NO    PHYSICAL EXAM:  General: NAD, AAOx3, calm and cooperative  Cardiac: RRR S1, S2, no Murmurs, rubs or gallops  Respiratory: CTAB, normal respiratory effort  Abdomen: Soft, non-distended, non-tender, no rebound, no guarding. +BS.  Musculoskeletal: Strength 5/5 BL UE/LE, ROM intact, compartments soft  Skin: Warm/dry, normal color, no jaundice      MEDICATIONS  (STANDING):  ciprofloxacin   IVPB 400 milliGRAM(s) IV Intermittent every 12 hours  heparin   Injectable 5000 Unit(s) SubCutaneous every 8 hours  lactated ringers. 1000 milliLiter(s) (150 mL/Hr) IV Continuous <Continuous>  mesalamine DR Capsule 800 milliGRAM(s) Oral four times a day  methylPREDNISolone sodium succinate Injectable 20 milliGRAM(s) IV Push every 8 hours  metroNIDAZOLE  IVPB 500 milliGRAM(s) IV Intermittent every 8 hours  pantoprazole  Injectable 40 milliGRAM(s) IV Push two times a day    MEDICATIONS  (PRN):  morphine  - Injectable 4 milliGRAM(s) IV Push every 6 hours PRN Severe Pain (7 - 10)  ondansetron Injectable 4 milliGRAM(s) IV Push every 6 hours PRN Nausea and/or Vomiting      DVT PROPHYLAXIS: heparin   Injectable 5000 Unit(s) SubCutaneous every 8 hours    GI PROPHYLAXIS: pantoprazole  Injectable 40 milliGRAM(s) IV Push two times a day    ANTICOAGULATION:   ANTIBIOTICS:  ciprofloxacin   IVPB 400 milliGRAM(s)  metroNIDAZOLE  IVPB 500 milliGRAM(s)        Isolation Precautions:     Isolation Type: CONTACT;  Indication for Isolation: Clostridium difficile    Additional Instructions:  Contact Isolation (11-05-21 @ 01:41)      LAB/STUDIES:  Labs:  CAPILLARY BLOOD GLUCOSE                              11.6   9.97  )-----------( 323      ( 04 Nov 2021 17:17 )             36.1       Auto Neutrophil %: 74.5 % (11-04-21 @ 17:17)  Auto Immature Granulocyte %: 0.5 % (11-04-21 @ 17:17)    11-04    142  |  106  |  15  ----------------------------<  89  3.5   |  22  |  0.7      Calcium, Total Serum: 9.1 mg/dL (11-04-21 @ 17:17)      LFTs:             7.1  | <0.2 | 13       ------------------[61      ( 04 Nov 2021 17:17 )  4.1  | x    | 12          Lipase:86     Amylase:x             Coags:     11.20  ----< 0.97    ( 04 Nov 2021 17:17 )     29.7              IMAGING:  < from: CT Abdomen and Pelvis w/ IV Cont (11.04.21 @ 23:07) >  IMPRESSION:    1. Circumferential wall thickening throughout descending colon through sigmoid, with loss of normal colonic haustral fold pattern, consistent with acute colitis and active inflammatory bowel disease.    2. No evidence of perirectal abscess. Residual focus of gas within right lateral perianal region, which may be within perianalfistula, which can be better evaluated on dedicated MRI pelvis with and without IV contrast, perianal fistula protocol. Previously seen perianal abscess no longer visualized, however most inferior portion of the perineum and medial gluteal folds are not imaged on routine CT abdomen and pelvis.    --- End of Report ---      < end of copied text >      ACCESS/ DEVICES:  [ ] Peripheral IV  [ ] Central Venous Line	[ ] R	[ ] L	[ ] IJ	[ ] Fem	[ ] SC	Placed:   [ ] Arterial Line		[ ] R	[ ] L	[ ] Fem	[ ] Rad	[ ] Ax	Placed:   [ ] PICC:					[ ] Mediport  [ ] Urinary Catheter,  Date Placed:   [ ] Chest tube: [ ] Right, [ ] Left  [ ] ABIGAIL/Vito Drains

## 2021-11-05 NOTE — H&P ADULT - HISTORY OF PRESENT ILLNESS
HPI: 30yo female with PMHx of Hemorrhoids, Crohn's disease complicated by perirectal abcess and perianal fistula (discharged in August 2021) and asthma who presented to ED with worsening rectal pain secondary to rectal abscess. She was scheduled for a followup for CT scan 10/2021 in the outpt setting to surveill for fistula recurrance but has been unable to obtain appointment. Patient states that she normally has occasional Nausea and Vomiting due to hx of Crohn's disease but recently for the past 2 days she has noticed the presence of fevers/chills increased nausea/vomiting and inability to tolerate PO intake. Her last meal was 2 days ago. She currently is passing gas and still having bowel movements and the pain has subsided due to the morphine she received in the ED. She states the pain is mostly at the "top of her anus" and there is occasional drainage of purulent fluid from that area. The area is tender especially to tough and there's a "hard" area on the right side of her anus.    ED COURSE: Labs remarkable for Anemia w/borderline low/normal MCV of 80, Lipase mildly elevated, CT A/P showing active Crohn's Flare and not able to definitively r/o preirectal abscess/fistula. VSS except tachycardia to 118BPM, pt given Cipro/DAMON, 1L LR + analgesia in ED.

## 2021-11-05 NOTE — PROGRESS NOTE ADULT - ASSESSMENT
31F PMHx asthma, crohn's disease, hemorrhoids, perianal fistula s/p Incision and Drainage with Dr. Fitzgerald 08/2021 presenting with perianal pain consistent with fistula/abscess.    PLAN:  - No acute surgical intervention at this time   - IVFs  - GI/DVT ppx  - NPO  - Treatment of Crohn's flare  - follow up GI for recs    spectra [phone]     31F PMHx asthma, crohn's disease, hemorrhoids, perianal fistula s/p Incision and Drainage with Dr. Fitzgerald 08/2021 presenting with perianal pain consistent with fistula/abscess.    PLAN:  - No acute surgical intervention at this time   - IVFs  - GI/DVT ppx  - NPO  - MRI  - Treatment of Crohn's flare  - follow up GI for recs    spectra [phone]

## 2021-11-05 NOTE — H&P ADULT - ASSESSMENT
ASSESSMENT  30 YO F w/PMHx of Asthma, Hemorrhoids and Crohn's complicated by perirectal abscess and fistula formation admitted to medicine for crohn's flair.     PLAN/ACTIVE PROBLEMS  # Crohn's Disease Flare   # h/o Hemorrhoids  - GI PCR + C. Diff  - NPO  - PPI BID  - Delzicol 800 mg 4 times a day  - Fecal calprotectin 304 last admission, repeat , ova and parasite neg last admit  - Zofran PRN for nausea  - pain control  - c/w IV Cipro and Flagyl  - Solumedrol 20mg Q8hrs.    # Normo/Microcytic Anemia  - Serum Iron w/TIBC + Ferritin  - Stool FIT  - Type + Screen    # h/o Asthma  - Duonebs PRN    DIET: NPO  DVT PPX: HSQ  GI PPX: PPI IVP BID

## 2021-11-05 NOTE — H&P ADULT - NSHPPHYSICALEXAM_GEN_ALL_CORE
GEN: NAD, Well Appearing, Well nourished  HEENT: NCAT, PERRL, EOMI, No Icterus, No Pallor, No nystagmus, Vision Intact, No JVD/TD  CV/CHEST: RRR, +S1/S2, no murmurs  PULM: CTAB, Good Air Entry Bilaterally  ABD: SNTTP, ND x 4 Q's  RECTAL:   EXT: Warm, Well Perfused x 4. No Edema  SKIN: No Rash  NEURO: AxOx3, + Normal Affect

## 2021-11-05 NOTE — H&P ADULT - ATTENDING COMMENTS
HPI:  HPI: 30yo female with PMHx of Hemorrhoids, Crohn's disease complicated by perirectal abcess and perianal fistula (discharged in August 2021) and asthma who presented to ED with worsening rectal pain secondary to rectal abscess. She was scheduled for a followup for CT scan 10/2021 in the outpt setting to surveill for fistula recurrance but has been unable to obtain appointment. Patient states that she normally has occasional Nausea and Vomiting due to hx of Crohn's disease but recently for the past 2 days she has noticed the presence of fevers/chills increased nausea/vomiting and inability to tolerate PO intake. Her last meal was 2 days ago. She currently is passing gas and still having bowel movements and the pain has subsided due to the morphine she received in the ED. She states the pain is mostly at the "top of her anus" and there is occasional drainage of purulent fluid from that area. The area is tender especially to tough and there's a "hard" area on the right side of her anus.    ED COURSE: Labs remarkable for Anemia w/borderline low/normal MCV of 80, Lipase mildly elevated, CT A/P showing active Crohn's Flare and not able to definitively r/o preirectal abscess/fistula. VSS except tachycardia to 118BPM, pt given Cipro/DAMON, 1L LR + analgesia in ED. (05 Nov 2021 01:21)    REVIEW OF SYSTEMS: see cc/HPI   CONSTITUTIONAL: No weakness, fevers or chills  EYES/ENT: No visual changes;  No vertigo or throat pain   NECK: No pain or stiffness  RESPIRATORY: No cough, wheezing, hemoptysis; No shortness of breath  CARDIOVASCULAR: No chest pain or palpitations  GASTROINTESTINAL: No abdominal or epigastric pain. No nausea, vomiting, or hematemesis; No diarrhea or constipation. No melena or hematochezia.  GENITOURINARY: No dysuria, frequency or hematuria  NEUROLOGICAL: No numbness or weakness  SKIN: No itching, rashes    Physical Exam:  General: WN/WD NAD  Neurology: A&Ox3, nonfocal, follows commands  Eyes: PERRLA/ EOMI  ENT/Neck: Neck supple, trachea midline, No JVD  Respiratory: CTA B/L, No wheezing, rales, rhonchi  CV: Normal rate regular rhythm, S1S2, no murmurs, rubs or gallops  Abdominal: Soft, NT, ND +BS,   Extremities: No edema, + peripheral pulses  Skin: No Rashes, Hematoma, Ecchymosis  Incisions: n/a  Tubes n/a    A/p  Crohn's exacerbation   Perirectal fistula   -NPO and PPI   -PRN pain Rx and PRN Zofran  -stool for calprotectin   -IV steroids inpatient w/ outpatient taper and possible move to biologic Rx   -Cipro/Flagyl / Delzicol  -GI evaluation   -MRI of pelvis w/ contrast - perianal fistula protocol  -Surgical follow up - no intervention for now     Anemia - most likely of chronic disease   -agree w/ iron studies   -type and screen     H/o asthma   -PRN Albuterol     DVT prophylaxis

## 2021-11-06 NOTE — ED ADULT NURSE NOTE - NS ED NURSE TRANSPORT WITH
Cardiac Monitor/Defib/ACLS/Rescue Kit/O2/BVM Tooth #19 has multiple chips and dental caries. Tender to tap but no swelling or surrounding abscess.

## 2021-11-09 ENCOUNTER — APPOINTMENT (OUTPATIENT)
Dept: SURGERY | Facility: CLINIC | Age: 31
End: 2021-11-09

## 2021-11-11 DIAGNOSIS — K50.90 CROHN'S DISEASE, UNSPECIFIED, WITHOUT COMPLICATIONS: ICD-10-CM

## 2021-11-11 DIAGNOSIS — J45.909 UNSPECIFIED ASTHMA, UNCOMPLICATED: ICD-10-CM

## 2021-11-11 DIAGNOSIS — D63.1 ANEMIA IN CHRONIC KIDNEY DISEASE: ICD-10-CM

## 2021-11-11 DIAGNOSIS — Z53.29 PROCEDURE AND TREATMENT NOT CARRIED OUT BECAUSE OF PATIENT'S DECISION FOR OTHER REASONS: ICD-10-CM

## 2021-11-11 DIAGNOSIS — K60.3 ANAL FISTULA: ICD-10-CM

## 2021-12-01 PROCEDURE — G9005: CPT

## 2022-01-01 NOTE — ED PROVIDER NOTE - ATTENDING WITH...
Dinora Nayak is a 2 m.o. male  who presents by private vehicle to ER with c/o Patient presents with:  Fall  Patient was laying on lawn chair and rolled off onto wooden deck. This occurred around 12 noon today. Patient cried immediately. Patient has eaten with out difficulty, no vomiting. He specifically denies any fevers, chills, nausea, vomiting, chest pain, shortness of breath, headache, rash, diarrhea, abdominal pain, urinary/bowel changes, sweating or weight loss. PCP: No primary care provider on file. PMHx significant for: History reviewed. No pertinent past medical history. PSHx significant for: History reviewed. No pertinent surgical history. Social Hx: Tobacco use: Social History    Tobacco Use      Smoking status: Never      Smokeless tobacco: Never  ; EtOH use: The patient states he drinks 0 per week.; Illicit Drug use: Allergies:  No Known Allergies    There are no other complaints, changes or physical findings at this time. History reviewed. No pertinent past medical history. History reviewed. No pertinent surgical history. History reviewed. No pertinent family history.     Social History     Socioeconomic History    Marital status: Not on file     Spouse name: Not on file    Number of children: Not on file    Years of education: Not on file    Highest education level: Not on file   Occupational History    Not on file   Tobacco Use    Smoking status: Never    Smokeless tobacco: Never   Substance and Sexual Activity    Alcohol use: Not on file    Drug use: Not on file    Sexual activity: Not on file   Other Topics Concern    Not on file   Social History Narrative    Not on file     Social Determinants of Health     Financial Resource Strain: Not on file   Food Insecurity: Not on file   Transportation Needs: Not on file   Physical Activity: Not on file   Stress: Not on file   Social Connections: Not on file   Intimate Partner Violence: Not on file   Housing Stability: Not on file         ALLERGIES: Patient has no known allergies. Review of Systems   Constitutional:  Negative for activity change, crying and decreased responsiveness. HENT:  Negative for congestion and facial swelling. Eyes:  Negative for visual disturbance. Respiratory:  Negative for cough. Cardiovascular:  Negative for fatigue with feeds and cyanosis. Gastrointestinal: Negative. Genitourinary: Negative. Musculoskeletal: Negative. Negative for extremity weakness. Skin:  Negative for color change and wound. Hematological: Negative. All other systems reviewed and are negative. Vitals:    10/12/22 1741   Pulse: 132   Resp: 34   Temp: 98.1 °F (36.7 °C)   SpO2: 100%   Weight: 5.99 kg            Physical Exam  Vitals and nursing note reviewed. Constitutional:       General: He is active. He has a strong cry. Appearance: He is well-developed. HENT:      Head: Normocephalic. Swelling present. No tenderness. Anterior fontanelle is full. Right Ear: Tympanic membrane normal.      Left Ear: Tympanic membrane normal.      Nose: Nose normal.      Mouth/Throat:      Mouth: Mucous membranes are moist.   Eyes:      General: Visual tracking is normal. Gaze aligned appropriately. Right eye: No discharge. Left eye: No discharge. Extraocular Movements: Extraocular movements intact. Pupils: Pupils are equal, round, and reactive to light. Cardiovascular:      Rate and Rhythm: Normal rate and regular rhythm. Heart sounds: No murmur heard. Pulmonary:      Effort: Pulmonary effort is normal. No respiratory distress or nasal flaring. Breath sounds: Normal breath sounds. No wheezing or rhonchi. Abdominal:      General: There is no distension. Palpations: Abdomen is soft. Tenderness: There is no abdominal tenderness. Musculoskeletal:         General: No deformity. Normal range of motion. Comments: Spine palpated with out step off or crepitus. Non-TTP over spine. Patient moving all extremities   Lymphadenopathy:      Head: No occipital adenopathy. Cervical: No cervical adenopathy. Skin:     General: Skin is warm. Findings: No petechiae. Rash is not purpuric. Neurological:      Mental Status: He is alert. Primitive Reflexes: Suck normal.        MDM  Number of Diagnoses or Management Options  Fall, initial encounter  Minor head injury, initial encounter  Diagnosis management comments: Assesment/Plan- 2 m.o. Patient presents with:  Fall  differential includes: traumatic brain injury, minor head injury, contusion. Labs and imaging reviewed with Ct with no acute findings. Patient is well appearing, afebrile and tolerating PO. Discharged home. Recommend PCP follow up. Patient educated on reasons to return to the ED. Amount and/or Complexity of Data Reviewed  Clinical lab tests: ordered and reviewed  Tests in the medicine section of CPT®: ordered and reviewed  Discuss the patient with other providers: yes (Attending- Dr. Binu Brenner who agrees with plan)    Risk of Complications, Morbidity, and/or Mortality  Presenting problems: moderate  Diagnostic procedures: moderate  Management options: moderate    Patient Progress  Patient progress: improved         Procedures      GCS: 15   No altered mental status;   No palpable skull fracture  Non-frontal scalp hematoma                    Plan: PECARN tool recommends Head CT or Observation: 0.9% risk of clinically important traumatic brain injury: CT head will be obtained  Decision made based on: Physician experience ACP

## 2022-04-23 ENCOUNTER — INPATIENT (INPATIENT)
Facility: HOSPITAL | Age: 32
LOS: 2 days | Discharge: HOME | End: 2022-04-26
Attending: INTERNAL MEDICINE | Admitting: INTERNAL MEDICINE
Payer: MEDICAID

## 2022-04-23 VITALS
HEART RATE: 98 BPM | RESPIRATION RATE: 18 BRPM | DIASTOLIC BLOOD PRESSURE: 59 MMHG | HEIGHT: 62 IN | WEIGHT: 119.93 LBS | TEMPERATURE: 98 F | SYSTOLIC BLOOD PRESSURE: 116 MMHG | OXYGEN SATURATION: 98 %

## 2022-04-23 DIAGNOSIS — I26.99 OTHER PULMONARY EMBOLISM WITHOUT ACUTE COR PULMONALE: ICD-10-CM

## 2022-04-23 LAB
ALBUMIN SERPL ELPH-MCNC: 4 G/DL — SIGNIFICANT CHANGE UP (ref 3.5–5.2)
ALP SERPL-CCNC: 52 U/L — SIGNIFICANT CHANGE UP (ref 30–115)
ALT FLD-CCNC: 27 U/L — SIGNIFICANT CHANGE UP (ref 0–41)
ANION GAP SERPL CALC-SCNC: 15 MMOL/L — HIGH (ref 7–14)
AST SERPL-CCNC: 44 U/L — HIGH (ref 0–41)
BASOPHILS # BLD AUTO: 0.02 K/UL — SIGNIFICANT CHANGE UP (ref 0–0.2)
BASOPHILS NFR BLD AUTO: 0.1 % — SIGNIFICANT CHANGE UP (ref 0–1)
BILIRUB SERPL-MCNC: 0.3 MG/DL — SIGNIFICANT CHANGE UP (ref 0.2–1.2)
BLD GP AB SCN SERPL QL: SIGNIFICANT CHANGE UP
BUN SERPL-MCNC: 21 MG/DL — HIGH (ref 10–20)
CALCIUM SERPL-MCNC: 8.9 MG/DL — SIGNIFICANT CHANGE UP (ref 8.5–10.1)
CHLORIDE SERPL-SCNC: 102 MMOL/L — SIGNIFICANT CHANGE UP (ref 98–110)
CO2 SERPL-SCNC: 23 MMOL/L — SIGNIFICANT CHANGE UP (ref 17–32)
CREAT SERPL-MCNC: 0.7 MG/DL — SIGNIFICANT CHANGE UP (ref 0.7–1.5)
EGFR: 117 ML/MIN/1.73M2 — SIGNIFICANT CHANGE UP
EOSINOPHIL # BLD AUTO: 0.03 K/UL — SIGNIFICANT CHANGE UP (ref 0–0.7)
EOSINOPHIL NFR BLD AUTO: 0.2 % — SIGNIFICANT CHANGE UP (ref 0–8)
GLUCOSE SERPL-MCNC: 67 MG/DL — LOW (ref 70–99)
HCG SERPL-ACNC: <0.6 MIU/ML — SIGNIFICANT CHANGE UP
HCT VFR BLD CALC: 37.5 % — SIGNIFICANT CHANGE UP (ref 37–47)
HGB BLD-MCNC: 12.1 G/DL — SIGNIFICANT CHANGE UP (ref 12–16)
IMM GRANULOCYTES NFR BLD AUTO: 0.7 % — HIGH (ref 0.1–0.3)
LACTATE SERPL-SCNC: 1.6 MMOL/L — SIGNIFICANT CHANGE UP (ref 0.7–2)
LACTATE SERPL-SCNC: 3 MMOL/L — HIGH (ref 0.7–2)
LIDOCAIN IGE QN: 61 U/L — HIGH (ref 7–60)
LYMPHOCYTES # BLD AUTO: 0.82 K/UL — LOW (ref 1.2–3.4)
LYMPHOCYTES # BLD AUTO: 5.7 % — LOW (ref 20.5–51.1)
MCHC RBC-ENTMCNC: 28.6 PG — SIGNIFICANT CHANGE UP (ref 27–31)
MCHC RBC-ENTMCNC: 32.3 G/DL — SIGNIFICANT CHANGE UP (ref 32–37)
MCV RBC AUTO: 88.7 FL — SIGNIFICANT CHANGE UP (ref 81–99)
MONOCYTES # BLD AUTO: 0.71 K/UL — HIGH (ref 0.1–0.6)
MONOCYTES NFR BLD AUTO: 4.9 % — SIGNIFICANT CHANGE UP (ref 1.7–9.3)
NEUTROPHILS # BLD AUTO: 12.81 K/UL — HIGH (ref 1.4–6.5)
NEUTROPHILS NFR BLD AUTO: 88.4 % — HIGH (ref 42.2–75.2)
NRBC # BLD: 0 /100 WBCS — SIGNIFICANT CHANGE UP (ref 0–0)
PLATELET # BLD AUTO: 217 K/UL — SIGNIFICANT CHANGE UP (ref 130–400)
POTASSIUM SERPL-MCNC: 4.3 MMOL/L — SIGNIFICANT CHANGE UP (ref 3.5–5)
POTASSIUM SERPL-SCNC: 4.3 MMOL/L — SIGNIFICANT CHANGE UP (ref 3.5–5)
PROT SERPL-MCNC: 6.5 G/DL — SIGNIFICANT CHANGE UP (ref 6–8)
RBC # BLD: 4.23 M/UL — SIGNIFICANT CHANGE UP (ref 4.2–5.4)
RBC # FLD: 15 % — HIGH (ref 11.5–14.5)
SARS-COV-2 RNA SPEC QL NAA+PROBE: SIGNIFICANT CHANGE UP
SODIUM SERPL-SCNC: 140 MMOL/L — SIGNIFICANT CHANGE UP (ref 135–146)
WBC # BLD: 14.49 K/UL — HIGH (ref 4.8–10.8)
WBC # FLD AUTO: 14.49 K/UL — HIGH (ref 4.8–10.8)

## 2022-04-23 PROCEDURE — 73600 X-RAY EXAM OF ANKLE: CPT | Mod: 26,LT

## 2022-04-23 PROCEDURE — 46040 I&D ISCHIORCT&/PERIRCT ABSC: CPT

## 2022-04-23 PROCEDURE — 99285 EMERGENCY DEPT VISIT HI MDM: CPT

## 2022-04-23 PROCEDURE — 93306 TTE W/DOPPLER COMPLETE: CPT | Mod: 26

## 2022-04-23 PROCEDURE — 99221 1ST HOSP IP/OBS SF/LOW 40: CPT | Mod: 57

## 2022-04-23 PROCEDURE — 99223 1ST HOSP IP/OBS HIGH 75: CPT

## 2022-04-23 PROCEDURE — 74177 CT ABD & PELVIS W/CONTRAST: CPT | Mod: 26,MA

## 2022-04-23 RX ORDER — HYDROMORPHONE HYDROCHLORIDE 2 MG/ML
1 INJECTION INTRAMUSCULAR; INTRAVENOUS; SUBCUTANEOUS ONCE
Refills: 0 | Status: DISCONTINUED | OUTPATIENT
Start: 2022-04-23 | End: 2022-04-23

## 2022-04-23 RX ORDER — ALBUTEROL 90 UG/1
0 AEROSOL, METERED ORAL
Qty: 0 | Refills: 0 | DISCHARGE

## 2022-04-23 RX ORDER — ACETAMINOPHEN 500 MG
650 TABLET ORAL EVERY 6 HOURS
Refills: 0 | Status: DISCONTINUED | OUTPATIENT
Start: 2022-04-23 | End: 2022-04-26

## 2022-04-23 RX ORDER — HYDROMORPHONE HYDROCHLORIDE 2 MG/ML
2 INJECTION INTRAMUSCULAR; INTRAVENOUS; SUBCUTANEOUS EVERY 8 HOURS
Refills: 0 | Status: DISCONTINUED | OUTPATIENT
Start: 2022-04-23 | End: 2022-04-24

## 2022-04-23 RX ORDER — ENOXAPARIN SODIUM 100 MG/ML
60 INJECTION SUBCUTANEOUS ONCE
Refills: 0 | Status: COMPLETED | OUTPATIENT
Start: 2022-04-23 | End: 2022-04-23

## 2022-04-23 RX ORDER — CHLORHEXIDINE GLUCONATE 213 G/1000ML
1 SOLUTION TOPICAL
Refills: 0 | Status: DISCONTINUED | OUTPATIENT
Start: 2022-04-23 | End: 2022-04-26

## 2022-04-23 RX ORDER — METRONIDAZOLE 500 MG
500 TABLET ORAL ONCE
Refills: 0 | Status: COMPLETED | OUTPATIENT
Start: 2022-04-23 | End: 2022-04-23

## 2022-04-23 RX ORDER — IOHEXOL 300 MG/ML
30 INJECTION, SOLUTION INTRAVENOUS ONCE
Refills: 0 | Status: COMPLETED | OUTPATIENT
Start: 2022-04-23 | End: 2022-04-23

## 2022-04-23 RX ORDER — CEFEPIME 1 G/1
1000 INJECTION, POWDER, FOR SOLUTION INTRAMUSCULAR; INTRAVENOUS ONCE
Refills: 0 | Status: COMPLETED | OUTPATIENT
Start: 2022-04-23 | End: 2022-04-23

## 2022-04-23 RX ORDER — TRAMADOL HYDROCHLORIDE 50 MG/1
25 TABLET ORAL EVERY 6 HOURS
Refills: 0 | Status: DISCONTINUED | OUTPATIENT
Start: 2022-04-23 | End: 2022-04-24

## 2022-04-23 RX ORDER — SODIUM CHLORIDE 9 MG/ML
1000 INJECTION INTRAMUSCULAR; INTRAVENOUS; SUBCUTANEOUS ONCE
Refills: 0 | Status: COMPLETED | OUTPATIENT
Start: 2022-04-23 | End: 2022-04-23

## 2022-04-23 RX ORDER — CIPROFLOXACIN LACTATE 400MG/40ML
400 VIAL (ML) INTRAVENOUS EVERY 12 HOURS
Refills: 0 | Status: DISCONTINUED | OUTPATIENT
Start: 2022-04-23 | End: 2022-04-25

## 2022-04-23 RX ORDER — METRONIDAZOLE 500 MG
500 TABLET ORAL EVERY 8 HOURS
Refills: 0 | Status: DISCONTINUED | OUTPATIENT
Start: 2022-04-23 | End: 2022-04-26

## 2022-04-23 RX ORDER — SODIUM CHLORIDE 9 MG/ML
1000 INJECTION, SOLUTION INTRAVENOUS
Refills: 0 | Status: DISCONTINUED | OUTPATIENT
Start: 2022-04-23 | End: 2022-04-24

## 2022-04-23 RX ORDER — PANTOPRAZOLE SODIUM 20 MG/1
40 TABLET, DELAYED RELEASE ORAL
Refills: 0 | Status: DISCONTINUED | OUTPATIENT
Start: 2022-04-23 | End: 2022-04-26

## 2022-04-23 RX ADMIN — IOHEXOL 30 MILLILITER(S): 300 INJECTION, SOLUTION INTRAVENOUS at 09:36

## 2022-04-23 RX ADMIN — ENOXAPARIN SODIUM 60 MILLIGRAM(S): 100 INJECTION SUBCUTANEOUS at 13:58

## 2022-04-23 RX ADMIN — SODIUM CHLORIDE 1000 MILLILITER(S): 9 INJECTION INTRAMUSCULAR; INTRAVENOUS; SUBCUTANEOUS at 09:29

## 2022-04-23 RX ADMIN — Medication 100 MILLIGRAM(S): at 13:58

## 2022-04-23 RX ADMIN — HYDROMORPHONE HYDROCHLORIDE 2 MILLIGRAM(S): 2 INJECTION INTRAMUSCULAR; INTRAVENOUS; SUBCUTANEOUS at 20:09

## 2022-04-23 RX ADMIN — HYDROMORPHONE HYDROCHLORIDE 1 MILLIGRAM(S): 2 INJECTION INTRAMUSCULAR; INTRAVENOUS; SUBCUTANEOUS at 09:29

## 2022-04-23 RX ADMIN — SODIUM CHLORIDE 100 MILLILITER(S): 9 INJECTION, SOLUTION INTRAVENOUS at 15:59

## 2022-04-23 RX ADMIN — HYDROMORPHONE HYDROCHLORIDE 1 MILLIGRAM(S): 2 INJECTION INTRAMUSCULAR; INTRAVENOUS; SUBCUTANEOUS at 12:48

## 2022-04-23 RX ADMIN — Medication 100 MILLIGRAM(S): at 23:01

## 2022-04-23 RX ADMIN — CEFEPIME 100 MILLIGRAM(S): 1 INJECTION, POWDER, FOR SOLUTION INTRAMUSCULAR; INTRAVENOUS at 15:24

## 2022-04-23 RX ADMIN — HYDROMORPHONE HYDROCHLORIDE 1 MILLIGRAM(S): 2 INJECTION INTRAMUSCULAR; INTRAVENOUS; SUBCUTANEOUS at 13:05

## 2022-04-23 NOTE — ED ADULT NURSE NOTE - TEMPLATE
Is This A New Presentation, Or A Follow-Up?: Skin Lesions How Severe Is Your Skin Lesion?: mild Have Your Skin Lesions Been Treated?: not been treated Wounds

## 2022-04-23 NOTE — H&P ADULT - ATTENDING COMMENTS
32 YO F w/ a PMH of asthma and Crohn's disease complicated w/ perianal fistula w/ recurrent abscesses who presents to the hospital with a c/o rectal pain for the past x 1 week. Denies any fevers/chill, ABD pain, N/V/D, or bloody stools. ROS is negative except as above.     In the ED, CT-AP w/ IV/PO contrast showed perianal abscess and incidental B/L lower lobe PEs. Surgery consulted and performed bedside I&D, cultures sent, pt started on IV ABXs (Cefe/Flagyl). Therapeutic Enoxaparin started in the ED.     FMHx: Reviewed, not relevant    Physical exam shows pt in NAD. VSS, afebrile, not hypoxic on RA. A&Ox3. Neuro exam without deficits, motor/sensory intact, no dysarthria, no facial asymmetry. Muscle strength/sensation intact. CTA B/L with no W/C/R. RRR, no M/G/R. ABD is soft and non-tender, normoactive BSs. LEs without swelling. No rashes. Labs and radiology as above.     Rectal pain due to perirectal abscess; sepsis present on admission. I&D performed in the ED, cultures sent. FU cultures. IV ABxs (Cipro/Flagyl). PRN pain meds. Wound care.     B/L pulmonary embolism; provoked. Obtain official echo. Started on therapeutic Lovenox in the ED, transition to NOAC in the AM. Serial cardiac enzymes. FU official LE doppler results. Monitor VSs. Supplemental O2 PRN.     Mild HAGMA (High Anion Gap Metabolic Acidosis) from lactic acidosis. IVFs (LR). Repeat lactate and BMP in the AM.     HX of asthma and Crohn's disease complicated w/ perianal fistula w/ recurrent abscesses. Restart home meds, except as stated above. DVT PPX. Inform PCP of pt's admission to hospital. My note supersedes the residents note.     Date seen by [email or web address]

## 2022-04-23 NOTE — ED PROVIDER NOTE - OBJECTIVE STATEMENT
Patient is a 31-year-old female with Crohn's disease status post surgical treatment of a perianal abscess by Dr. Fitzgerald in 2021 who comes in for 1 day of similar rectal pain.  She reports the pain radiates internally and into her vagina as well.  Patient has pain with urination.  She reports a preceding 2 weeks of intermittent rectal bleeding as well.  Denies any fevers.

## 2022-04-23 NOTE — ED ADULT NURSE NOTE - NS ED NURSE LEVEL OF CONSCIOUSNESS MENTAL STATUS
You may take your prescription narcotics as prescribed for pain management.   Continue all other medications  Follow-up with your primary care provider tomorrow for repeat assessment and further management  Return to ED for any new or worsening symptoms
Awake/Alert/Cooperative

## 2022-04-23 NOTE — ED PROVIDER NOTE - CARE PLAN
Principal Discharge DX:	Pulmonary embolism  Secondary Diagnosis:	Perianal Crohn's disease, with abscess   1

## 2022-04-23 NOTE — H&P ADULT - NSHPPHYSICALEXAM_GEN_ALL_CORE
PHYSICAL EXAM:  GENERAL: NAD, well-developed  HEAD:  Atraumatic, Normocephalic  EYES: EOMI, PERRLA, conjunctiva and sclera clear  NECK: Supple, No JVD  CHEST/LUNG: Clear to auscultation bilaterally; No wheeze  HEART: Regular rate and rhythm; No murmurs, rubs, or gallops  ABDOMEN: Soft, Nontender, Nondistended; Bowel sounds present  EXTREMITIES:  2+ Peripheral Pulses, No clubbing, cyanosis, or edema  PSYCH: AAOx3  NEUROLOGY: non-focal  SKIN: No rashes or lesions PHYSICAL EXAM:  GENERAL: NAD, well-developed  HEAD:  Atraumatic, Normocephalic  EYES: EOMI, PERRLA, conjunctiva and sclera clear  NECK: Supple, No JVD  CHEST/LUNG: Clear to auscultation bilaterally; No wheeze  HEART: Regular rate and rhythm; No murmurs, rubs, or gallops  ABDOMEN: Soft, Nontender, Nondistended; Bowel sounds present  EXTREMITIES: No clubbing, cyanosis, or edema  PSYCH: AAOx3  NEUROLOGY: non-focal  SKIN: No rashes or lesions

## 2022-04-23 NOTE — PATIENT PROFILE ADULT - NSPROGENPREVTRANSF_GEN_A_NUR
Order is teed up  
Patient calling for a referral for Behavorial health please put order patient feels he does need this cant schedule without referral   Please call when order is in   
Patient was notified, done  
Referral placed.  
no history of blood product transfusion

## 2022-04-23 NOTE — ED PROVIDER NOTE - PHYSICAL EXAMINATION
Vital Signs: I have reviewed the initial vital signs.  Constitutional: well-nourished, appears stated age, mild distress  Cardiovascular: regular rate, regular rhythm, well-perfused extremities  Respiratory: unlabored respiratory effort, clear to auscultation bilaterally  Gastrointestinal: soft abdomen with RUQ/RLQ soreness, no guarding but pt refusing full exam; large perianal gluteal redness/fluctuance noted - pt refusing rectal exam  Musculoskeletal: no lower extremity edema  Integumentary: warm, dry, no rash  Neurologic: awake, alert, cranial nerves II-XII grossly intact, extremities’ motor and sensory functions grossly intact  Psychiatric: tearful

## 2022-04-23 NOTE — ED ADULT TRIAGE NOTE - CHIEF COMPLAINT QUOTE
Pt. came to ED c/o pain from rectal abscess that radiates to RLQ abdomen since last night. C/o rectal bleeding last week, but none since then. Pt. had abscess removed 8 months ago, but felt it come back last week.

## 2022-04-23 NOTE — CONSULT NOTE ADULT - SUBJECTIVE AND OBJECTIVE BOX
GENERAL SURGERY CONSULT NOTE    Patient: GREYSON GARZA , 31y ([birth year])Female   MRN: [id number]  Location: [facility] ED  Visit: 04-23-22 Emergency  Date: 04-23-22 @ 13:37    HPI:  32 yo F with a PMH of asthma and Crohn's disease complicated by perianal fistulae and perianal abscess presented to [facility] ED c/o perianal pain. CT done showing abscess. General surgery consulted. Pt's last abscess was in August 2021 at which point it was I&D'd. She presented again in November 2021 with a Crohn's flare but eloped prior to admission. Pt reports she started having pain in her buttocks about a week ago which has progressively worsened accompanied by subjective fevers. Last BM 2 days ago. Endorses nausea and small episode of vomitus prior to arrival. +flatus. Denies CP, SOB, abdominal pain, dysuria, hematuria, or recent illness. Unsure of last colonoscopy. Does not follow with GI. Takes Prednisone 40 mg daily. While in the ED, pt used the bedpan and her abscess spontaneously started to drain.     ROS: 10 systems reviewed. All negative except those noted in the HPI.    PAST MEDICAL & SURGICAL HISTORY:  Asthma  Crohns disease of small intestine  Hemorrhoids    No significant past surgical history    Home Medications:  albuterol 90 mcg/inh inhalation aerosol with adapter: inhaled 4 times a day, As Needed (23 Sep 2019 20:55)    VITALS:  T(F): 98.5 (04-23-22 @ 08:55), Max: 98.5 (04-23-22 @ 08:55)  HR: 98 (04-23-22 @ 08:55) (98 - 98)  BP: 116/59 (04-23-22 @ 08:55) (116/59 - 116/59)  RR: 18 (04-23-22 @ 08:55) (18 - 18)  SpO2: 98% (04-23-22 @ 08:55) (98% - 98%)    PHYSICAL EXAM:  General: NAD, AAOx3, calm and cooperative  HEENT: NCAT, Trachea ML, Neck supple  Cardiac: RRR  Respiratory: normal respiratory effort  Abdomen: Soft, non-distended, non-tender, no rebound, no guarding. +BS.  Rectal: multiple skin tags. small opening to the right of the anus with purulent drainage.   Musculoskeletal: Strength 5/5 BL UE/LE, ROM intact, compartments soft  Neuro: Sensation grossly intact and equal throughout, no focal deficits  Vascular: Pulses 2+ throughout, extremities well perfused  Skin: Warm/dry, normal color, no jaundice    MEDICATIONS  (STANDING):  cefepime   IVPB 1000 milliGRAM(s) IV Intermittent once  enoxaparin Injectable 60 milliGRAM(s) SubCutaneous Once  metroNIDAZOLE  IVPB 500 milliGRAM(s) IV Intermittent once    MEDICATIONS  (PRN):    LAB/STUDIES:                        12.1   14.49 )-----------( 217      ( 23 Apr 2022 09:14 )             37.5     04-23    140  |  102  |  21<H>  ----------------------------<  67<L>  4.3   |  23  |  0.7    Ca    8.9      23 Apr 2022 09:14    TPro  6.5  /  Alb  4.0  /  TBili  0.3  /  DBili  x   /  AST  44<H>  /  ALT  27  /  AlkPhos  52  04-23    LIVER FUNCTIONS - ( 23 Apr 2022 09:14 )  Alb: 4.0 g/dL / Pro: 6.5 g/dL / ALK PHOS: 52 U/L / ALT: 27 U/L / AST: 44 U/L / GGT: x           IMAGING:  < from: CT Abdomen and Pelvis w/ Oral Cont and w/ IV Cont (04.23.22 @ 12:13) >    ACC: [id number] EXAM:  CT ABDOMEN AND PELVIS OC IC                          PROCEDURE DATE:  04/23/2022      INTERPRETATION:  CLINICAL STATEMENT: Lower abdominal pain, history of   Crohn's disease.    TECHNIQUE: Contiguous axial CT images were obtained from the lower chest   to the pubic symphysis following the administration of 100 cc Omnipaque   350 intravenous contrast.  Oral contrast was administered.  Reformatted   images in the coronal and sagittal planes were acquired.    COMPARISON: CT abdomen 11/4/2021.    FINDINGS:    LOWER CHEST: Acute pulmonary emboli within bilateral lower lobe segmental   pulmonary branches.    HEPATOBILIARY: Unremarkable.    SPLEEN: Unremarkable.    PANCREAS: Unremarkable.    ADRENAL GLANDS: Unremarkable.    KIDNEYS: Symmetric renal enhancement. No hydronephrosis or ureteral   calculi.    ABDOMINOPELVIC NODES: Unremarkable.    PELVIC ORGANS: Unremarkable.    PERITONEUM/MESENTERY/BOWEL: Enteric contrast reaches the descending   colon. No bowel obstruction, pneumoperitoneum or ascites. Normal   appendix. Air-fluid collection within the right ischioanal fossa measures   7.3 x 1.8 x 3.1 cm (CC x TR x AP) and abuts the external anal sphincter.    BONES/SOFT TISSUES: L4 vertebral body hemangioma. Rectus abdominous   diastasis.    OTHER: Normal caliber abdominal aorta.    IMPRESSION:    1. Abscess in the right ischioanal fossa abutting the external anal   sphincter. Dedicated MRI may be obtained for evaluation of perianal   fistula.  2. Acute bilaterallower lobe pulmonary emboli.    Preliminary findings discussed with Dr. Luna on 4/23/2022 12:36 PM with   readback.    --- End of Report ---    JOSEP LUJAN DO; Resident Radiologist  This document has been electronically signed.  DANIEL DIAMOND MD; Attending Radiologist  This document has been electronically signed. Apr 23 2022 12:47PM    < end of copied text >    ASSESSMENT:  32 yo F with a PMH of asthma and Crohn's disease complicated by perianal fistulae and perianal abscess presents with recurrent perianal abscess and incidental acute b/l lower lobe PEs.    PLAN:  -s/p bedside I&D  -IV antibiotics  -f/u wound culture  -wash area at least daily and after each BM  -admit to medicine for PEs

## 2022-04-23 NOTE — ED ADULT NURSE NOTE - OBJECTIVE STATEMENT
pt reports recurrent rectal abscess (was surgically removed 8 months ago). states it came back over the past week. pain radiating to rlq. pt also reports rectal bleeding 2 weeks ago that has stopped.

## 2022-04-23 NOTE — H&P ADULT - ASSESSMENT
31F w/ PMHx of asthma and Crohn's disease complicated by perianal fistulae and recurrent perianal abscess presented to [facility] ED c/o perianal pain. CT done showing abscess. General surgery consulted. Pt's last abscess was in August 2021 at which point it was I&D'd. She presented again in November 2021 with a Crohn's flare but eloped prior to admission. Pt reports she started having pain in her buttocks about a week ago which has progressively worsened accompanied by subjective fevers. Last BM 2 days ago. Endorses nausea and small episode of vomitus prior to arrival. +flatus. Denies CP, SOB, abdominal pain, dysuria, hematuria, or recent illness. Unsure of last colonoscopy. Does not follow with GI. Takes Prednisone 40 mg daily. While in the ED, pt used the bedpan and her abscess spontaneously started to drain.     #PE  -PESI class I: Very low risk  -CT: Acute bilaterallower lobe pulmonary emboli.  PLAN  - Lovenox > Switch to ELiquis in AM if no bleeding from I&D  - TTE    #Asthma    #Crohn's Disease  #Perinal Abscess  -CT: Abscess in the right ischioanal fossa abutting the external anal sphincter.  PLAN  - s/p Cefepime/Flagyl + I&D in the ED >> Cipro/Flagyl  - f/u Wound Cx  - Colorectal surgery following 31F w/ PMHx of asthma and Crohn's disease complicated by perianal fistulae and recurrent perianal abscess presented to [facility] ED c/o perianal pain.    #PE  -PESI class I: Very low risk  -CT: Acute bilateral lower lobe pulmonary emboli.  -Takes OCPs  PLAN  - Lovenox > Switch to DOAC in AM if no bleeding from I&D  - TTE  - Counselled on OCP cessation and need for alternative methods    #Asthma  -Not on any inhalers  -Controlled    #Crohn's Disease  #Perinal Abscess, recurrent  -CT: Abscess in the right ischioanal fossa abutting the external anal sphincter.  PLAN  - s/p Cefepime/Flagyl + I&D in the ED >> Cipro/Flagyl  - f/u Wound Cx  - Colorectal surgery following  - c/w Pred 40 > GI eval for steroid sparing recs    DVT ppx Lovenox  GI ppx PPI  Diet Regular  Code Full  Dispo Acute 31F w/ PMHx of asthma and Crohn's disease complicated by perianal fistulae and recurrent perianal abscess presented to [facility] ED c/o perianal pain.    #PE  -PESI class I: Very low risk  -CT: Acute bilateral lower lobe pulmonary emboli.  -Takes OCPs  PLAN  - Lovenox > Switch to DOAC in AM if no bleeding from I&D  - TTE  - Counselled on OCP cessation and need for alternative methods    #Asthma  -Not on any inhalers  -Controlled    #Crohn's Disease  #Perinal Abscess, recurrent  -CT: Abscess in the right ischioanal fossa abutting the external anal sphincter.  PLAN  - s/p Cefepime/Flagyl + I&D in the ED >> Cipro/Flagyl  - f/u Wound Cx  - Colorectal surgery following  - c/w Pred 40 > GI eval for steroid sparing recs  - Pain control    DVT ppx Lovenox  GI ppx PPI  Diet Regular  Code Full  Dispo Acute

## 2022-04-23 NOTE — H&P ADULT - HISTORY OF PRESENT ILLNESS
31F w/ PMHx of asthma and Crohn's disease complicated by perianal fistulae and recurrent perianal abscess presented to [facility] ED c/o perianal pain. CT done showing abscess. General surgery consulted. Pt's last abscess was in August 2021 at which point it was I&D'd. She presented again in November 2021 with a Crohn's flare but eloped prior to admission. Pt reports she started having pain in her buttocks about a week ago which has progressively worsened accompanied by subjective fevers. Last BM 2 days ago. Endorses nausea and small episode of vomitus prior to arrival. +flatus. Denies CP, SOB, abdominal pain, dysuria, hematuria, or recent illness. Unsure of last colonoscopy. Does not follow with GI. Takes Prednisone 40 mg daily. While in the ED, pt used the bedpan and her abscess spontaneously started to drain.  31F w/ PMHx of asthma and Crohn's disease complicated by perianal fistulae and recurrent perianal abscess presented to [facility] ED c/o perianal pain. CT done showing abscess. GPt's last abscess was in August 2021 at which point it was I&D'd. She presented again in November 2021 with a Crohn's flare but eloped prior to admission. Pt reports she started having pain in her buttocks about a week ago which has progressively worsened accompanied by subjective fevers. Last BM 2 days ago. Endorses nausea and small episode of vomitus prior to arrival. +flatus. Denies CP, SOB, abdominal pain, dysuria, hematuria, or recent illness. Unsure of last colonoscopy. Does not follow with GI. Takes Prednisone 40 mg daily. While in the ED, pt used the bedpan and her abscess spontaneously started to drain.  31F w/ PMHx of asthma and Crohn's disease complicated by perianal fistulae and recurrent perianal abscess presented to [facility] ED c/o perianal pain. Pt reports she started having pain in her buttocks about a week ago which has progressively worsened accompanied by subjective fevers. Last BM 2 days ago. Endorses nausea and small episode of vomitus prior to arrival. +flatus.  Pt's last abscess was in August 2021 at which point it was I&D'd. She presented again in November 2021 with a Crohn's flare but eloped prior to admission. Unsure of last colonoscopy. Does not follow with GI and takes Prednisone 40 mg daily.    ED course: VS in triage. Labs revealed leukocytosis and LA 3. CT done showing abscess and incidental acute b/l PE.

## 2022-04-23 NOTE — PATIENT PROFILE ADULT - FALL HARM RISK - HARM RISK INTERVENTIONS

## 2022-04-23 NOTE — H&P ADULT - NSHPLABSRESULTS_GEN_ALL_CORE
Labs:                         12.1   14.49 )-----------( 217      ( 23 Apr 2022 09:14 )             37.5     Neutophil% 88.4, Lymphocyte% 5.7, Monocyte% 4.9, Bands% 0.7 04-23-22 @ 09:14    23 Apr 2022 09:14    140    |  102    |  21     ----------------------------<  67     4.3     |  23     |  0.7      Ca    8.9        23 Apr 2022 09:14    TPro  6.5    /  Alb  4.0    /  TBili  0.3    /  DBili  x      /  AST  44     /  ALT  27     /  AlkPhos  52     23 Apr 2022 09:14        Amylase --, Lipase 61, 04-23-22 @ 09:14      COVID-19 PCR: NotDetec (04-23)    Radiology:     < from: CT Abdomen and Pelvis w/ Oral Cont and w/ IV Cont (04.23.22 @ 12:13) >    1. Abscess in the right ischioanal fossa abutting the external anal   sphincter. Dedicated MRI may be obtained for evaluation of perianal   fistula.  2. Acute bilaterallower lobe pulmonary emboli.    < end of copied text >

## 2022-04-24 LAB
ALBUMIN SERPL ELPH-MCNC: 3.2 G/DL — LOW (ref 3.5–5.2)
ALP SERPL-CCNC: 40 U/L — SIGNIFICANT CHANGE UP (ref 30–115)
ALT FLD-CCNC: 19 U/L — SIGNIFICANT CHANGE UP (ref 0–41)
ANION GAP SERPL CALC-SCNC: 12 MMOL/L — SIGNIFICANT CHANGE UP (ref 7–14)
AST SERPL-CCNC: 14 U/L — SIGNIFICANT CHANGE UP (ref 0–41)
BASOPHILS # BLD AUTO: 0.01 K/UL — SIGNIFICANT CHANGE UP (ref 0–0.2)
BASOPHILS NFR BLD AUTO: 0.1 % — SIGNIFICANT CHANGE UP (ref 0–1)
BILIRUB SERPL-MCNC: <0.2 MG/DL — SIGNIFICANT CHANGE UP (ref 0.2–1.2)
BLD GP AB SCN SERPL QL: SIGNIFICANT CHANGE UP
BUN SERPL-MCNC: 13 MG/DL — SIGNIFICANT CHANGE UP (ref 10–20)
CALCIUM SERPL-MCNC: 8 MG/DL — LOW (ref 8.5–10.1)
CHLORIDE SERPL-SCNC: 105 MMOL/L — SIGNIFICANT CHANGE UP (ref 98–110)
CO2 SERPL-SCNC: 22 MMOL/L — SIGNIFICANT CHANGE UP (ref 17–32)
CREAT SERPL-MCNC: 0.6 MG/DL — LOW (ref 0.7–1.5)
EGFR: 121 ML/MIN/1.73M2 — SIGNIFICANT CHANGE UP
EOSINOPHIL # BLD AUTO: 0.02 K/UL — SIGNIFICANT CHANGE UP (ref 0–0.7)
EOSINOPHIL NFR BLD AUTO: 0.2 % — SIGNIFICANT CHANGE UP (ref 0–8)
GLUCOSE SERPL-MCNC: 77 MG/DL — SIGNIFICANT CHANGE UP (ref 70–99)
HCT VFR BLD CALC: 34 % — LOW (ref 37–47)
HGB BLD-MCNC: 11.3 G/DL — LOW (ref 12–16)
IMM GRANULOCYTES NFR BLD AUTO: 0.7 % — HIGH (ref 0.1–0.3)
LYMPHOCYTES # BLD AUTO: 0.69 K/UL — LOW (ref 1.2–3.4)
LYMPHOCYTES # BLD AUTO: 7 % — LOW (ref 20.5–51.1)
MAGNESIUM SERPL-MCNC: 1.9 MG/DL — SIGNIFICANT CHANGE UP (ref 1.8–2.4)
MCHC RBC-ENTMCNC: 28.8 PG — SIGNIFICANT CHANGE UP (ref 27–31)
MCHC RBC-ENTMCNC: 33.2 G/DL — SIGNIFICANT CHANGE UP (ref 32–37)
MCV RBC AUTO: 86.7 FL — SIGNIFICANT CHANGE UP (ref 81–99)
MONOCYTES # BLD AUTO: 0.43 K/UL — SIGNIFICANT CHANGE UP (ref 0.1–0.6)
MONOCYTES NFR BLD AUTO: 4.4 % — SIGNIFICANT CHANGE UP (ref 1.7–9.3)
NEUTROPHILS # BLD AUTO: 8.58 K/UL — HIGH (ref 1.4–6.5)
NEUTROPHILS NFR BLD AUTO: 87.6 % — HIGH (ref 42.2–75.2)
NRBC # BLD: 0 /100 WBCS — SIGNIFICANT CHANGE UP (ref 0–0)
PLATELET # BLD AUTO: 199 K/UL — SIGNIFICANT CHANGE UP (ref 130–400)
POTASSIUM SERPL-MCNC: 3.7 MMOL/L — SIGNIFICANT CHANGE UP (ref 3.5–5)
POTASSIUM SERPL-SCNC: 3.7 MMOL/L — SIGNIFICANT CHANGE UP (ref 3.5–5)
PROT SERPL-MCNC: 5.1 G/DL — LOW (ref 6–8)
RBC # BLD: 3.92 M/UL — LOW (ref 4.2–5.4)
RBC # FLD: 15 % — HIGH (ref 11.5–14.5)
SODIUM SERPL-SCNC: 139 MMOL/L — SIGNIFICANT CHANGE UP (ref 135–146)
WBC # BLD: 9.8 K/UL — SIGNIFICANT CHANGE UP (ref 4.8–10.8)
WBC # FLD AUTO: 9.8 K/UL — SIGNIFICANT CHANGE UP (ref 4.8–10.8)

## 2022-04-24 PROCEDURE — 99223 1ST HOSP IP/OBS HIGH 75: CPT

## 2022-04-24 PROCEDURE — 99233 SBSQ HOSP IP/OBS HIGH 50: CPT

## 2022-04-24 PROCEDURE — 99232 SBSQ HOSP IP/OBS MODERATE 35: CPT

## 2022-04-24 RX ORDER — HYDROMORPHONE HYDROCHLORIDE 2 MG/ML
1 INJECTION INTRAMUSCULAR; INTRAVENOUS; SUBCUTANEOUS EVERY 4 HOURS
Refills: 0 | Status: DISCONTINUED | OUTPATIENT
Start: 2022-04-24 | End: 2022-04-26

## 2022-04-24 RX ORDER — HYDROMORPHONE HYDROCHLORIDE 2 MG/ML
0.5 INJECTION INTRAMUSCULAR; INTRAVENOUS; SUBCUTANEOUS EVERY 8 HOURS
Refills: 0 | Status: DISCONTINUED | OUTPATIENT
Start: 2022-04-24 | End: 2022-04-24

## 2022-04-24 RX ORDER — ENOXAPARIN SODIUM 100 MG/ML
50 INJECTION SUBCUTANEOUS EVERY 12 HOURS
Refills: 0 | Status: DISCONTINUED | OUTPATIENT
Start: 2022-04-24 | End: 2022-04-26

## 2022-04-24 RX ADMIN — HYDROMORPHONE HYDROCHLORIDE 1 MILLIGRAM(S): 2 INJECTION INTRAMUSCULAR; INTRAVENOUS; SUBCUTANEOUS at 12:13

## 2022-04-24 RX ADMIN — Medication 40 MILLIGRAM(S): at 05:33

## 2022-04-24 RX ADMIN — HYDROMORPHONE HYDROCHLORIDE 0.5 MILLIGRAM(S): 2 INJECTION INTRAMUSCULAR; INTRAVENOUS; SUBCUTANEOUS at 04:29

## 2022-04-24 RX ADMIN — PANTOPRAZOLE SODIUM 40 MILLIGRAM(S): 20 TABLET, DELAYED RELEASE ORAL at 05:33

## 2022-04-24 RX ADMIN — HYDROMORPHONE HYDROCHLORIDE 1 MILLIGRAM(S): 2 INJECTION INTRAMUSCULAR; INTRAVENOUS; SUBCUTANEOUS at 20:23

## 2022-04-24 RX ADMIN — Medication 100 MILLIGRAM(S): at 14:17

## 2022-04-24 RX ADMIN — Medication 100 MILLIGRAM(S): at 21:05

## 2022-04-24 RX ADMIN — HYDROMORPHONE HYDROCHLORIDE 1 MILLIGRAM(S): 2 INJECTION INTRAMUSCULAR; INTRAVENOUS; SUBCUTANEOUS at 21:05

## 2022-04-24 RX ADMIN — ENOXAPARIN SODIUM 50 MILLIGRAM(S): 100 INJECTION SUBCUTANEOUS at 17:20

## 2022-04-24 RX ADMIN — Medication 100 MILLIGRAM(S): at 08:07

## 2022-04-24 NOTE — CONSULT NOTE ADULT - ASSESSMENT
31F w/ PMHx of asthma and Crohn's disease (for the last 6 years) complicated by recurrent perianal abscess (unsure about fistula), non compliant for the last 6 years is on prednisone 40mg daily, presented to [facility] ED c/o perianal pain and drainage    Crohn's disease with recurrent perianal abscess.  Sigmoidoscopy : 2019 by Dr. Calli jessicap ulcers and pathology showed cryptitis, crypt abscess, architectural distortion. No fistula on Colonoscopy.   Pt's last perianal abscess was in August 2021 at which point it was I&D'd under anesthesia by Dr. Fitzgerald and found no fistula, no seton was placed.  second episode this time I and D performed for (7.3cm perianal abscess) at the ER and now appears much better on physical exam.  Baseline symptoms: liquid stools two to 5 epsides, non bloody, intermittent cramps and vomiting.  She is been on prednsione 40mg daily for the last 6 years, given by multiple ER visits, PMD, never follows a GI doctor, one time she followed in [facility] then lost to follow up again. never on Biological treatment.  She has JULIO CESAR+    REC:  advance diet as tolerated to low fat, lactose free.  needs C diff, ESR, CRP, FCP, quantiferon gold, hepatitis serology, TPMT levels.  At this point c/w Cipro and Flagyl for two weeks.  c/w prednisone 40mg as of now as been on for 6 years but she needs full evaluation as outpatient once abscess is resolved with EGD/Colonoscopy/MRE and need for Biologicals  F/U with surgery if they need to perform a MRI pelvis (fistula protocol) or examination under anesthesia to evaluate for fistula as likely there is always an etiology for abscess to acheive if patient has fistulating disease.  Dr. Bush on case.  I explained to the patient the risks of non compliance

## 2022-04-24 NOTE — PROGRESS NOTE ADULT - SUBJECTIVE AND OBJECTIVE BOX
GENERAL SURGERY PROGRESS NOTE    Patient: GREYSON GARZA , 31y ([birth year])Female   MRN: [id number]  Location: [address]  Visit: 04-23-22 Inpatient  Date: 04-24-22 @ 09:14    Hospital Day #:2    Procedure/Dx/Injuries: perianal abscess s/p I&D (no packing)     Events of past 24 hours:  Bedside I&D performed, purulent drainage 20cc. Pt tolerated well, no complications. Wound culture sent. Admitted to medicine for PEs. IV abx (Cipro/flagyl) began.     PAST MEDICAL & SURGICAL HISTORY:  Asthma  Crohns disease of small intestine  Hemorrhoids  No significant past surgical history    Vitals:   T(F): 97.5 (04-24-22 @ 04:30), Max: 97.6 (04-23-22 @ 15:46)  HR: 68 (04-24-22 @ 04:30)  BP: 109/67 (04-24-22 @ 04:30)  RR: 18 (04-24-22 @ 04:30)  SpO2: --    Diet, Regular  Fluids: lactated ringers.: Solution, 1000 milliLiter(s) infuse at 100 mL/Hr    PHYSICAL EXAM:  General: NAD, AAOx3, calm and cooperative  HEENT: NCAT, Trachea ML, Neck supple  Cardiac: RRR  Respiratory: normal respiratory effort  Abdomen: Soft, non-distended, non-tender, no rebound, no guarding. +BS.  Rectal: multiple skin tags. small opening to the right of the anus with purulent drainage now s/p I&D with dressings in place C/D/I  Musculoskeletal: Strength 5/5 BL UE/LE, ROM intact, compartments soft  Neuro: Sensation grossly intact and equal throughout, no focal deficits  Vascular: Pulses 2+ throughout, extremities well perfused  Skin: Warm/dry, normal color, no jaundice    MEDICATIONS  (STANDING):  chlorhexidine 4% Liquid 1 Application(s) Topical <User Schedule>  ciprofloxacin   IVPB 400 milliGRAM(s) IV Intermittent every 12 hours  lactated ringers. 1000 milliLiter(s) (100 mL/Hr) IV Continuous <Continuous>  metroNIDAZOLE  IVPB 500 milliGRAM(s) IV Intermittent every 8 hours  pantoprazole    Tablet 40 milliGRAM(s) Oral before breakfast  predniSONE   Tablet 40 milliGRAM(s) Oral daily    MEDICATIONS  (PRN):  acetaminophen     Tablet .. 650 milliGRAM(s) Oral every 6 hours PRN Mild Pain (1 - 3)  HYDROmorphone  Injectable 0.5 milliGRAM(s) IV Push every 8 hours PRN Severe Pain (7 - 10)  traMADol 25 milliGRAM(s) Oral every 6 hours PRN Moderate Pain (4 - 6)      GI PROPHYLAXIS: pantoprazole    Tablet 40 milliGRAM(s) Oral before breakfast  ANTIBIOTICS:  ciprofloxacin   IVPB 400 milliGRAM(s)  metroNIDAZOLE  IVPB 500 milliGRAM(s)    LAB/STUDIES:  Labs:  CAPILLARY BLOOD GLUCOSE                        11.3   9.80  )-----------( 199      ( 24 Apr 2022 04:30 )             34.0       Auto Neutrophil %: 87.6 % (04-24-22 @ 04:30)  Auto Immature Granulocyte %: 0.7 % (04-24-22 @ 04:30)    04-23    140  |  102  |  21<H>  ----------------------------<  67<L>  4.3   |  23  |  0.7      LFTs:             6.5  | 0.3  | 44       ------------------[52      ( 23 Apr 2022 09:14 )  4.0  | x    | 27          Lipase:61     Amylase:x         Lactate, Blood: 1.6 mmol/L (04-23-22 @ 16:00)  Lactate, Blood: 3.0 mmol/L (04-23-22 @ 09:14)      IMAGING:  < from: CT Abdomen and Pelvis w/ Oral Cont and w/ IV Cont (04.23.22 @ 12:13) >  IMPRESSION:  1. Abscess in the right ischioanal fossa abutting the external anal   sphincter. Dedicated MRI may be obtained for evaluation of perianal   fistula.  2. Acute bilaterallower lobe pulmonary emboli.      ACCESS/ DEVICES:  [ ] Peripheral IV

## 2022-04-24 NOTE — PROGRESS NOTE ADULT - SUBJECTIVE AND OBJECTIVE BOX
GREYSON GARZA  31y  Female  ***My note supersedes ALL resident notes that I sign.  My corrections for their notes are in my note.***    I can be reached directly on [employer] [id number]. My office number is [phone]. My personal cell number is [phone].    INTERVAL EVENTS: Here for f/u of rectal pain. Pt still c/o rectal pain (dil 0.5 not enough). Pt also c/o rt arm weakness, which is mild. Not assoc w/ deep breathing. Pt can move arm well. Pt c/o left calf pain. Pt never had blood clots before. Pt does NOT feel SOB or palpitations. Pt has been on steroids for 5 yrs for Crohn's, but does NOT f/u well w/ GI as outpt.    T(F): 97.5 (04-24-22 @ 04:30), Max: 97.6 (04-23-22 @ 15:46)  HR: 68 (04-24-22 @ 04:30) (60 - 68)  BP: 109/67 (04-24-22 @ 04:30) (91/58 - 109/67)  RR: 18 (04-24-22 @ 04:30) (18 - 18)  SpO2: --    Gen: NAD  HEENT: PERRL, EOMI, mouth clr, nose clr  Neck: no nodes, no JVD, thyroid nl  lungs: clr  hrt: s1 s2 rrr no murmur  abd: soft, NT/ND, no HS megaly  ext: no edema, no c/c; rt arm seems fine (no swelling or deformity; FROM); + pain left calf and left ankle (no swelling)  neuro: aa ox3, cn intact, can move all 4 ext    LABS:                      11.3    (    86.7   9.80  )-----------( ---------      199      ( 24 Apr 2022 04:30 )             34.0    (    15.0     WBC Count: 9.80 K/uL (04-24-22 @ 04:30) - better  WBC Count: 14.49 K/uL (04-23-22 @ 09:14)    139   (   105   (   77      04-24-22 @ 04:30  ----------------------               3.7   (   22   (   13                             -----                        0.6  Ca  8.0   Mg  1.9    P   --     LFT  5.1  (  <0.2  (  14       04-24-22 @ 04:30  -------------------------  3.2  (  40  (  19    RADIOLOGY & ADDITIONAL TESTS:  < from: CT Abdomen and Pelvis w/ Oral Cont and w/ IV Cont (04.23.22 @ 12:13) >  PERITONEUM/MESENTERY/BOWEL: Enteric contrast reaches the descending   colon. No bowel obstruction, pneumoperitoneum or ascites. Normal   appendix. Air-fluid collection within the right ischioanal fossa measures   7.3 x 1.8 x 3.1 cm (CC x TR x AP) and abuts the external anal sphincter.    BONES/SOFT TISSUES: L4 vertebral body hemangioma. Rectus abdominous   diastasis.    OTHER: Normal caliber abdominal aorta.    IMPRESSION:    1. Abscess in the right ischioanal fossa abutting the external anal   sphincter. Dedicated MRI may be obtained for evaluation of perianal fistula.  2. Acute bilateral lower lobe pulmonary emboli.    < end of copied text >    < from: Xray Ankle 2 Views, Left (04.23.22 @ 09:41) >  IMPRESSION:    No acute fracture or dislocation. Ankle mortise is symmetric.    < end of copied text >    MEDICATIONS:  ciprofloxacin   IVPB 400 milliGRAM(s) IV Intermittent every 12 hours  metroNIDAZOLE  IVPB 500 milliGRAM(s) IV Intermittent every 8 hours    acetaminophen     Tablet .. 650 milliGRAM(s) Oral every 6 hours PRN  chlorhexidine 4% Liquid 1 Application(s) Topical <User Schedule>  HYDROmorphone  Injectable 1 milliGRAM(s) IV Push every 4 hours PRN  pantoprazole    Tablet 40 milliGRAM(s) Oral before breakfast  predniSONE   Tablet 40 milliGRAM(s) Oral daily

## 2022-04-24 NOTE — CONSULT NOTE ADULT - SUBJECTIVE AND OBJECTIVE BOX
Patient is a 31y old  Female who presents with a chief complaint of     Admitted on: 04-23-22    HPI:    31F w/ PMHx of asthma and Crohn's disease complicated by perianal fistulae and recurrent perianal abscess presented to [facility] [place] ED c/o perianal pain. Pt reports she started having pain in her buttocks about a week ago which has progressively worsened accompanied by subjective fevers. Last BM 2 days ago. Endorses nausea and small episode of vomitus prior to arrival. +flatus.  Pt's last abscess was in August 2021 at which point it was I&D'd. She presented again in November 2021 with a Crohn's flare but eloped prior to admission. Does not follow with GI and takes Prednisone 40 mg daily.          COVID 19: Negative      Prior EGD: never  Sigmoidoscopy 2019:   Prior Colonoscopy: reports having 2 colonoscopies, none in the system  < from: Flexible Sigmoidoscopy (07.19.19 @ 12:15) >  Impressions:    The maximum extent of this study was 60cm.    The rectum appeared normal up until 15 cm. Biopsies of the rectum were taken.  However on retroflexion there was small (<1cm) ulceration in the perianal area.  No fistulas could be seen (Biopsy).    Between 15 and 20 cm, mild colitis (erythema) without guillaume ulceration was seen  (Biopsy).    From 20cm to 60cm, severe linear ulcerations with skipped areas and pseudopolyp  formation without bleeding was seen. Multiple biopsies were taken (Biopsy).    These findings are suggestive of IBD favoring Crohn's disease over ulcerative  colitis.         PAST MEDICAL & SURGICAL HISTORY:  Asthma    Crohns disease of small intestine    Hemorrhoids    No significant past surgical history        FAMILY HISTORY:  FH: inflammatory bowel disease  maternal cousin        Social History:  No Iv drug abuse    Home Medications:    MEDICATIONS  (STANDING):  chlorhexidine 4% Liquid 1 Application(s) Topical <User Schedule>  ciprofloxacin   IVPB 400 milliGRAM(s) IV Intermittent every 12 hours  lactated ringers. 1000 milliLiter(s) (100 mL/Hr) IV Continuous <Continuous>  metroNIDAZOLE  IVPB 500 milliGRAM(s) IV Intermittent every 8 hours  pantoprazole    Tablet 40 milliGRAM(s) Oral before breakfast  predniSONE   Tablet 40 milliGRAM(s) Oral daily    MEDICATIONS  (PRN):  acetaminophen     Tablet .. 650 milliGRAM(s) Oral every 6 hours PRN Mild Pain (1 - 3)  HYDROmorphone  Injectable 0.5 milliGRAM(s) IV Push every 8 hours PRN Severe Pain (7 - 10)  traMADol 25 milliGRAM(s) Oral every 6 hours PRN Moderate Pain (4 - 6)      Allergies  penicillin (Rash)      Review of Systems:   Constitutional:  No Fever, No Chills  ENT/Mouth:  No Hearing Changes,  No Difficulty Swallowing  Eyes:  No Eye Pain, No Vision Changes  Cardiovascular:  No Chest Pain, No Palpitations  Respiratory:  No Cough, No Dyspnea  Gastrointestinal:  As described in HPI  Musculoskeletal:  No Joint Swelling, No Back Pain  Skin:  No Skin Lesions, No Jaundice  Neuro:  No Syncope, No Dizziness  Heme/Lymph:  No Bruising, No Bleeding.          Physical Examination:  T(C): 36.4 (04-24-22 @ 04:30), Max: 36.4 (04-23-22 @ 15:46)  HR: 68 (04-24-22 @ 04:30) (60 - 68)  BP: 109/67 (04-24-22 @ 04:30) (91/58 - 109/67)  RR: 18 (04-24-22 @ 04:30) (18 - 18)  SpO2: --        Constitutional: No acute distress.  Eyes:. Conjunctivae are clear, Sclera is non-icteric.  Ears Nose and Throat: The external ears are normal appearing,  Oral mucosa is pink and moist.  Respiratory:  No signs of respiratory distress. Lung sounds are clear bilaterally.  Cardiovascular:  S1 S2, Regular rate and rhythm.  GI: Abdomen is soft, symmetric, and non-tender without distention.  Neuro: No Tremor, No involuntary movements  Skin: No Jaundice.          Data: (reviewed by attending)                        11.3   9.80  )-----------( 199      ( 24 Apr 2022 04:30 )             34.0     Hgb Trend:  11.3  04-24-22 @ 04:30  12.1  04-23-22 @ 09:14      04-23    140  |  102  |  21<H>  ----------------------------<  67<L>  4.3   |  23  |  0.7    Ca    8.9      23 Apr 2022 09:14    TPro  6.5  /  Alb  4.0  /  TBili  0.3  /  DBili  x   /  AST  44<H>  /  ALT  27  /  AlkPhos  52  04-23    Liver panel trend:  TBili 0.3   /   AST 44   /   ALT 27   /   AlkP 52   /   Tptn 6.5   /   Alb 4.0    /   DBili --      04-23              Radiology:(reviewed by attending)  CT Abdomen and Pelvis w/ Oral Cont and w/ IV Cont:   ACC: [id number] EXAM:  CT ABDOMEN AND PELVIS OC IC                          PROCEDURE DATE:  04/23/2022          INTERPRETATION:  CLINICAL STATEMENT: Lower abdominal pain, history of   Crohn's disease.    TECHNIQUE: Contiguous axial CT images were obtained from the lower chest   to the pubic symphysis following the administration of 100 cc Omnipaque   350 intravenous contrast.  Oral contrast was administered.  Reformatted   images in the coronal and sagittal planes were acquired.    COMPARISON: CT abdomen 11/4/2021.    FINDINGS:    LOWER CHEST: Acute pulmonary emboli within bilateral lower lobe segmental   pulmonary branches.    HEPATOBILIARY: Unremarkable.    SPLEEN: Unremarkable.    PANCREAS: Unremarkable.    ADRENAL GLANDS: Unremarkable.    KIDNEYS: Symmetric renal enhancement. No hydronephrosis or ureteral   calculi.    ABDOMINOPELVIC NODES: Unremarkable.    PELVIC ORGANS: Unremarkable.    PERITONEUM/MESENTERY/BOWEL: Enteric contrast reaches the descending   colon. No bowel obstruction, pneumoperitoneum or ascites. Normal   appendix. Air-fluid collection within the right ischioanal fossa measures   7.3 x 1.8 x 3.1 cm (CC x TR x AP) and abuts the external anal sphincter.    BONES/SOFT TISSUES: L4 vertebral body hemangioma. Rectus abdominous   diastasis.    OTHER: Normal caliber abdominal aorta.    IMPRESSION:    1. Abscess in the right ischioanal fossa abutting the external anal   sphincter. Dedicated MRI may be obtained for evaluation of perianal   fistula.  2. Acute bilaterallower lobe pulmonary emboli.      Preliminary findings discussed with Dr. Luna on 4/23/2022 12:36 PM with   readback.    --- End of Report ---          JOSEP LUJAN DO; Resident Radiologist  This document has been electronically signed.  DANIEL DIAMOND MD; Attending Radiologist  This document has been electronically signed. Apr 23 2022 12:47PM (04-23-22 @ 12:13)             Patient is a 31y old  Female who presents with a chief complaint of     Admitted on: 04-23-22    HPI:    31F w/ PMHx of asthma and Crohn's disease complicated by perianal abscess (unsure about fistula), non compliant for the last 6 years is on prednisone 40mg daily, presented to [facility] [place] ED c/o perianal pain and drainage. Pt reports she started having pain in her buttocks about a week ago which has progressively worsened accompanied by subjective fevers. Last BM 2 days ago. Endorses nausea and small episode of vomitus prior to arrival. +flatus.  Pt's last perianal abscess was in August 2021 at which point it was I&D'd under anesthesia by Dr. Fitzgerald and found no fistula. She presented again in November 2021 with a Crohn's flare but eloped prior to admission. She was advised to follow up as outpatient but didn't follow up. her good days have 1-2 soft brown BM, bad days is 5 per day. she has some intermittent diffuse abdominal cramps, and NBNB vomiting.          COVID 19: Negative      Prior EGD: never  Sigmoidoscopy 2019:   Prior Colonoscopy: reports having 2 colonoscopies, none in the system  < from: Flexible Sigmoidoscopy (07.19.19 @ 12:15) >  Impressions:    The maximum extent of this study was 60cm.    The rectum appeared normal up until 15 cm. Biopsies of the rectum were taken.  However on retroflexion there was small (<1cm) ulceration in the perianal area.  No fistulas could be seen (Biopsy).    Between 15 and 20 cm, mild colitis (erythema) without guillaume ulceration was seen  (Biopsy).    From 20cm to 60cm, severe linear ulcerations with skipped areas and pseudopolyp  formation without bleeding was seen. Multiple biopsies were taken (Biopsy).    These findings are suggestive of IBD favoring Crohn's disease over ulcerative  colitis.         PAST MEDICAL & SURGICAL HISTORY:  Asthma    Crohns disease of small intestine    Hemorrhoids    No significant past surgical history        FAMILY HISTORY:  FH: inflammatory bowel disease  maternal cousin        Social History:  No Iv drug abuse    Home Medications:    MEDICATIONS  (STANDING):  chlorhexidine 4% Liquid 1 Application(s) Topical <User Schedule>  ciprofloxacin   IVPB 400 milliGRAM(s) IV Intermittent every 12 hours  lactated ringers. 1000 milliLiter(s) (100 mL/Hr) IV Continuous <Continuous>  metroNIDAZOLE  IVPB 500 milliGRAM(s) IV Intermittent every 8 hours  pantoprazole    Tablet 40 milliGRAM(s) Oral before breakfast  predniSONE   Tablet 40 milliGRAM(s) Oral daily    MEDICATIONS  (PRN):  acetaminophen     Tablet .. 650 milliGRAM(s) Oral every 6 hours PRN Mild Pain (1 - 3)  HYDROmorphone  Injectable 0.5 milliGRAM(s) IV Push every 8 hours PRN Severe Pain (7 - 10)  traMADol 25 milliGRAM(s) Oral every 6 hours PRN Moderate Pain (4 - 6)      Allergies  penicillin (Rash)      Review of Systems:   Constitutional:  No Fever, No Chills  ENT/Mouth:  No Hearing Changes,  No Difficulty Swallowing  Eyes:  No Eye Pain, No Vision Changes  Cardiovascular:  No Chest Pain, No Palpitations  Respiratory:  No Cough, No Dyspnea  Gastrointestinal:  As described in HPI  Musculoskeletal:  No Joint Swelling, No Back Pain  Skin:  No Skin Lesions, No Jaundice  Neuro:  No Syncope, No Dizziness  Heme/Lymph:  No Bruising, No Bleeding.          Physical Examination:  T(C): 36.4 (04-24-22 @ 04:30), Max: 36.4 (04-23-22 @ 15:46)  HR: 68 (04-24-22 @ 04:30) (60 - 68)  BP: 109/67 (04-24-22 @ 04:30) (91/58 - 109/67)  RR: 18 (04-24-22 @ 04:30) (18 - 18)  SpO2: --        Constitutional: No acute distress.  Eyes:. Conjunctivae are clear, Sclera is non-icteric.  Ears Nose and Throat: The external ears are normal appearing,  Oral mucosa is pink and moist.  Respiratory:  No signs of respiratory distress. Lung sounds are clear bilaterally.  Cardiovascular:  S1 S2, Regular rate and rhythm.  GI: Abdomen is soft, symmetric, and non-tender without distention.  Neuro: No Tremor, No involuntary movements  Skin: No Jaundice.          Data: (reviewed by attending)                        11.3   9.80  )-----------( 199      ( 24 Apr 2022 04:30 )             34.0     Hgb Trend:  11.3  04-24-22 @ 04:30  12.1  04-23-22 @ 09:14      04-23    140  |  102  |  21<H>  ----------------------------<  67<L>  4.3   |  23  |  0.7    Ca    8.9      23 Apr 2022 09:14    TPro  6.5  /  Alb  4.0  /  TBili  0.3  /  DBili  x   /  AST  44<H>  /  ALT  27  /  AlkPhos  52  04-23    Liver panel trend:  TBili 0.3   /   AST 44   /   ALT 27   /   AlkP 52   /   Tptn 6.5   /   Alb 4.0    /   DBili --      04-23              Radiology:(reviewed by attending)  CT Abdomen and Pelvis w/ Oral Cont and w/ IV Cont:   ACC: [id number] EXAM:  CT ABDOMEN AND PELVIS OC IC                          PROCEDURE DATE:  04/23/2022          INTERPRETATION:  CLINICAL STATEMENT: Lower abdominal pain, history of   Crohn's disease.    TECHNIQUE: Contiguous axial CT images were obtained from the lower chest   to the pubic symphysis following the administration of 100 cc Omnipaque   350 intravenous contrast.  Oral contrast was administered.  Reformatted   images in the coronal and sagittal planes were acquired.    COMPARISON: CT abdomen 11/4/2021.    FINDINGS:    LOWER CHEST: Acute pulmonary emboli within bilateral lower lobe segmental   pulmonary branches.    HEPATOBILIARY: Unremarkable.    SPLEEN: Unremarkable.    PANCREAS: Unremarkable.    ADRENAL GLANDS: Unremarkable.    KIDNEYS: Symmetric renal enhancement. No hydronephrosis or ureteral   calculi.    ABDOMINOPELVIC NODES: Unremarkable.    PELVIC ORGANS: Unremarkable.    PERITONEUM/MESENTERY/BOWEL: Enteric contrast reaches the descending   colon. No bowel obstruction, pneumoperitoneum or ascites. Normal   appendix. Air-fluid collection within the right ischioanal fossa measures   7.3 x 1.8 x 3.1 cm (CC x TR x AP) and abuts the external anal sphincter.    BONES/SOFT TISSUES: L4 vertebral body hemangioma. Rectus abdominous   diastasis.    OTHER: Normal caliber abdominal aorta.    IMPRESSION:    1. Abscess in the right ischioanal fossa abutting the external anal   sphincter. Dedicated MRI may be obtained for evaluation of perianal   fistula.  2. Acute bilaterallower lobe pulmonary emboli.      Preliminary findings discussed with Dr. Luna on 4/23/2022 12:36 PM with   readback.    --- End of Report ---          JOSEP LUJAN DO; Resident Radiologist  This document has been electronically signed.  DANIEL DIAMOND MD; Attending Radiologist  This document has been electronically signed. Apr 23 2022 12:47PM (04-23-22 @ 12:13)             Patient is a 31y old  Female who presents with a chief complaint of perianal abscess    Admitted on: 04-23-22    HPI:    31F w/ PMHx of asthma and Crohn's disease complicated by perianal abscess (unsure about fistula), non compliant for the last 6 years is on prednisone 40mg daily, presented to [facility] ED c/o perianal pain and drainage. Pt reports she started having pain in her buttocks about a week ago which has progressively worsened accompanied by subjective fevers. Last BM 2 days ago. Endorses nausea and small episode of vomitus prior to arrival. +flatus.  Pt's last perianal abscess was in August 2021 at which point it was I&D'd under anesthesia by Dr. Fitzgerald and found no fistula. She presented again in November 2021 with a Crohn's flare but eloped prior to admission. She was advised to follow up as outpatient but didn't follow up. her good days have 1-2 soft brown BM, bad days is 5 per day. she has some intermittent diffuse abdominal cramps, and NBNB vomiting.          COVID 19: Negative      Prior EGD: never  Sigmoidoscopy 2019:   Prior Colonoscopy: reports having 2 colonoscopies, none in the system  < from: Flexible Sigmoidoscopy (07.19.19 @ 12:15) >  Impressions:    The maximum extent of this study was 60cm.    The rectum appeared normal up until 15 cm. Biopsies of the rectum were taken.  However on retroflexion there was small (<1cm) ulceration in the perianal area.  No fistulas could be seen (Biopsy).    Between 15 and 20 cm, mild colitis (erythema) without guillaume ulceration was seen  (Biopsy).    From 20cm to 60cm, severe linear ulcerations with skipped areas and pseudopolyp  formation without bleeding was seen. Multiple biopsies were taken (Biopsy).    These findings are suggestive of IBD favoring Crohn's disease over ulcerative  colitis.         PAST MEDICAL & SURGICAL HISTORY:  Asthma    Crohns disease of small intestine    Hemorrhoids    No significant past surgical history        FAMILY HISTORY:  FH: inflammatory bowel disease  maternal cousin        Social History:  No Iv drug abuse    Home Medications:    MEDICATIONS  (STANDING):  chlorhexidine 4% Liquid 1 Application(s) Topical <User Schedule>  ciprofloxacin   IVPB 400 milliGRAM(s) IV Intermittent every 12 hours  lactated ringers. 1000 milliLiter(s) (100 mL/Hr) IV Continuous <Continuous>  metroNIDAZOLE  IVPB 500 milliGRAM(s) IV Intermittent every 8 hours  pantoprazole    Tablet 40 milliGRAM(s) Oral before breakfast  predniSONE   Tablet 40 milliGRAM(s) Oral daily    MEDICATIONS  (PRN):  acetaminophen     Tablet .. 650 milliGRAM(s) Oral every 6 hours PRN Mild Pain (1 - 3)  HYDROmorphone  Injectable 0.5 milliGRAM(s) IV Push every 8 hours PRN Severe Pain (7 - 10)  traMADol 25 milliGRAM(s) Oral every 6 hours PRN Moderate Pain (4 - 6)      Allergies  penicillin (Rash)      Review of Systems:   Constitutional:  No Fever, No Chills  ENT/Mouth:  No Hearing Changes,  No Difficulty Swallowing  Eyes:  No Eye Pain, No Vision Changes  Cardiovascular:  No Chest Pain, No Palpitations  Respiratory:  No Cough, No Dyspnea  Gastrointestinal:  As described in HPI  Musculoskeletal:  No Joint Swelling, No Back Pain  Skin:  No Skin Lesions, No Jaundice  Neuro:  No Syncope, No Dizziness  Heme/Lymph:  No Bruising, No Bleeding.          Physical Examination:  T(C): 36.4 (04-24-22 @ 04:30), Max: 36.4 (04-23-22 @ 15:46)  HR: 68 (04-24-22 @ 04:30) (60 - 68)  BP: 109/67 (04-24-22 @ 04:30) (91/58 - 109/67)  RR: 18 (04-24-22 @ 04:30) (18 - 18)  SpO2: --        Constitutional: No acute distress.  Eyes:. Conjunctivae are clear, Sclera is non-icteric.  Ears Nose and Throat: The external ears are normal appearing,  Oral mucosa is pink and moist.  Respiratory:  No signs of respiratory distress. Lung sounds are clear bilaterally.  Cardiovascular:  S1 S2, Regular rate and rhythm.  GI: Abdomen is soft, symmetric, and non-tender without distention.  Neuro: No Tremor, No involuntary movements  Skin: No Jaundice.          Data: (reviewed by attending)                        11.3   9.80  )-----------( 199      ( 24 Apr 2022 04:30 )             34.0     Hgb Trend:  11.3  04-24-22 @ 04:30  12.1  04-23-22 @ 09:14      04-23    140  |  102  |  21<H>  ----------------------------<  67<L>  4.3   |  23  |  0.7    Ca    8.9      23 Apr 2022 09:14    TPro  6.5  /  Alb  4.0  /  TBili  0.3  /  DBili  x   /  AST  44<H>  /  ALT  27  /  AlkPhos  52  04-23    Liver panel trend:  TBili 0.3   /   AST 44   /   ALT 27   /   AlkP 52   /   Tptn 6.5   /   Alb 4.0    /   DBili --      04-23              Radiology:(reviewed by attending)  CT Abdomen and Pelvis w/ Oral Cont and w/ IV Cont:   ACC: [id number] EXAM:  CT ABDOMEN AND PELVIS OC IC                          PROCEDURE DATE:  04/23/2022          INTERPRETATION:  CLINICAL STATEMENT: Lower abdominal pain, history of   Crohn's disease.    TECHNIQUE: Contiguous axial CT images were obtained from the lower chest   to the pubic symphysis following the administration of 100 cc Omnipaque   350 intravenous contrast.  Oral contrast was administered.  Reformatted   images in the coronal and sagittal planes were acquired.    COMPARISON: CT abdomen 11/4/2021.    FINDINGS:    LOWER CHEST: Acute pulmonary emboli within bilateral lower lobe segmental   pulmonary branches.    HEPATOBILIARY: Unremarkable.    SPLEEN: Unremarkable.    PANCREAS: Unremarkable.    ADRENAL GLANDS: Unremarkable.    KIDNEYS: Symmetric renal enhancement. No hydronephrosis or ureteral   calculi.    ABDOMINOPELVIC NODES: Unremarkable.    PELVIC ORGANS: Unremarkable.    PERITONEUM/MESENTERY/BOWEL: Enteric contrast reaches the descending   colon. No bowel obstruction, pneumoperitoneum or ascites. Normal   appendix. Air-fluid collection within the right ischioanal fossa measures   7.3 x 1.8 x 3.1 cm (CC x TR x AP) and abuts the external anal sphincter.    BONES/SOFT TISSUES: L4 vertebral body hemangioma. Rectus abdominous   diastasis.    OTHER: Normal caliber abdominal aorta.    IMPRESSION:    1. Abscess in the right ischioanal fossa abutting the external anal   sphincter. Dedicated MRI may be obtained for evaluation of perianal   fistula.  2. Acute bilaterallower lobe pulmonary emboli.      Preliminary findings discussed with Dr. Luna on 4/23/2022 12:36 PM with   readback.    --- End of Report ---          JOSEP LUJAN DO; Resident Radiologist  This document has been electronically signed.  DANIEL DIAMOND MD; Attending Radiologist  This document has been electronically signed. Apr 23 2022 12:47PM (04-23-22 @ 12:13)

## 2022-04-24 NOTE — PROGRESS NOTE ADULT - ASSESSMENT
ASSESSMENT:  32 yo F with a PMH of asthma and Crohn's disease complicated by perianal fistulae and perianal abscess presents with recurrent perianal abscess and incidental acute b/l lower lobe PEs s/p bedside I&D (20cc purulent fluid drainage)     PLAN:  - Continue IV antibiotics (Cipro/Flagyl)   - F/u wound culture  - Wash area at least daily and after each BM  - Admitted to medicine for PEs   - Pain control

## 2022-04-24 NOTE — PROGRESS NOTE ADULT - ASSESSMENT
31F w/ PMHx of asthma and Crohn's disease complicated by perianal fistulae and recurrent perianal abscess presented to [facility] ED c/o perianal pain.    # new asymp b/l PE; 1st episode; takes OCPs  PESI class I: Very low risk  CT: Acute bilateral lower lobe pulmonary emboli.  lovenox 50mg sc q12 (will use DOAC on d/c)  lt ankle xray: neg  V Dupl: r/o DVT  f/u TTE  d/c OCPs upon d/c  Pulm eval - Dr Kim    # hx Crohn's Disease; Perianal Abscess (7.3cm), recurrent; NO SEPSIS  WBC better  CT: Abscess in the right ischioanal fossa abutting the external anal sphincter.  abx: cipro + flagyl; will use oral upon d/c  f/u cx's  f/u sx (s/p I&D)  decr Pred 30mg po q24 - if not decr further, will need OP prevention and PJP ppx  incr dilaudid 1mg iv q4 prn (can incr as needed)  avoid NSAIDs (on a/c)  GI eval for Crohn's hx and steroid mngmt    # Asthma - intermittent; stable  Not on any inhalers    # DVT ppx: therap Lovenox    # GI ppx PPI    # Code Full    Dispo: V Dupl; Echo; therap LMWH; iv abx; f/u sx; pulm eval; pain meds; decr Pred  eventually, pt will go home, once cleared by surg - should be able to d/c in 24-48 hrs I believe

## 2022-04-25 LAB
-  AMIKACIN: SIGNIFICANT CHANGE UP
-  AMOXICILLIN/CLAVULANIC ACID: SIGNIFICANT CHANGE UP
-  AMPICILLIN/SULBACTAM: SIGNIFICANT CHANGE UP
-  AMPICILLIN: SIGNIFICANT CHANGE UP
-  AZTREONAM: SIGNIFICANT CHANGE UP
-  CEFAZOLIN: SIGNIFICANT CHANGE UP
-  CEFEPIME: SIGNIFICANT CHANGE UP
-  CEFOXITIN: SIGNIFICANT CHANGE UP
-  CEFTRIAXONE: SIGNIFICANT CHANGE UP
-  CIPROFLOXACIN: SIGNIFICANT CHANGE UP
-  ERTAPENEM: SIGNIFICANT CHANGE UP
-  GENTAMICIN: SIGNIFICANT CHANGE UP
-  IMIPENEM: SIGNIFICANT CHANGE UP
-  LEVOFLOXACIN: SIGNIFICANT CHANGE UP
-  MEROPENEM: SIGNIFICANT CHANGE UP
-  PIPERACILLIN/TAZOBACTAM: SIGNIFICANT CHANGE UP
-  TOBRAMYCIN: SIGNIFICANT CHANGE UP
-  TRIMETHOPRIM/SULFAMETHOXAZOLE: SIGNIFICANT CHANGE UP
ALBUMIN SERPL ELPH-MCNC: 3.3 G/DL — LOW (ref 3.5–5.2)
ALP SERPL-CCNC: 41 U/L — SIGNIFICANT CHANGE UP (ref 30–115)
ALT FLD-CCNC: 20 U/L — SIGNIFICANT CHANGE UP (ref 0–41)
ANION GAP SERPL CALC-SCNC: 11 MMOL/L — SIGNIFICANT CHANGE UP (ref 7–14)
AST SERPL-CCNC: 14 U/L — SIGNIFICANT CHANGE UP (ref 0–41)
BASOPHILS # BLD AUTO: 0.02 K/UL — SIGNIFICANT CHANGE UP (ref 0–0.2)
BASOPHILS NFR BLD AUTO: 0.2 % — SIGNIFICANT CHANGE UP (ref 0–1)
BILIRUB SERPL-MCNC: <0.2 MG/DL — SIGNIFICANT CHANGE UP (ref 0.2–1.2)
BUN SERPL-MCNC: 20 MG/DL — SIGNIFICANT CHANGE UP (ref 10–20)
CALCIUM SERPL-MCNC: 8.3 MG/DL — LOW (ref 8.5–10.1)
CHLORIDE SERPL-SCNC: 107 MMOL/L — SIGNIFICANT CHANGE UP (ref 98–110)
CO2 SERPL-SCNC: 24 MMOL/L — SIGNIFICANT CHANGE UP (ref 17–32)
CREAT SERPL-MCNC: 0.7 MG/DL — SIGNIFICANT CHANGE UP (ref 0.7–1.5)
CULTURE RESULTS: SIGNIFICANT CHANGE UP
EGFR: 117 ML/MIN/1.73M2 — SIGNIFICANT CHANGE UP
EOSINOPHIL # BLD AUTO: 0.04 K/UL — SIGNIFICANT CHANGE UP (ref 0–0.7)
EOSINOPHIL NFR BLD AUTO: 0.4 % — SIGNIFICANT CHANGE UP (ref 0–8)
GLUCOSE SERPL-MCNC: 96 MG/DL — SIGNIFICANT CHANGE UP (ref 70–99)
HCT VFR BLD CALC: 35.5 % — LOW (ref 37–47)
HGB BLD-MCNC: 11.7 G/DL — LOW (ref 12–16)
IMM GRANULOCYTES NFR BLD AUTO: 1 % — HIGH (ref 0.1–0.3)
LYMPHOCYTES # BLD AUTO: 1.07 K/UL — LOW (ref 1.2–3.4)
LYMPHOCYTES # BLD AUTO: 11.7 % — LOW (ref 20.5–51.1)
MAGNESIUM SERPL-MCNC: 1.8 MG/DL — SIGNIFICANT CHANGE UP (ref 1.8–2.4)
MCHC RBC-ENTMCNC: 28.4 PG — SIGNIFICANT CHANGE UP (ref 27–31)
MCHC RBC-ENTMCNC: 33 G/DL — SIGNIFICANT CHANGE UP (ref 32–37)
MCV RBC AUTO: 86.2 FL — SIGNIFICANT CHANGE UP (ref 81–99)
METHOD TYPE: SIGNIFICANT CHANGE UP
MONOCYTES # BLD AUTO: 0.59 K/UL — SIGNIFICANT CHANGE UP (ref 0.1–0.6)
MONOCYTES NFR BLD AUTO: 6.5 % — SIGNIFICANT CHANGE UP (ref 1.7–9.3)
NEUTROPHILS # BLD AUTO: 7.32 K/UL — HIGH (ref 1.4–6.5)
NEUTROPHILS NFR BLD AUTO: 80.2 % — HIGH (ref 42.2–75.2)
NRBC # BLD: 0 /100 WBCS — SIGNIFICANT CHANGE UP (ref 0–0)
ORGANISM # SPEC MICROSCOPIC CNT: SIGNIFICANT CHANGE UP
ORGANISM # SPEC MICROSCOPIC CNT: SIGNIFICANT CHANGE UP
PLATELET # BLD AUTO: 215 K/UL — SIGNIFICANT CHANGE UP (ref 130–400)
POTASSIUM SERPL-MCNC: 3.4 MMOL/L — LOW (ref 3.5–5)
POTASSIUM SERPL-SCNC: 3.4 MMOL/L — LOW (ref 3.5–5)
PROT SERPL-MCNC: 5.4 G/DL — LOW (ref 6–8)
RBC # BLD: 4.12 M/UL — LOW (ref 4.2–5.4)
RBC # FLD: 14.7 % — HIGH (ref 11.5–14.5)
SODIUM SERPL-SCNC: 142 MMOL/L — SIGNIFICANT CHANGE UP (ref 135–146)
SPECIMEN SOURCE: SIGNIFICANT CHANGE UP
WBC # BLD: 9.13 K/UL — SIGNIFICANT CHANGE UP (ref 4.8–10.8)
WBC # FLD AUTO: 9.13 K/UL — SIGNIFICANT CHANGE UP (ref 4.8–10.8)

## 2022-04-25 PROCEDURE — 99233 SBSQ HOSP IP/OBS HIGH 50: CPT

## 2022-04-25 PROCEDURE — 93970 EXTREMITY STUDY: CPT | Mod: 26

## 2022-04-25 PROCEDURE — 99232 SBSQ HOSP IP/OBS MODERATE 35: CPT

## 2022-04-25 PROCEDURE — 99222 1ST HOSP IP/OBS MODERATE 55: CPT

## 2022-04-25 RX ORDER — ALBUTEROL 90 UG/1
2 AEROSOL, METERED ORAL EVERY 6 HOURS
Refills: 0 | Status: DISCONTINUED | OUTPATIENT
Start: 2022-04-25 | End: 2022-04-26

## 2022-04-25 RX ORDER — GABAPENTIN 400 MG/1
300 CAPSULE ORAL THREE TIMES A DAY
Refills: 0 | Status: DISCONTINUED | OUTPATIENT
Start: 2022-04-25 | End: 2022-04-26

## 2022-04-25 RX ORDER — MAGNESIUM OXIDE 400 MG ORAL TABLET 241.3 MG
400 TABLET ORAL ONCE
Refills: 0 | Status: COMPLETED | OUTPATIENT
Start: 2022-04-25 | End: 2022-04-25

## 2022-04-25 RX ORDER — CEFTRIAXONE 500 MG/1
1000 INJECTION, POWDER, FOR SOLUTION INTRAMUSCULAR; INTRAVENOUS EVERY 24 HOURS
Refills: 0 | Status: DISCONTINUED | OUTPATIENT
Start: 2022-04-25 | End: 2022-04-26

## 2022-04-25 RX ORDER — POTASSIUM CHLORIDE 20 MEQ
40 PACKET (EA) ORAL ONCE
Refills: 0 | Status: COMPLETED | OUTPATIENT
Start: 2022-04-25 | End: 2022-04-25

## 2022-04-25 RX ADMIN — MAGNESIUM OXIDE 400 MG ORAL TABLET 400 MILLIGRAM(S): 241.3 TABLET ORAL at 11:52

## 2022-04-25 RX ADMIN — CEFTRIAXONE 100 MILLIGRAM(S): 500 INJECTION, POWDER, FOR SOLUTION INTRAMUSCULAR; INTRAVENOUS at 14:50

## 2022-04-25 RX ADMIN — HYDROMORPHONE HYDROCHLORIDE 1 MILLIGRAM(S): 2 INJECTION INTRAMUSCULAR; INTRAVENOUS; SUBCUTANEOUS at 16:25

## 2022-04-25 RX ADMIN — ALBUTEROL 2 PUFF(S): 90 AEROSOL, METERED ORAL at 18:17

## 2022-04-25 RX ADMIN — Medication 100 MILLIGRAM(S): at 05:59

## 2022-04-25 RX ADMIN — HYDROMORPHONE HYDROCHLORIDE 1 MILLIGRAM(S): 2 INJECTION INTRAMUSCULAR; INTRAVENOUS; SUBCUTANEOUS at 11:53

## 2022-04-25 RX ADMIN — Medication 40 MILLIGRAM(S): at 11:52

## 2022-04-25 RX ADMIN — Medication 30 MILLIGRAM(S): at 06:01

## 2022-04-25 RX ADMIN — Medication 100 MILLIGRAM(S): at 14:50

## 2022-04-25 RX ADMIN — HYDROMORPHONE HYDROCHLORIDE 1 MILLIGRAM(S): 2 INJECTION INTRAMUSCULAR; INTRAVENOUS; SUBCUTANEOUS at 20:37

## 2022-04-25 RX ADMIN — HYDROMORPHONE HYDROCHLORIDE 1 MILLIGRAM(S): 2 INJECTION INTRAMUSCULAR; INTRAVENOUS; SUBCUTANEOUS at 06:17

## 2022-04-25 RX ADMIN — GABAPENTIN 300 MILLIGRAM(S): 400 CAPSULE ORAL at 15:22

## 2022-04-25 RX ADMIN — HYDROMORPHONE HYDROCHLORIDE 1 MILLIGRAM(S): 2 INJECTION INTRAMUSCULAR; INTRAVENOUS; SUBCUTANEOUS at 05:59

## 2022-04-25 RX ADMIN — GABAPENTIN 300 MILLIGRAM(S): 400 CAPSULE ORAL at 21:31

## 2022-04-25 RX ADMIN — ENOXAPARIN SODIUM 50 MILLIGRAM(S): 100 INJECTION SUBCUTANEOUS at 06:01

## 2022-04-25 RX ADMIN — Medication 40 MILLIEQUIVALENT(S): at 11:52

## 2022-04-25 RX ADMIN — ENOXAPARIN SODIUM 50 MILLIGRAM(S): 100 INJECTION SUBCUTANEOUS at 17:10

## 2022-04-25 RX ADMIN — PANTOPRAZOLE SODIUM 40 MILLIGRAM(S): 20 TABLET, DELAYED RELEASE ORAL at 06:04

## 2022-04-25 RX ADMIN — Medication 100 MILLIGRAM(S): at 21:31

## 2022-04-25 NOTE — CONSULT NOTE ADULT - ATTENDING COMMENTS
[employer] was was mailed out  to patient on 8/11/2020 8:56 AM.  Marco A Dubose LPN Care Coordinator           
Patient is a 31F with Crohn's disease and previous anal fistulas presents with perianal abscess and PE.      Vitals labs and images reviewed    CTAP  1. Abscess in the right ischioanal fossa abutting the external anal   sphincter. Dedicated MRI may be obtained for evaluation of perianal   fistula.  2. Acute bilateral lower lobe pulmonary emboli.    PLAN:  - s/p bedside I&D  -IV antibiotics  -f/u wound culture  -sitz baths  -dressing changes  - GI consult for Crohn's disease  - surgery to follow
Events noted, CT reviewed, PE/ On OCP/ Crohn disease, echo, full AC, LE doppler, pul standpoint OP up
31 F with a perianal abscess. Needs drainage by surgery +/- setons after MRI. Continue abx, will need biologics eventually after resolution or improvement in infection.

## 2022-04-25 NOTE — PROGRESS NOTE ADULT - ASSESSMENT
31F w/ PMHx of asthma and Crohn's disease complicated by perianal fistulae and recurrent perianal abscess presented to [facility] ED c/o perianal pain.    # new asymp b/l PE; 1st episode; takes OCPs  PESI class I: Very low risk  CT: Acute bilateral lower lobe pulmonary emboli.  lovenox 50mg sc q12 (will use DOAC on d/c)  lt ankle xray: neg  V Dupl: r/o DVT - ordered  TTE: nl EF; no rt hrt strain  d/c OCPs upon d/c  Pulm eval - Dr Kim - noted    # hx Crohn's Disease; Perianal Abscess (7.3cm), recurrent; NO SEPSIS  WBC better  CT: Abscess in the right ischioanal fossa abutting the external anal sphincter.  abx: cipro + flagyl; will use oral upon d/c  abscess cx: E Coli -> resist FQ   f/u sx (s/p I&D)  decr Pred 30mg po q24 (aware of GI note)  OP prevention: start Ca 1250/Vit D 200 po q12  PJP ppx: start Bactrim DS po M,W,F  c/w dilaudid 1mg iv q4 prn (can incr as needed)  avoid NSAIDs (on a/c)  GI eval noted: outpt f/u for biologic tx    # Asthma - intermittent; stable  Not on any inhalers    # DVT ppx: therap Lovenox    # GI ppx PPI    # Code Full    # updated  at bedside    Dispo: V Dupl; therap LMWH; iv abx; f/u colo sx for d/c plan; pain meds; decr Pred; start Ca/Vit D; start bactrim ppx;   eventually, pt will go home, once cleared by surg - should be able to d/c in 24-48 hrs I believe

## 2022-04-25 NOTE — CONSULT NOTE ADULT - SUBJECTIVE AND OBJECTIVE BOX
Patient is a 31y old  Female who presents with a chief complaint of Rectal pain (25 Apr 2022 08:43)      HPI:  31F w/ PMHx of asthma and Crohn's disease complicated by perianal fistulae and recurrent perianal abscess presented to [facility] ED c/o perianal pain. Pt reports she started having pain in her buttocks about a week ago which has progressively worsened accompanied by subjective fevers. Last BM 2 days ago. Endorses nausea and small episode of vomitus prior to arrival. +flatus.  Pt's last abscess was in August 2021 at which point it was I&D'd. She presented again in November 2021 with a Crohn's flare but eloped prior to admission. Unsure of last colonoscopy. Does not follow with GI and takes Prednisone 40 mg daily.    ED course: VS in triage. Labs revealed leukocytosis and LA 3. CT done showing abscess and incidental acute b/l PE. (23 Apr 2022 15:06)      PAST MEDICAL & SURGICAL HISTORY:  Asthma    Crohns disease of small intestine    Hemorrhoids    No significant past surgical history        SOCIAL HX:   Smoking -ve ( marijuana only)                             FAMILY HISTORY:  FH: inflammatory bowel disease  maternal cousin    .  No cardiovascular or pulmonary family history     REVIEW OF SYSTEMS:    All ROS are negative exept per HPI       Allergies    penicillin (Rash)    Intolerances          PHYSICAL EXAM  Vital Signs Last 24 Hrs  T(C): 36.2 (25 Apr 2022 04:30), Max: 36.6 (24 Apr 2022 19:50)  T(F): 97.2 (25 Apr 2022 04:30), Max: 97.9 (24 Apr 2022 19:50)  HR: 70 (25 Apr 2022 04:30) (65 - 72)  BP: 130/78 (25 Apr 2022 04:30) (97/53 - 130/78)  BP(mean): --  RR: 18 (25 Apr 2022 04:30) (18 - 18)  SpO2: 96% (25 Apr 2022 00:05) (96% - 96%)    CONSTITUTIONAL:  ill appearing      ENT:   Airway patent,   No thrush    EYES:   Clear bilaterally,   pupils equal,   round and reactive to light.    CARDIAC:   No edema   LE       RESPIRATORY:   decreased air entry    GASTROINTESTINAL:  Abdomen soft, non-tender,   No guarding,   Positive BS    MUSCULOSKELETAL:   Range of motion is not limited,  No clubbing, cyanosis    NEUROLOGICAL:   Alert and oriented   No motor deficits.           LABS:                          11.7   9.13  )-----------( 215      ( 25 Apr 2022 08:53 )             35.5                                               04-25    142  |  107  |  20  ----------------------------<  96  3.4<L>   |  24  |  0.7    Ca    8.3<L>      25 Apr 2022 08:53  Mg     1.8     04-25    TPro  5.4<L>  /  Alb  3.3<L>  /  TBili  <0.2  /  DBili  x   /  AST  14  /  ALT  20  /  AlkPhos  41  04-25                                                                                           LIVER FUNCTIONS - ( 25 Apr 2022 08:53 )  Alb: 3.3 g/dL / Pro: 5.4 g/dL / ALK PHOS: 41 U/L / ALT: 20 U/L / AST: 14 U/L / GGT: x                                                  Culture - Abscess with Gram Stain (collected 23 Apr 2022 13:21)  Source: .Abscess perianal  Preliminary Report (24 Apr 2022 18:47):    Numerous Escherichia coli                                                    MEDICATIONS  (STANDING):  cefTRIAXone   IVPB 1000 milliGRAM(s) IV Intermittent every 24 hours  chlorhexidine 4% Liquid 1 Application(s) Topical <User Schedule>  enoxaparin Injectable 50 milliGRAM(s) SubCutaneous every 12 hours  gabapentin 300 milliGRAM(s) Oral three times a day  metroNIDAZOLE  IVPB 500 milliGRAM(s) IV Intermittent every 8 hours  pantoprazole    Tablet 40 milliGRAM(s) Oral before breakfast  predniSONE   Tablet 30 milliGRAM(s) Oral daily    MEDICATIONS  (PRN):  acetaminophen     Tablet .. 650 milliGRAM(s) Oral every 6 hours PRN Mild Pain (1 - 3)  HYDROmorphone  Injectable 1 milliGRAM(s) IV Push every 4 hours PRN Severe Pain (7 - 10)      X-Rays reviewed:    CT scan : B/L lobe Small PE  Patient is a 31y old  Female who presents with a chief complaint of Rectal pain (25 Apr 2022 08:43)      HPI:  31F w/ PMHx of asthma and Crohn's disease complicated by perianal fistulae and recurrent perianal abscess presented to [facility] ED c/o perianal pain. Pt reports she started having pain in her buttocks about a week ago which has progressively worsened accompanied by subjective fevers. Last BM 2 days ago. Endorses nausea and small episode of vomitus prior to arrival. +flatus.  Pt's last abscess was in August 2021 at which point it was I&D'd. She presented again in November 2021 with a Crohn's flare but eloped prior to admission. Unsure of last colonoscopy. Does not follow with GI and takes Prednisone 40 mg daily.    ED course: VS in triage. Labs revealed leukocytosis and LA 3. CT done showing abscess and incidental acute b/l PE. called to evaluate, patient on OCP, no family hx of PE    PAST MEDICAL & SURGICAL HISTORY:  Asthma    Crohns disease of small intestine    Hemorrhoids    No significant past surgical history        SOCIAL HX:   Smoking -ve ( marijuana only)                             FAMILY HISTORY:  FH: inflammatory bowel disease  maternal cousin    .  No cardiovascular or pulmonary family history     REVIEW OF SYSTEMS:    All ROS are negative exept per HPI       Allergies    penicillin (Rash)    Intolerances          PHYSICAL EXAM  Vital Signs Last 24 Hrs  T(C): 36.2 (25 Apr 2022 04:30), Max: 36.6 (24 Apr 2022 19:50)  T(F): 97.2 (25 Apr 2022 04:30), Max: 97.9 (24 Apr 2022 19:50)  HR: 70 (25 Apr 2022 04:30) (65 - 72)  BP: 130/78 (25 Apr 2022 04:30) (97/53 - 130/78)  RR: 18 (25 Apr 2022 04:30) (18 - 18)  SpO2: 96% (25 Apr 2022 00:05) (96% - 96%)    CONSTITUTIONAL:  comfortable      ENT:   Airway patent,   No thrush    EYES:   Clear bilaterally,   pupils equal,   round and reactive to light.    CARDIAC:   No edema   LE       RESPIRATORY:   decreased air entry    GASTROINTESTINAL:  Abdomen soft, non-tender,   No guarding,   Positive BS    MUSCULOSKELETAL:   Range of motion is not limited,  No clubbing, cyanosis    NEUROLOGICAL:   Alert and oriented   No motor deficits.           LABS:                          11.7   9.13  )-----------( 215      ( 25 Apr 2022 08:53 )             35.5                                               04-25    142  |  107  |  20  ----------------------------<  96  3.4<L>   |  24  |  0.7    Ca    8.3<L>      25 Apr 2022 08:53  Mg     1.8     04-25    TPro  5.4<L>  /  Alb  3.3<L>  /  TBili  <0.2  /  DBili  x   /  AST  14  /  ALT  20  /  AlkPhos  41  04-25                                                                                           LIVER FUNCTIONS - ( 25 Apr 2022 08:53 )  Alb: 3.3 g/dL / Pro: 5.4 g/dL / ALK PHOS: 41 U/L / ALT: 20 U/L / AST: 14 U/L / GGT: x                                                  Culture - Abscess with Gram Stain (collected 23 Apr 2022 13:21)  Source: .Abscess perianal  Preliminary Report (24 Apr 2022 18:47):    Numerous Escherichia coli                                                    MEDICATIONS  (STANDING):  cefTRIAXone   IVPB 1000 milliGRAM(s) IV Intermittent every 24 hours  chlorhexidine 4% Liquid 1 Application(s) Topical <User Schedule>  enoxaparin Injectable 50 milliGRAM(s) SubCutaneous every 12 hours  gabapentin 300 milliGRAM(s) Oral three times a day  metroNIDAZOLE  IVPB 500 milliGRAM(s) IV Intermittent every 8 hours  pantoprazole    Tablet 40 milliGRAM(s) Oral before breakfast  predniSONE   Tablet 30 milliGRAM(s) Oral daily    MEDICATIONS  (PRN):  acetaminophen     Tablet .. 650 milliGRAM(s) Oral every 6 hours PRN Mild Pain (1 - 3)  HYDROmorphone  Injectable 1 milliGRAM(s) IV Push every 4 hours PRN Severe Pain (7 - 10)      X-Rays reviewed:    CT scan : B/L lobe Small PE

## 2022-04-25 NOTE — PROGRESS NOTE ADULT - SUBJECTIVE AND OBJECTIVE BOX
GREYSON GARZA 31y Female  MRN#: [id number]   CODE STATUS:________    Hospital Day: 2d    Pt is currently admitted with the primary diagnosis of Perianal abscess     SUBJECTIVE  Hospital Course  31F w/ PMHx of asthma and Crohn's disease complicated by perianal fistulae and recurrent perianal abscess presented to [facility] ED c/o perianal pain. Pt reports she started having pain in her buttocks about a week ago which has progressively worsened accompanied by subjective fevers. Last BM 2 days ago. Endorses nausea and small episode of vomitus prior to arrival. +flatus.  Pt's last abscess was in August 2021 at which point it was I&D'd. She presented again in November 2021 with a Crohn's flare but eloped prior to admission. Unsure of last colonoscopy. Does not follow with GI and takes Prednisone 40 mg daily.    ED course: VS in triage. Labs revealed leukocytosis and LA 3. CT done showing abscess and incidental acute b/l PE.  Started on Lovenox therapeutic - Cipro/Flagyl switched to Rocephin/Flagyl 4/25    Overnight events   No overnight events     Subjective complaints   Patient is feeling fine, complains of RLE pain lancinating, ANOx4, on RA   Denies fever, sob, cp, lightheadedness, nausea, diarrhea, dysuria,   Eating tolerated     Present Today:   - Martino:  No [ X ], Yes [   ] : Indication:                                            OBJECTIVE  PAST MEDICAL & SURGICAL HISTORY  Asthma    Crohns disease of small intestine    Hemorrhoids    No significant past surgical history                                                ALLERGIES:  penicillin (Rash)                           HOME MEDICATIONS  Home Medications:                           MEDICATIONS:  STANDING MEDICATIONS  cefTRIAXone   IVPB 1000 milliGRAM(s) IV Intermittent every 24 hours  chlorhexidine 4% Liquid 1 Application(s) Topical <User Schedule>  enoxaparin Injectable 50 milliGRAM(s) SubCutaneous every 12 hours  gabapentin 300 milliGRAM(s) Oral three times a day  magnesium oxide 400 milliGRAM(s) Oral once  metroNIDAZOLE  IVPB 500 milliGRAM(s) IV Intermittent every 8 hours  pantoprazole    Tablet 40 milliGRAM(s) Oral before breakfast  potassium chloride    Tablet ER 40 milliEquivalent(s) Oral once  predniSONE   Tablet 30 milliGRAM(s) Oral daily    PRN MEDICATIONS  acetaminophen     Tablet .. 650 milliGRAM(s) Oral every 6 hours PRN  HYDROmorphone  Injectable 1 milliGRAM(s) IV Push every 4 hours PRN                                            ------------------------------------------------------------  VITAL SIGNS: Last 24 Hours  T(C): 36.2 (25 Apr 2022 04:30), Max: 36.6 (24 Apr 2022 19:50)  T(F): 97.2 (25 Apr 2022 04:30), Max: 97.9 (24 Apr 2022 19:50)  HR: 70 (25 Apr 2022 04:30) (65 - 72)  BP: 130/78 (25 Apr 2022 04:30) (97/53 - 130/78)  BP(mean): --  RR: 18 (25 Apr 2022 04:30) (18 - 18)  SpO2: 96% (25 Apr 2022 00:05) (96% - 96%)                                               LABS:                        11.7   9.13  )-----------( 215      ( 25 Apr 2022 08:53 )             35.5     04-25    142  |  107  |  20  ----------------------------<  96  3.4<L>   |  24  |  0.7    Ca    8.3<L>      25 Apr 2022 08:53  Mg     1.8     04-25    TPro  5.4<L>  /  Alb  3.3<L>  /  TBili  <0.2  /  DBili  x   /  AST  14  /  ALT  20  /  AlkPhos  41  04-25                Culture - Abscess with Gram Stain (collected 23 Apr 2022 13:21)  Source: .Abscess perianal  Preliminary Report (24 Apr 2022 18:47):    Numerous Escherichia coli                                                    RADIOLOGY:        PHYSICAL EXAM:  GENERAL: NAD, lying in bed comfortably  NECK: Supple, No JVD  CHEST/LUNG: Clear to auscultation bilaterally; No rales, rhonchi, wheezing, or rubs. Unlabored respirations on RA  HEART: Regular rate and rhythm; No murmurs, rubs, or gallops  ABDOMEN: BSx4; Soft, nontender, nondistended  EXTREMITIES:  2+ Peripheral Pulses. No clubbing, cyanosis, or edema  NERVOUS SYSTEM:  A&Ox3, no focal deficits   SKIN: No rashes or lesions                                         ASSESSMENT & PLAN    31F w/ PMHx of asthma and Crohn's disease complicated by perianal fistulae and recurrent perianal abscess presented to [facility]- ED c/o perianal pain.    #New asymp b/l PE; 1st episode; takes OCPs - on RA   PESI class I: Very low risk  CT: Acute bilateral lower lobe pulmonary emboli.  lovenox 50mg sc q12 (will use DOAC on d/c)  ankle xray: neg  - V Dupl: r/o DVT  - f/u TTE    #Hx Crohn's Disease; Perianal Abscess (7.3cm), recurrent; NO SEPSIS  WBC downtrending  CT: Abscess in the right ischioanal fossa abutting the external anal sphincter.  Cx = E coli pansensitive   Abx: cipro + flagyl; will use oral upon d/c --> switched to rocephin/flagyl 4/25   F/u surgery --> no further I&D or Mx --> wound care as noted outpatient f/u   Pain control as needed (hydromorphone) - gabapentin   GI eval for Crohn's hx and steroid mngmt --> recs noted c/w prednisone keep at 40mg     # Asthma - intermittent; stable  Not on any inhalers    # DVT ppx: therap Lovenox    # GI ppx PPI    # Code Full    Disposition: V Dupl; Echo; therap LMWH; iv abx; f/u sx; pulm eval; pain meds,  F/u surgery recs        GREYSON GARZA 31y Female  MRN#: [id number]   CODE STATUS:________    Hospital Day: 2d    Pt is currently admitted with the primary diagnosis of Perianal abscess     SUBJECTIVE  Hospital Course  31F w/ PMHx of asthma and Crohn's disease complicated by perianal fistulae and recurrent perianal abscess presented to [facility] ED c/o perianal pain. Pt reports she started having pain in her buttocks about a week ago which has progressively worsened accompanied by subjective fevers. Last BM 2 days ago. Endorses nausea and small episode of vomitus prior to arrival. +flatus.  Pt's last abscess was in August 2021 at which point it was I&D'd. She presented again in November 2021 with a Crohn's flare but eloped prior to admission. Unsure of last colonoscopy. Does not follow with GI and takes Prednisone 40 mg daily.    ED course: VS in triage. Labs revealed leukocytosis and LA 3. CT done showing abscess and incidental acute b/l PE.  Started on Lovenox therapeutic - Cipro/Flagyl switched to Rocephin/Flagyl 4/25    Overnight events   No overnight events     Subjective complaints   Patient is feeling fine, complains of RLE pain lancinating, ANOx4, on RA   Denies fever, sob, cp, lightheadedness, nausea, diarrhea, dysuria,   Eating tolerated     Present Today:   - Martino:  No [ X ], Yes [   ] : Indication:                                            OBJECTIVE  PAST MEDICAL & SURGICAL HISTORY  Asthma    Crohns disease of small intestine    Hemorrhoids    No significant past surgical history                                                ALLERGIES:  penicillin (Rash)                           HOME MEDICATIONS  Home Medications:                           MEDICATIONS:  STANDING MEDICATIONS  cefTRIAXone   IVPB 1000 milliGRAM(s) IV Intermittent every 24 hours  chlorhexidine 4% Liquid 1 Application(s) Topical <User Schedule>  enoxaparin Injectable 50 milliGRAM(s) SubCutaneous every 12 hours  gabapentin 300 milliGRAM(s) Oral three times a day  magnesium oxide 400 milliGRAM(s) Oral once  metroNIDAZOLE  IVPB 500 milliGRAM(s) IV Intermittent every 8 hours  pantoprazole    Tablet 40 milliGRAM(s) Oral before breakfast  potassium chloride    Tablet ER 40 milliEquivalent(s) Oral once  predniSONE   Tablet 30 milliGRAM(s) Oral daily    PRN MEDICATIONS  acetaminophen     Tablet .. 650 milliGRAM(s) Oral every 6 hours PRN  HYDROmorphone  Injectable 1 milliGRAM(s) IV Push every 4 hours PRN                                            ------------------------------------------------------------  VITAL SIGNS: Last 24 Hours  T(C): 36.2 (25 Apr 2022 04:30), Max: 36.6 (24 Apr 2022 19:50)  T(F): 97.2 (25 Apr 2022 04:30), Max: 97.9 (24 Apr 2022 19:50)  HR: 70 (25 Apr 2022 04:30) (65 - 72)  BP: 130/78 (25 Apr 2022 04:30) (97/53 - 130/78)  BP(mean): --  RR: 18 (25 Apr 2022 04:30) (18 - 18)  SpO2: 96% (25 Apr 2022 00:05) (96% - 96%)                                               LABS:                        11.7   9.13  )-----------( 215      ( 25 Apr 2022 08:53 )             35.5     04-25    142  |  107  |  20  ----------------------------<  96  3.4<L>   |  24  |  0.7    Ca    8.3<L>      25 Apr 2022 08:53  Mg     1.8     04-25    TPro  5.4<L>  /  Alb  3.3<L>  /  TBili  <0.2  /  DBili  x   /  AST  14  /  ALT  20  /  AlkPhos  41  04-25                Culture - Abscess with Gram Stain (collected 23 Apr 2022 13:21)  Source: .Abscess perianal  Preliminary Report (24 Apr 2022 18:47):    Numerous Escherichia coli                                                    RADIOLOGY:        PHYSICAL EXAM:  GENERAL: NAD, lying in bed comfortably  NECK: Supple, No JVD  CHEST/LUNG: Clear to auscultation bilaterally; No rales, rhonchi, wheezing, or rubs. Unlabored respirations on RA  HEART: Regular rate and rhythm; No murmurs, rubs, or gallops  ABDOMEN: BSx4; Soft, nontender, nondistended  EXTREMITIES:  2+ Peripheral Pulses. No clubbing, cyanosis, or edema  NERVOUS SYSTEM:  A&Ox3, no focal deficits   SKIN: No rashes or lesions                                         ASSESSMENT & PLAN    31F w/ PMHx of asthma and Crohn's disease complicated by perianal fistulae and recurrent perianal abscess presented to [facility] ED c/o perianal pain.    #New asymp b/l PE; 1st episode; takes OCPs - on RA   PESI class I: Very low risk  CT: Acute bilateral lower lobe pulmonary emboli.  lovenox 50mg sc q12 (will use DOAC on d/c)  ankle xray: neg  - V Dupl: r/o DVT  - f/u TTE    #Hx Crohn's Disease; Perianal Abscess (7.3cm), recurrent; NO SEPSIS  WBC downtrending  CT: Abscess in the right ischioanal fossa abutting the external anal sphincter.  Cx = E coli pansensitive   Abx: cipro + flagyl; will use oral upon d/c --> switched to rocephin/flagyl 4/25   F/u surgery --> no further I&D or Mx --> wound care as noted outpatient f/u   Pain control as needed (hydromorphone) - gabapentin   GI eval for Crohn's hx and steroid mngmt --> recs noted c/w prednisone keep at 30mg for now    # Asthma - intermittent; stable  Not on any inhalers  - started on albuterol as per patient request     # DVT ppx: therap Lovenox    # GI ppx PPI    # Code Full    Disposition: V Dupl; Echo; therap LMWH; iv abx; f/u sx; pulm eval; pain meds,  F/u surgery recs

## 2022-04-25 NOTE — PROGRESS NOTE ADULT - ASSESSMENT
ASSESSMENT:  32 yo F with a PMH of asthma and Crohn's disease complicated by perianal fistulae and perianal abscess presents with recurrent perianal abscess and incidental acute b/l lower lobe PEs s/p bedside I&D (20cc purulent fluid drainage)     PLAN:  - Continue IV abx  - Change dressings  - Wash area daily after each BM  - Pain control       BLUE TEAM SPECTRA: [id number]

## 2022-04-25 NOTE — PROGRESS NOTE ADULT - SUBJECTIVE AND OBJECTIVE BOX
KAYLA GREYSON  31y  Female  ***My note supersedes ALL resident notes that I sign.  My corrections for their notes are in my note.***    I can be reached directly on [id number]. My office number is [phone]. My personal cell number is [phone].    INTERVAL EVENTS: Here for f/u of perianal abscess. Pt says pain is better controlled. Breathing is OK.    T(F): 97.9 (04-25-22 @ 13:28), Max: 97.9 (04-24-22 @ 19:50)  HR: 65 (04-25-22 @ 13:28) (65 - 70)  BP: 111/61 (04-25-22 @ 13:28) (111/61 - 130/78)  RR: 18 (04-25-22 @ 13:28) (18 - 18)  SpO2: 96% (04-25-22 @ 00:05) (96% - 96%)    Gen: NAD  HEENT: PERRL, EOMI, mouth clr, nose clr  Neck: no nodes, no JVD, thyroid nl  lungs: clr  hrt: s1 s2 rrr no murmur  abd: soft, NT/ND, no HS megaly  ext: no edema, no c/c; rt arm seems fine (no swelling or deformity; FROM); + pain left ankle (no swelling) (calf is better)  neuro: aa ox3, cn intact, can move all 4 ext    LABS:                      11.7    (    86.2   9.13  )-----------( ---------      215      ( 25 Apr 2022 08:53 )             35.5    (    14.7     142   (   107   (   96      04-25-22 @ 08:53  ----------------------               3.4   (   24   (   20                             -----                        0.7  Ca  8.3   Mg  1.8    P   --     LFT  5.4  (  <0.2  (  14       04-25-22 @ 08:53  -------------------------  3.3  (  41  (  20    Culture - Abscess with Gram Stain (collected 04-23-22 @ 13:21)  Source: .Abscess perianal  Preliminary Report (04-24-22 @ 18:47):    Numerous Escherichia coli  Organism: Escherichia coli (04-25-22 @ 17:25)  Organism: Escherichia coli (04-25-22 @ 17:25)      -  Amikacin: S <=16      -  Amoxicillin/Clavulanic Acid: S <=8/4      -  Ampicillin: S <=8 These ampicillin results predict results for amoxicillin      -  Ampicillin/Sulbactam: S <=4/2 Enterobacter, Klebsiella aerogenes, Citrobacter, and Serratia may develop resistance during prolonged therapy (3-4 days)      -  Aztreonam: S <=4      -  Cefazolin: S <=2 Enterobacter, Klebsiella aerogenes, Citrobacter, and Serratia may develop resistance during prolonged therapy (3-4 days)      -  Cefepime: S <=2      -  Cefoxitin: S <=8      -  Ceftriaxone: S <=1 Enterobacter, Klebsiella aerogenes, Citrobacter, and Serratia may develop resistance during prolonged therapy      -  Ciprofloxacin: R >2      -  Ertapenem: S <=0.5      -  Gentamicin: S <=2      -  Imipenem: S <=1      -  Levofloxacin: R >4      -  Meropenem: S <=1      -  Piperacillin/Tazobactam: S <=8      -  Tobramycin: S <=2      -  Trimethoprim/Sulfamethoxazole: S <=0.5/9.5      Method Type: ELVER    RADIOLOGY & ADDITIONAL TESTS:  < from: TTE Echo Complete w/o Contrast w/ Doppler (04.23.22 @ 16:04) >  Summary:   1. Normal global left ventricular systolic function.   2. LV Ejection Fraction by Fontana's Method with a biplane EF of 59 %.   3. Normal right ventricular size and function.   4. Normal left atrial size.   5. Normal right atrial size.   6. Trace mitral valve regurgitation.   7. Mild tricuspid regurgitation.    < end of copied text >    MEDICATIONS:  cefTRIAXone   IVPB 1000 milliGRAM(s) IV Intermittent every 24 hours  metroNIDAZOLE  IVPB 500 milliGRAM(s) IV Intermittent every 8 hours    acetaminophen     Tablet .. 650 milliGRAM(s) Oral every 6 hours PRN  ALBUTerol    90 MICROgram(s) HFA Inhaler 2 Puff(s) Inhalation every 6 hours PRN  chlorhexidine 4% Liquid 1 Application(s) Topical <User Schedule>  enoxaparin Injectable 50 milliGRAM(s) SubCutaneous every 12 hours  gabapentin 300 milliGRAM(s) Oral three times a day  HYDROmorphone  Injectable 1 milliGRAM(s) IV Push every 4 hours PRN  pantoprazole    Tablet 40 milliGRAM(s) Oral before breakfast

## 2022-04-25 NOTE — CONSULT NOTE ADULT - ASSESSMENT
Impression  Acute PE Provoked ( OCP)  Sepsis present on Admission / Rectal Abscess   HO Crohn's     Recommendations  CTA reviewed  Low risk PE  Continue AC  LE duplex  Please consider another method of contraception  Mobility encouraged once LE duplex done  OUt pt f/u for Hypercoagulable work up  Impression    Acute PE on RA, Stable hemodynamically   Sepsis present on Admission / Rectal Abscess   HO Crohn's     Recommendations    Continue full AC if no CI  LE duplex  ECHO  Mobility encouraged once LE duplex done  OUt pt f/u for Hypercoagulable work up

## 2022-04-25 NOTE — PROGRESS NOTE ADULT - SUBJECTIVE AND OBJECTIVE BOX
Gastroenterology progress note:     Patient is a 31y old  Female who presents with a chief complaint of Crohn's fistula (24 Apr 2022 09:15)       Admitted on: 04-23-22    We are following the patient for Crohn's disease      Interval History: patient remains in pain, the Dilaudid is controlling her rectal pain but now has lower extremities pain, had insomnia last night and she complains of anxiety     PAST MEDICAL & SURGICAL HISTORY:  Asthma   Crohns disease of small intestine  Hemorrhoids  No significant past surgical history    MEDICATIONS  (STANDING):  chlorhexidine 4% Liquid 1 Application(s) Topical <User Schedule>  ciprofloxacin   IVPB 400 milliGRAM(s) IV Intermittent every 12 hours  enoxaparin Injectable 50 milliGRAM(s) SubCutaneous every 12 hours  metroNIDAZOLE  IVPB 500 milliGRAM(s) IV Intermittent every 8 hours  pantoprazole    Tablet 40 milliGRAM(s) Oral before breakfast  predniSONE   Tablet 30 milliGRAM(s) Oral daily    MEDICATIONS  (PRN):  acetaminophen     Tablet .. 650 milliGRAM(s) Oral every 6 hours PRN Mild Pain (1 - 3)  HYDROmorphone  Injectable 1 milliGRAM(s) IV Push every 4 hours PRN Severe Pain (7 - 10)    Allergies  penicillin (Rash)    Review of Systems:   General: no fever  HEENT: no hemoptysis  Cardiovascular:  No Chest Pain, No Palpitations  Respiratory:  No Cough, No Dyspnea  Gastrointestinal:  As described in HPI  Hematology: no bruising or hematoma   Neurology: no new motor deficit  Skin: no new rash    Physical Examination:  T(C): 36.2 (04-25-22 @ 04:30), Max: 36.6 (04-24-22 @ 19:50)  HR: 70 (04-25-22 @ 04:30) (65 - 72)  BP: 130/78 (04-25-22 @ 04:30) (97/53 - 130/78)  RR: 18 (04-25-22 @ 04:30) (18 - 18)  SpO2: 96% (04-25-22 @ 00:05) (96% - 96%)      Constitutional: No acute distress.  Head: normocephalic  Neck: supple   Eyes: EOMI  Respiratory:  No signs of respiratory distress. Lung sounds are clear bilaterally.  Cardiovascular:  S1 S2, Regular rate and rhythm.  Abdominal: Abdomen is soft, symmetric, and non-tender without distention.    Neurology: alert oriented *3, no asterixis   Skin: No rashes, No Jaundice.      Data: (reviewed by attending)                        11.3   9.80  )-----------( 199      ( 24 Apr 2022 04:30 )             34.0     Hgb trend:  11.3  04-24-22 @ 04:30  12.1  04-23-22 @ 09:14      04-24    139  |  105  |  13  ----------------------------<  77  3.7   |  22  |  0.6<L>    Ca    8.0<L>      24 Apr 2022 04:30  Mg     1.9     04-24    TPro  5.1<L>  /  Alb  3.2<L>  /  TBili  <0.2  /  DBili  x   /  AST  14  /  ALT  19  /  AlkPhos  40  04-24    Liver panel trend:  TBili <0.2   /   AST 14   /   ALT 19   /   AlkP 40   /   Tptn 5.1   /   Alb 3.2    /   DBili --      04-24  TBili 0.3   /   AST 44   /   ALT 27   /   AlkP 52   /   Tptn 6.5   /   Alb 4.0    /   DBili --      04-23      Culture - Abscess with Gram Stain (collected 23 Apr 2022 13:21)  Source: .Abscess perianal  Preliminary Report (24 Apr 2022 18:47):    Numerous Escherichia coli

## 2022-04-25 NOTE — PROGRESS NOTE ADULT - SUBJECTIVE AND OBJECTIVE BOX
GENERAL SURGERY PROGRESS NOTE    Patient: GREYSON GARZA , 31y ([birth year])Female   MRN: [id number]  Location: [address]  Visit: 04-23-22 Inpatient  Date: 04-25-22 @ 11:26    Hospital Day #: 3    Events of past 24 hours: No acute events overnight.     PAST MEDICAL & SURGICAL HISTORY:  Asthma    Crohns disease of small intestine    Hemorrhoids    No significant past surgical history        Vitals:   T(F): 97.2 (04-25-22 @ 04:30), Max: 97.9 (04-24-22 @ 19:50)  HR: 70 (04-25-22 @ 04:30)  BP: 130/78 (04-25-22 @ 04:30)  RR: 18 (04-25-22 @ 04:30)  SpO2: 96% (04-25-22 @ 00:05)      Diet, Low Fiber:   Low Fat (LOWFAT)  No Lactose      Fluids:     I & O's:    Bowel Movement: : [] YES [] NO  Flatus: : [] YES [] NO    PHYSICAL EXAM:  General: NAD, AAOx3, calm and cooperative  HEENT: NCAT, Trachea ML, Neck supple  Cardiac: RRR  Respiratory: normal respiratory effort  Abdomen: Soft, non-distended, non-tender, no rebound, no guarding. +BS.  Rectal: multiple skin tags. small opening to the right of the anus with purulent drainage now s/p I&D with dressings in place C/D/I  Musculoskeletal: Strength 5/5 BL UE/LE, ROM intact, compartments soft  Neuro: Sensation grossly intact and equal throughout, no focal deficits  Vascular: Pulses 2+ throughout, extremities well perfused  Skin: Warm/dry, normal color, no jaundice    MEDICATIONS  (STANDING):  cefTRIAXone   IVPB 1000 milliGRAM(s) IV Intermittent every 24 hours  chlorhexidine 4% Liquid 1 Application(s) Topical <User Schedule>  enoxaparin Injectable 50 milliGRAM(s) SubCutaneous every 12 hours  gabapentin 300 milliGRAM(s) Oral three times a day  magnesium oxide 400 milliGRAM(s) Oral once  metroNIDAZOLE  IVPB 500 milliGRAM(s) IV Intermittent every 8 hours  pantoprazole    Tablet 40 milliGRAM(s) Oral before breakfast  potassium chloride    Tablet ER 40 milliEquivalent(s) Oral once  predniSONE   Tablet 40 milliGRAM(s) Oral daily    MEDICATIONS  (PRN):  acetaminophen     Tablet .. 650 milliGRAM(s) Oral every 6 hours PRN Mild Pain (1 - 3)  HYDROmorphone  Injectable 1 milliGRAM(s) IV Push every 4 hours PRN Severe Pain (7 - 10)      DVT PROPHYLAXIS: enoxaparin Injectable 50 milliGRAM(s) SubCutaneous every 12 hours    GI PROPHYLAXIS: pantoprazole    Tablet 40 milliGRAM(s) Oral before breakfast    ANTICOAGULATION:   ANTIBIOTICS:  cefTRIAXone   IVPB 1000 milliGRAM(s)  metroNIDAZOLE  IVPB 500 milliGRAM(s)            LAB/STUDIES:  Labs:  CAPILLARY BLOOD GLUCOSE                              11.7   9.13  )-----------( 215      ( 25 Apr 2022 08:53 )             35.5       Auto Neutrophil %: 80.2 % (04-25-22 @ 08:53)  Auto Immature Granulocyte %: 1.0 % (04-25-22 @ 08:53)    04-25    142  |  107  |  20  ----------------------------<  96  3.4<L>   |  24  |  0.7      Calcium, Total Serum: 8.3 mg/dL (04-25-22 @ 08:53)      LFTs:             5.4  | <0.2 | 14       ------------------[41      ( 25 Apr 2022 08:53 )  3.3  | x    | 20          Lipase:x      Amylase:x         Lactate, Blood: 1.6 mmol/L (04-23-22 @ 16:00)  Lactate, Blood: 3.0 mmol/L (04-23-22 @ 09:14)      Coags:                Culture - Abscess with Gram Stain (collected 23 Apr 2022 13:21)  Source: .Abscess perianal  Preliminary Report (24 Apr 2022 18:47):    Numerous Escherichia coli              IMAGING:      ACCESS/ DEVICES:  [ ] Peripheral IV  [ ] Central Venous Line	[ ] R	[ ] L	[ ] IJ	[ ] Fem	[ ] SC	Placed:   [ ] Arterial Line		[ ] R	[ ] L	[ ] Fem	[ ] Rad	[ ] Ax	Placed:   [ ] PICC:					[ ] Mediport  [ ] Urinary Catheter,  Date Placed:   [ ] Chest tube: [ ] Right, [ ] Left  [ ] ABIGAIL/Vito Drains

## 2022-04-25 NOTE — PROGRESS NOTE ADULT - ASSESSMENT
31F w/ PMHx of asthma and Crohn's disease (for the last 6 years) complicated by recurrent perianal abscess (unsure about fistula), non compliant for the last 6 years is on prednisone 40mg daily, presented to [facility]- ED c/o perianal pain and drainage    Crohn's disease with recurrent perianal abscess.  Sigmoidoscopy : 2019 by Dr. Calli tyson ulcers and pathology showed cryptitis, crypt abscess, architectural distortion. No fistula noted    Pt's last perianal abscess was in August 2021 at which point it was I&D'd under anesthesia by Dr. Fitzgerald and found no fistula, no seton was placed.  second episode this time I and D performed for (7.3cm perianal abscess) at the ER and now appears much better on physical exam.  Baseline symptoms: liquid stools two to 5 epsides, non bloody, intermittent cramps and vomiting.  She is been on prednsione 40mg daily for the last 6 years, given by multiple ER visits, PMD, never follows a GI doctor, one time she followed in [facility] then lost to follow up again. never on Biological treatment.  She has JULIO CESAR+    REC:  advance diet as tolerated to low fat, lactose free, low residue   needs  ESR, CRP, fecal calprotectin, quantiferon gold, hepatitis serology (hep A IgG, hep B sAg, sAb, core Ab, hep C AB, TPMT levels.  At this point c/w Cipro and Flagyl for two weeks.  c/w prednisone 40mg as of now as been on for 6 years but she needs full evaluation as outpatient once abscess is resolved with EGD/Colonoscopy/MRE   will need biologics as OP    Dr. Bush follow up    Wash area at least daily and after each BM    -for questions text me on  teams, call [phone] on weekdays before 5pm or call GI service at [phone]  after 5 pm and on weekends

## 2022-04-26 ENCOUNTER — TRANSCRIPTION ENCOUNTER (OUTPATIENT)
Age: 32
End: 2022-04-26

## 2022-04-26 VITALS
TEMPERATURE: 97 F | RESPIRATION RATE: 18 BRPM | SYSTOLIC BLOOD PRESSURE: 122 MMHG | HEART RATE: 77 BPM | DIASTOLIC BLOOD PRESSURE: 68 MMHG

## 2022-04-26 PROCEDURE — 99239 HOSP IP/OBS DSCHRG MGMT >30: CPT

## 2022-04-26 PROCEDURE — 99233 SBSQ HOSP IP/OBS HIGH 50: CPT

## 2022-04-26 RX ORDER — CEFPODOXIME PROXETIL 100 MG
1 TABLET ORAL
Qty: 4 | Refills: 0
Start: 2022-04-26 | End: 2022-04-29

## 2022-04-26 RX ORDER — HYDROMORPHONE HYDROCHLORIDE 2 MG/ML
1 INJECTION INTRAMUSCULAR; INTRAVENOUS; SUBCUTANEOUS
Qty: 28 | Refills: 0
Start: 2022-04-26 | End: 2022-05-02

## 2022-04-26 RX ORDER — GABAPENTIN 400 MG/1
1 CAPSULE ORAL
Qty: 90 | Refills: 0
Start: 2022-04-26 | End: 2022-05-25

## 2022-04-26 RX ORDER — APIXABAN 2.5 MG/1
10 TABLET, FILM COATED ORAL EVERY 12 HOURS
Refills: 0 | Status: DISCONTINUED | OUTPATIENT
Start: 2022-04-26 | End: 2022-04-26

## 2022-04-26 RX ORDER — APIXABAN 2.5 MG/1
2 TABLET, FILM COATED ORAL
Qty: 120 | Refills: 0
Start: 2022-04-26 | End: 2022-05-25

## 2022-04-26 RX ORDER — ONDANSETRON 8 MG/1
4 TABLET, FILM COATED ORAL ONCE
Refills: 0 | Status: COMPLETED | OUTPATIENT
Start: 2022-04-26 | End: 2022-04-26

## 2022-04-26 RX ORDER — METRONIDAZOLE 500 MG
1 TABLET ORAL
Qty: 12 | Refills: 0
Start: 2022-04-26 | End: 2022-04-29

## 2022-04-26 RX ORDER — POTASSIUM CHLORIDE 20 MEQ
20 PACKET (EA) ORAL ONCE
Refills: 0 | Status: COMPLETED | OUTPATIENT
Start: 2022-04-26 | End: 2022-04-26

## 2022-04-26 RX ORDER — CEFUROXIME AXETIL 250 MG
1 TABLET ORAL
Qty: 14 | Refills: 0
Start: 2022-04-26 | End: 2022-05-02

## 2022-04-26 RX ORDER — METRONIDAZOLE 500 MG
1 TABLET ORAL
Qty: 21 | Refills: 0
Start: 2022-04-26 | End: 2022-05-02

## 2022-04-26 RX ADMIN — Medication 100 MILLIGRAM(S): at 05:26

## 2022-04-26 RX ADMIN — HYDROMORPHONE HYDROCHLORIDE 1 MILLIGRAM(S): 2 INJECTION INTRAMUSCULAR; INTRAVENOUS; SUBCUTANEOUS at 07:05

## 2022-04-26 RX ADMIN — Medication 1 TABLET(S): at 12:22

## 2022-04-26 RX ADMIN — Medication 30 MILLIGRAM(S): at 05:27

## 2022-04-26 RX ADMIN — ONDANSETRON 4 MILLIGRAM(S): 8 TABLET, FILM COATED ORAL at 05:26

## 2022-04-26 RX ADMIN — PANTOPRAZOLE SODIUM 40 MILLIGRAM(S): 20 TABLET, DELAYED RELEASE ORAL at 07:05

## 2022-04-26 RX ADMIN — GABAPENTIN 300 MILLIGRAM(S): 400 CAPSULE ORAL at 16:31

## 2022-04-26 RX ADMIN — GABAPENTIN 300 MILLIGRAM(S): 400 CAPSULE ORAL at 05:26

## 2022-04-26 RX ADMIN — ENOXAPARIN SODIUM 50 MILLIGRAM(S): 100 INJECTION SUBCUTANEOUS at 05:27

## 2022-04-26 RX ADMIN — Medication 1 TABLET(S): at 17:12

## 2022-04-26 RX ADMIN — HYDROMORPHONE HYDROCHLORIDE 1 MILLIGRAM(S): 2 INJECTION INTRAMUSCULAR; INTRAVENOUS; SUBCUTANEOUS at 16:31

## 2022-04-26 RX ADMIN — HYDROMORPHONE HYDROCHLORIDE 1 MILLIGRAM(S): 2 INJECTION INTRAMUSCULAR; INTRAVENOUS; SUBCUTANEOUS at 02:40

## 2022-04-26 RX ADMIN — APIXABAN 10 MILLIGRAM(S): 2.5 TABLET, FILM COATED ORAL at 17:12

## 2022-04-26 RX ADMIN — CHLORHEXIDINE GLUCONATE 1 APPLICATION(S): 213 SOLUTION TOPICAL at 05:29

## 2022-04-26 RX ADMIN — HYDROMORPHONE HYDROCHLORIDE 1 MILLIGRAM(S): 2 INJECTION INTRAMUSCULAR; INTRAVENOUS; SUBCUTANEOUS at 12:23

## 2022-04-26 NOTE — DISCHARGE NOTE PROVIDER - NSDCMRMEDTOKEN_GEN_ALL_CORE_FT
calcium-vitamin D 500 mg-5 mcg (200 intl units) oral tablet: 1 tab(s) orally 2 times a day  cefpodoxime 200 mg oral tablet: 1 tab(s) orally once a day   Eliquis Starter Pack for Treatment of DVT and PE 5 mg oral tablet: 2 tabs (10 mg) orally 2 times a day for seven days then 1 tab (5mg) orally 2 times a day  metroNIDAZOLE 500 mg oral tablet: 1 tab(s) orally 3 times a day  Neurontin 300 mg oral capsule: 1 cap(s) orally 3 times a day  predniSONE 10 mg oral tablet: 3 tab(s) orally once a day  sulfamethoxazole-trimethoprim 800 mg-160 mg oral tablet: 1 tab(s) orally every 48 hours   calcium-vitamin D 500 mg-5 mcg (200 intl units) oral tablet: 1 tab(s) orally 2 times a day  cefuroxime 500 mg oral tablet: 1 tab(s) orally 2 times a day   Dilaudid 2 mg oral tablet: 1 tab(s) orally every 6 hours, As Needed -for severe pain MDD:4 tab   Eliquis Starter Pack for Treatment of DVT and PE 5 mg oral tablet: 2 tabs (10 mg) orally 2 times a day for seven days then 1 tab (5mg) orally 2 times a day  metroNIDAZOLE 500 mg oral tablet: 1 tab(s) orally 3 times a day  Neurontin 300 mg oral capsule: 1 cap(s) orally 3 times a day  predniSONE 10 mg oral tablet: 3 tab(s) orally once a day  sulfamethoxazole-trimethoprim 800 mg-160 mg oral tablet: 1 tab(s) orally every 48 hours

## 2022-04-26 NOTE — PROGRESS NOTE ADULT - ASSESSMENT
31F w/ PMHx of asthma and Crohn's disease (for the last 6 years) complicated by recurrent perianal abscess (unsure about fistula), non compliant for the last 6 years is on prednisone 40mg daily, presented to [facility] ED c/o perianal pain and drainage    Crohn's disease with recurrent perianal abscess.  Sigmoidoscopy : 2019 by Dr. Calli tyson ulcers and pathology showed cryptitis, crypt abscess, architectural distortion. No fistula noted    Pt's last perianal abscess was in August 2021 at which point it was I&D'd under anesthesia by Dr. Fitzgerald and found no fistula, no seton was placed.  second episode this time I and D performed for (7.3cm perianal abscess) at the ER and now appears much better on physical exam.  Baseline symptoms: liquid stools two to 5 epsides, non bloody, intermittent cramps and vomiting.  She is been on prednsione 40mg daily for the last 6 years, given by multiple ER visits, PMD, never follows a GI doctor, one time she followed in [facility] then lost to follow up again. never on Biological treatment. currently being tapered to 30 by medical team      REC:  diet as tolerated low fat, lactose free, low residue   patient to be discharged today on cefuroxime and flagyl  to be discharged on predniosne 30 and bactrim  our MA was contacted to reach ou to the patient to be seen next friday  will need OP biologics and repeat EGD/colonsocopy +/- MRE  will need follow up with Dr. Bush as OP     -for questions text me on  teams, call [phone] on weekdays before 5pm or call GI service at [phone]  after 5 pm and on weekends

## 2022-04-26 NOTE — DISCHARGE NOTE PROVIDER - NSDCCPCAREPLAN_GEN_ALL_CORE_FT
SUBJECTIVE:  Paddy Gonzalez  is a 55 remainder of the review of systems is negative, except as above.       PHYSICAL EXAMINATION:   GENERAL: The patient is alert and oriented and in no distress.   VITAL SIGNS:   Visit Vitals  /84 (BP Location: [facility] [place] Patient Position: Sitting, Cuff Size: Large Adult)   Pulse 72   Resp 16   Ht 5' 8\" (1.727 m)   Wt 92.5 kg   BMI 31.02 kg/m²      HEENT: Eye exam shows conjunctivae to be normal. Pupils are equal, round, reactive to light. Ear exam shows the canals to be clear and tympanic membranes to be normal. Throat exam is normal. Oral mucosa is normal without any lesions.   NECK: No cervical or supraclavicular lymphadenopathy that is palpable. Carotid exam shows no bruits. Thyroid normal to palpation.   LUNGS: Clear to auscultation. No wheezes, rhonchi, rales or retractions are noted.   CARDIAC: Regular rate and rhythm with a normal S1, S2. No murmurs are noted. Dorsalis pedis pulses are palpable.   ABDOMEN: Benign with no masses, tenderness or hepatosplenomegaly   palpable. No hernias are palpable. No aortic aneurysm is palpable.  GENITOURINARY: No inguinal hernia. EXTREMITIES: No edema. No lesions noted.   MUSCULOSKELETAL: Normal gait. No obvious joint abnormalities are noted on exam. The patient has good range of motion in his joints. Muscle strength is appropriate.   SKIN: Exam shows skin to be warm and dry with no unusual rashes or lesions.   NEUROLOGICAL: Alert and oriented, cranial nerves II through XII grossly intact, gait and balance are normal, muscular tone normal.  PSYCHIATRIC: Normal judgment and insight. Memory is grossly appropriate. Mood and affect are normal.   RECTAL: Deferred patient will have a PSA test    Paddy was seen today for physical.    Diagnoses and all orders for this visit:    Annual physical exam  -     PROSTATE SPECIFIC ANTIGEN; Future  -     PREV EST AGE 40-64    PRINCIPAL DISCHARGE DIAGNOSIS  Diagnosis: Pulmonary embolism  Assessment and Plan of Treatment: During this hospitalization, you were diagnosed with a pulmonary embolsim. In your case, you have a  deep vein thrombosis (DVT) which is a blood clot in a large vein deep in a leg, arm, or elsewhere in the body. The clot can separate from the vein, travel to the lungs and cut off blood flow. This is a pulmonary embolism (PE). Pulmonary embolism is very serious. Both the prevention and the treatment are similar for DVT and PE.   To help prevent more blood clots from forming, please see your primary care doctor within one week of discharge, and please take your medicines exactly as instructed. Don’t skip doses. You have been prescribed a medication to thin the blood, so that more clots do not form.  Have all lab tests as recommended. This is very important when you take medicines to prevent blood clots. Make sure you stay active and walk for at least 30 minutes every day. When sitting for long periods of time, move your knees, ankles, feet, and toes.    If you smoke, get help to quit. Stay at a healthy weight. Try to exercise at least 30 minutes on most days. Before starting an exercise program, talk with your primary care provider. When traveling by car, make frequent stops to get up and move around. On long airplane rides, get up and move around when possible. If you can’t get up, wiggle your toes, move your ankles and tighten your calves to keep your blood moving.  Seek immediate medical care if you have pain, swelling, and redness in your leg, arm, or other body area. These symptoms may mean another blood clot. Also call your healthcare provider if you have signs and symptoms of bleeding, like blood in your urine, bleeding with bowel movements, or bleeding from the nose, gums, a cut, or vagina. Call 911 if you have symptoms of a blood clot in the lungs including: Chest pain, trouble breathing, coughing blood, fast heartbeat, heavy or uncontrolled bleeding.      SECONDARY DISCHARGE DIAGNOSES  Diagnosis: Perianal Crohn's disease, with abscess  Assessment and Plan of Treatment: An abscess is an infected area that contains a collection of pus and debris. It can occur in almost any part of the body and occurs when the tissue gets infection. Symptoms include a painful mass that is red, warm, tender that might break open and HAVE drainage. If your health care provider gave you antibiotics, make sure to take the full course and do not stop even if you are feeling better.   SEEK IMMEDIATE MEDICAL CARE IF YOU HAVE ANY OF THE FOLLOWING SYMPTOMS: chills, fever, muscle aches, or red streaking from the area.

## 2022-04-26 NOTE — DISCHARGE NOTE PROVIDER - CARE PROVIDERS DIRECT ADDRESSES
[email or web address]

## 2022-04-26 NOTE — PHYSICAL THERAPY INITIAL EVALUATION ADULT - GENERAL OBSERVATIONS, REHAB EVAL
[phone] Patient encountered semi arenas in bed + IV lock,  Patient c/o 8/10 pain in buttock at rest and 9/10 with movement . DR mccain and medical team aware of patients complaints/patient premedicated for activity and limited 2/2 complaint

## 2022-04-26 NOTE — DISCHARGE NOTE PROVIDER - PROVIDER TOKENS
PROVIDER:[TOKEN:[[id number]],FOLLOWUP:[1-3 days]] PROVIDER:[TOKEN:[[id number]],FOLLOWUP:[1-3 days]],PROVIDER:[TOKEN:[[id number]],FOLLOWUP:[2 weeks]]

## 2022-04-26 NOTE — CHART NOTE - NSCHARTNOTEFT_GEN_A_CORE
The patient has a mobility limitation that significantly impair the pts ability to participate in one or more MRADLS such as toileting, eating, dressing at home. The pts home provides adequate access between rooms for the use of the manual wheelchair. the wheelchair will improve the pt ability to participate in MRADLS and will be used regularly at home. upper extremity function is presevred to propel wheelchair. pt agrees to use manual wheelchair provided at home The patient has a mobility limitation that significantly impairs the pts ability to participate in one or more MRADLS such as toileting, eating, dressing at home. The pts home provides adequate access between rooms for the use of the manual wheelchair. the wheelchair will improve the pt ability to participate in MRADLS and will be used regularly at home. upper extremity function is presevred to propel wheelchair. pt agrees to use manual wheelchair provided at home

## 2022-04-26 NOTE — PROGRESS NOTE ADULT - PROVIDER SPECIALTY LIST ADULT
Gastroenterology
Internal Medicine
Internal Medicine
Colorectal Surgery
Internal Medicine
Surgery
Gastroenterology

## 2022-04-26 NOTE — DISCHARGE NOTE NURSING/CASE MANAGEMENT/SOCIAL WORK - PATIENT PORTAL LINK FT
You can access the FollowMyHealth Patient Portal offered by [facility] by registering at the following website: [email or web address]. By joining [employer]’s FollowMyHealth portal, you will also be able to view your health information using other applications (apps) compatible with our system.

## 2022-04-26 NOTE — DISCHARGE NOTE PROVIDER - HOSPITAL COURSE
31F w/ PMHx of asthma and Crohn's disease complicated by perianal fistulae and recurrent perianal abscess presented to [facility] ED c/o perianal pain. Pt reports she started having pain in her buttocks about a week ago which has progressively worsened accompanied by subjective fevers. Last BM 2 days ago. Endorses nausea and small episode of vomitus prior to arrival. +flatus.  Pt's last abscess was in August 2021 at which point it was I&D'd. She presented again in November 2021 with a Crohn's flare but eloped prior to admission. Unsure of last colonoscopy. Does not follow with GI and takes Prednisone 40 mg daily.    ED course: VS in triage. Labs revealed leukocytosis and LA 3. CT done showing abscess in the right ischioanal fossa abutting the external anal sphincter and incidental acute b/l PE.  Started on Lovenox therapeutic - Cipro/Flagyl switched to Rocephin/Flagyl 4/25  US doppler done showed DVT in     Patient was seen and examined this morning at bedside and is stable for discharge.  She is to be discharged on the prescribed medications   She is to follow-up with gastroenterology team for management and possibly switching her prednisone to other medication.   31F w/ PMHx of asthma and Crohn's disease complicated by perianal fistulae and recurrent perianal abscess presented to [facility] ED c/o perianal pain. Pt reports she started having pain in her buttocks about a week ago which has progressively worsened accompanied by subjective fevers. Last BM 2 days ago. Endorses nausea and small episode of vomitus prior to arrival. +flatus.  Pt's last abscess was in August 2021 at which point it was I&D'd. She presented again in November 2021 with a Crohn's flare but eloped prior to admission. Unsure of last colonoscopy. Does not follow with GI and takes Prednisone 40 mg daily.    ED course: VS in triage. Labs revealed leukocytosis and LA 3. CT done showing abscess in the right ischioanal fossa abutting the external anal sphincter and incidental acute b/l PE.  Started on Lovenox therapeutic - Cipro/Flagyl switched to Rocephin/Flagyl 4/25  US doppler done showed DVT in L popliteal     Patient was seen and examined this morning at bedside and is stable for discharge.  She is to be discharged on the prescribed medications   She is to follow-up with gastroenterology team for management and possibly switching her prednisone to other medication.   31F w/ PMHx of asthma and Crohn's disease complicated by perianal fistulae and recurrent perianal abscess presented to [facility] ED c/o perianal pain. Pt reports she started having pain in her buttocks about a week ago which has progressively worsened accompanied by subjective fevers. Last BM 2 days ago. Endorses nausea and small episode of vomitus prior to arrival. +flatus.  Pt's last abscess was in August 2021 at which point it was I&D'd. She presented again in November 2021 with a Crohn's flare but eloped prior to admission. Unsure of last colonoscopy. Does not follow with GI and takes Prednisone 40 mg daily.    ED course: VS in triage. Labs revealed leukocytosis and LA 3. CT done showing abscess in the right ischioanal fossa abutting the external anal sphincter and incidental acute b/l PE.  Started on Lovenox therapeutic - Cipro/Flagyl switched to Rocephin/Flagyl 4/25  US doppler done showed DVT in L popliteal and is currently started on Eliquis     Patient was seen and examined this morning at bedside and is stable for discharge.  She is to be discharged on the prescribed medications including eliquis and antibiotics   She is to follow-up with gastroenterology team for management and possibly switching her prednisone to other medication.

## 2022-04-26 NOTE — DISCHARGE NOTE NURSING/CASE MANAGEMENT/SOCIAL WORK - NSDCPEFALRISK_GEN_ALL_CORE
For information on Fall & Injury Prevention, visit: [email or web address] OR  [email or web address] OR  https://www.cdc.gov/steadi/patient.html

## 2022-04-26 NOTE — PROGRESS NOTE ADULT - SUBJECTIVE AND OBJECTIVE BOX
Gastroenterology progress note:     Patient is a 31y old  Female who presents with a chief complaint of Perianal Abscess + PE  (26 Apr 2022 10:31)       Admitted on: 04-23-22    We are following the patient for crohn's disease      Interval History: patient perirectal pain is controlled with medications, tolerating diet. had nausea but resolved with medications. feels ready to go home     PAST MEDICAL & SURGICAL HISTORY:  Asthma   Crohns disease of small intestine  Hemorrhoids  No significant past surgical history    MEDICATIONS  (STANDING):  apixaban 10 milliGRAM(s) Oral every 12 hours  calcium carbonate 1250 mG  + Vitamin D (OsCal 500 + D) 1 Tablet(s) Oral two times a day  cefTRIAXone   IVPB 1000 milliGRAM(s) IV Intermittent every 24 hours  chlorhexidine 4% Liquid 1 Application(s) Topical <User Schedule>  gabapentin 300 milliGRAM(s) Oral three times a day  metroNIDAZOLE  IVPB 500 milliGRAM(s) IV Intermittent every 8 hours  pantoprazole    Tablet 40 milliGRAM(s) Oral before breakfast  predniSONE   Tablet 30 milliGRAM(s) Oral daily  trimethoprim  160 mG/sulfamethoxazole 800 mG 1 Tablet(s) Oral every 48 hours    MEDICATIONS  (PRN):  acetaminophen     Tablet .. 650 milliGRAM(s) Oral every 6 hours PRN Mild Pain (1 - 3)  ALBUTerol    90 MICROgram(s) HFA Inhaler 2 Puff(s) Inhalation every 6 hours PRN Wheezing  HYDROmorphone  Injectable 1 milliGRAM(s) IV Push every 4 hours PRN Severe Pain (7 - 10)      Allergies  penicillin (Rash)      Review of Systems:   General: no fever  HEENT: no hemoptysis  Cardiovascular:  No Chest Pain, No Palpitations  Respiratory:  No Cough, No Dyspnea  Gastrointestinal:  As described in HPI   Neurology: no new motor deficit  Skin: no new rash    Physical Examination:  T(C): 36.1 (04-26-22 @ 13:49), Max: 36.1 (04-25-22 @ 20:30)  HR: 77 (04-26-22 @ 13:49) (66 - 77)  BP: 122/68 (04-26-22 @ 13:49) (122/68 - 132/74)  RR: 18 (04-26-22 @ 13:49) (16 - 18)  SpO2: 97% (04-25-22 @ 20:15) (97% - 97%)      Constitutional: No acute distress.  Head: normocephalic  Neck: supple   Eyes: EOMI  Respiratory:  No signs of respiratory distress. Lung sounds are clear bilaterally.  Cardiovascular:  S1 S2, Regular rate and rhythm.  Abdominal: Abdomen is soft, symmetric, and non-tender without distention.   Neurology: alert oriented *3, no asterixis   Skin: No rashes, No Jaundice.      Data: (reviewed by attending)                        11.7   9.13  )-----------( 215      ( 25 Apr 2022 08:53 )             35.5     Hgb trend:  11.7  04-25-22 @ 08:53  11.3  04-24-22 @ 04:30      04-25    142  |  107  |  20  ----------------------------<  96  3.4<L>   |  24  |  0.7    Ca    8.3<L>      25 Apr 2022 08:53  Mg     1.8     04-25    TPro  5.4<L>  /  Alb  3.3<L>  /  TBili  <0.2  /  DBili  x   /  AST  14  /  ALT  20  /  AlkPhos  41  04-25    Liver panel trend:  TBili <0.2   /   AST 14   /   ALT 20   /   AlkP 41   /   Tptn 5.4   /   Alb 3.3    /   DBili --      04-25  TBili <0.2   /   AST 14   /   ALT 19   /   AlkP 40   /   Tptn 5.1   /   Alb 3.2    /   DBili --      04-24  TBili 0.3   /   AST 44   /   ALT 27   /   AlkP 52   /   Tptn 6.5   /   Alb 4.0    /   DBili --      04-23

## 2022-04-26 NOTE — PROGRESS NOTE ADULT - ASSESSMENT
31F w/ PMHx of asthma and Crohn's disease complicated by perianal fistulae and recurrent perianal abscess presented to [facility] ED c/o perianal pain.    # new asymp b/l PE; 1st episode; takes OCPs  PESI class I: Very low risk  CT: Acute bilateral lower lobe pulmonary emboli.  lovenox 50mg sc q12 - on d/c - Eliquis 10mg po q12 for 1 wk, then 5mg po q12 for at least 6 mo  lt ankle xray: neg  V Dupl: + DVT in Left Pop, Peron, PT (explains ankle pain)  TTE: nl EF; no rt hrt strain  d/c OCPs upon d/c  Pulm eval - Dr Kim - noted    # hx Crohn's Disease; Perianal Abscess (7.3cm), recurrent; NO SEPSIS  WBC better  CT: Abscess in the right ischioanal fossa abutting the external anal sphincter.  abscess cx: E Coli -> resist FQ   abx: ceftin 500mg q12 + flagyl 500mg po q8 for another week  decr Pred 30mg po q24 (aware of GI note)  OP prevention: start Ca 1250/Vit D 200 po q12  PJP ppx: start Bactrim DS po M,W,F  c/w dilaudid 1mg iv q4 prn - on d/c dilaudid 2mg po q6 prn pain - I sent 1-wk rx to [facility] on [address]  avoid NSAIDs (on a/c)  GI eval noted: outpt f/u for biologic tx  wound care: just gauze and tape in place (give pt rx for supplies) - pt says she can manage her own wound care    # Asthma - intermittent; stable  Not on any inhalers    # DVT ppx: therap Lovenox    # GI ppx PPI    # Code Full    # updated  at bedside    Dispo: therap LMWH; iv abx; pain meds; Pred 30mg no taper; Ca/Vit D; bactrim ppx;   pt will go home today (cleared by surg)

## 2022-04-26 NOTE — PROGRESS NOTE ADULT - SUBJECTIVE AND OBJECTIVE BOX
GREYSON GARZA 31y Female  MRN#: [id number]   CODE STATUS:________    Hospital Day: 3d    Pt is currently admitted with the primary diagnosis of     SUBJECTIVE  Hospital Course    Overnight events     Subjective complaints     Present Today:   - Martino:  No [  ], Yes [   ] : Indication:     - Type of IV Access:       .. CVC/Piccline:  No [  ], Yes [   ] : Indication:       .. Midline: No [  ], Yes [   ] : Indication:                                              OBJECTIVE  PAST MEDICAL & SURGICAL HISTORY  Asthma    Crohns disease of small intestine    Hemorrhoids    No significant past surgical history                                                ALLERGIES:  penicillin (Rash)                           HOME MEDICATIONS  Home Medications:                           MEDICATIONS:  STANDING MEDICATIONS  calcium carbonate 1250 mG  + Vitamin D (OsCal 500 + D) 1 Tablet(s) Oral two times a day  cefTRIAXone   IVPB 1000 milliGRAM(s) IV Intermittent every 24 hours  chlorhexidine 4% Liquid 1 Application(s) Topical <User Schedule>  enoxaparin Injectable 50 milliGRAM(s) SubCutaneous every 12 hours  gabapentin 300 milliGRAM(s) Oral three times a day  metroNIDAZOLE  IVPB 500 milliGRAM(s) IV Intermittent every 8 hours  pantoprazole    Tablet 40 milliGRAM(s) Oral before breakfast  predniSONE   Tablet 30 milliGRAM(s) Oral daily  trimethoprim  160 mG/sulfamethoxazole 800 mG 1 Tablet(s) Oral every 48 hours    PRN MEDICATIONS  acetaminophen     Tablet .. 650 milliGRAM(s) Oral every 6 hours PRN  ALBUTerol    90 MICROgram(s) HFA Inhaler 2 Puff(s) Inhalation every 6 hours PRN  HYDROmorphone  Injectable 1 milliGRAM(s) IV Push every 4 hours PRN                                            ------------------------------------------------------------  VITAL SIGNS: Last 24 Hours  T(C): 36.1 (26 Apr 2022 05:24), Max: 36.6 (25 Apr 2022 13:28)  T(F): 96.9 (26 Apr 2022 05:24), Max: 97.9 (25 Apr 2022 13:28)  HR: 66 (26 Apr 2022 05:24) (65 - 70)  BP: 132/74 (26 Apr 2022 05:24) (111/61 - 132/74)  BP(mean): --  RR: 16 (26 Apr 2022 05:24) (16 - 18)  SpO2: --                                               LABS:                        11.7   9.13  )-----------( 215      ( 25 Apr 2022 08:53 )             35.5     04-25    142  |  107  |  20  ----------------------------<  96  3.4<L>   |  24  |  0.7    Ca    8.3<L>      25 Apr 2022 08:53  Mg     1.8     04-25    TPro  5.4<L>  /  Alb  3.3<L>  /  TBili  <0.2  /  DBili  x   /  AST  14  /  ALT  20  /  AlkPhos  41  04-25                Culture - Abscess with Gram Stain (collected 23 Apr 2022 13:21)  Source: .Abscess perianal  Final Report (25 Apr 2022 19:05):    Numerous Escherichia coli    Moderate Prevotella disiens "Susceptibilities not performed"  Organism: Escherichia coli (25 Apr 2022 19:05)  Organism: Escherichia coli (25 Apr 2022 19:05)                                                    RADIOLOGY:        PHYSICAL EXAM:  GENERAL: NAD, lying in bed comfortably  HEAD:  Atraumatic, Normocephalic  EYES: EOMI, PERRLA, conjunctiva and sclera clear  ENT: Moist mucous membranes  NECK: Supple, No JVD  CHEST/LUNG: Clear to auscultation bilaterally; No rales, rhonchi, wheezing, or rubs. Unlabored respirations  HEART: Regular rate and rhythm; No murmurs, rubs, or gallops  ABDOMEN: BSx4; Soft, nontender, nondistended  EXTREMITIES:  2+ Peripheral Pulses, brisk capillary refill. No clubbing, cyanosis, or edema  NERVOUS SYSTEM:  A&Ox3, no focal deficits   SKIN: No rashes or lesions                                         ASSESSMENT & PLAN    Past medical history and hospital course                                                                                                           ----------------------------------------------------  # DVT prophylaxis     # GI prophylaxis     # Diet     # Activity Score (AM-PAC)    # Code status     # Disposition                                                                              --------------------------------------------------------    # Luisa      GREYSON GARZA 31y Female  MRN#: [id number]   CODE STATUS:________    Hospital Day: 3d    Pt is currently admitted with the primary diagnosis of Perianal abscess     SUBJECTIVE  Hospital Course  31F w/ PMHx of asthma and Crohn's disease complicated by perianal fistulae and recurrent perianal abscess presented to [facility] ED c/o perianal pain. Pt reports she started having pain in her buttocks about a week ago which has progressively worsened accompanied by subjective fevers. Last BM 2 days ago. Endorses nausea and small episode of vomitus prior to arrival. +flatus.  Pt's last abscess was in August 2021 at which point it was I&D'd. She presented again in November 2021 with a Crohn's flare but eloped prior to admission. Unsure of last colonoscopy. Does not follow with GI and takes Prednisone 40 mg daily.    ED course: VS in triage. Labs revealed leukocytosis and LA 3. CT done showing abscess and incidental acute b/l PE.  Started on Lovenox therapeutic - Cipro/Flagyl switched to Rocephin/Flagyl 4/25    Overnight events   No overnight events     Subjective complaints   Patient is feeling fine, complains of RLE pain lancinating, ANOx4, on RA   Denies fever, sob, cp, lightheadedness, nausea, diarrhea, dysuria,   Eating tolerated     Present Today:   - Martino:  No [ X ], Yes [   ] : Indication:         OBJECTIVE  PAST MEDICAL & SURGICAL HISTORY  Asthma    Crohns disease of small intestine    Hemorrhoids    No significant past surgical history                                                ALLERGIES:  penicillin (Rash)                           HOME MEDICATIONS  Home Medications:                           MEDICATIONS:  STANDING MEDICATIONS  calcium carbonate 1250 mG  + Vitamin D (OsCal 500 + D) 1 Tablet(s) Oral two times a day  cefTRIAXone   IVPB 1000 milliGRAM(s) IV Intermittent every 24 hours  chlorhexidine 4% Liquid 1 Application(s) Topical <User Schedule>  enoxaparin Injectable 50 milliGRAM(s) SubCutaneous every 12 hours  gabapentin 300 milliGRAM(s) Oral three times a day  metroNIDAZOLE  IVPB 500 milliGRAM(s) IV Intermittent every 8 hours  pantoprazole    Tablet 40 milliGRAM(s) Oral before breakfast  predniSONE   Tablet 30 milliGRAM(s) Oral daily  trimethoprim  160 mG/sulfamethoxazole 800 mG 1 Tablet(s) Oral every 48 hours    PRN MEDICATIONS  acetaminophen     Tablet .. 650 milliGRAM(s) Oral every 6 hours PRN  ALBUTerol    90 MICROgram(s) HFA Inhaler 2 Puff(s) Inhalation every 6 hours PRN  HYDROmorphone  Injectable 1 milliGRAM(s) IV Push every 4 hours PRN                                            ------------------------------------------------------------  VITAL SIGNS: Last 24 Hours  T(C): 36.1 (26 Apr 2022 05:24), Max: 36.6 (25 Apr 2022 13:28)  T(F): 96.9 (26 Apr 2022 05:24), Max: 97.9 (25 Apr 2022 13:28)  HR: 66 (26 Apr 2022 05:24) (65 - 70)  BP: 132/74 (26 Apr 2022 05:24) (111/61 - 132/74)  BP(mean): --  RR: 16 (26 Apr 2022 05:24) (16 - 18)  SpO2: --                                               LABS:                        11.7   9.13  )-----------( 215      ( 25 Apr 2022 08:53 )             35.5     04-25    142  |  107  |  20  ----------------------------<  96  3.4<L>   |  24  |  0.7    Ca    8.3<L>      25 Apr 2022 08:53  Mg     1.8     04-25    TPro  5.4<L>  /  Alb  3.3<L>  /  TBili  <0.2  /  DBili  x   /  AST  14  /  ALT  20  /  AlkPhos  41  04-25                Culture - Abscess with Gram Stain (collected 23 Apr 2022 13:21)  Source: .Abscess perianal  Final Report (25 Apr 2022 19:05):    Numerous Escherichia coli    Moderate Prevotella disiens "Susceptibilities not performed"  Organism: Escherichia coli (25 Apr 2022 19:05)  Organism: Escherichia coli (25 Apr 2022 19:05)                                     RADIOLOGY:    F/U US duplex LE     PHYSICAL EXAM:  GENERAL: NAD, lying in bed comfortably  NECK: Supple, No JVD  CHEST/LUNG: Clear to auscultation bilaterally; No rales, rhonchi, wheezing, or rubs. Unlabored respirations on RA  HEART: Regular rate and rhythm; No murmurs, rubs, or gallops  ABDOMEN: BSx4; Soft, nontender, nondistended  EXTREMITIES:  2+ Peripheral Pulses. No clubbing, cyanosis, or edema  NERVOUS SYSTEM:  A&Ox3, no focal deficits   SKIN: No rashes or lesions                                         ASSESSMENT & PLAN    31F w/ PMHx of asthma and Crohn's disease complicated by perianal fistulae and recurrent perianal abscess presented to [facility] ED c/o perianal pain.    #New asymp b/l PE; 1st episode; takes OCPs - on RA   PESI class I: Very low risk  CT: Acute bilateral lower lobe pulmonary emboli.  lovenox 50mg sc q12 (will use DOAC on d/c) --> switched to ELiquis   ankle xray: neg  - V Dupl: r/o DVT - f/u read     #Hx Crohn's Disease; Perianal Abscess (7.3cm), recurrent; NO SEPSIS  WBC downtrending  CT: Abscess in the right ischioanal fossa abutting the external anal sphincter.  Cx = E coli pansensitive   Abx: cipro + flagyl; will use oral upon d/c --> switched to rocephin/flagyl 4/25   F/u surgery --> no further I&D or Mx --> wound care as noted outpatient f/u   Pain control as needed (hydromorphone) - gabapentin   GI eval for Crohn's hx and steroid mngmt --> recs noted c/w prednisone keep at 30mg for now + f/u as outpatient   - PPX for chronic steroids = Bactrim + Calcium carb + vitamin D     # Asthma - intermittent; stable  Not on any inhalers  - started on albuterol as per patient request     # DVT ppx: Eliquis    # GI ppx PPI    # Code Full    Disposition: V Dupl; D/C

## 2022-04-26 NOTE — PROGRESS NOTE ADULT - SUBJECTIVE AND OBJECTIVE BOX
GREYSON GARZA  31y  Female  ***My note supersedes ALL resident notes that I sign.  My corrections for their notes are in my note.***    I can be reached directly on [id number]. My office number is [phone]. My personal cell number is [phone].    INTERVAL EVENTS: Here for f/u of rectal pain. Pt feels OK to go home. Sx cleared pt to go. Pt asked for pain meds. Pt can do her own dressing changes (gauze and tape). Pt can walk OK.    T(F): 97 (04-26-22 @ 13:49), Max: 97 (04-26-22 @ 13:49)  HR: 77 (04-26-22 @ 13:49) (66 - 77)  BP: 122/68 (04-26-22 @ 13:49) (122/68 - 132/74)  RR: 18 (04-26-22 @ 13:49) (16 - 18)  SpO2: 97% (04-25-22 @ 20:15) (97% - 97%)    Gen: NAD  HEENT: PERRL, EOMI, mouth clr, nose clr  Neck: no nodes, no JVD, thyroid nl  lungs: clr  hrt: s1 s2 rrr no murmur  abd: soft, NT/ND, no HS megaly  ext: no edema, no c/c; rt arm seems better; + pain left ankle (no swelling) (calf is better)  neuro: aa ox3, cn intact, can move all 4 ext    LABS:                      11.7    (    86.2   9.13  )-----------( ---------      215      ( 25 Apr 2022 08:53 )             35.5    (    14.7     Culture - Abscess with Gram Stain (collected 04-23-22 @ 13:21)  Source: .Abscess perianal  Final Report (04-25-22 @ 19:05):    Numerous Escherichia coli    Moderate Prevotella disiens "Susceptibilities not performed"  Organism: Escherichia coli (04-25-22 @ 19:05)  Organism: Escherichia coli (04-25-22 @ 19:05)      -  Amikacin: S <=16      -  Amoxicillin/Clavulanic Acid: S <=8/4      -  Ampicillin: S <=8 These ampicillin results predict results for amoxicillin      -  Ampicillin/Sulbactam: S <=4/2 Enterobacter, Klebsiella aerogenes, Citrobacter, and Serratia may develop resistance during prolonged therapy (3-4 days)      -  Aztreonam: S <=4      -  Cefazolin: S <=2 Enterobacter, Klebsiella aerogenes, Citrobacter, and Serratia may develop resistance during prolonged therapy (3-4 days)      -  Cefepime: S <=2      -  Cefoxitin: S <=8      -  Ceftriaxone: S <=1 Enterobacter, Klebsiella aerogenes, Citrobacter, and Serratia may develop resistance during prolonged therapy      -  Ciprofloxacin: R >2      -  Ertapenem: S <=0.5      -  Gentamicin: S <=2      -  Imipenem: S <=1      -  Levofloxacin: R >4      -  Meropenem: S <=1      -  Piperacillin/Tazobactam: S <=8      -  Tobramycin: S <=2      -  Trimethoprim/Sulfamethoxazole: S <=0.5/9.5      Method Type: ELVER    RADIOLOGY & ADDITIONAL TESTS:  < from: [facility] Duplex Lower Ext Vein Scan, Bilat (04.25.22 @ 22:03) >  IMPRESSION:    DVT detected in left popliteal vein and appears mobile. DVT detected in   left posterior tibial and peroneal veins.  No DVT in right lower extremity.    < end of copied text >    MEDICATIONS:  cefTRIAXone   IVPB 1000 milliGRAM(s) IV Intermittent every 24 hours  metroNIDAZOLE  IVPB 500 milliGRAM(s) IV Intermittent every 8 hours  trimethoprim  160 mG/sulfamethoxazole 800 mG 1 Tablet(s) Oral every 48 hours    acetaminophen     Tablet .. 650 milliGRAM(s) Oral every 6 hours PRN  ALBUTerol    90 MICROgram(s) HFA Inhaler 2 Puff(s) Inhalation every 6 hours PRN  apixaban 10 milliGRAM(s) Oral every 12 hours  calcium carbonate 1250 mG  + Vitamin D (OsCal 500 + D) 1 Tablet(s) Oral two times a day  chlorhexidine 4% Liquid 1 Application(s) Topical <User Schedule>  gabapentin 300 milliGRAM(s) Oral three times a day  HYDROmorphone  Injectable 1 milliGRAM(s) IV Push every 4 hours PRN  pantoprazole    Tablet 40 milliGRAM(s) Oral before breakfast  predniSONE   Tablet 30 milliGRAM(s) Oral daily

## 2022-04-26 NOTE — PROGRESS NOTE ADULT - ATTENDING COMMENTS
Patient is a 31F with Crohn's disease and previous anal fistulas presents with perianal abscess and PE.      Patient seen and examined.  Reports soreness near incision site.  Decreased drainage    Rectal - left sided incision and drainage site with minimal drainage or erythema    Vitals labs and images reviewed    CTAP  1. Abscess in the right ischioanal fossa abutting the external anal   sphincter. Dedicated MRI may be obtained for evaluation of perianal   fistula.  2. Acute bilateral lower lobe pulmonary emboli.    PLAN:  - s/p bedside I&D  - transition to PO antibiotics  - f/u wound culture  - sitz baths  - dressing changes  - GI consult for Crohn's disease  - surgery to follow
I edited the note
I edited the note
need GI and colorectal follow up as outpatient, cont abx and wound care for now

## 2022-04-26 NOTE — PHYSICAL THERAPY INITIAL EVALUATION ADULT - PERTINENT HX OF CURRENT PROBLEM, REHAB EVAL
31F w/ PMHx of asthma and Crohn's disease complicated by perianal fistulae and recurrent perianal abscess presented to [facility] ED c/o perianal pain.

## 2022-04-26 NOTE — DISCHARGE NOTE PROVIDER - NSDCCONDITION_GEN_ALL_CORE
----- Message from Ruddy Hughes PA-C sent at 3/22/2019  3:15 PM CDT -----  Lumbar x-ray results :  (No fractures, but generalized arthritic changes)  The lumbar vertebral body heights are preserved. Degenerative changes of the lumbar spine are  demonstrated with multilevel facet arthropathy and narrowing of the  intervertebral disc spaces as well as scattered mild marginal  osteophytosis. No osseous destruction.   Stable

## 2022-04-29 ENCOUNTER — APPOINTMENT (OUTPATIENT)
Dept: GASTROENTEROLOGY | Facility: CLINIC | Age: 32
End: 2022-04-29
Payer: MEDICAID

## 2022-04-29 ENCOUNTER — NON-APPOINTMENT (OUTPATIENT)
Age: 32
End: 2022-04-29

## 2022-04-29 ENCOUNTER — OUTPATIENT (OUTPATIENT)
Dept: OUTPATIENT SERVICES | Facility: HOSPITAL | Age: 32
LOS: 1 days | Discharge: HOME | End: 2022-04-29

## 2022-04-29 VITALS
SYSTOLIC BLOOD PRESSURE: 110 MMHG | WEIGHT: 95 LBS | BODY MASS INDEX: 17.94 KG/M2 | DIASTOLIC BLOOD PRESSURE: 76 MMHG | OXYGEN SATURATION: 97 % | HEIGHT: 61 IN | HEART RATE: 80 BPM | TEMPERATURE: 97 F

## 2022-04-29 DIAGNOSIS — K61.1 RECTAL ABSCESS: ICD-10-CM

## 2022-04-29 DIAGNOSIS — K52.9 NONINFECTIVE GASTROENTERITIS AND COLITIS, UNSPECIFIED: ICD-10-CM

## 2022-04-29 PROCEDURE — 99214 OFFICE O/P EST MOD 30 MIN: CPT | Mod: GC

## 2022-05-02 ENCOUNTER — APPOINTMENT (OUTPATIENT)
Dept: SURGERY | Facility: CLINIC | Age: 32
End: 2022-05-02

## 2022-05-03 NOTE — HISTORY OF PRESENT ILLNESS
[de-identified] : 31F with PMHx of steroids dependant pan ulcerative colitis, asthma, presented for follow up after recent hospitalization. Previously presented in 2019 for persistent abdominal pain and BM 5-10 per day (5 is her normal for 3 years now) associated with dark blood 3 times per month. Did not follow up after that. Currently following colorectal surgeon for perirectal abscess. Went to the ED for abd pain. Found to have acute b/l PE, DVT in L popliteal V, anorectal fistula/abscess  s/p I&D. Discharged 2 days ago. Currently started on Eliquis. Presents for persistent abd pain. Has not done colonoscopy in a lot of years now. Currently has 2-3 BM accompanied with pain.

## 2022-05-03 NOTE — ASSESSMENT
[FreeTextEntry1] : 31F with PMHx of steroids dependant pan ulcerative colitis, asthma, presented for follow up after recent hospitalization\par \par #Pan Ulcerative colitis versus Crohn's disease on prednisone\par -non compliant\par -Sigmoidoscopy in 07/2019 showed Crohn's disease > UC\par -Did not follow up for colonoscopy, labs MR enteropathy last visit\par -Order MR pelvis, enteropathy\par - Colonoscopy after rectal abscess resolves\par - Currently on eliquis. Kendrick wait until ok to hold eliquis. Will need pulm clearance\par - Order crp, esr, calprotectin\par \par #Perirectal abscess\par - s/p I&D\par \par #b/l PE\par #L popliteal DVT\par - on eliquis\par \par RTC after MR pelvis/labs/enteropathy

## 2022-05-03 NOTE — PHYSICAL EXAM
[Outer Ear] : the ears and nose were normal in appearance [Sclera] : the sclera and conjunctiva were normal [Neck Appearance] : the appearance of the neck was normal [] : no respiratory distress [Heart Rate And Rhythm] : heart rate was normal and rhythm regular [Periumbilical] : in the periumbilical area [No Focal Deficits] : no focal deficits [Oriented To Time, Place, And Person] : oriented to person, place, and time [FreeTextEntry1] : Moderate distress

## 2022-05-04 DIAGNOSIS — Z87.19 PERSONAL HISTORY OF OTHER DISEASES OF THE DIGESTIVE SYSTEM: ICD-10-CM

## 2022-05-04 DIAGNOSIS — F41.9 ANXIETY DISORDER, UNSPECIFIED: ICD-10-CM

## 2022-05-04 DIAGNOSIS — M79.662 PAIN IN LEFT LOWER LEG: ICD-10-CM

## 2022-05-04 DIAGNOSIS — I82.442 ACUTE EMBOLISM AND THROMBOSIS OF LEFT TIBIAL VEIN: ICD-10-CM

## 2022-05-04 DIAGNOSIS — Z91.14 PATIENT'S OTHER NONCOMPLIANCE WITH MEDICATION REGIMEN: ICD-10-CM

## 2022-05-04 DIAGNOSIS — I82.452 ACUTE EMBOLISM AND THROMBOSIS OF LEFT PERONEAL VEIN: ICD-10-CM

## 2022-05-04 DIAGNOSIS — K50.914 CROHN'S DISEASE, UNSPECIFIED, WITH ABSCESS: ICD-10-CM

## 2022-05-04 DIAGNOSIS — G47.00 INSOMNIA, UNSPECIFIED: ICD-10-CM

## 2022-05-04 DIAGNOSIS — M79.601 PAIN IN RIGHT ARM: ICD-10-CM

## 2022-05-04 DIAGNOSIS — F12.10 CANNABIS ABUSE, UNCOMPLICATED: ICD-10-CM

## 2022-05-04 DIAGNOSIS — E87.2 ACIDOSIS: ICD-10-CM

## 2022-05-04 DIAGNOSIS — D72.829 ELEVATED WHITE BLOOD CELL COUNT, UNSPECIFIED: ICD-10-CM

## 2022-05-04 DIAGNOSIS — Z88.0 ALLERGY STATUS TO PENICILLIN: ICD-10-CM

## 2022-05-04 DIAGNOSIS — J45.20 MILD INTERMITTENT ASTHMA, UNCOMPLICATED: ICD-10-CM

## 2022-05-04 DIAGNOSIS — I26.94 MULTIPLE SUBSEGMENTAL PULMONARY EMBOLI WITHOUT ACUTE COR PULMONALE: ICD-10-CM

## 2022-05-04 DIAGNOSIS — I82.432 ACUTE EMBOLISM AND THROMBOSIS OF LEFT POPLITEAL VEIN: ICD-10-CM

## 2022-05-04 DIAGNOSIS — L02.215 CUTANEOUS ABSCESS OF PERINEUM: ICD-10-CM

## 2022-05-11 ENCOUNTER — APPOINTMENT (OUTPATIENT)
Dept: SURGERY | Facility: CLINIC | Age: 32
End: 2022-05-11

## 2022-06-27 ENCOUNTER — APPOINTMENT (OUTPATIENT)
Dept: SURGERY | Facility: CLINIC | Age: 32
End: 2022-06-27

## 2022-07-23 ENCOUNTER — OUTPATIENT (OUTPATIENT)
Dept: OUTPATIENT SERVICES | Facility: HOSPITAL | Age: 32
LOS: 1 days | Discharge: HOME | End: 2022-07-23

## 2022-07-23 ENCOUNTER — NON-APPOINTMENT (OUTPATIENT)
Age: 32
End: 2022-07-23

## 2022-07-23 ENCOUNTER — APPOINTMENT (OUTPATIENT)
Dept: INTERNAL MEDICINE | Facility: CLINIC | Age: 32
End: 2022-07-23

## 2022-07-23 VITALS
SYSTOLIC BLOOD PRESSURE: 130 MMHG | OXYGEN SATURATION: 98 % | TEMPERATURE: 98.6 F | BODY MASS INDEX: 22.28 KG/M2 | HEART RATE: 97 BPM | HEIGHT: 61 IN | WEIGHT: 118 LBS | DIASTOLIC BLOOD PRESSURE: 85 MMHG

## 2022-07-23 DIAGNOSIS — Z78.9 OTHER SPECIFIED HEALTH STATUS: ICD-10-CM

## 2022-07-23 DIAGNOSIS — Z83.79 FAMILY HISTORY OF OTHER DISEASES OF THE DIGESTIVE SYSTEM: ICD-10-CM

## 2022-07-23 PROCEDURE — 99204 OFFICE O/P NEW MOD 45 MIN: CPT | Mod: GC

## 2022-07-23 NOTE — HISTORY OF PRESENT ILLNESS
[FreeTextEntry1] : here to establish care with primary care [de-identified] : 32 yrold female with ulcerative colitis seen by gi has f/u withdr gumaste also h/o of dvt and pe was on eliquis ran out of it

## 2022-07-23 NOTE — ASSESSMENT
[FreeTextEntry1] : 32yroldfemalewith\par 1,ulcerative colitis f/u with gi\par 2.dvt [id number] refer tohematology\par 3.hcm routine blood\par refer to med gyn and hematology \par rto in 3 months and prn

## 2022-07-23 NOTE — PHYSICAL EXAM
[No JVD] : no jugular venous distention [No Respiratory Distress] : no respiratory distress  [Normal Rate] : normal rate  [Normal] : no joint swelling and grossly normal strength and tone

## 2022-07-26 DIAGNOSIS — Z78.9 OTHER SPECIFIED HEALTH STATUS: ICD-10-CM

## 2022-07-26 DIAGNOSIS — K61.1 RECTAL ABSCESS: ICD-10-CM

## 2022-07-26 DIAGNOSIS — I82.409 ACUTE EMBOLISM AND THROMBOSIS OF UNSPECIFIED DEEP VEINS OF UNSPECIFIED LOWER EXTREMITY: ICD-10-CM

## 2022-07-26 DIAGNOSIS — Z83.79 FAMILY HISTORY OF OTHER DISEASES OF THE DIGESTIVE SYSTEM: ICD-10-CM

## 2022-07-26 DIAGNOSIS — K51.90 ULCERATIVE COLITIS, UNSPECIFIED, WITHOUT COMPLICATIONS: ICD-10-CM

## 2022-08-15 ENCOUNTER — APPOINTMENT (OUTPATIENT)
Dept: OBGYN | Facility: CLINIC | Age: 32
End: 2022-08-15

## 2022-08-25 ENCOUNTER — APPOINTMENT (OUTPATIENT)
Dept: INTERNAL MEDICINE | Facility: CLINIC | Age: 32
End: 2022-08-25

## 2022-09-15 ENCOUNTER — APPOINTMENT (OUTPATIENT)
Dept: OBGYN | Facility: CLINIC | Age: 32
End: 2022-09-15

## 2022-09-16 ENCOUNTER — NON-APPOINTMENT (OUTPATIENT)
Age: 32
End: 2022-09-16

## 2022-09-19 NOTE — ED ADULT TRIAGE NOTE - PATIENT ON (OXYGEN DELIVERY METHOD)
"[address] [facility] PROGRESS NOTE   Patient: Dean Krishna  ISMMM'E Date: 2022    YOB: 1945  Admission Date: 2022    MRN: [id number]  Inpatient LOS: 1    Room: 27[facility] Day: Hospital Day: 2    Subjective   HISTORY AND SUBJECTIVE COMPLAINTS     Chief Complaint:   Liver abscess    Interval History / Subjective:   Patient was seen and examined today at bedside. She did well overnight and tolerated the abscess drainage well. Hospital Course:  Dean Krishna is a 76year old female who presented on 2022 with complaints of No chief complaint on file. .    ROS:  Pertinent systems negative except as above.     Objective   PHYSICAL EXAMINATION     Vital 24 Hour Range Most Recent Value   Temperature Temp  Min: 97.9 Â°F (36.6 Â°C)  Max: 98.4 Â°F (36.9 Â°C) 98.4 Â°F (36.9 Â°C)   Pulse Pulse  Min: 74  Max: 96 89   Respiratory Resp  Min: 16  Max: 18 18   Blood Pressure BP  Min: 100/69  Max: 146/83 130/81   Pulse Oximetry SpO2  Min: 93 %  Max: 100 % 99 %   Arterial BP No data recorded     O2 O2 Flow Rate (L/min)  Av.1 L/min  Min: 1 L/min   Min taken time: 22 1745  Max: 4 L/min   Max taken time: 22 1335       Recorded Intake and Output:    Intake/Output Summary (Last 24 hours) at 2022 1918  Last data filed at 2022 1433  Gross per 24 hour   Intake 400 ml   Output --   Net 400 ml      Recorded Last Stool Occurrence: 1 (22)     Vital Most Recent Value First Value   Weight 75.7 kg (166 lb 14.2 oz) Weight: 75.7 kg (166 lb 14.2 oz)   Height 5' 6"" (167.6 cm) Height: 5' 6\"" (167.6 cm)   BMI 26.94 N/A     General: Looks well well developed, well nourished and no apparent distress  CV: regular rate and rhythm and no murmurs, rubs, or thrills  Resp: clear to auscultation bilaterally and no accessory muscle use   Abd: soft, nontender, nondistended and no hepatosplenomegaly  Ext: no clubbing, cyanosis, or ischemia  Skin: no rashes, lesions, or " ulcers noted  Neuro: no focal deficits noted  Psych: normal judgement and insight and oriented to time, place and person    TEST RESULTS     Labs: The Laboratory values listed below have been reviewed and pertinent findings discussed in the Assessment and Plan. Laboratory values:   Recent Labs   Lab 09/18/22  0637 09/17/22  0556 09/16/22  0555   WBC 21.1* 20.7* 22.2*   HGB 8.5* 8.7* 8.9*   HCT 26.7* 26.7* 26.8*    503* 564*       Recent Labs   Lab 09/18/22  0637 09/17/22  0556 09/16/22  0555 09/15/22  0554   SODIUM 137  --  136 137   POTASSIUM 3.7  --  4.0 3.7   CHLORIDE 103  --  101 101   CO2 30  --  28 30   CALCIUM 9.1  --  9.0 8.5   GLUCOSE 110*  --  102* 95   BUN 20  --  14 17   CREATININE 0.65  --  0.68 0.62   MG  --  2.3 2.1 2.0          Radiology: Imaging studies have been reviewed and pertinent findings discussed in the Assessment and Plan. No results found for any visits on 09/17/22 (from the past 48 hour(s)). ANCILLARY ORDERS     Diet:  3 Times/day W Meals; Ensure Enlive/standard Oral Supplement Oral Nutrition Supplement  Regular; Fluid Restrict 1500ml (900 From Dietary) Diet  Telemetry: On  Consults:    IP CONSULT TO TRANSPLANT  IP CONSULT TO INFECTIOUS DISEASES  IP CONSULT TO GI  IP CONSULT TO ELECTROPHYSIOLOGY  Therapy Orders:   No orders of the defined types were placed in this encounter. ADVANCED DIRECTIVES     Code Status: Full Resuscitation         ASSESSMENT AND PLAN      Severe sepsis secondary to Streptococcus bacteremia with liver lesion concerning for abscess:  -Blood culture grow Streptococcus species   -CT scan abdomen pelvis showed evidence of liver cystic lesion concerning for abscess. Â   -On Rocephin and Flagyl.   -ID, IR and Hepatology (Dr. Marielle Horner) consulted  -s/p abscess drainage on 9/18  Â   Liver mass 6.5 cm central, concerning for malignancy/elevated LFTs:   -CT with contrast suggest complexes 6 region which could be an abscess  -Plan for possible IR drainage of liver abscess. Â   Nonsustained second-degree heart block:  Today,Â At 9:15 a.m. medical emergency was announced at previous hosptilization that patient was unresponsive. Â All team arrived at bedside and patient was sweat then she gained her consciousness. Â It looks like she has vasovagal episode when she moved from the bed to the chair. PatientÂ wentÂ back to her normal alert oriented and denies any complain. Â EKG showed normal sinus rhythm. Tele monitor during the episode which show evidence suggestive for second-degree heart block with heart rate down around 30s. -External pacemaker in placeÂ   -Discontinued metoprolol  -consult EP in AM  Â   Urinary tract infection: ruled out  -Urine culture show no growth. Â   Acute on chronic diastolic heart failure exacerbation:  During exam, noted the patient has bilateral congestion and she required oxygen supplement. Â Found to have elevated NT proBNP. Â She has echo in 2021 and showed ejection fraction 99% with diastolic dysfunction. Â   -Continue Lasix 20 mg p.o. daily   Â   Persistent Atrial fibrillation: It is noted that the patient has chronic atrial fibrillation and sometimes she goes to AFib with RVR. Â That most likely related to sepsis and in going infection. Â Given plan for IR drain of possible liver abscess, EliquisÂ is kept onÂ hold. Â   -Keep Eliquis on hold. -Discontinued metoprolol.  -Continue with Cardizem 15 mg IV p.r.n. for heart rate more than 120  Â   Acute metabolic encephalopathy: resolved  Multifactorial: Aging, fragility, severe sepsis and bacteremia in the setting of underline dementia. Â CT scan brain came unremarkable. -Mental function back to baseline. .Â Â   Â   Acute kidney injury: resolved  Likely secondary to hypovolemia, hypotension and sepsis. Â   -Serum creatinine back to baseline  Â   Hyponatremia: resolved  Most likely secondary to poor oral intake. Â   -Continue monitor serum sodium.   Â   Hypokalemia:resolved  Â   Â   History of stroke:  -Patient was on aspirin and statin   Â   Late onset Alzheimer dementia:  -Continue donepezil 10 mg p.o. nightlyÂ I am  -Continue memantine 10 mg p.o. b.i.d. Nutrition status: appropriate  Body mass index is 26.94 kg/mÂ². - Overweight BMI 25-29  DVT Prophylaxis: SCDs         DISCHARGE PLANNING     The patient's treatment plans were discussed with patient and RN. Recommendations for Discharge   SW     PT     OT     SLP        Anticipated discharge destination: TBD  Expected Discharge Date: 9/20  Barriers to Discharge: IR clearance, IV antibtiotics              Renetta Harrington MD  Hospitalist, [facility] [place]    Pager: [phone]. Secure chat preferred.   From 7 PM to 7 AM please call on-call pager: [phone].    09/18/22   7:18 PM room air

## 2022-10-24 ENCOUNTER — APPOINTMENT (OUTPATIENT)
Dept: INTERNAL MEDICINE | Facility: CLINIC | Age: 32
End: 2022-10-24

## 2022-10-27 ENCOUNTER — APPOINTMENT (OUTPATIENT)
Dept: OBGYN | Facility: CLINIC | Age: 32
End: 2022-10-27

## 2022-10-28 ENCOUNTER — APPOINTMENT (OUTPATIENT)
Dept: SPEECH THERAPY | Facility: CLINIC | Age: 32
End: 2022-10-28

## 2022-11-11 ENCOUNTER — OUTPATIENT (OUTPATIENT)
Dept: OUTPATIENT SERVICES | Facility: HOSPITAL | Age: 32
LOS: 1 days | Discharge: HOME | End: 2022-11-11

## 2022-11-11 ENCOUNTER — NON-APPOINTMENT (OUTPATIENT)
Age: 32
End: 2022-11-11

## 2022-11-11 ENCOUNTER — APPOINTMENT (OUTPATIENT)
Dept: INTERNAL MEDICINE | Facility: CLINIC | Age: 32
End: 2022-11-11

## 2022-11-11 VITALS
OXYGEN SATURATION: 99 % | SYSTOLIC BLOOD PRESSURE: 102 MMHG | HEART RATE: 108 BPM | WEIGHT: 128 LBS | TEMPERATURE: 98.2 F | BODY MASS INDEX: 24.17 KG/M2 | HEIGHT: 61 IN | DIASTOLIC BLOOD PRESSURE: 66 MMHG

## 2022-11-11 DIAGNOSIS — Z79.52 LONG TERM (CURRENT) USE OF SYSTEMIC STEROIDS: ICD-10-CM

## 2022-11-11 DIAGNOSIS — I25.118 ATHEROSCLEROTIC HEART DISEASE OF NATIVE CORONARY ARTERY WITH OTHER FORMS OF ANGINA PECTORIS: ICD-10-CM

## 2022-11-11 DIAGNOSIS — Z80.3 FAMILY HISTORY OF MALIGNANT NEOPLASM OF BREAST: ICD-10-CM

## 2022-11-11 DIAGNOSIS — Z80.9 FAMILY HISTORY OF MALIGNANT NEOPLASM, UNSPECIFIED: ICD-10-CM

## 2022-11-11 PROCEDURE — 93010 ELECTROCARDIOGRAM REPORT: CPT

## 2022-11-11 PROCEDURE — 99214 OFFICE O/P EST MOD 30 MIN: CPT | Mod: GC

## 2022-11-11 NOTE — PHYSICAL EXAM
[Well Developed] : well developed [Normal Sclera/Conjunctiva] : normal sclera/conjunctiva [PERRL] : pupils equal round and reactive to light [Normal Outer Ear/Nose] : the outer ears and nose were normal in appearance [Normal Oropharynx] : the oropharynx was normal [No JVD] : no jugular venous distention [No Lymphadenopathy] : no lymphadenopathy [No Respiratory Distress] : no respiratory distress  [No Carotid Bruits] : no carotid bruits [Pedal Pulses Present] : the pedal pulses are present [No Edema] : there was no peripheral edema [Normal Appearance] : normal in appearance [No Masses] : no palpable masses [No Nipple Discharge] : no nipple discharge [Soft] : abdomen soft [Non Tender] : non-tender [Non-distended] : non-distended [No HSM] : no HSM [Normal Bowel Sounds] : normal bowel sounds [No Hernias] : no hernias [Normal Supraclavicular Nodes] : no supraclavicular lymphadenopathy [Normal Anterior Cervical Nodes] : no anterior cervical lymphadenopathy [No CVA Tenderness] : no CVA  tenderness [No Spinal Tenderness] : no spinal tenderness [No Joint Swelling] : no joint swelling [Grossly Normal Strength/Tone] : grossly normal strength/tone [No Rash] : no rash [Memory Grossly Normal] : memory grossly normal [Normal Affect] : the affect was normal [Normal Insight/Judgement] : insight and judgment were intact [de-identified] : tearful  [de-identified] : late expiratory wheeze  [de-identified] : rapid regular heart beath [de-identified] : pain on palpation of left lateral quadrent of breast, no nipple inversion, bloody discharge or massess felt  [de-identified] : sad mood, feels "lost"

## 2022-11-11 NOTE — HISTORY OF PRESENT ILLNESS
[FreeTextEntry1] : f/u [de-identified] : Ms Godwin is a 32 VIVI w a PMH of DVT (on eliquis), IBD, Ulcerative Colitis, Crohns (on long term steroids), and asthma presenting for f/u. Patient was unable to get lab work prescribed at previous visit. 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 Patient reports pain and burning w discharge from her known rectal abscess (s/p multiple I&D), patient followed by Dr Mccurdy. Patient also has painful and bleeding internal and external hemorrhoids. \par Patient reports new onset  CP, SOB, diaphoresis, nausea and neck/arm pain that occurs during moments of stress or exertion concerning for stable Angina. \par Patient also reports BL subariolar breast pain not related to her menses and associated w milky discharge. Unable to wear a bra due to the pain. \par Patient also requesting assistance w a letter of disability so she may apply for disability as she is unable to work and needs more help at home \par Of note patient scored a 20 on PHQ-9 \par

## 2022-11-11 NOTE — ASSESSMENT
[FreeTextEntry1] : Ms Godwin is a 32 VIVI w a PMH of DVT (on eliquis), IBD, Ulcerative Colitis, Crohns (on long term steroids), and asthma presenting for f/u. Patient was unable to get lab work prescribed at previous visit. 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 Patient reports pain and burning w discharge from her known rectal abscess (s/p multiple I&D), patient followed by Dr Mccurdy. Patient also has painful and bleeding internal and external hemorrhoids. \par Patient reports new onset  CP, SOB, diaphoresis, nausea and neck/arm pain that occurs during moments of stress or exertion concerning for stable Angina. \par Patient also reports BL subariolar breast pain not related to her menses and associated w milky discharge. Unable to wear a bra due to the pain. \par Patient also requesting assistance w a letter of disability so she may apply for disability as she is unable to work and needs more help at home \par Of note patient scored a 20 on PHQ-9 \par \par #new onset stable angina\par #recurrent DVT (no longer on OCP)\par #on eliquis\par -EKG on this visit showed NSR, no T wave changes, no arrythmia or axis deviation noted \par -f/u cardiovascular consult\par -c/w eliquis 5 BID at this time\par -reports increased flow/heavier of menses w no increase in clots or lenth of bleeding time\par -f/u cbc and lipid panel\par \par #Crohns\par #Ulcerative colitis\par #internal and external hemorrhoids\par #recurrent rectal abscess s/p multiple I&D\par #chronic steroid use\par -on steroids\par -needs colonoscopy\par -follows w Calli\par -f/u GI surgeon referral for hemorrhoidectomy\par -f/u A1c given long term steroid use\par -right sided perirectal abscess w sinus tract, tender and erythematous unable to express at this time\par -f/u Burn referal for wound care (Dr Alvarez) \par \par #Breast Pain\par milky white DC, no blood/masses/lymphadenopathy\par fam Hx of uterine Cancer and thyroid cancer \par 1st cousin w recent Dx of breast cancer\par -f/u Gyn referal will likely need US of breast \par \par #PHq9 20\par #reports feeling "lost" "unable to be helped"\par denies SI, no plan of how to harm herself, denies Homicidal ideation\par requesting a therapist\par -SW meeting w patient now\par -referral to Behavioral health for self and eldest daughter \par -SW to assist w Letter for Disability once more info has been gathered by specialists as patient was denied disability 1 month ago \par \par #Asthma\par -increased frequency of use of albuterol (2-3x/month from 1x/every 2 month) \par -consider Pulm referral and or allergy referral at next visit, will hold of for now given the number of urgent issues \par \par #HCM\par -f/u colonoscopy\par -f/u US of breast\par -f/u recs of cardiovascular, Gyn, Burn  and GI\par -continue w current medications at this time\par -6 month f/u and prn\par \par

## 2022-11-18 DIAGNOSIS — K50.90 CROHN'S DISEASE, UNSPECIFIED, WITHOUT COMPLICATIONS: ICD-10-CM

## 2022-11-18 DIAGNOSIS — K52.9 NONINFECTIVE GASTROENTERITIS AND COLITIS, UNSPECIFIED: ICD-10-CM

## 2022-11-18 DIAGNOSIS — Z80.9 FAMILY HISTORY OF MALIGNANT NEOPLASM, UNSPECIFIED: ICD-10-CM

## 2022-11-18 DIAGNOSIS — K51.90 ULCERATIVE COLITIS, UNSPECIFIED, WITHOUT COMPLICATIONS: ICD-10-CM

## 2022-11-18 DIAGNOSIS — Z80.3 FAMILY HISTORY OF MALIGNANT NEOPLASM OF BREAST: ICD-10-CM

## 2022-11-18 DIAGNOSIS — Z79.52 LONG TERM (CURRENT) USE OF SYSTEMIC STEROIDS: ICD-10-CM

## 2022-11-18 DIAGNOSIS — J45.909 UNSPECIFIED ASTHMA, UNCOMPLICATED: ICD-10-CM

## 2022-11-18 DIAGNOSIS — I82.409 ACUTE EMBOLISM AND THROMBOSIS OF UNSPECIFIED DEEP VEINS OF UNSPECIFIED LOWER EXTREMITY: ICD-10-CM

## 2022-11-18 DIAGNOSIS — I25.118 ATHEROSCLEROTIC HEART DISEASE OF NATIVE CORONARY ARTERY WITH OTHER FORMS OF ANGINA PECTORIS: ICD-10-CM

## 2022-11-30 ENCOUNTER — NON-APPOINTMENT (OUTPATIENT)
Age: 32
End: 2022-11-30

## 2022-12-12 ENCOUNTER — APPOINTMENT (OUTPATIENT)
Dept: OBGYN | Facility: CLINIC | Age: 32
End: 2022-12-12

## 2022-12-20 ENCOUNTER — APPOINTMENT (OUTPATIENT)
Dept: GASTROENTEROLOGY | Facility: CLINIC | Age: 32
End: 2022-12-20
Payer: MEDICAID

## 2022-12-20 ENCOUNTER — OUTPATIENT (OUTPATIENT)
Dept: OUTPATIENT SERVICES | Facility: HOSPITAL | Age: 32
LOS: 1 days | Discharge: HOME | End: 2022-12-20

## 2022-12-20 VITALS
HEIGHT: 61 IN | WEIGHT: 128 LBS | TEMPERATURE: 97 F | DIASTOLIC BLOOD PRESSURE: 88 MMHG | HEART RATE: 112 BPM | SYSTOLIC BLOOD PRESSURE: 130 MMHG | BODY MASS INDEX: 24.17 KG/M2 | OXYGEN SATURATION: 98 %

## 2022-12-20 DIAGNOSIS — K61.1 RECTAL ABSCESS: ICD-10-CM

## 2022-12-20 PROCEDURE — 99214 OFFICE O/P EST MOD 30 MIN: CPT | Mod: GC

## 2022-12-21 ENCOUNTER — NON-APPOINTMENT (OUTPATIENT)
Age: 32
End: 2022-12-21

## 2022-12-24 NOTE — HISTORY OF PRESENT ILLNESS
[FreeTextEntry1] : 31 y.o female patient with PMH of  \par \par 31F with PMHx of steroids dependant IBD had sigmoidoscopy showed colitis and rectal ulcers path showed cryptitis and crypts abscess, non compliant other pertinent medical history is  asthma, VTE, anorectal abscess. Patient was discharged from the hospital in March after being admitted for rectal abscesses. Patient today is complaining of rectal pain and drainage, according to patient it has been happening every month where she had severe pain then relieft with drainage, patient did not follow up with colorectal surgeon and with the gastroenterology clinic since April. Patient tried antibiotics before for the abscess. Patient denied any nausea or vomiting.

## 2022-12-24 NOTE — PHYSICAL EXAM
[Alert] : alert [No Acute Distress] : no acute distress [Hearing Threshold Finger Rub Not [place]] : hearing was normal [No Respiratory Distress] : no respiratory distress [No Acc Muscle Use] : no accessory muscle use [Heart Rate And Rhythm] : heart rate was normal and rhythm regular [Normal S1, S2] : normal S1 and S2 [Bowel Sounds] : normal bowel sounds [Abdomen Tenderness] : non-tender [de-identified] : Exam was done with the presence of chaperon. There is scar from previous I&D with opening next to it with small amount of drainage of clear pus, tenderness

## 2022-12-24 NOTE — ASSESSMENT
[FreeTextEntry1] : 31F with PMHx of steroids dependant pan ulcerative colitis, asthma, who was seen in april for follow up after I&D of rectal abscess. \par \par #Pan Ulcerative colitis versus Crohn's disease on prednisone\par -non compliant\par -Sigmoidoscopy in 07/2019 showed Crohn's disease > UC\par - Did not follow up for colonoscopy, labs MR enteropathy last visit\par - Advice patient to go to the ER for need of I&D as she complained of drainage and fever\par - Patient refused \par \par #Perirectal abscess\par \par #b/l PE\par #L popliteal DVT\par - on Eliquis\par \par Discussed with the patient about the treatment plan include the need to go to the ER for I&D, need for colorectal surgery referral again, in addition to Antibiotics and biological. The plan was explained in details but patient got upset as she asked for disability papers to be signed. Explained to the patient that this is the first time I see here so I cannot sign it, I explained again about the treatment plan and the need to control her disease. Patient got upset and start threatening me and the staff, refused the treatment plan, and the referral. \par

## 2022-12-24 NOTE — PHYSICAL EXAM
[Alert] : alert [No Acute Distress] : no acute distress [Hearing Threshold Finger Rub Not [place]] : hearing was normal [No Respiratory Distress] : no respiratory distress [No Acc Muscle Use] : no accessory muscle use [Heart Rate And Rhythm] : heart rate was normal and rhythm regular [Normal S1, S2] : normal S1 and S2 [Bowel Sounds] : normal bowel sounds [Abdomen Tenderness] : non-tender [de-identified] : Exam was done with the presence of chaperon. There is scar from previous I&D with opening next to it with small amount of drainage of clear pus, tenderness

## 2023-01-07 ENCOUNTER — INPATIENT (INPATIENT)
Facility: HOSPITAL | Age: 33
LOS: 0 days | Discharge: LEFT BEFORE DISCHARGE | End: 2023-01-07
Attending: INTERNAL MEDICINE | Admitting: INTERNAL MEDICINE
Payer: MEDICAID

## 2023-01-07 VITALS
RESPIRATION RATE: 18 BRPM | HEART RATE: 62 BPM | SYSTOLIC BLOOD PRESSURE: 112 MMHG | TEMPERATURE: 98 F | HEIGHT: 61 IN | DIASTOLIC BLOOD PRESSURE: 65 MMHG | WEIGHT: 128.09 LBS

## 2023-01-07 VITALS
TEMPERATURE: 98 F | DIASTOLIC BLOOD PRESSURE: 80 MMHG | RESPIRATION RATE: 18 BRPM | SYSTOLIC BLOOD PRESSURE: 137 MMHG | HEART RATE: 104 BPM | OXYGEN SATURATION: 96 %

## 2023-01-07 LAB
ALBUMIN SERPL ELPH-MCNC: 3.8 G/DL — SIGNIFICANT CHANGE UP (ref 3.5–5.2)
ALP SERPL-CCNC: 57 U/L — SIGNIFICANT CHANGE UP (ref 30–115)
ALT FLD-CCNC: 26 U/L — SIGNIFICANT CHANGE UP (ref 0–41)
ANION GAP SERPL CALC-SCNC: 7 MMOL/L — SIGNIFICANT CHANGE UP (ref 7–14)
AST SERPL-CCNC: 25 U/L — SIGNIFICANT CHANGE UP (ref 0–41)
BASOPHILS # BLD AUTO: 0.02 K/UL — SIGNIFICANT CHANGE UP (ref 0–0.2)
BASOPHILS NFR BLD AUTO: 0.1 % — SIGNIFICANT CHANGE UP (ref 0–1)
BILIRUB DIRECT SERPL-MCNC: <0.2 MG/DL — SIGNIFICANT CHANGE UP (ref 0–0.3)
BILIRUB INDIRECT FLD-MCNC: SIGNIFICANT CHANGE UP MG/DL (ref 0.2–1.2)
BILIRUB SERPL-MCNC: <0.2 MG/DL — SIGNIFICANT CHANGE UP (ref 0.2–1.2)
BUN SERPL-MCNC: 22 MG/DL — HIGH (ref 10–20)
CALCIUM SERPL-MCNC: 9 MG/DL — SIGNIFICANT CHANGE UP (ref 8.4–10.5)
CHLORIDE SERPL-SCNC: 104 MMOL/L — SIGNIFICANT CHANGE UP (ref 98–110)
CO2 SERPL-SCNC: 29 MMOL/L — SIGNIFICANT CHANGE UP (ref 17–32)
CREAT SERPL-MCNC: 1 MG/DL — SIGNIFICANT CHANGE UP (ref 0.7–1.5)
EGFR: 76 ML/MIN/1.73M2 — SIGNIFICANT CHANGE UP
EOSINOPHIL # BLD AUTO: 0.04 K/UL — SIGNIFICANT CHANGE UP (ref 0–0.7)
EOSINOPHIL NFR BLD AUTO: 0.3 % — SIGNIFICANT CHANGE UP (ref 0–8)
FLUAV AG NPH QL: SIGNIFICANT CHANGE UP
FLUBV AG NPH QL: SIGNIFICANT CHANGE UP
GLUCOSE SERPL-MCNC: 91 MG/DL — SIGNIFICANT CHANGE UP (ref 70–99)
HCG SERPL QL: NEGATIVE — SIGNIFICANT CHANGE UP
HCT VFR BLD CALC: 38.3 % — SIGNIFICANT CHANGE UP (ref 37–47)
HGB BLD-MCNC: 12.3 G/DL — SIGNIFICANT CHANGE UP (ref 12–16)
IMM GRANULOCYTES NFR BLD AUTO: 1.1 % — HIGH (ref 0.1–0.3)
LYMPHOCYTES # BLD AUTO: 1.15 K/UL — LOW (ref 1.2–3.4)
LYMPHOCYTES # BLD AUTO: 7.8 % — LOW (ref 20.5–51.1)
MAGNESIUM SERPL-MCNC: 2.1 MG/DL — SIGNIFICANT CHANGE UP (ref 1.8–2.4)
MCHC RBC-ENTMCNC: 28 PG — SIGNIFICANT CHANGE UP (ref 27–31)
MCHC RBC-ENTMCNC: 32.1 G/DL — SIGNIFICANT CHANGE UP (ref 32–37)
MCV RBC AUTO: 87.2 FL — SIGNIFICANT CHANGE UP (ref 81–99)
MONOCYTES # BLD AUTO: 0.68 K/UL — HIGH (ref 0.1–0.6)
MONOCYTES NFR BLD AUTO: 4.6 % — SIGNIFICANT CHANGE UP (ref 1.7–9.3)
NEUTROPHILS # BLD AUTO: 12.77 K/UL — HIGH (ref 1.4–6.5)
NEUTROPHILS NFR BLD AUTO: 86.1 % — HIGH (ref 42.2–75.2)
NRBC # BLD: 0 /100 WBCS — SIGNIFICANT CHANGE UP (ref 0–0)
NT-PROBNP SERPL-SCNC: 125 PG/ML — SIGNIFICANT CHANGE UP (ref 0–300)
PLATELET # BLD AUTO: 245 K/UL — SIGNIFICANT CHANGE UP (ref 130–400)
POTASSIUM SERPL-MCNC: 3.6 MMOL/L — SIGNIFICANT CHANGE UP (ref 3.5–5)
POTASSIUM SERPL-SCNC: 3.6 MMOL/L — SIGNIFICANT CHANGE UP (ref 3.5–5)
PROT SERPL-MCNC: 6 G/DL — SIGNIFICANT CHANGE UP (ref 6–8)
RBC # BLD: 4.39 M/UL — SIGNIFICANT CHANGE UP (ref 4.2–5.4)
RBC # FLD: 17 % — HIGH (ref 11.5–14.5)
RSV RNA NPH QL NAA+NON-PROBE: SIGNIFICANT CHANGE UP
SARS-COV-2 RNA SPEC QL NAA+PROBE: SIGNIFICANT CHANGE UP
SODIUM SERPL-SCNC: 140 MMOL/L — SIGNIFICANT CHANGE UP (ref 135–146)
TROPONIN T SERPL-MCNC: <0.01 NG/ML — SIGNIFICANT CHANGE UP
TROPONIN T SERPL-MCNC: <0.01 NG/ML — SIGNIFICANT CHANGE UP
WBC # BLD: 14.83 K/UL — HIGH (ref 4.8–10.8)
WBC # FLD AUTO: 14.83 K/UL — HIGH (ref 4.8–10.8)

## 2023-01-07 PROCEDURE — 93010 ELECTROCARDIOGRAM REPORT: CPT

## 2023-01-07 PROCEDURE — 71045 X-RAY EXAM CHEST 1 VIEW: CPT | Mod: 26

## 2023-01-07 PROCEDURE — 71275 CT ANGIOGRAPHY CHEST: CPT | Mod: 26,MA

## 2023-01-07 PROCEDURE — 74177 CT ABD & PELVIS W/CONTRAST: CPT | Mod: 26,MA

## 2023-01-07 PROCEDURE — 99285 EMERGENCY DEPT VISIT HI MDM: CPT

## 2023-01-07 RX ORDER — PANTOPRAZOLE SODIUM 20 MG/1
40 TABLET, DELAYED RELEASE ORAL
Refills: 0 | Status: DISCONTINUED | OUTPATIENT
Start: 2023-01-07 | End: 2023-01-07

## 2023-01-07 RX ORDER — DIATRIZOATE MEGLUMINE 180 MG/ML
30 INJECTION, SOLUTION INTRAVESICAL ONCE
Refills: 0 | Status: COMPLETED | OUTPATIENT
Start: 2023-01-07 | End: 2023-01-07

## 2023-01-07 RX ORDER — MORPHINE SULFATE 50 MG/1
2 CAPSULE, EXTENDED RELEASE ORAL EVERY 6 HOURS
Refills: 0 | Status: DISCONTINUED | OUTPATIENT
Start: 2023-01-07 | End: 2023-01-07

## 2023-01-07 RX ORDER — MORPHINE SULFATE 50 MG/1
4 CAPSULE, EXTENDED RELEASE ORAL ONCE
Refills: 0 | Status: DISCONTINUED | OUTPATIENT
Start: 2023-01-07 | End: 2023-01-07

## 2023-01-07 RX ORDER — ONDANSETRON 8 MG/1
4 TABLET, FILM COATED ORAL ONCE
Refills: 0 | Status: COMPLETED | OUTPATIENT
Start: 2023-01-07 | End: 2023-01-07

## 2023-01-07 RX ORDER — IPRATROPIUM/ALBUTEROL SULFATE 18-103MCG
3 AEROSOL WITH ADAPTER (GRAM) INHALATION EVERY 6 HOURS
Refills: 0 | Status: DISCONTINUED | OUTPATIENT
Start: 2023-01-07 | End: 2023-01-07

## 2023-01-07 RX ORDER — SODIUM CHLORIDE 9 MG/ML
1000 INJECTION, SOLUTION INTRAVENOUS ONCE
Refills: 0 | Status: COMPLETED | OUTPATIENT
Start: 2023-01-07 | End: 2023-01-07

## 2023-01-07 RX ORDER — IPRATROPIUM/ALBUTEROL SULFATE 18-103MCG
3 AEROSOL WITH ADAPTER (GRAM) INHALATION
Refills: 0 | Status: DISCONTINUED | OUTPATIENT
Start: 2023-01-07 | End: 2023-01-07

## 2023-01-07 RX ORDER — ENOXAPARIN SODIUM 100 MG/ML
40 INJECTION SUBCUTANEOUS EVERY 24 HOURS
Refills: 0 | Status: DISCONTINUED | OUTPATIENT
Start: 2023-01-07 | End: 2023-01-07

## 2023-01-07 RX ADMIN — ONDANSETRON 4 MILLIGRAM(S): 8 TABLET, FILM COATED ORAL at 18:44

## 2023-01-07 RX ADMIN — SODIUM CHLORIDE 1000 MILLILITER(S): 9 INJECTION, SOLUTION INTRAVENOUS at 10:58

## 2023-01-07 RX ADMIN — Medication 3 MILLILITER(S): at 11:10

## 2023-01-07 RX ADMIN — Medication 3 MILLILITER(S): at 11:30

## 2023-01-07 RX ADMIN — ONDANSETRON 4 MILLIGRAM(S): 8 TABLET, FILM COATED ORAL at 10:57

## 2023-01-07 RX ADMIN — MORPHINE SULFATE 4 MILLIGRAM(S): 50 CAPSULE, EXTENDED RELEASE ORAL at 10:57

## 2023-01-07 RX ADMIN — MORPHINE SULFATE 4 MILLIGRAM(S): 50 CAPSULE, EXTENDED RELEASE ORAL at 18:46

## 2023-01-07 RX ADMIN — DIATRIZOATE MEGLUMINE 30 MILLILITER(S): 180 INJECTION, SOLUTION INTRAVESICAL at 10:58

## 2023-01-07 NOTE — ED ADULT NURSE NOTE - NSIMPLEMENTINTERV_GEN_ALL_ED
Implemented All Universal Safety Interventions:  [facility] to call system. Call bell, personal items and telephone within reach. Instruct patient to call for assistance. Room bathroom lighting operational. Non-slip footwear when patient is off stretcher. Physically safe environment: no spills, clutter or unnecessary equipment. Stretcher in lowest position, wheels locked, appropriate side rails in place.

## 2023-01-07 NOTE — ED PROVIDER NOTE - TEST CONSIDERED BUT NOT PERFORMED
Tests Considered But Not Performed CTA c/a/p considered but ultimately just PE study performed. Patient without risk factors for dissection and clinically pain is atypical. Additionally, aorta can be viewed on CTA chest without clear evidence of dissection.

## 2023-01-07 NOTE — PATIENT PROFILE ADULT - FALL HARM RISK - HARM RISK INTERVENTIONS

## 2023-01-07 NOTE — H&P ADULT - HISTORY OF PRESENT ILLNESS
Patient is a 32-year-old female with a  PMHx of asthma , Crohn's disease, hx of  DVT 1 year ago previously on Eliquis presents to the ED with right sided pleuritic chest pain. Patient notes three day history of right sided chest pain worse on palpation or inspiration. She notes she recently had upper respiratory symptoms including cough and congestion. She has been in contact with her brother who also has a URI. Pain is described as central chest pain radiating to the right side of the chest. She also notes pain along her posterior chest wall with palpation. Patient has history of DVT 1 year ago treated with few months of eliquis. She stopped taking the medication prematurely and is not sure how many months she took it for exactly. DVT was thought to be provoked 2/2 to OCP which she has since discontinued. ED records note family history + for cardiac arrest in patients sister at 27 years old. Patient notes sister developed a heart condition 2/2 to drug use and passed away following open heart surgery. ROS + for cough, nasal congestion, and mild dyspnea on exertion. She denies headache, fever, chills, nausea, worsening of baseline diarrhea, or constipation. She notes she chronically has nausea/vomitting/diarrhea w/ mild abdominal pain 2/2 to Crohns which is at baseline.    In the ED, patient /80, , afebrile and saturating well on room air. Labs notable only for WBC of 14.83. Troponin negative x1. EKG showing sinus tachycardia. Due to concern for PE, patient received CT Angio PE protocol and CT abdomen/pelvis which were both negative for pathology. Patient admitted to the hospital to evaluate for pleuritic chest pain, likely 2/2 to recent URI.

## 2023-01-07 NOTE — ED PROVIDER NOTE - CLINICAL SUMMARY MEDICAL DECISION MAKING FREE TEXT BOX
Patient presented with worsening pleuritic chest pain as documented. (+) in marked painful distress on arrival primarily with deep inspiration. Otherwise afebrile, HD stable, lungs overall clear. EKG obtained and non-ischemic without evidence of STEMI. Obtained labs which were grossly unremarkable including no significant leukocytosis, anemia, signs of dehydration/TONY, transaminitis or significant electrolyte abnormalities. Chest xray negative for pneumothorax, pneumonia, widened mediastinum, evidence of rib fractures, enlarged cardiac silhouette or any other emergent pathologies. CTA chest negative for PE and CT abd/pelvis negative for emergent intra-abdominal pathologies. Patient still in pain despite negative work up and given the above will require admission for further work up and monitoring. Patient agreeable with plan. HD stable at time of admission.

## 2023-01-07 NOTE — ED PROVIDER NOTE - DIFFERENTIAL DIAGNOSIS
Chest pain, hx DVT, r/o PE, pneumothorax, ACS, consider dissection, asthma exacerbation Differential Diagnosis

## 2023-01-07 NOTE — ED PROVIDER NOTE - OBJECTIVE STATEMENT
Patient is a 32-year-old female with a history of asthma, Crohn's disease, DVT previously on Eliquis but stopped taking it. Patient presents for right-sided chest pain for 3 days. Pain is right-sided radiating to the middle of her chest, pleuritic.  Patient had a work-up for her prior DVT, and was thought to be secondary to her oral contraceptive use, which she no longer takes.  She has associated dyspnea on exertion. Significant family history include death of her 27-year-old sister of unexplained cause.  Patient states she is unable to take Motrin or Tylenol as they irritate her Crohn's disease. +Chills, +nasal congestion, +nausea, +cough, +RUQ abd pain, +diarrhea (but baseline).   No other complaints - no fever/chills, sore throat, palpitations, vomiting, dysuria, hematuria, new joint pain, FND, rash, trauma.

## 2023-01-07 NOTE — H&P ADULT - NSHPLABSRESULTS_GEN_ALL_CORE
VITAL SIGNS: Last 24 Hours  T(C): 36.5 (07 Jan 2023 15:45), Max: 36.8 (07 Jan 2023 09:34)  T(F): 97.7 (07 Jan 2023 15:45), Max: 98.2 (07 Jan 2023 09:34)  HR: 69 (07 Jan 2023 15:45) (69 - 104)  BP: 108/58 (07 Jan 2023 15:45) (108/58 - 137/80)  BP(mean): --  RR: 18 (07 Jan 2023 15:45) (18 - 18)  SpO2: 96% (07 Jan 2023 15:45) (96% - 96%)    LABS:                        12.3   14.83 )-----------( 245      ( 07 Jan 2023 10:53 )             38.3     01-07    140  |  104  |  22<H>  ----------------------------<  91  3.6   |  29  |  1.0    Ca    9.0      07 Jan 2023 10:53  Mg     2.1     01-07    TPro  6.0  /  Alb  3.8  /  TBili  <0.2  /  DBili  <0.2  /  AST  25  /  ALT  26  /  AlkPhos  57  01-07          Troponin T, Serum: <0.01 ng/mL (01-07-23 @ 10:53)      CARDIAC MARKERS ( 07 Jan 2023 10:53 )  x     / <0.01 ng/mL / x     / x     / x

## 2023-01-07 NOTE — ED ADULT NURSE NOTE - OBJECTIVE STATEMENT
Pt reports to ed for cp when coughing x3 days. Pt reports taking eliquis for dvt but stopping x2mo ago. PMH dvt & crohns.

## 2023-01-07 NOTE — ED PROVIDER NOTE - PHYSICAL EXAMINATION
_  Vital signs reviewed; ABCs intact  GENERAL: +Sobbing with tears, well nourished  SKIN: Warm, dry  HEAD & NECK: NCAT, supple neck  EYES: EOMI, PER B/L, no scleral icterus  ENT: +Mildly dry MM; uvula midline; no oropharyngeal erythema or exudates  CARD: +Tachycardic, S1, S2; no murmurs, no rubs, no gallops  RESP: +Tachypneic, wheezing  ABD: Soft, ND, +tender diffusely, no rebound, no guarding, +BS; no CVAT  EXT: Pulses palpable distally; no edema; no calf tenderness; symmetrical calf circumferences  NEUROMSK: Grossly intact  PSYCH: AAOx3, cooperative, appropriate

## 2023-01-07 NOTE — ED PROVIDER NOTE - CONSIDERATION OF ADMISSION OBSERVATION
Despite negative work up in ED patient still with persistent pain requiring admission. Consideration of Admission/Observation

## 2023-01-07 NOTE — H&P ADULT - ASSESSMENT
Patient is a 32-year-old female with a  PMHx of asthma, Crohn's disease, DVT previously on Eliquis but stopped taking it presents to the ED for right sided chest pain. She reports pleuritic right sided chest pain for 3 days in the central chest radiating to the right . Work up for previous DVT was thought to be 2/2 to OCP which she has since discontinued . Chest pain is associated with dyspnea on exertion.   Significant family history include death of her 27-year-old sister of unexplained cause. Patient states she is unable to take Motrin or Tylenol as they irritate her Crohn's disease. +Chills, +nasal congestion, +nausea, +cough, +RUQ abd pain, +diarrhea (but baseline).   No other complaints - no fever/chills, sore throat, palpitations, vomiting, dysuria, hematuria, new joint pain, FND, rash, trauma.    In the ED, patient /80, , afebrile and saturating well on room air. Labs notable only for WBC of 14.83. Troponin negative x1. EKG showing sinus tachycarida. Due to concern for PE, patient received CT Angio PE protcol and CT abdomen/pelvis which were both negative for pathology.       Chest Pain, r/o ACS  - EKG: NSR, sinus tachycardia  - troponin negative x1, follow up repeat       Abdominal Pain   Crohn Disease   -      Asthma  -        #Misc  - DVT Prophylaxis:  - GI Prophylaxis:  - Diet: regular  - Activity: IAT  - IV Fluids: n/a  - Code Status: full code  - Dispo: admit to medicine  Patient is a 32-year-old female with a  PMHx of asthma , Crohn's disease, hx of  DVT 1 year ago previously on Eliquis presents to the ED with right sided pleuritic chest pain. Patient notes three day history of right sided chest pain worse on palpation or inspiration. She notes she recently had upper respiratory symptoms including cough and congestion. She has been in contact with her brother who also has a URI. Pain is described as central chest pain radiating to the right side of the chest. She also notes pain along her posterior chest wall with palpation. Patient has history of DVT 1 year ago treated with few months of eliquis. She stopped taking the medication prematurely and is not sure how many months she took it for exactly. DVT was thought to be provoked 2/2 to OCP which she has since discontinued. ED records note family history + for cardiac arrest in patients sister at 27 years old. Patient notes sister developed a heart condition 2/2 to drug use and passed away following open heart surgery. ROS + for cough, nasal congestion, and mild dyspnea on exertion. She denies headache, fever, chills, nausea, worsening of baseline diarrhea, or constipation. She notes she chronically has nausea/vomitting/diarrhea w/ mild abdominal pain 2/2 to Crohns which is at baseline.    In the ED, patient /80, , afebrile and saturating well on room air. Labs notable only for WBC of 14.83. Troponin negative x1. EKG showing sinus tachycardia. Due to concern for PE, patient received CT Angio PE protocol and CT abdomen/pelvis which were both negative for pathology. Patient admitted to the hospital to evaluate for pleuritic chest pain, likely 2/2 to recent URI.     Pleuritic Chest pain likely 2/2 to URI   Chest Pain, r/o ACS (unlikely)  - EKG: NSR, sinus tachycardia  - troponin negative x1, follow up repeat at 20:00  - patient unable to tolerate Ibuprofen for pain   - c/w morphine IV for pain management & lidocaine patch  - CT angio negative for PE   - RVP, flu, covid negative  - can consider echo but pain unlikely cardiac in nature given negative trop, normal EKG, and pleuritic nature of pain     Crohn Disease   - CT abdomen negative for any intraabdominal pathology   - outpatient follow up with GI   - c/w oral prednisone home dose 40 mg   - Zofran PRN for nausea   - no evidence of acute exacerbation: no increased abdominal pain, nausea, vomitting, or diarrhea, denies blood bowel movements     Asthma  - c/w albuterol duonebs PRN    #Misc  - DVT Prophylaxis:  - GI Prophylaxis:  - Diet: regular  - Activity: IAT  - IV Fluids: n/a  - Code Status: full code  - Dispo: admit to medicine

## 2023-01-07 NOTE — ED PROVIDER NOTE - PROGRESS NOTE DETAILS
Resident AO: Patient reassessed, continues to be in pain. Will admit to medicine to intractable pain.

## 2023-01-11 DIAGNOSIS — R07.89 OTHER CHEST PAIN: ICD-10-CM

## 2023-01-11 DIAGNOSIS — K50.90 CROHN'S DISEASE, UNSPECIFIED, WITHOUT COMPLICATIONS: ICD-10-CM

## 2023-01-11 DIAGNOSIS — Z20.822 CONTACT WITH AND (SUSPECTED) EXPOSURE TO COVID-19: ICD-10-CM

## 2023-01-11 DIAGNOSIS — Z91.14 PATIENT'S OTHER NONCOMPLIANCE WITH MEDICATION REGIMEN: ICD-10-CM

## 2023-01-11 DIAGNOSIS — J06.9 ACUTE UPPER RESPIRATORY INFECTION, UNSPECIFIED: ICD-10-CM

## 2023-01-11 DIAGNOSIS — Z79.52 LONG TERM (CURRENT) USE OF SYSTEMIC STEROIDS: ICD-10-CM

## 2023-01-11 DIAGNOSIS — Z53.29 PROCEDURE AND TREATMENT NOT CARRIED OUT BECAUSE OF PATIENT'S DECISION FOR OTHER REASONS: ICD-10-CM

## 2023-01-11 DIAGNOSIS — Z86.718 PERSONAL HISTORY OF OTHER VENOUS THROMBOSIS AND EMBOLISM: ICD-10-CM

## 2023-01-11 DIAGNOSIS — Z82.41 FAMILY HISTORY OF SUDDEN CARDIAC DEATH: ICD-10-CM

## 2023-01-11 DIAGNOSIS — R00.0 TACHYCARDIA, UNSPECIFIED: ICD-10-CM

## 2023-01-11 DIAGNOSIS — Z88.0 ALLERGY STATUS TO PENICILLIN: ICD-10-CM

## 2023-01-11 DIAGNOSIS — J45.909 UNSPECIFIED ASTHMA, UNCOMPLICATED: ICD-10-CM

## 2023-02-01 ENCOUNTER — APPOINTMENT (OUTPATIENT)
Dept: SURGERY | Facility: CLINIC | Age: 33
End: 2023-02-01

## 2023-02-01 PROBLEM — K61.1 PERIRECTAL ABSCESS: Status: ACTIVE | Noted: 2021-08-17

## 2023-02-22 NOTE — ED PROVIDER NOTE - NS ED MD EM SELECTION
Abdomen , soft, nontender, nondistended , no guarding or rigidity , no masses palpable , normal bowel sounds , Liver and Spleen,  no hepatosplenomegaly , liver nontender [id number] Comprehensive

## 2023-02-22 NOTE — ED ADULT NURSE NOTE - ED CARDIAC CAPILLARY REFILL
Pacemaker Evaluation Report    February 22, 2023    Primary Cardiologist: Dr. Merrill  Implanting MD: Dr. Barker  :[employer]ott CST          
2 seconds or less

## 2023-02-27 ENCOUNTER — APPOINTMENT (OUTPATIENT)
Dept: INTERNAL MEDICINE | Facility: CLINIC | Age: 33
End: 2023-02-27
Payer: MEDICAID

## 2023-02-27 ENCOUNTER — OUTPATIENT (OUTPATIENT)
Dept: OUTPATIENT SERVICES | Facility: HOSPITAL | Age: 33
LOS: 1 days | End: 2023-02-27
Payer: MEDICAID

## 2023-02-27 ENCOUNTER — NON-APPOINTMENT (OUTPATIENT)
Age: 33
End: 2023-02-27

## 2023-02-27 ENCOUNTER — APPOINTMENT (OUTPATIENT)
Dept: INTERNAL MEDICINE | Facility: CLINIC | Age: 33
End: 2023-02-27

## 2023-02-27 VITALS
TEMPERATURE: 96.5 F | DIASTOLIC BLOOD PRESSURE: 83 MMHG | HEART RATE: 98 BPM | OXYGEN SATURATION: 99 % | BODY MASS INDEX: 23.22 KG/M2 | WEIGHT: 123 LBS | HEIGHT: 61 IN | SYSTOLIC BLOOD PRESSURE: 121 MMHG

## 2023-02-27 DIAGNOSIS — Z00.00 ENCOUNTER FOR GENERAL ADULT MEDICAL EXAMINATION W/OUT ABNORMAL FINDINGS: ICD-10-CM

## 2023-02-27 DIAGNOSIS — Z00.00 ENCOUNTER FOR GENERAL ADULT MEDICAL EXAMINATION WITHOUT ABNORMAL FINDINGS: ICD-10-CM

## 2023-02-27 PROCEDURE — 99214 OFFICE O/P EST MOD 30 MIN: CPT | Mod: GC

## 2023-02-27 PROCEDURE — 99214 OFFICE O/P EST MOD 30 MIN: CPT

## 2023-02-27 NOTE — PHYSICAL EXAM
[Well Developed] : well developed [Normal Sclera/Conjunctiva] : normal sclera/conjunctiva [Normal Outer Ear/Nose] : the outer ears and nose were normal in appearance [No JVD] : no jugular venous distention [No Lymphadenopathy] : no lymphadenopathy [No Respiratory Distress] : no respiratory distress  [No Edema] : there was no peripheral edema [Soft] : abdomen soft [Non Tender] : non-tender [No HSM] : no HSM [No Rash] : no rash [Memory Grossly Normal] : memory grossly normal [Normal Affect] : the affect was normal [Normal Insight/Judgement] : insight and judgment were intact [de-identified] : tearful, depressed affect  [de-identified] : late expiratory wheeze  [de-identified] : rapid regular heart beath [de-identified] : defer to GYN  [de-identified] : sad mood, feels "lost"

## 2023-02-27 NOTE — ASSESSMENT
[FreeTextEntry1] : Ms Godwin is a 32 VIVI w a PMH of DVT (on eliquis), IBD, Ulcerative Colitis, Crohns (on long term steroids), and asthma presenting for f/u. Patient was unable to get lab work prescribed at previous visit. 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 Patient reports pain and burning from her known rectal abscess (s/p multiple I&D), patient followed by Dr Mccurdy. Patient also has painful and bleeding internal and external hemorrhoids. \par Patient reports  CP, SOB, diaphoresis, nausea and neck/arm pain that occurs during moments of stress or exertion concerning for stable Angina. \par Pt stops her eliquis for weeks at a time when she feels depressed\par Of note patient scored a 27 on PHQ-9 \par Pt crying at the time of my encounter\par \par \par #new onset stable angina\par #recurrent DVT (no longer on OCP)\par #on eliquis\par -EKG last visit showed NSR, no T wave changes, no arrythmia or axis deviation noted \par -f/u cardiovascular consult\par -c/w eliquis 5 BID at this time\par -f/u cbc and lipid panel\par \par #Crohns\par #Ulcerative colitis\par #internal and external hemorrhoids\par #recurrent rectal abscess s/p multiple I&D\par #chronic steroid use\par -on steroids\par -needs colonoscopy\par -follows zohaib Mccurdy\par -f/u GI surgeon referral for hemorrhoidectomy\par -f/u A1c given long term steroid use\par -f/u Burn referal for wound care for rectal abscess (Dr Alvarez) \par \par #Breast Pain\par -f/u Gyn referal  \par \par #PHq9 27\par #reports feeling "lost" "unable to be helped"\par denies SI, no plan of how to harm herself, denies Homicidal ideation\par requesting a therapist\par -SW meeting w patient now\par -referral to Behavioral health\par - will start sertraline 25 mg \par \par \par #Asthma\par -increased frequency of use of albuterol (2-3x/month from 1x/every 2 month) \par -consider Pulm referral and or allergy referral at next visit, will hold of for now given the number of urgent issues \par \par #HCM\par -f/u colonoscopy\par -f/u recs of cardiovascular, Gyn, Burn  and GI\par -continue w current medications at this time\par -3 month f/u and prn\par \par

## 2023-02-27 NOTE — HISTORY OF PRESENT ILLNESS
[FreeTextEntry1] : f/u [de-identified] : Ms Godwin is a 32 VIVI w a PMH of DVT (on eliquis), IBD, Ulcerative Colitis, Crohns (on long term steroids), and asthma presenting for f/u. Patient was unable to get lab work prescribed at previous visit. 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 Patient reports pain and burning from her known rectal abscess (s/p multiple I&D), patient followed by Dr Mccurdy. Patient also has painful and bleeding internal and external hemorrhoids. \par Patient reports  CP, SOB, diaphoresis, nausea and neck/arm pain that occurs during moments of stress or exertion concerning for stable Angina. \par Pt stops her eliquis for weeks at a time when she feels depressed\par Of note patient scored a 27 on PHQ-9 \par Pt crying at the time of my encounter\par

## 2023-03-02 DIAGNOSIS — J45.909 UNSPECIFIED ASTHMA, UNCOMPLICATED: ICD-10-CM

## 2023-03-02 DIAGNOSIS — D50.9 IRON DEFICIENCY ANEMIA, UNSPECIFIED: ICD-10-CM

## 2023-03-02 DIAGNOSIS — F32.A DEPRESSION, UNSPECIFIED: ICD-10-CM

## 2023-03-02 DIAGNOSIS — I82.409 ACUTE EMBOLISM AND THROMBOSIS OF UNSPECIFIED DEEP VEINS OF UNSPECIFIED LOWER EXTREMITY: ICD-10-CM

## 2023-03-09 ENCOUNTER — APPOINTMENT (OUTPATIENT)
Dept: PSYCHIATRY | Facility: CLINIC | Age: 33
End: 2023-03-09

## 2023-03-09 ENCOUNTER — OUTPATIENT (OUTPATIENT)
Dept: OUTPATIENT SERVICES | Facility: HOSPITAL | Age: 33
LOS: 1 days | End: 2023-03-09
Payer: MEDICAID

## 2023-03-09 DIAGNOSIS — F41.9 ANXIETY DISORDER, UNSPECIFIED: ICD-10-CM

## 2023-03-09 DIAGNOSIS — F33.1 MAJOR DEPRESSIVE DISORDER, RECURRENT, MODERATE: ICD-10-CM

## 2023-03-09 PROCEDURE — 90791 PSYCH DIAGNOSTIC EVALUATION: CPT

## 2023-03-09 NOTE — FAMILY HISTORY
[FreeTextEntry1] : Family composition:Sara is 31 y/o F,  for 13 years, has 3 children 10 y/o F, 9 y/o M, and 7 y/o M. Domiciled together in a house renting a room from her mother-in-law. \par Family history and background: [place]. \par Family relationship: good.\par Pertinent Family Medical, MH and Substance Use History including Adult Child of Alcoholic and child of substance abuse status; history of cancer and heart disease:\par Father: Headaches, heart disease, lung disease, diabetes.\par Mother:Anemia, Asthma, high blood pressure, cancer\par Grandmother:Anemia, diabetes, Thyroid\par Grandfather: Thyroid, Diabetes,\par Sister: Menstrual Pain.\par \par Cultural Assessment:\par Race/ethnicity: [place] citizen\par Sexual identity: Female\par Spirituality/Confucianist:no\par \par

## 2023-03-09 NOTE — ED PROVIDER NOTE - NS ED ROS FT
josé antonio
Constitutional: (-) fever  Eyes/ENT: (-) blurry vision, (-) epistaxis  Cardiovascular: (-) chest pain, (-) syncope  Respiratory: (-) cough, (-) shortness of breath  Gastrointestinal: (-) vomiting, (+) diarrhea  Musculoskeletal: (-) neck pain, (-) back pain, (-) joint pain  Integumentary: (-) rash, (-) edema  Neurological: (-) headache, (-) altered mental status  Psychiatric: (-) hallucinations  Allergic/Immunologic: (-) pruritus

## 2023-03-09 NOTE — HEALTH RISK ASSESSMENT
[Patient/Caregiver not ready to engage] : , patient/caregiver not ready to engage [Patient/Collateral not ready to engage] : , patient/collateral not ready to engage [AdvancecareDate] : 3/9/23  [] : 3/9/23
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
## 2023-03-09 NOTE — SOCIAL HISTORY
[FreeTextEntry1] : Employment history:Unemployed last 7 years, worked for [employer] in the past. \par Developmental history: Speech delay, in Special Ed. \par Social supports (friends, Volunteers, club, AA meeting, other meetings ) ?: no\par Meaningful Activities: Home schools her 3 kids, takes them to park, museums. \par \par \par \par  \par \par \par

## 2023-03-09 NOTE — PSYCHOSOCIAL ASSESSMENT
[None known] : None known [HS Diploma or GED] : HS Diploma or GED [Yes (select details below)] : Have you ever experienced this type of event? Yes [tried hard not to think about the event(s) or went out of your way to avoid situations that reminded you of the event] : tried hard to avoid thinking about events or avoid situations that reminded patient of the event [has been constantly on guard, watchful, or easily startled] : has been constantly on guard, watchful, or easily startled [felt numb or detached from people, activities, or your surrounding] : has felt numb or detached from people, activities, or surroundings [felt guilty or unable to stop blaming yourself or others for the event(s) or any problems the event(s) may have caused] : has felt guilty or unable to stop blaming self or others for event(s), or any problems the event(s) may have caused [Unemployed and not looking for work] : unemployed and not looking for work [Dependent] : dependent [Food stamps] : food stamps [Private residence (home, apartment, rooming house, hotel, motel, supported housing, supported Single Room Occupancy (SRO),] : Private residence (home, apartment, rooming house, hotel, motel, supported housing, supported Single Room Occupancy (SRO), permanent housing programs, transient housing programs, and shelter plus care housing) [No] : no [had nightmares about the event(s) or thought about the event(s) when you did not want] : did not have nightmares and/or unwanted thoughts about the events [FreeTextEntry4] : Domiciled with her  and their 3 children.  [FreeTextEntry7] : Ben Godwin [phone]
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
## 2023-03-09 NOTE — REASON FOR VISIT
[Number can be texted] : number can be texted [OK  to leave message] : OK  to leave message [Primary Care] : Primary Care [[facility] Provider/Facility] : [facility] Provider/Facility [Patient] : Patient [Prior Medical Records] : Prior Medical Records [FreeTextEntry3] : [email or web address] [FreeTextEntry5] : English [FreeTextEntry6] : Sara [FreeTextEntry2] : Anxiety/Depression [FreeTextEntry1] : Anxiety/Depression due to medical complications.

## 2023-03-13 ENCOUNTER — NON-APPOINTMENT (OUTPATIENT)
Age: 33
End: 2023-03-13

## 2023-03-13 ENCOUNTER — OUTPATIENT (OUTPATIENT)
Dept: OUTPATIENT SERVICES | Facility: HOSPITAL | Age: 33
LOS: 1 days | End: 2023-03-13
Payer: MEDICAID

## 2023-03-13 ENCOUNTER — APPOINTMENT (OUTPATIENT)
Dept: PSYCHIATRY | Facility: CLINIC | Age: 33
End: 2023-03-13
Payer: MEDICAID

## 2023-03-13 DIAGNOSIS — F43.10 POST-TRAUMATIC STRESS DISORDER, UNSPECIFIED: ICD-10-CM

## 2023-03-13 DIAGNOSIS — F33.2 MAJOR DEPRESSIVE DISORDER, RECURRENT SEVERE WITHOUT PSYCHOTIC FEATURES: ICD-10-CM

## 2023-03-13 DIAGNOSIS — F33.1 MAJOR DEPRESSIVE DISORDER, RECURRENT, MODERATE: ICD-10-CM

## 2023-03-13 PROCEDURE — 90792 PSYCH DIAG EVAL W/MED SRVCS: CPT

## 2023-03-14 DIAGNOSIS — F43.10 POST-TRAUMATIC STRESS DISORDER, UNSPECIFIED: ICD-10-CM

## 2023-03-14 DIAGNOSIS — F33.2 MAJOR DEPRESSIVE DISORDER, RECURRENT SEVERE WITHOUT PSYCHOTIC FEATURES: ICD-10-CM

## 2023-03-23 ENCOUNTER — NON-APPOINTMENT (OUTPATIENT)
Age: 33
End: 2023-03-23

## 2023-03-23 NOTE — DISCUSSION/SUMMARY
[FreeTextEntry1] : Therapist spoke to patient regarding starting therapy.  patient was scheduled for in-person appointment on Monday April 3 at 2:30PM

## 2023-03-27 ENCOUNTER — OUTPATIENT (OUTPATIENT)
Dept: OUTPATIENT SERVICES | Facility: HOSPITAL | Age: 33
LOS: 1 days | End: 2023-03-27
Payer: MEDICAID

## 2023-03-27 ENCOUNTER — APPOINTMENT (OUTPATIENT)
Dept: PSYCHIATRY | Facility: CLINIC | Age: 33
End: 2023-03-27

## 2023-03-27 ENCOUNTER — APPOINTMENT (OUTPATIENT)
Dept: PSYCHIATRY | Facility: CLINIC | Age: 33
End: 2023-03-27
Payer: MEDICAID

## 2023-03-27 DIAGNOSIS — F33.1 MAJOR DEPRESSIVE DISORDER, RECURRENT, MODERATE: ICD-10-CM

## 2023-03-27 DIAGNOSIS — F41.1 GENERALIZED ANXIETY DISORDER: ICD-10-CM

## 2023-03-27 DIAGNOSIS — F33.2 MAJOR DEPRESSIVE DISORDER, RECURRENT SEVERE WITHOUT PSYCHOTIC FEATURES: ICD-10-CM

## 2023-03-27 DIAGNOSIS — F51.05 INSOMNIA DUE TO OTHER MENTAL DISORDER: ICD-10-CM

## 2023-03-27 PROCEDURE — 99214 OFFICE O/P EST MOD 30 MIN: CPT | Mod: 95

## 2023-03-27 RX ORDER — TRAZODONE HYDROCHLORIDE 50 MG/1
50 TABLET ORAL
Qty: 90 | Refills: 3 | Status: DISCONTINUED | COMMUNITY
Start: 2023-03-13 | End: 2023-03-27

## 2023-03-27 NOTE — HEALTH RISK ASSESSMENT
[Patient/Caregiver not ready to engage] : , patient/caregiver not ready to engage [AdvancecareDate] : 3/9/23  [Patient/Collateral not ready to engage] : , patient/collateral not ready to engage

## 2023-03-27 NOTE — PSYCHOSOCIAL ASSESSMENT
[None known] : None known [HS Diploma or GED] : HS Diploma or GED [Yes (select details below)] : Have you ever experienced this type of event? Yes [had nightmares about the event(s) or thought about the event(s) when you did not want] : did not have nightmares and/or unwanted thoughts about the events [tried hard not to think about the event(s) or went out of your way to avoid situations that reminded you of the event] : tried hard to avoid thinking about events or avoid situations that reminded patient of the event [has been constantly on guard, watchful, or easily startled] : has been constantly on guard, watchful, or easily startled [felt numb or detached from people, activities, or your surrounding] : has felt numb or detached from people, activities, or surroundings [felt guilty or unable to stop blaming yourself or others for the event(s) or any problems the event(s) may have caused] : has felt guilty or unable to stop blaming self or others for event(s), or any problems the event(s) may have caused [Unemployed and not looking for work] : unemployed and not looking for work [Dependent] : dependent [Food stamps] : food stamps [Private residence (home, apartment, rooming house, hotel, motel, supported housing, supported Single Room Occupancy (SRO),] : Private residence (home, apartment, rooming house, hotel, motel, supported housing, supported Single Room Occupancy (SRO), permanent housing programs, transient housing programs, and shelter plus care housing) [No] : Patient has personal representation (legal guardian, representative payee, conservatorship)? No [FreeTextEntry4] : Domiciled with her  and their 3 children.  [FreeTextEntry7] : Ben Godwin [phone]
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
## 2023-03-27 NOTE — FAMILY HISTORY
[FreeTextEntry1] : Family composition:Sara is 31 y/o F,  for 13 years, has 3 children 10 y/o F, 7 y/o M, and 7 y/o M. Domiciled together in a house renting a room from her mother-in-law. \par Family history and background: Born [place] raised [place]. \par Family relationship: good.\par Pertinent Family Medical, MH and Substance Use History including Adult Child of Alcoholic and child of substance abuse status; history of cancer and heart disease:\par Father: Headaches, heart disease, lung disease, diabetes.\par Mother:Anemia, Asthma, high blood pressure, cancer, r/o BPD\par Grandmother:Anemia, diabetes, Thyroid\par Grandfather: Thyroid, Diabetes,\par Sister: Menstrual Pain.\par \par Cultural Assessment:\par Race/ethnicity: Afghan\par Sexual identity: Female\par Spirituality/Yarsani:no\par \par

## 2023-03-27 NOTE — SOCIAL HISTORY
[FreeTextEntry1] : Employment history:Unemployed last 7 years, worked for [employer] in the past. \par Developmental history: Speech delay, in Special Ed. \par Social supports (friends, Volunteers, club, AA meeting, other meetings ) ?: no\par Meaningful Activities: Home schools her 3 kids, takes them to park, museums. \par \par \par \par  \par \par \par

## 2023-03-27 NOTE — PHYSICAL EXAM
[Slowed] : slowed [Cooperative] : cooperative [Depressed] : depressed [Anxious] : anxious [Constricted] : constricted [Clear] : clear [Linear/Goal Directed] : linear/goal directed [None] : none [Depressive] : depressive [Phobic] : phobic [None Reported] : none reported [Average] : average [WNL] : within normal limits [Mild] : mild

## 2023-03-27 NOTE — REASON FOR VISIT
[Number can be texted] : number can be texted [OK  to leave message] : OK  to leave message [FreeTextEntry3] : [email or web address] [FreeTextEntry5] : English [FreeTextEntry6] : Sara [Primary Care] : Primary Care [[facility] Provider/Facility] : [facility] Provider/Facility [Patient] : Patient [Prior Medical Records] : Prior Medical Records [FreeTextEntry2] : Anxiety/Depression [FreeTextEntry1] : Anxiety/Depression due to medical complications.

## 2023-03-27 NOTE — HISTORY OF PRESENT ILLNESS
[Not Applicable] : Not applicable [FreeTextEntry1] : Solo/tele session 30mins.\par  Referred to OPD from the [facility] clinic Dr. Chiu.  \par \par Pt says that she has her Chron's disease flare up now and she always feels worse during those times. Pt says that trazodone doesn't work at all. She feels very tired as she doesn't sleep at all. However, she says that ambien worked really well and she tolerated it well. I will stop trazodone and will give her more of ambien (pt is instructed to not take it daily). I will also give her 5 tabs of klonopin QHS PRN as she says that she feels very anxious when she goes to bed and that prevents her from sleeping. Pt is 135/5.1. We will consider remeron next time. Pt tolerates zoloft well, however, gets more HA. We will try to increase zoloft to 100mg. Pt feels very anxious and depressed. Risks of klonopin were d/w pt.\par Denies SI/HI/AH/PI.\par \par \par   [FreeTextEntry2] : Never had a psychiatrist, no therapy.   [FreeTextEntry3] : Dr Aram MD is her provider and 7 days ago started her on Zoloft 25 mg.

## 2023-03-27 NOTE — PAST MEDICAL HISTORY
[FreeTextEntry1] : \par \par diagnosed with ulcerative colitis 7 years ago, and last year diagnosed with Crohn's she had an abscess and had to have surgery. . Prednisone 40 mg, Eliquis 5 mg 2 x a day.  Patient also has asthma.   \par Doesn’t smoke, doesn’t drink, denied substance use all together.\par \par

## 2023-03-28 DIAGNOSIS — F51.05 INSOMNIA DUE TO OTHER MENTAL DISORDER: ICD-10-CM

## 2023-03-28 DIAGNOSIS — F33.2 MAJOR DEPRESSIVE DISORDER, RECURRENT SEVERE WITHOUT PSYCHOTIC FEATURES: ICD-10-CM

## 2023-03-28 DIAGNOSIS — F41.1 GENERALIZED ANXIETY DISORDER: ICD-10-CM

## 2023-03-28 NOTE — PATIENT PROFILE ADULT - [facility]
MD DANIE Amador [facility] Nurse Msg Pool  Caller: Unspecified (Today, 11:07 AM)  If having issues with the diuretic (finding herself going to the bathroom more and not keeping up with hydration)   Then can stop spironolactone and start 2.5mg of finasteride daily instead     Will need CMP in 4 weeks on finasteride   ---------------------------------------------------------------------------------------------    Writer called [facility] and conveyed the above information from Dr. Brandon.  Pt verbalized understanding and agreed with plan of care.  She asked if the two medications worked the same?  Writer told pt she was unable to answer that question.  Pt noted she will ask her pharmacist.   Writer placed the orders.    Pt noted going to the bathroom every 45 min and difficulty staying hydrated.      Writer sent pt information via [employer] regarding finasteride.     (4) no impairment

## 2023-03-29 ENCOUNTER — OUTPATIENT (OUTPATIENT)
Dept: OUTPATIENT SERVICES | Facility: HOSPITAL | Age: 33
LOS: 1 days | End: 2023-03-29
Payer: MEDICAID

## 2023-03-29 ENCOUNTER — APPOINTMENT (OUTPATIENT)
Dept: SPEECH THERAPY | Facility: CLINIC | Age: 33
End: 2023-03-29

## 2023-03-29 DIAGNOSIS — H90.3 SENSORINEURAL HEARING LOSS, BILATERAL: ICD-10-CM

## 2023-03-29 PROCEDURE — 92557 COMPREHENSIVE HEARING TEST: CPT

## 2023-03-29 PROCEDURE — 92550 TYMPANOMETRY & REFLEX THRESH: CPT

## 2023-03-30 NOTE — ED ADULT NURSE NOTE - GENITOURINARY ASSESSMENT
Intermediate Joint Aspiration/Injection: L radiocarpal    Date/Time: 3/30/2023 1:00 PM  Performed by: Michael Villatoro MD  Authorized by: Michael Villatoro MD     Consent Done?:  Yes (Verbal)  Indications:  Pain  Site marked: The procedure site was marked    Timeout: Prior to procedure the correct patient, procedure, and site was verified      Location:  Wrist  Site:  L radiocarpal  Prep: Patient was prepped and draped in usual sterile fashion    Ultrasonic Guidance for needle placement: No  Needle size:  25 G  Approach:  Dorsal  Medications:  40 mg triamcinolone acetonide 40 mg/mL  Patient tolerance:  Patient tolerated the procedure well with no immediate complications     - - -

## 2023-04-03 ENCOUNTER — OUTPATIENT (OUTPATIENT)
Dept: OUTPATIENT SERVICES | Facility: HOSPITAL | Age: 33
LOS: 1 days | End: 2023-04-03
Payer: MEDICAID

## 2023-04-03 ENCOUNTER — APPOINTMENT (OUTPATIENT)
Dept: PSYCHIATRY | Facility: CLINIC | Age: 33
End: 2023-04-03

## 2023-04-03 DIAGNOSIS — F33.2 MAJOR DEPRESSIVE DISORDER, RECURRENT SEVERE WITHOUT PSYCHOTIC FEATURES: ICD-10-CM

## 2023-04-03 PROCEDURE — 90834 PSYTX W PT 45 MINUTES: CPT | Mod: 95

## 2023-04-04 ENCOUNTER — APPOINTMENT (OUTPATIENT)
Dept: OBGYN | Facility: CLINIC | Age: 33
End: 2023-04-04

## 2023-04-04 ENCOUNTER — APPOINTMENT (OUTPATIENT)
Dept: SURGERY | Facility: CLINIC | Age: 33
End: 2023-04-04

## 2023-04-04 DIAGNOSIS — F33.2 MAJOR DEPRESSIVE DISORDER, RECURRENT SEVERE WITHOUT PSYCHOTIC FEATURES: ICD-10-CM

## 2023-04-10 ENCOUNTER — APPOINTMENT (OUTPATIENT)
Dept: PSYCHIATRY | Facility: CLINIC | Age: 33
End: 2023-04-10

## 2023-04-10 ENCOUNTER — NON-APPOINTMENT (OUTPATIENT)
Age: 33
End: 2023-04-10

## 2023-04-10 ENCOUNTER — OUTPATIENT (OUTPATIENT)
Dept: OUTPATIENT SERVICES | Facility: HOSPITAL | Age: 33
LOS: 1 days | End: 2023-04-10
Payer: MEDICAID

## 2023-04-10 DIAGNOSIS — F41.1 GENERALIZED ANXIETY DISORDER: ICD-10-CM

## 2023-04-10 DIAGNOSIS — F33.2 MAJOR DEPRESSIVE DISORDER, RECURRENT SEVERE WITHOUT PSYCHOTIC FEATURES: ICD-10-CM

## 2023-04-10 PROCEDURE — 90834 PSYTX W PT 45 MINUTES: CPT | Mod: 95

## 2023-04-10 NOTE — REASON FOR VISIT
[Patient preference] : as per patient preference [Telehealth (audio & video) - Individual/Group] : This visit was provided via telehealth using real-time 2-way audio visual technology. [Medical Office: ([facility])___] : The provider was located at the medical office in [unfilled]. [Home] : The patient, [unfilled], was located at home, [unfilled], at the time of the visit. [Verbal consent obtained from patient/other participant(s)] : Verbal consent for telehealth/telephonic services obtained from patient/other participant(s) [FreeTextEntry4] : 11:45AM [FreeTextEntry5] : 12:30 PM [Patient] : Patient [FreeTextEntry1] : Depression

## 2023-04-10 NOTE — PLAN
[FreeTextEntry2] : Goal#1: Alleviate depressive symptoms and increase coping skills \par Goal#1 Objective #1 Patient will explore and process feelings, and identify triggers of depression and anxiety \par Goal#1 Objective #2 Patient will learn and practice coping skills and comply with medication therapy recommendation and reduce the rating for the CUDOS depression scale from 57 to less than 20..\par  [Cognitive and/or Behavior Therapy] : Cognitive and/or Behavior Therapy  [Motivational Interviewing] : Motivational Interviewing  [Psychoeducation] : Psychoeducation  [Supportive Therapy] : Supportive Therapy [de-identified] : Therapist provided an individual psychotherapy session with patient. Therapist used cognitive behavior therapy (CBT) motivational Interviewing (MI) and supportive psychotherapy (SP) techniques. Patient was engaged in session. \par \par Patient processed feelings and events experienced since last session. Patient explained that she has not been feeling happy.  She continues to have trouble sleeping is is worried about her memory.  Patient feels that she cannot remember the medication and when she does take meds it's not working as well as she wants it to. Patient also discussed anxiety that she is experiencing as a result of planning a trip to [place]. Therapist helped patient process her feelings regarding her anxiety.  Patient started exploring possible coping techniques. Therapist reinforced need to focus on treatment goals.\par \par Therapist reached out to psychiatrist and moved patient's next appointment up to tomorrow. Patient is future oriented, continues to have protective factors, did not endorse suicide ideation or attempt. Therapist praised patient for progress made towards goal and encouraged continued growth.\par  [Recommended Frequency of Visits: ____] : Recommended frequency of visits: [unfilled] [Return in ____ week(s)] : Return in [unfilled] week(s) [FreeTextEntry1] : Patient agreed to use a positive affirmation this week.

## 2023-04-10 NOTE — PHYSICAL EXAM
[0 - Not at all true (0 days)] : 0 - Not at all true (0 days) [3 - Often true (5-6 days)] : 3 - Often true (5-6 days) [2 - Sometimes true (3-4 days)] : 2 - Sometimes true (3-4 days) [4 - Almost always true (everyday)] : 4 - Almost always true (everyday) [4 - Extremely] : 4 - Extremely [3 - Pretty bad, most things are going poorly] : 3 - Pretty bad, most things are going poorly [FreeTextEntry1] : 57

## 2023-04-11 ENCOUNTER — NON-APPOINTMENT (OUTPATIENT)
Age: 33
End: 2023-04-11

## 2023-04-11 DIAGNOSIS — F33.2 MAJOR DEPRESSIVE DISORDER, RECURRENT SEVERE WITHOUT PSYCHOTIC FEATURES: ICD-10-CM

## 2023-04-11 DIAGNOSIS — F41.1 GENERALIZED ANXIETY DISORDER: ICD-10-CM

## 2023-04-11 NOTE — DISCUSSION/SUMMARY
[FreeTextEntry1] : Pt is no show. She didn't respond to our Solo invitation and my phone calls. I left VM

## 2023-04-12 NOTE — PLAN
[Cognitive and/or Behavior Therapy] : Cognitive and/or Behavior Therapy  [Motivational Interviewing] : Motivational Interviewing  [Psychoeducation] : Psychoeducation  [Supportive Therapy] : Supportive Therapy [Recommended Frequency of Visits: ____] : Recommended frequency of visits: [unfilled] [Return in ____ week(s)] : Return in [unfilled] week(s) [FreeTextEntry2] : Goal#1: Alleviate depressive symptoms and increase coping skills \par Goal#1 Objective #1 Patient will explore and process feelings, and identify triggers of depression and anxiety \par Goal#1 Objective #2 Patient will learn and practice coping skills and comply with medication therapy recommendation and reduce the rating for the CUDOS depression scale from 57 to less than 20..\par  [de-identified] : Therapist conducted an initial psychotherapy session with patient in person.  Therapist used  (CBT) cognitive behavior therapy, (MT) Motivational Interviewing techniques, and psychoeducation to engage with patient. Therapist worked on building rapport with patient.  Therapist obtained information and conducted a therapeutic alliance with patient.   Treatment plan was created with patient's involvement.\par  [FreeTextEntry1] : Patient agreed to use a positive affirmation this week.

## 2023-04-12 NOTE — REASON FOR VISIT
[Patient preference] : as per patient preference [Telehealth (audio & video) - Individual/Group] : This visit was provided via telehealth using real-time 2-way audio visual technology. [Medical Office: ([facility])___] : The provider was located at the medical office in [unfilled]. [Home] : The patient, [unfilled], was located at home, [unfilled], at the time of the visit. [Verbal consent obtained from patient/other participant(s)] : Verbal consent for telehealth/telephonic services obtained from patient/other participant(s) [Patient] : Patient [FreeTextEntry4] : 11:15 AM [FreeTextEntry5] : 12:00 PM [FreeTextEntry1] : Depression

## 2023-04-13 NOTE — DISCUSSION/SUMMARY
[Articulate] : articulate [Cognitively intact] : cognitively intact [Health literacy] : health literacy [In good health] : in good health [Financially stable] : financially stable [Part of a supportive marriage] : part of a supportive marriage [Part of a supportive family] : part of a supportive family [English fluency] : English fluency [Connected to healthcare] : connected to healthcare [Access to safe outdoor spaces] : access to safe outdoor spaces [Mental Health] : Mental Health [Initial] : Initial [every ___ months] : every [unfilled] months [every ___ weeks] : every [unfilled] weeks [Improvement in symptoms as evidenced by:] : Improvement in symptoms as evidenced by: [Attainment of higher level of functioning as evidenced by:] : Attainment of higher level of functioning as evidenced by: [Treatment is no longer medically necessary as evidenced by:] : Treatment is no longer medically necessary as evidenced by: [Other rationale for transition/discharge:] : Other rationale for transition/discharge: [None - Reason others did not participate:] : None - Reason others did not participate:  [Yes] : Yes [Psychiatric Provider/Prescriber] : Psychiatric Provider/Prescriber [Therapist] : Therapist [Supervisor (if needed)] : Supervisor

## 2023-04-14 ENCOUNTER — APPOINTMENT (OUTPATIENT)
Dept: PSYCHIATRY | Facility: CLINIC | Age: 33
End: 2023-04-14

## 2023-04-14 ENCOUNTER — APPOINTMENT (OUTPATIENT)
Dept: PSYCHIATRY | Facility: CLINIC | Age: 33
End: 2023-04-14
Payer: MEDICAID

## 2023-04-14 ENCOUNTER — OUTPATIENT (OUTPATIENT)
Dept: OUTPATIENT SERVICES | Facility: HOSPITAL | Age: 33
LOS: 1 days | End: 2023-04-14
Payer: MEDICAID

## 2023-04-14 DIAGNOSIS — F41.0 PANIC DISORDER [EPISODIC PAROXYSMAL ANXIETY]: ICD-10-CM

## 2023-04-14 DIAGNOSIS — F33.1 MAJOR DEPRESSIVE DISORDER, RECURRENT, MODERATE: ICD-10-CM

## 2023-04-14 DIAGNOSIS — F41.1 GENERALIZED ANXIETY DISORDER: ICD-10-CM

## 2023-04-14 PROCEDURE — 99214 OFFICE O/P EST MOD 30 MIN: CPT | Mod: 95

## 2023-04-14 NOTE — SOCIAL HISTORY
[FreeTextEntry1] : Employment history:Unemployed last 7 years, worked for [employer] in the past. \par Developmental history: Speech delay, in Special Ed. \par Social supports (friends, Volunteers, club, AA meeting, other meetings ) ?: no\par Meaningful Activities: Home schools her 3 kids, takes them to park, museums. \par \par \par \par  \par \par \par

## 2023-04-14 NOTE — FAMILY HISTORY
[FreeTextEntry1] : Family composition:Sara is 31 y/o F,  for 13 years, has 3 children 10 y/o F, 7 y/o M, and 7 y/o M. Domiciled together in a house renting a room from her mother-in-law. \par Family history and background: [place]. \par Family relationship: good.\par Pertinent Family Medical, MH and Substance Use History including Adult Child of Alcoholic and child of substance abuse status; history of cancer and heart disease:\par Father: Headaches, heart disease, lung disease, diabetes.\par Mother:Anemia, Asthma, high blood pressure, cancer, r/o BPD\par Grandmother:Anemia, diabetes, Thyroid\par Grandfather: Thyroid, Diabetes,\par Sister: Menstrual Pain.\par \par Cultural Assessment:\par Race/ethnicity: French\par Sexual identity: Female\par Spirituality/Episcopal:no\par \par
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
## 2023-04-14 NOTE — HISTORY OF PRESENT ILLNESS
[Not Applicable] : Not applicable [FreeTextEntry1] : Solo/tele session 30mins.\par  Referred to [facility]PD from the [facility] clinic Dr. Chiu.  \par Pt is anxious because she is going to [place] with her kids and  for 1 week. Last time she flew was when she was 6. She is very scared to fly. \par Skill and supportive therapy were provided.\par Pt tolerates 100mg of zoloft well and agreed to increase it to 150mg. She also says that klonopin works and asks to continue to prescribe it. \par Pt is awaiting her hearing aid. Says that she can't hear without it. Spoke about her medical problems and living situation. I stopped trazodone because it didn't work at all.\par We will consider remeron next time. \par Denies SI/HI/AH/PI.\par \par \par   [FreeTextEntry2] : Never had a psychiatrist, no therapy.   [FreeTextEntry3] : Dr Aram MD is her provider and 7 days ago started her on Zoloft 25 mg.

## 2023-04-14 NOTE — PSYCHOSOCIAL ASSESSMENT
[None known] : None known [HS Diploma or GED] : HS Diploma or GED [Yes (select details below)] : Have you ever experienced this type of event? Yes [had nightmares about the event(s) or thought about the event(s) when you did not want] : did not have nightmares and/or unwanted thoughts about the events [tried hard not to think about the event(s) or went out of your way to avoid situations that reminded you of the event] : tried hard to avoid thinking about events or avoid situations that reminded patient of the event [has been constantly on guard, watchful, or easily startled] : has been constantly on guard, watchful, or easily startled [felt numb or detached from people, activities, or your surrounding] : has felt numb or detached from people, activities, or surroundings [felt guilty or unable to stop blaming yourself or others for the event(s) or any problems the event(s) may have caused] : has felt guilty or unable to stop blaming self or others for event(s), or any problems the event(s) may have caused [Unemployed and not looking for work] : unemployed and not looking for work [Dependent] : dependent [Food stamps] : food stamps [Private residence (home, apartment, rooming house, hotel, motel, supported housing, supported Single Room Occupancy (SRO),] : Private residence (home, apartment, rooming house, hotel, motel, supported housing, supported Single Room Occupancy (SRO), permanent housing programs, transient housing programs, and shelter plus care housing) [No] : Patient has personal representation (legal guardian, representative payee, conservatorship)? No [FreeTextEntry4] : Domiciled with her  and their 3 children.  [FreeTextEntry7] : Ben Godwin [phone]

## 2023-04-14 NOTE — REASON FOR VISIT
[Number can be texted] : number can be texted [OK  to leave message] : OK  to leave message [FreeTextEntry3] : [email or web address] [FreeTextEntry5] : English [FreeTextEntry6] : Sara [Primary Care] : Primary Care [[facility] Provider/Facility] : [facility] Provider/Facility [Patient] : Patient [Prior Medical Records] : Prior Medical Records [FreeTextEntry2] : Anxiety/Depression [FreeTextEntry1] : Anxiety/Depression due to medical complications.

## 2023-04-15 DIAGNOSIS — F41.1 GENERALIZED ANXIETY DISORDER: ICD-10-CM

## 2023-04-15 DIAGNOSIS — F41.0 PANIC DISORDER [EPISODIC PAROXYSMAL ANXIETY]: ICD-10-CM

## 2023-04-17 ENCOUNTER — APPOINTMENT (OUTPATIENT)
Dept: PSYCHIATRY | Facility: CLINIC | Age: 33
End: 2023-04-17

## 2023-04-17 ENCOUNTER — OUTPATIENT (OUTPATIENT)
Dept: OUTPATIENT SERVICES | Facility: HOSPITAL | Age: 33
LOS: 1 days | End: 2023-04-17
Payer: MEDICAID

## 2023-04-17 DIAGNOSIS — F41.1 GENERALIZED ANXIETY DISORDER: ICD-10-CM

## 2023-04-17 DIAGNOSIS — F41.9 ANXIETY DISORDER, UNSPECIFIED: ICD-10-CM

## 2023-04-17 DIAGNOSIS — F33.2 MAJOR DEPRESSIVE DISORDER, RECURRENT SEVERE WITHOUT PSYCHOTIC FEATURES: ICD-10-CM

## 2023-04-17 PROCEDURE — 90832 PSYTX W PT 30 MINUTES: CPT | Mod: 95

## 2023-04-18 NOTE — PLAN
[Cognitive and/or Behavior Therapy] : Cognitive and/or Behavior Therapy  [Motivational Interviewing] : Motivational Interviewing  [Psychoeducation] : Psychoeducation  [Supportive Therapy] : Supportive Therapy [Recommended Frequency of Visits: ____] : Recommended frequency of visits: [unfilled] [Return in ____ week(s)] : Return in [unfilled] week(s) [FreeTextEntry2] : Goal#1: Alleviate symptoms of depression and anxiety as evidence by a reduction of the CUDOS psychological assessment scale rating from 57 to less than 20. \par Goal#1 Objective #1 Pt will explore and process feeling at every session, identifying at least 1 trigger of depression and anxiety, and review depression scale quarterly\par Goal#1 Objective #2 Patient will learn and practice 2 coping skills in between scheduled session and comply with medication management medication recommendation. [de-identified] : Therapist provided an individual psychotherapy session with patient. Therapist used cognitive behavior therapy (CBT) motivational Interviewing (MI) and supportive psychotherapy (SP) techniques. Patient was engaged in session. \par \par Patient processed feelings and events experienced since last session. Patient reports being anxious about travelling to [place] by plane. Patient discussed coping techniques used. She stated she discussed her feelings with her . Patient reported that she is compliance with medication however has not increased one of her meds as discussed with the psychiatrist.  Therapist help patient to identify coping regarding her plan trip.  Therapist provided educational resource for patient regarding being prepared to travel with children on a plane, encouraged patient to add breathing techniques to her daily exercise routine. Therapist discussed barriers preventing patient from following treatment plan and encouraged ongoing communication with the psychiatrist\par \par Patient is future oriented, continues to have protective factors, did not endorse suicide ideation or attempt. Therapist praised patient for being honest about her compliance and  encouraged continued growth. Patient requested additional support session on the day of her trip to [place]. \par  [FreeTextEntry1] : Patient agreed to use a practice breathing techniques.

## 2023-04-18 NOTE — REASON FOR VISIT
[Patient preference] : as per patient preference [Telehealth (audio & video) - Individual/Group] : This visit was provided via telehealth using real-time 2-way audio visual technology. [Medical Office: ([facility])___] : The provider was located at the medical office in [unfilled]. [Home] : The patient, [unfilled], was located at home, [unfilled], at the time of the visit. [Verbal consent obtained from patient/other participant(s)] : Verbal consent for telehealth/telephonic services obtained from patient/other participant(s) [Patient] : Patient [FreeTextEntry4] : 11:00 AM [FreeTextEntry5] : 11:30 PM [FreeTextEntry1] : Depression
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
## 2023-04-21 ENCOUNTER — OUTPATIENT (OUTPATIENT)
Dept: OUTPATIENT SERVICES | Facility: HOSPITAL | Age: 33
LOS: 1 days | End: 2023-04-21
Payer: MEDICAID

## 2023-04-21 ENCOUNTER — APPOINTMENT (OUTPATIENT)
Dept: PSYCHIATRY | Facility: CLINIC | Age: 33
End: 2023-04-21

## 2023-04-21 DIAGNOSIS — F41.1 GENERALIZED ANXIETY DISORDER: ICD-10-CM

## 2023-04-21 DIAGNOSIS — F41.9 ANXIETY DISORDER, UNSPECIFIED: ICD-10-CM

## 2023-04-21 DIAGNOSIS — F33.2 MAJOR DEPRESSIVE DISORDER, RECURRENT SEVERE WITHOUT PSYCHOTIC FEATURES: ICD-10-CM

## 2023-04-21 PROCEDURE — 90832 PSYTX W PT 30 MINUTES: CPT | Mod: 95

## 2023-04-21 NOTE — PLAN
[FreeTextEntry2] : Goal#1: Alleviate symptoms of depression and anxiety as evidence by a reduction of the CUDOS psychological assessment scale rating from 57 to less than 20. \par Goal#1 Objective #1 Pt will explore and process feeling at every session, identifying at least 1 trigger of depression and anxiety, and review depression scale quarterly\par Goal#1 Objective #2 Patient will learn and practice 2 coping skills in between scheduled session and comply with medication management medication recommendation. [Cognitive and/or Behavior Therapy] : Cognitive and/or Behavior Therapy  [Motivational Interviewing] : Motivational Interviewing  [Psychoeducation] : Psychoeducation  [Supportive Therapy] : Supportive Therapy [de-identified] : Therapist provided an individual psychotherapy session with patient. Therapist used cognitive behavior therapy (CBT) motivational Interviewing (MI) and supportive psychotherapy (SP) techniques. Patient was engaged in session. \par \par Patient processed feelings and events experienced since last session. Patient requested meeting because she was having anxiety because she is flying to [place].  Therapist helped patient explore coping techniques to use while travelling. Therapist helped patient practice breathing techniques and encouraged her to remain compliant with medication.  Patient was made aware that we do not service [place] and agreed to practice coping skills.  Therapist reinforced need to focus on treatment goals.\par \par Patient is future oriented, she will be spending time with her parents.  She is happy that her children will spend time with their cousins.  Patient plans on doing home schooling.  Patient has protective factors, did not endorse suicide ideation or attempt. Therapist praised patient for progress made towards goal and encouraged continued growth.\par  [Recommended Frequency of Visits: ____] : Recommended frequency of visits: [unfilled] [Return in ____ week(s)] : Return in [unfilled] week(s) [FreeTextEntry1] : Patient agreed to use a practice breathing techniques. Patient plans on returning by May 1, 2023, however she has not purchased a return ticket yet.

## 2023-04-21 NOTE — REASON FOR VISIT
[Patient preference] : as per patient preference [Telehealth (audio & video) - Individual/Group] : This visit was provided via telehealth using real-time 2-way audio visual technology. [Other Location: e.g. Home (Enter Location, City,State)___] : The provider was located at [unfilled]. [Home] : The patient, [unfilled], was located at home, [unfilled], at the time of the visit. [Verbal consent obtained from patient/other participant(s)] : Verbal consent for telehealth/telephonic services obtained from patient/other participant(s) [FreeTextEntry4] : 11:00 AM [FreeTextEntry5] : 11:30 PM [Patient] : Patient [FreeTextEntry1] : Depression

## 2023-05-01 ENCOUNTER — NON-APPOINTMENT (OUTPATIENT)
Age: 33
End: 2023-05-01

## 2023-05-01 ENCOUNTER — APPOINTMENT (OUTPATIENT)
Dept: PSYCHIATRY | Facility: CLINIC | Age: 33
End: 2023-05-01

## 2023-05-15 ENCOUNTER — OUTPATIENT (OUTPATIENT)
Dept: OUTPATIENT SERVICES | Facility: HOSPITAL | Age: 33
LOS: 1 days | End: 2023-05-15
Payer: MEDICAID

## 2023-05-15 ENCOUNTER — APPOINTMENT (OUTPATIENT)
Dept: PSYCHIATRY | Facility: CLINIC | Age: 33
End: 2023-05-15

## 2023-05-15 DIAGNOSIS — F41.1 GENERALIZED ANXIETY DISORDER: ICD-10-CM

## 2023-05-15 DIAGNOSIS — F41.9 ANXIETY DISORDER, UNSPECIFIED: ICD-10-CM

## 2023-05-15 DIAGNOSIS — F33.2 MAJOR DEPRESSIVE DISORDER, RECURRENT SEVERE WITHOUT PSYCHOTIC FEATURES: ICD-10-CM

## 2023-05-15 PROCEDURE — 90834 PSYTX W PT 45 MINUTES: CPT | Mod: 95

## 2023-05-17 ENCOUNTER — OUTPATIENT (OUTPATIENT)
Dept: OUTPATIENT SERVICES | Facility: HOSPITAL | Age: 33
LOS: 1 days | End: 2023-05-17
Payer: MEDICAID

## 2023-05-17 ENCOUNTER — APPOINTMENT (OUTPATIENT)
Dept: PSYCHIATRY | Facility: CLINIC | Age: 33
End: 2023-05-17
Payer: MEDICAID

## 2023-05-17 DIAGNOSIS — F33.1 MAJOR DEPRESSIVE DISORDER, RECURRENT, MODERATE: ICD-10-CM

## 2023-05-17 DIAGNOSIS — F41.1 GENERALIZED ANXIETY DISORDER: ICD-10-CM

## 2023-05-17 DIAGNOSIS — F51.05 INSOMNIA DUE TO OTHER MENTAL DISORDER: ICD-10-CM

## 2023-05-17 DIAGNOSIS — F33.2 MAJOR DEPRESSIVE DISORDER, RECURRENT SEVERE WITHOUT PSYCHOTIC FEATURES: ICD-10-CM

## 2023-05-17 PROCEDURE — 99214 OFFICE O/P EST MOD 30 MIN: CPT | Mod: 95

## 2023-05-17 NOTE — REASON FOR VISIT
[Patient preference] : as per patient preference [Telehealth (audio & video) - Individual/Group] : This visit was provided via telehealth using real-time 2-way audio visual technology. [Medical Office: ([facility])___] : The provider was located at the medical office in [unfilled]. [Home] : The patient, [unfilled], was located at home, [unfilled], at the time of the visit. [Verbal consent obtained from patient/other participant(s)] : Verbal consent for telehealth/telephonic services obtained from patient/other participant(s) [Patient] : Patient [FreeTextEntry4] : 11:30 AM [FreeTextEntry5] : 12:15 PM [FreeTextEntry1] : Depression

## 2023-05-17 NOTE — HISTORY OF PRESENT ILLNESS
[Not Applicable] : Not applicable [FreeTextEntry1] : Solo/tele session.\par  Referred to [facility]PD from the [facility] clinic Dr. Chiu.  \par Pt came  back from [place]. Spoke about her time there, about her relations with the siblings and her childhood trauma.\par Skill and supportive therapy were provided.\par Pt tolerates 150mg of zoloft well. However, I will not increase it because she takes prednisone and feels somewhat hyper at this time. She also has insomnia now.  She also says that klonopin works and asks to continue to prescribe it. \par Pt is awaiting her hearing aid. Says that she can't hear without it. Spoke about her medical problems and living situation. I stopped trazodone because it didn't work at all.\par The risks and benefits of trazodone were d/w pt. She will try it. \yamila Denies SI/HI/AH/PI.\par \par   [FreeTextEntry2] : Never had a psychiatrist, no therapy.   [FreeTextEntry3] : Dr Aram MD is her provider and 7 days ago started her on Zoloft 25 mg.

## 2023-05-17 NOTE — DISCUSSION/SUMMARY
[Date of Last Physical Exam: _____] : Date of Last Physical Exam: [unfilled] [Date of Last Annual Labs: _____] : Date of Last Annual Labs: [unfilled] [Annual Review of Systems Completed?] : Annual Review of Systems Completed: Yes [Date of Last HbgA1c: _____] : Date of Last HbgA1c: [unfilled] [Date of Last Lipid Profile: _____] : Date of Last Lipid Profile: [unfilled] [Potential impact of patient’s physical health conditions on psychiatric care?] : Potential impact of patient’s physical health conditions on psychiatric care: Yes [FreeTextEntry1] : pt has Crohn's and UC. She sees specialists for her medical problems

## 2023-05-17 NOTE — FAMILY HISTORY
[FreeTextEntry1] : Family composition:Sara is 33 y/o F,  for 13 years, has 3 children 10 y/o F, 7 y/o M, and 5 y/o M. Domiciled together in a house renting a room from her mother-in-law. \par Family history and background: [place]. \par Family relationship: good.\par Pertinent Family Medical, MH and Substance Use History including Adult Child of Alcoholic and child of substance abuse status; history of cancer and heart disease:\par Father: Headaches, heart disease, lung disease, diabetes.\par Mother:Anemia, Asthma, high blood pressure, cancer, r/o BPD\par Grandmother:Anemia, diabetes, Thyroid\par Grandfather: Thyroid, Diabetes,\par Sister: Menstrual Pain.\par \par Cultural Assessment:\par Race/ethnicity: Greenlandic\par Sexual identity: Female\par Spirituality/Faith:no\par \par

## 2023-05-17 NOTE — REASON FOR VISIT
[Number can be texted] : number can be texted [OK  to leave message] : OK  to leave message [Primary Care] : Primary Care [[facility] Provider/Facility] : [facility] Provider/Facility [Patient] : Patient [Prior Medical Records] : Prior Medical Records [FreeTextEntry3] : [email or web address] [FreeTextEntry5] : English [FreeTextEntry6] : Sara [FreeTextEntry2] : Anxiety/Depression [FreeTextEntry1] : Anxiety/Depression due to medical complications.
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
## 2023-05-17 NOTE — SOCIAL HISTORY
[FreeTextEntry1] : Employment history:Unemployed last 7 years, worked for [employer] in the past. \par Developmental history: Speech delay, in Special Ed. \par Social supports (friends, Volunteers, club, AA meeting, other meetings ) ?: no\par Meaningful Activities: Home schools her 3 kids, takes them to park, museums. \par \par \par \par  \par \par \par

## 2023-05-22 ENCOUNTER — APPOINTMENT (OUTPATIENT)
Dept: PSYCHIATRY | Facility: CLINIC | Age: 33
End: 2023-05-22

## 2023-05-25 ENCOUNTER — APPOINTMENT (OUTPATIENT)
Dept: PSYCHIATRY | Facility: CLINIC | Age: 33
End: 2023-05-25

## 2023-05-25 ENCOUNTER — OUTPATIENT (OUTPATIENT)
Dept: OUTPATIENT SERVICES | Facility: HOSPITAL | Age: 33
LOS: 1 days | End: 2023-05-25
Payer: MEDICAID

## 2023-05-25 DIAGNOSIS — F41.0 PANIC DISORDER [EPISODIC PAROXYSMAL ANXIETY]: ICD-10-CM

## 2023-05-25 DIAGNOSIS — F41.1 GENERALIZED ANXIETY DISORDER: ICD-10-CM

## 2023-05-25 DIAGNOSIS — F41.9 ANXIETY DISORDER, UNSPECIFIED: ICD-10-CM

## 2023-05-25 DIAGNOSIS — F32.2 MAJOR DEPRESSIVE DISORDER, SINGLE EPISODE, SEVERE WITHOUT PSYCHOTIC FEATURES: ICD-10-CM

## 2023-05-25 PROCEDURE — 90832 PSYTX W PT 30 MINUTES: CPT | Mod: 95

## 2023-05-26 DIAGNOSIS — F41.0 PANIC DISORDER [EPISODIC PAROXYSMAL ANXIETY]: ICD-10-CM

## 2023-05-26 DIAGNOSIS — F32.2 MAJOR DEPRESSIVE DISORDER, SINGLE EPISODE, SEVERE WITHOUT PSYCHOTIC FEATURES: ICD-10-CM

## 2023-05-26 DIAGNOSIS — F41.9 ANXIETY DISORDER, UNSPECIFIED: ICD-10-CM

## 2023-05-26 NOTE — PLAN
[Cognitive and/or Behavior Therapy] : Cognitive and/or Behavior Therapy  [Motivational Interviewing] : Motivational Interviewing  [Psychoeducation] : Psychoeducation  [Supportive Therapy] : Supportive Therapy [Recommended Frequency of Visits: ____] : Recommended frequency of visits: [unfilled] [Return in ____ week(s)] : Return in [unfilled] week(s) [FreeTextEntry2] : Goal#1: Alleviate symptoms of depression and anxiety as evidence by a reduction of the CUDOS psychological assessment scale rating from 57 to less than 20. \par Goal#1 Objective #1 Pt will explore and process feeling at every session, identifying at least 1 trigger of depression and anxiety, and review depression scale quarterly\par Goal#1 Objective #2 Patient will learn and practice 2 coping skills in between scheduled session and comply with medication management medication recommendation. [de-identified] : Therapist provided an individual psychotherapy session with patient. Therapist used cognitive behavior therapy (CBT) motivational Interviewing (MI) and supportive psychotherapy (SP) techniques. Patient was engaged in session. \par \par Patient processed feelings and events experienced since last session. Patient is currently staying with her mother at [address] [facility] in [place].  She is still residing in [place].  Patient explained that she is staying with her mother to decrease her anxiety.  Patient also discussed coping techniques used. Patient reported that she continues to try to be compliant with medication but she is having trouble remembering.  Patient has not purchased pill organizer as discussed in the last session because she forgot. She also is not taking some of her meds at the present time because they were sent to [place]. Therapist helped patient explore her options.  Patient documented, on a yellow sticky note, her plans to purchase the pill organizer on [employer] and her plans to reach out to pharmacy to ask that her prescriptions be sent to a [place] pharmacy .  Therapist reinforced need to focus on treatment goals.\par \par Patient is future oriented, continues to have protective factors, did not endorse suicide ideation or attempt. Therapist praised patient for progress made towards goal and encouraged continued growth.\par \par Therapist continued to help patient process the transition. [FreeTextEntry1] : Patient agreed to use a practice breathing techniques.

## 2023-05-26 NOTE — REASON FOR VISIT
[Patient preference] : as per patient preference [Telehealth (audio & video) - Individual/Group] : This visit was provided via telehealth using real-time 2-way audio visual technology. [Medical Office: ([facility])___] : The provider was located at the medical office in [unfilled]. [Home] : The patient, [unfilled], was located at home, [unfilled], at the time of the visit. [Verbal consent obtained from patient/other participant(s)] : Verbal consent for telehealth/telephonic services obtained from patient/other participant(s) [Patient] : Patient [FreeTextEntry4] : 9:15  AM [FreeTextEntry5] : 9:45  PM [FreeTextEntry1] : Depression

## 2023-05-31 ENCOUNTER — OUTPATIENT (OUTPATIENT)
Dept: OUTPATIENT SERVICES | Facility: HOSPITAL | Age: 33
LOS: 1 days | End: 2023-05-31
Payer: MEDICAID

## 2023-05-31 ENCOUNTER — APPOINTMENT (OUTPATIENT)
Dept: PSYCHIATRY | Facility: CLINIC | Age: 33
End: 2023-05-31

## 2023-05-31 DIAGNOSIS — F32.2 MAJOR DEPRESSIVE DISORDER, SINGLE EPISODE, SEVERE WITHOUT PSYCHOTIC FEATURES: ICD-10-CM

## 2023-05-31 DIAGNOSIS — F41.1 GENERALIZED ANXIETY DISORDER: ICD-10-CM

## 2023-05-31 PROCEDURE — 90832 PSYTX W PT 30 MINUTES: CPT | Mod: 95

## 2023-06-01 DIAGNOSIS — F41.1 GENERALIZED ANXIETY DISORDER: ICD-10-CM

## 2023-06-01 DIAGNOSIS — F32.2 MAJOR DEPRESSIVE DISORDER, SINGLE EPISODE, SEVERE WITHOUT PSYCHOTIC FEATURES: ICD-10-CM

## 2023-06-06 NOTE — PLAN
[Cognitive and/or Behavior Therapy] : Cognitive and/or Behavior Therapy  [Motivational Interviewing] : Motivational Interviewing  [Psychoeducation] : Psychoeducation  [Supportive Therapy] : Supportive Therapy [Recommended Frequency of Visits: ____] : Recommended frequency of visits: [unfilled] [Return in ____ week(s)] : Return in [unfilled] week(s) [FreeTextEntry2] : Goal#1: Alleviate symptoms of depression and anxiety as evidence by a reduction of the CUDOS psychological assessment scale rating from 57 to less than 20. \par Goal#1 Objective #1 Pt will explore and process feeling at every session, identifying at least 1 trigger of depression and anxiety, and review depression scale quarterly\par Goal#1 Objective #2 Patient will learn and practice 2 coping skills in between scheduled session and comply with medication management medication recommendation. [de-identified] : Therapist provided an individual psychotherapy session with patient. Therapist used cognitive behavior therapy (CBT) motivational Interviewing (MI) and supportive psychotherapy (SP) techniques. Patient was engaged in session. \par \par Patient processed feelings and events experienced since last session. Patient explained that she had an anxiety attack as she was trying to complete her snap and her [employer] S[employer] form.  She was upset because she could not access the forms due to password and email issues. Patient discussed coping techniques used. She stated that she reached out to her  who promised to assist her and she was able to calm down.  Patient reported that she is still taking her medications, however, she was without until recently because it was never picked up from the [facility].  Patient reported that she did get a smaller version of a pill organizer, but is still not able to manage her meds.  Therapist discussed the importance of prioritizing medication managements and being compliant with the meds the way the doctor prescribes them. Patient agreed to keep trying to get organized with her meds.   Therapist reinforced need to focus on treatment goals.\par \par Patient is future oriented, continues to have protective factors, did not endorse suicide ideation or attempt. Therapist praised patient for progress made towards goal and encouraged continued growth.\par \par Therapist reminded patient that this would be the final session.  Patient is looking forward to the new therapist. [FreeTextEntry1] : Patient will practice self care.

## 2023-06-06 NOTE — REASON FOR VISIT
[Patient preference] : as per patient preference [Telehealth (audio & video) - Individual/Group] : This visit was provided via telehealth using real-time 2-way audio visual technology. [Medical Office: ([facility])___] : The provider was located at the medical office in [unfilled]. [Home] : The patient, [unfilled], was located at home, [unfilled], at the time of the visit. [Verbal consent obtained from patient/other participant(s)] : Verbal consent for telehealth/telephonic services obtained from patient/other participant(s) [Patient] : Patient [FreeTextEntry4] : 11:00  AM [FreeTextEntry5] : 11:30  PM [FreeTextEntry1] : Depression

## 2023-06-07 ENCOUNTER — APPOINTMENT (OUTPATIENT)
Dept: GASTROENTEROLOGY | Facility: CLINIC | Age: 33
End: 2023-06-07

## 2023-06-07 ENCOUNTER — OUTPATIENT (OUTPATIENT)
Dept: OUTPATIENT SERVICES | Facility: HOSPITAL | Age: 33
LOS: 1 days | End: 2023-06-07
Payer: MEDICAID

## 2023-06-07 ENCOUNTER — APPOINTMENT (OUTPATIENT)
Dept: PSYCHIATRY | Facility: CLINIC | Age: 33
End: 2023-06-07

## 2023-06-07 ENCOUNTER — APPOINTMENT (OUTPATIENT)
Dept: SPEECH THERAPY | Facility: CLINIC | Age: 33
End: 2023-06-07

## 2023-06-07 DIAGNOSIS — F41.1 GENERALIZED ANXIETY DISORDER: ICD-10-CM

## 2023-06-07 DIAGNOSIS — F33.2 MAJOR DEPRESSIVE DISORDER, RECURRENT SEVERE WITHOUT PSYCHOTIC FEATURES: ICD-10-CM

## 2023-06-07 PROCEDURE — 90832 PSYTX W PT 30 MINUTES: CPT | Mod: 95

## 2023-06-07 NOTE — PLAN
[FreeTextEntry2] : Objective A: Pt will explore and process feeling at every session, identifying at least 1 trigger of depression and anxiety, and review depression screenings and scales quarterly \par Objective Target Date: 4/10/24 \par Status of Objective: Initial New objective to help patient become aware of feelings and condition. \par Objective B: Patient will learn and practice 2 coping skills in between scheduled session and comply with medication management medication recommendation. \par Objective Target Date: 4/10/24 \par Status of Objective: Initial New objective to help patient learn and use coping skill. \par Intervention(s): Therapist will help patient explore coping techniques, such as communication strategies, relaxation techniques. Therapist will help patient to expose and extinguish irrational beliefs that contribute to anxiety and depression. Therapist will assist patient in developing reality based, positive cognitive messages that will increase self-confidence and thereby decrease anxiety and depressive symptoms.\par \par Medication Management: every 1 months. \par Individual Therapy: every 2 weeks. \par  [Supportive Therapy] : Supportive Therapy [Other: ____] : [unfilled] [de-identified] : Patient was seen for individual therapy via telehealth video for first meeting after transfer. \par Rapport established with patient who reports experiencing anxiety and depression. \par Shared difficult living situation as she lived with her mother in law, however when patient's father in law passed away, she started renting rooms in her house and patient and family only occupy 1 room of the house. Since her mother is in [place] for a few weeks she has been able to stay this time in her mother's\par  house and get some relief and peace .Reports that her  works and "is trying to find a place".  She has filled out applications for housing however "nothing has opened up."\par Medically, she reports having ulcerative colitis and Crohns which requires constant Prednisone.  She explains that side effects are unbearable at times. she discussed using a pill organizer  with previous therapist but it has not worked out as she still does not remember.  Will continue to assist patient with medication compliance. \par Support provided. Contact information provided.   [Return in ____ week(s)] : Return in [unfilled] week(s) [FreeTextEntry1] : Reports that she would like to meet weekly
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
## 2023-06-07 NOTE — REASON FOR VISIT
[Patient preference] : as per patient preference [Access issues (e.g., transportation, impaired mobility, etc.)] : due to patient's access issues [Telehealth (audio & video) - Individual/Group] : This visit was provided via telehealth using real-time 2-way audio visual technology. [Other Location: e.g. Home (Enter Location, City,State)___] : The provider was located at [unfilled]. [Home] : The patient, [unfilled], was located at home, [unfilled], at the time of the visit. [Verbal consent obtained from patient/other participant(s)] : Verbal consent for telehealth/telephonic services obtained from patient/other participant(s) [FreeTextEntry4] : 1036cg [FreeTextEntry5] : 11am [Patient] : Patient [FreeTextEntry1] : Psychotherapy follow up - First session with new therapist

## 2023-06-08 DIAGNOSIS — F41.1 GENERALIZED ANXIETY DISORDER: ICD-10-CM

## 2023-06-08 DIAGNOSIS — F33.2 MAJOR DEPRESSIVE DISORDER, RECURRENT SEVERE WITHOUT PSYCHOTIC FEATURES: ICD-10-CM

## 2023-06-14 ENCOUNTER — OUTPATIENT (OUTPATIENT)
Dept: OUTPATIENT SERVICES | Facility: HOSPITAL | Age: 33
LOS: 1 days | End: 2023-06-14
Payer: MEDICAID

## 2023-06-14 ENCOUNTER — APPOINTMENT (OUTPATIENT)
Dept: PSYCHIATRY | Facility: CLINIC | Age: 33
End: 2023-06-14
Payer: MEDICAID

## 2023-06-14 DIAGNOSIS — F33.2 MAJOR DEPRESSIVE DISORDER, RECURRENT SEVERE WITHOUT PSYCHOTIC FEATURES: ICD-10-CM

## 2023-06-14 DIAGNOSIS — F41.1 GENERALIZED ANXIETY DISORDER: ICD-10-CM

## 2023-06-14 DIAGNOSIS — F51.05 INSOMNIA DUE TO OTHER MENTAL DISORDER: ICD-10-CM

## 2023-06-14 DIAGNOSIS — F33.1 MAJOR DEPRESSIVE DISORDER, RECURRENT, MODERATE: ICD-10-CM

## 2023-06-14 PROCEDURE — 99214 OFFICE O/P EST MOD 30 MIN: CPT | Mod: 95

## 2023-06-14 PROCEDURE — 90832 PSYTX W PT 30 MINUTES: CPT | Mod: 95

## 2023-06-14 PROCEDURE — 99214 OFFICE O/P EST MOD 30 MIN: CPT | Mod: 25,95

## 2023-06-14 NOTE — PHYSICAL EXAM
[Disheveled] : disheveled [Cooperative] : cooperative [Depressed] : depressed [Anxious] : anxious [Constricted] : constricted [Clear] : clear [Linear/Goal Directed] : linear/goal directed [Depressive] : depressive [None Reported] : none reported [Memory] : memory [Average] : average [WNL] : within normal limits [FreeTextEntry8] : tearful as she explained her living situation and trauma [de-identified] : anxiety, fear brought on by negative experiences and trauma

## 2023-06-14 NOTE — REASON FOR VISIT
[Patient preference] : as per patient preference [Access issues (e.g., transportation, impaired mobility, etc.)] : due to patient's access issues [Telehealth (audio & video) - Individual/Group] : This visit was provided via telehealth using real-time 2-way audio visual technology. [Other Location: e.g. Home (Enter Location, City,State)___] : The provider was located at [unfilled]. [Home] : The patient, [unfilled], was located at home, [unfilled], at the time of the visit. [Verbal consent obtained from patient/other participant(s)] : Verbal consent for telehealth/telephonic services obtained from patient/other participant(s) [FreeTextEntry4] : 10:19am [FreeTextEntry5] : 1050am [Patient] : Patient [FreeTextEntry1] : Psychotherapy follow up

## 2023-06-14 NOTE — SOCIAL HISTORY
[FreeTextEntry1] : Employment history:Unemployed last 7 years, worked for [employer] in the past. \par Developmental history: Speech delay, in Special Ed. \par Social supports (friends, Volunteers, club, AA meeting, other meetings ) ?: no\par Meaningful Activities: Home schools her 3 kids, takes them to park, museums. \par \par \par \par  \par \par \par

## 2023-06-14 NOTE — FAMILY HISTORY
[FreeTextEntry1] : Family composition:Sara is 33 y/o F,  for 13 years, has 3 children 10 y/o F, 7 y/o M, and 5 y/o M. Domiciled together in a house renting a room from her mother-in-law. \par Family history and background: Born [place] raised [place]. \par Family relationship: good.\par Pertinent Family Medical, MH and Substance Use History including Adult Child of Alcoholic and child of substance abuse status; history of cancer and heart disease:\par Father: Headaches, heart disease, lung disease, diabetes.\par Mother:Anemia, Asthma, high blood pressure, cancer, r/o BPD\par Grandmother:Anemia, diabetes, Thyroid\par Grandfather: Thyroid, Diabetes,\par Sister: Menstrual Pain.\par \par Cultural Assessment:\par Race/ethnicity: [place]\par Sexual identity: Female\par Spirituality/Worship:no\par \par
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
## 2023-06-14 NOTE — REASON FOR VISIT
[Number can be texted] : number can be texted [OK  to leave message] : OK  to leave message [Primary Care] : Primary Care [[facility] Provider/Facility] : [facility] Provider/Facility [Patient] : Patient [Prior Medical Records] : Prior Medical Records [FreeTextEntry3] : [email or web address] [FreeTextEntry5] : English [FreeTextEntry6] : Sara [FreeTextEntry2] : Anxiety/Depression [FreeTextEntry1] : Anxiety/Depression due to medical complications.
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
## 2023-06-14 NOTE — PHYSICAL EXAM
[Slowed] : slowed [Cooperative] : cooperative [Depressed] : depressed [Anxious] : anxious [Constricted] : constricted [Clear] : clear [Linear/Goal Directed] : linear/goal directed [None] : none [Depressive] : depressive [Phobic] : phobic [None Reported] : none reported [Average] : average [WNL] : within normal limits [Mild] : mild [0] : 0 - Absent [1] : 1 - Mild [3] : 3 - Expresses feelings of discouragement, despair, pessimism about future, which can be dispelled [2] : 2 - Spontaneously indicates feelings of worthlessness [FreeTextEntry1] : Looks sad

## 2023-06-14 NOTE — HISTORY OF PRESENT ILLNESS
[Not Applicable] : Not applicable [FreeTextEntry2] : Never had a psychiatrist, no therapy.   [FreeTextEntry1] : Solo/tele session. Had a lot of technical problems.\par  Spoke about her mom with [place], about her relations with the siblings and her childhood trauma.\par Skill and supportive therapy were provided.\par Pt feels very depressed. We completed Jackson scale and pt scored high. Depression is 9/10.\par Pt tolerates 150mg of zoloft well. She agreed to increase it to 200mg. We will not add other meds this time. I will see her again in 2 weeks and, if she doesn't improve at all, we will consider adding abilify or wellburtin. \par  She also says that klonopin works and asks to continue to prescribe it. C/o feeling very apathetic and exhausted, with severe anhedonia.\par Pt is awaiting her hearing aid. Says that she can't hear without it. Spoke about her medical problems and living situation. I stopped trazodone because it didn't work at all.\par I will not start remeron this time, because pt is gaining weight and was told by her PCP that she needs to lose (135/5.1)\par Denies SI/HI/AH/PI. [FreeTextEntry3] : Dr Aram MD is her provider and 7 days ago started her on Zoloft 25 mg.

## 2023-06-22 ENCOUNTER — APPOINTMENT (OUTPATIENT)
Dept: PSYCHIATRY | Facility: CLINIC | Age: 33
End: 2023-06-22

## 2023-06-22 ENCOUNTER — OUTPATIENT (OUTPATIENT)
Dept: OUTPATIENT SERVICES | Facility: HOSPITAL | Age: 33
LOS: 1 days | End: 2023-06-22
Payer: MEDICAID

## 2023-06-22 DIAGNOSIS — F33.2 MAJOR DEPRESSIVE DISORDER, RECURRENT SEVERE WITHOUT PSYCHOTIC FEATURES: ICD-10-CM

## 2023-06-22 PROCEDURE — 90834 PSYTX W PT 45 MINUTES: CPT | Mod: 95

## 2023-06-23 DIAGNOSIS — F33.2 MAJOR DEPRESSIVE DISORDER, RECURRENT SEVERE WITHOUT PSYCHOTIC FEATURES: ICD-10-CM

## 2023-06-27 NOTE — PRE-ANESTHESIA EVALUATION PEDIATRIC - NSATTENDATTESTRD_GEN_ALL_CORE
No-Patient/Caregiver offered and refused free interpretation services.
The patient has been re-examined and I agree with the above assessment or I updated with my findings.

## 2023-06-28 ENCOUNTER — OUTPATIENT (OUTPATIENT)
Dept: OUTPATIENT SERVICES | Facility: HOSPITAL | Age: 33
LOS: 1 days | End: 2023-06-28
Payer: MEDICAID

## 2023-06-28 ENCOUNTER — APPOINTMENT (OUTPATIENT)
Dept: PSYCHIATRY | Facility: CLINIC | Age: 33
End: 2023-06-28
Payer: MEDICAID

## 2023-06-28 DIAGNOSIS — F33.1 MAJOR DEPRESSIVE DISORDER, RECURRENT, MODERATE: ICD-10-CM

## 2023-06-28 DIAGNOSIS — F33.2 MAJOR DEPRESSIVE DISORDER, RECURRENT SEVERE WITHOUT PSYCHOTIC FEATURES: ICD-10-CM

## 2023-06-28 PROCEDURE — 99214 OFFICE O/P EST MOD 30 MIN: CPT | Mod: 95

## 2023-06-28 NOTE — DISCUSSION/SUMMARY
[Date of Last Physical Exam: _____] : Date of Last Physical Exam: [unfilled] [FreeTextEntry1] : pt is n/c with labs and medical F/U. Compliance was encouraged. Pt expressed understanding

## 2023-06-28 NOTE — REASON FOR VISIT
[Number can be texted] : number can be texted [OK  to leave message] : OK  to leave message [Primary Care] : Primary Care [[facility] Provider/Facility] : [facility] Provider/Facility [Patient] : Patient [Prior Medical Records] : Prior Medical Records [FreeTextEntry3] : [email or web address] [FreeTextEntry5] : English [FreeTextEntry6] : Sara [FreeTextEntry2] : Anxiety/Depression [FreeTextEntry1] : Anxiety/Depression due to medical complications.

## 2023-06-28 NOTE — PHYSICAL EXAM
[Disheveled] : disheveled [Cooperative] : cooperative [Depressed] : depressed [Anxious] : anxious [Constricted] : constricted [Clear] : clear [Linear/Goal Directed] : linear/goal directed [Depressive] : depressive [None Reported] : none reported [Memory] : memory [Average] : average [WNL] : within normal limits [FreeTextEntry8] : mood lifted as she is relieved about moving to [place] [de-identified] : anxiety, fear brought on by negative experiences and trauma

## 2023-06-28 NOTE — PHYSICAL EXAM
[Slowed] : slowed [Cooperative] : cooperative [Depressed] : depressed [Anxious] : anxious [Constricted] : constricted [Clear] : clear [Linear/Goal Directed] : linear/goal directed [None] : none [Depressive] : depressive [Phobic] : phobic [None Reported] : none reported [Average] : average [WNL] : within normal limits [Mild] : mild [0] : 0 - Absent [1] : 1 - Mild [3] : 3 - Expresses feelings of discouragement, despair, pessimism about future, which can be dispelled [2] : 2 - Spontaneously indicates feelings of worthlessness [FreeTextEntry8] : depression is better. anxiety is bad
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
## 2023-06-28 NOTE — FAMILY HISTORY
[FreeTextEntry1] : Family composition:Sara is 31 y/o F,  for 13 years, has 3 children 10 y/o F, 9 y/o M, and 7 y/o M. Domiciled together in a house renting a room from her mother-in-law. \par Family history and background: [place] raised [place]. \par Family relationship: good.\par Pertinent Family Medical, MH and Substance Use History including Adult Child of Alcoholic and child of substance abuse status; history of cancer and heart disease:\par Father: Headaches, heart disease, lung disease, diabetes.\par Mother:Anemia, Asthma, high blood pressure, cancer, r/o BPD\par Grandmother:Anemia, diabetes, Thyroid\par Grandfather: Thyroid, Diabetes,\par Sister: Menstrual Pain.\par \par Cultural Assessment:\par Race/ethnicity: Cambodian\par Sexual identity: Female\par Spirituality/Jain:no\par \par

## 2023-06-28 NOTE — HISTORY OF PRESENT ILLNESS
[Not Applicable] : Not applicable [FreeTextEntry1] : Solo/tele session. \par Pt had severe HA and she stopped all her meds, including our meds, for 1 week on her own, without notifying me. She resumed her meds 2 days ago . Pt says that her HA is not related to the meds (it didn't become better after she stopped meds). She felt much worse without the meds (so, she knows now  that they work). Her anxiety, however, got worse after that. Compliance was encouraged. Education and supportive therapy were provided. We will not start wellbutrin or abilify today.\par Pt stays with her family in her mom's apt, while the mother is in [place]. She is coming back in August and then will be leaving again for [place]. Pt says that her parents try to buy a house in [place] and the pt will be moving with them. I told pt to start looking for a psychiatrist and a therapist in [place] now, because her case will be  closed in this event.\par  Spoke about her mom with [place], about her relations with the siblings and her childhood trauma.\par We will not add other meds this time. I will see her again in 2 weeks and, if she doesn't improve at all, we will consider adding abilify or wellburtin. \par  She also says that klonopin works and asks to continue to prescribe it. \par Denies SI/HI/AH/PI.\par \par I will not start remeron this time, because pt is gaining weight and was told by her PCP that she needs to lose (135/5.1)\par Denies SI/HI/AH/PI. [FreeTextEntry2] : Never had a psychiatrist, no therapy.   [FreeTextEntry3] : Dr Aram MD is her provider and 7 days ago started her on Zoloft 25 mg.

## 2023-06-28 NOTE — SOCIAL HISTORY
[FreeTextEntry1] : Employment history:Unemployed last 7 years, worked for [employer] in the past. \par Developmental history: Speech delay, in Special Ed. \par Social supports (friends, Volunteers, club, AA meeting, other meetings ) ?: no\par Meaningful Activities: Home schools her 3 kids, takes them to park, museums. \par \par \par \par  \par \par \par

## 2023-06-28 NOTE — REASON FOR VISIT
[Patient preference] : as per patient preference [Continuing, patient seen in-person within last 12 months] : Telehealth services are continuing as patient has been seen in-person within last 12 months. [Telehealth (audio & video) - Individual/Group] : This visit was provided via telehealth using real-time 2-way audio visual technology. [Medical Office: ([facility])___] : The provider was located at the medical office in [unfilled]. [Home] : The patient, [unfilled], was located at home, [unfilled], at the time of the visit. [Verbal consent obtained from patient/other participant(s)] : Verbal consent for telehealth/telephonic services obtained from patient/other participant(s) [Patient] : Patient [FreeTextEntry4] : 930am [FreeTextEntry5] : 10:10am [FreeTextEntry1] : Psychotherapy follow up

## 2023-06-29 DIAGNOSIS — F33.2 MAJOR DEPRESSIVE DISORDER, RECURRENT SEVERE WITHOUT PSYCHOTIC FEATURES: ICD-10-CM

## 2023-07-05 ENCOUNTER — APPOINTMENT (OUTPATIENT)
Dept: PSYCHIATRY | Facility: CLINIC | Age: 33
End: 2023-07-05

## 2023-07-12 ENCOUNTER — APPOINTMENT (OUTPATIENT)
Dept: PSYCHIATRY | Facility: CLINIC | Age: 33
End: 2023-07-12
Payer: MEDICAID

## 2023-07-12 ENCOUNTER — OUTPATIENT (OUTPATIENT)
Dept: OUTPATIENT SERVICES | Facility: HOSPITAL | Age: 33
LOS: 1 days | End: 2023-07-12
Payer: MEDICAID

## 2023-07-12 ENCOUNTER — APPOINTMENT (OUTPATIENT)
Dept: PSYCHIATRY | Facility: CLINIC | Age: 33
End: 2023-07-12

## 2023-07-12 DIAGNOSIS — F33.1 MAJOR DEPRESSIVE DISORDER, RECURRENT, MODERATE: ICD-10-CM

## 2023-07-12 PROCEDURE — 99214 OFFICE O/P EST MOD 30 MIN: CPT | Mod: 95

## 2023-07-12 NOTE — DISCUSSION/SUMMARY
[FreeTextEntry1] : Solo/tele session. We had a lot of technical problems.\par Pt is very upset, since she got a letter from [place] that she needs to come in person for her appt in order to have her food stamps continued. Pt says that she can't travel and feels very overwhelmed. Pt was comforted. She requested a letter from us for her [place] office.\par Pt reports being c/w meds and denies side effects. she doesn't want to add any more meds to her regimen and she doesn't need benzos this time.\par Pt stays with her family in her mom's apt, while the mother is in [place]. She is coming back in August and then will be leaving again for [place]. Pt says that her parents try to buy a house in [place] and the pt will be moving with them. I told pt to start looking for a psychiatrist and a therapist in [place] now, because her case will be  closed in this event.\par  Spoke about her mom with [place], about her relations with the siblings and her childhood trauma.\par We will not add other meds this time. I will see her again in 2 weeks and, if she doesn't improve at all, we will consider adding abilify or wellburtin. \par Denies SI/HI/AH/PI.\par I will not start remeron this time, because pt is gaining weight and was told by her PCP that she needs to lose (135/5.1)\par Denies SI/HI/AH/PI.\par \par Pt s 34 y/o F,  for 13 years, has 3 children 10 y/o F, 7 y/o M, and 5 y/o M. Domiciled all 5 people together in 1 room in a house renting  from her mother-in-law. Mother-in-law rents out rooms to strangers “she takes in immigrants”.( Is in the process of getting her own place through housing [employer] but isn't sure when they will get approved.  works full time. No ACS involvement),   home schooling her 3 children in the last month, due to fear of them getting killed, she stated they went to [employer] and recently “so many shootouts in the parking lot” decided to home school her kids,  currently unemployed( due to Crohn's disease), with a complicated PMH(diagnosed with ulcerative colitis 7 years ago, and last year diagnosed with Crohn's, she had an abscess and had to have 2 surgeries), DVT (on eliquis), on prednisone for many years, no prior IPP, no SA, no DIPTI, never on psych meds or in psych care, until very recently, when dr. Chiu prescribed her 25 mg of zoloft \par Never had a psychiatrist, no therapy. (though was in counseling in school).  \par Pt feels very depressed (10/10), with anhedonia, severe insomnia (for years sleeps only 2-3 h a day and then has severe day time sleepiness and inability to concentrate). Pt feels exhausted, but manages to take care of her family.\par Denies h/o franck (though she has family h/o BPD and also she reports some vague symptoms of hypomania, though they are not clear cut). Pt says that she feels very depressed and anxious for at least 4 y, but in the past 2-3 m, her depression and anxiety became worse due to a very poor social situation and insomnia.\par \par \par 1. Next appt in 1M tele\par 2. Does she improve on 200mg of zoloft. If not, we cant add abilify or wellbutrin (was d/w pt). Pt stopped all her meds for 1 week and that's why I am not starting anything new this time.\par 3. I sent klonopin on 6/14 and didn't send this time. Does she need it to be sent\par 4. Labs results (I gave pt my fax) (she is supposed to see her PCP in the end of june)\par 5. Trazodone doesn't work. I didn't start remeron because pt is 135/5.1 (she wants to lose weight)\par 6. Pt stays at her mom's apt (mom is in [place] and comes back in August, to leave again for [place] in 1 week. Pt will go with her and, possibly, will relocate to [place], if parents buy a house up there. I told her that we will close her case in this event\par 7. Did Mattie give her a letter for food stamps (pt can't travel). I emailed Mattie\par \par     \par

## 2023-07-12 NOTE — SOCIAL HISTORY
[FreeTextEntry1] : Employment history:Unemployed last 7 years, worked for [employer] in the past. \par Developmental history: Speech delay, in Special Ed. \par Social supports (friends, Volunteers, club, AA meeting, other meetings ) ?: no\par Meaningful Activities: Home schools her 3 kids, takes them to park, museums. \par \par \par \par  \par \par \par

## 2023-07-12 NOTE — PHYSICAL EXAM
[Slowed] : slowed [Cooperative] : cooperative [Depressed] : depressed [Anxious] : anxious [Constricted] : constricted [Clear] : clear [Linear/Goal Directed] : linear/goal directed [None] : none [Depressive] : depressive [Phobic] : phobic [None Reported] : none reported [Average] : average [WNL] : within normal limits [Mild] : mild [FreeTextEntry8] : depression is better. anxiety is bad [0] : 0 - Absent [1] : 1 - Mild [3] : 3 - Expresses feelings of discouragement, despair, pessimism about future, which can be dispelled [2] : 2 - Spontaneously indicates feelings of worthlessness

## 2023-07-12 NOTE — REASON FOR VISIT
[Number can be texted] : number can be texted [OK  to leave message] : OK  to leave message [FreeTextEntry3] : [email or web address] [FreeTextEntry5] : English [FreeTextEntry6] : Sara [Primary Care] : Primary Care [[facility] Provider/Facility] : [facility] Provider/Facility [Patient] : Patient [Prior Medical Records] : Prior Medical Records [FreeTextEntry2] : Anxiety/Depression [FreeTextEntry1] : Anxiety/Depression due to medical complications.

## 2023-07-12 NOTE — HISTORY OF PRESENT ILLNESS
[Not Applicable] : Not applicable [FreeTextEntry1] : Solo/tele session. We had a lot of technical problems.\par Pt is very upset, since she got a letter from [place] that she needs to come in person for her appt in order to have her food stamps continued. Pt says that she can't travel and feels very overwhelmed. Pt was comforted. She requested a letter from us for her [place] office.\par Pt reports being c/w meds and denies side effects. she doesn't want to add any more meds to her regimen and she doesn't need benzos this time.\par Pt stays with her family in her mom's apt, while the mother is in [place]. She is coming back in August and then will be leaving again for [place]. Pt says that her parents try to buy a house in [place] and the pt will be moving with them. I told pt to start looking for a psychiatrist and a therapist in [place] now, because her case will be  closed in this event.\par  Spoke about her mom with [place], about her relations with the siblings and her childhood trauma.\par We will not add other meds this time. I will see her again in 2 weeks and, if she doesn't improve at all, we will consider adding abilify or wellburtin. \par Denies SI/HI/AH/PI.\par I will not start remeron this time, because pt is gaining weight and was told by her PCP that she needs to lose (135/5.1)\par Denies SI/HI/AH/PI. [FreeTextEntry2] : Never had a psychiatrist, no therapy.   [FreeTextEntry3] : Dr Aram MD is her provider and 7 days ago started her on Zoloft 25 mg.

## 2023-07-12 NOTE — FAMILY HISTORY
[FreeTextEntry1] : Family composition:Sara is 31 y/o F,  for 13 years, has 3 children 10 y/o F, 9 y/o M, and 5 y/o M. Domiciled together in a house renting a room from her mother-in-law. \par Family history and background: [place]. \par Family relationship: good.\par Pertinent Family Medical, MH and Substance Use History including Adult Child of Alcoholic and child of substance abuse status; history of cancer and heart disease:\par Father: Headaches, heart disease, lung disease, diabetes.\par Mother:Anemia, Asthma, high blood pressure, cancer, r/o BPD\par Grandmother:Anemia, diabetes, Thyroid\par Grandfather: Thyroid, Diabetes,\par Sister: Menstrual Pain.\par \par Cultural Assessment:\par Race/ethnicity: Com[place]an\par Sexual identity: Female\par Spirituality/Yazdanism:no\par \par

## 2023-07-13 DIAGNOSIS — F33.1 MAJOR DEPRESSIVE DISORDER, RECURRENT, MODERATE: ICD-10-CM

## 2023-07-14 ENCOUNTER — OUTPATIENT (OUTPATIENT)
Dept: OUTPATIENT SERVICES | Facility: HOSPITAL | Age: 33
LOS: 1 days | End: 2023-07-14
Payer: MEDICAID

## 2023-07-14 ENCOUNTER — APPOINTMENT (OUTPATIENT)
Dept: PSYCHIATRY | Facility: CLINIC | Age: 33
End: 2023-07-14

## 2023-07-14 DIAGNOSIS — F33.2 MAJOR DEPRESSIVE DISORDER, RECURRENT SEVERE WITHOUT PSYCHOTIC FEATURES: ICD-10-CM

## 2023-07-14 PROCEDURE — 90832 PSYTX W PT 30 MINUTES: CPT | Mod: 95

## 2023-07-14 NOTE — REASON FOR VISIT
[Telehealth (audio & video) - Individual/Group] : This visit was provided via telehealth using real-time 2-way audio visual technology. [Medical Office: ([facility])___] : The provider was located at the medical office in [unfilled]. [Home] : The patient, [unfilled], was located at home, [unfilled], at the time of the visit. [Verbal consent obtained from patient/other participant(s)] : Verbal consent for telehealth/telephonic services obtained from patient/other participant(s) [Patient] : Patient [Patient preference] : as per patient preference [Access issues (e.g., transportation, impaired mobility, etc.)] : due to patient's access issues [FreeTextEntry4] : 869mh [FreeTextEntry5] : 1016am [FreeTextEntry1] : Psychotherapy follow up

## 2023-07-14 NOTE — PHYSICAL EXAM
[Cooperative] : cooperative [Depressed] : depressed [Anxious] : anxious [Constricted] : constricted [Clear] : clear [Linear/Goal Directed] : linear/goal directed [Depressive] : depressive [None Reported] : none reported [Average] : average [WNL] : within normal limits [FreeTextEntry1] : Feels better than last session [FreeTextEntry8] : patient seems less anxious, less depressed [de-identified] : anxiety, fear brought on by negative experiences and trauma

## 2023-07-15 DIAGNOSIS — F33.2 MAJOR DEPRESSIVE DISORDER, RECURRENT SEVERE WITHOUT PSYCHOTIC FEATURES: ICD-10-CM

## 2023-07-26 ENCOUNTER — APPOINTMENT (OUTPATIENT)
Dept: PSYCHIATRY | Facility: CLINIC | Age: 33
End: 2023-07-26

## 2023-07-28 ENCOUNTER — OUTPATIENT (OUTPATIENT)
Dept: OUTPATIENT SERVICES | Facility: HOSPITAL | Age: 33
LOS: 1 days | End: 2023-07-28
Payer: MEDICAID

## 2023-07-28 ENCOUNTER — APPOINTMENT (OUTPATIENT)
Dept: PSYCHIATRY | Facility: CLINIC | Age: 33
End: 2023-07-28

## 2023-07-28 DIAGNOSIS — F20.9 SCHIZOPHRENIA, UNSPECIFIED: ICD-10-CM

## 2023-07-28 DIAGNOSIS — F33.2 MAJOR DEPRESSIVE DISORDER, RECURRENT SEVERE WITHOUT PSYCHOTIC FEATURES: ICD-10-CM

## 2023-07-28 PROCEDURE — 90832 PSYTX W PT 30 MINUTES: CPT | Mod: 95

## 2023-07-28 NOTE — PLAN
[FreeTextEntry2] : Objective A: Pt will explore and process feeling at every session, identifying at least 1 trigger of depression and anxiety, and review depression screenings and scales quarterly \par Status of Objective: Initial New objective to help patient become aware of feelings and condition. \par Objective B: Patient will learn and practice 2 coping skills in between scheduled session and comply with medication management medication recommendation. \par \par Status of Objective: Initial New objective to help patient learn and use coping skill. \par Intervention(s): Therapist will help patient explore coping techniques, such as communication strategies, relaxation techniques. Therapist will help patient to expose and extinguish irrational beliefs that contribute to anxiety and depression. Therapist will assist patient in developing reality based, positive cognitive messages that will increase self-confidence and thereby decrease anxiety and depressive symptoms.\par \par Medication Management: every 1 months. \par Individual Therapy: every 2 weeks. \par  [Supportive Therapy] : Supportive Therapy [Trauma Focused CBT] : Trauma Focused CBT [de-identified] : Patient was seen for individual therapy via telehealth video for follow up.  \par Patient complied with making appointment with her PCP Dr. Raza. \par Shared that she wanted to be truthful after clinician was exploring her sleeping patterns.  For years patient has been drinking Red Bull energy drink with her prednisone "because I cant tolerate the taste with anything else". \par Admitted that yesterday she drank 24 ounces and this is a decrease from what she usually drinks stating "I shared with my dad but I didn’t want to do this". \par Clinician explored side effects of drinking energy drinks on anxiety, sleep patterns and patient voices understanding this however feels she is dependent on it already.  Her  encouraged her to discuss with writer.  For the last 2 years patient has been sleeping between 3-4 hours every night. \par Clinician acknowledged difficulty sharing this information, however praised her for taking first steps to make changes. \par Support provided. \par  [Return in ____ week(s)] : Return in [unfilled] week(s) [FreeTextEntry1] : Reach out to Home Health through [facility] [phone]

## 2023-07-28 NOTE — REASON FOR VISIT
[Patient preference] : as per patient preference [Access issues (e.g., transportation, impaired mobility, etc.)] : due to patient's access issues [Telehealth (audio & video) - Individual/Group] : This visit was provided via telehealth using real-time 2-way audio visual technology. [Other Location: e.g. Home (Enter Location, City,State)___] : The provider was located at [unfilled]. [Home] : The patient, [unfilled], was located at home, [unfilled], at the time of the visit. [Verbal consent obtained from patient/other participant(s)] : Verbal consent for telehealth/telephonic services obtained from patient/other participant(s) [FreeTextEntry4] : 1025sw [FreeTextEntry5] : 11am [Patient] : Patient [FreeTextEntry1] : Psychotherapy follow up

## 2023-07-28 NOTE — PHYSICAL EXAM
[Cooperative] : cooperative [Depressed] : depressed [Anxious] : anxious [Constricted] : constricted [Clear] : clear [Linear/Goal Directed] : linear/goal directed [Depressive] : depressive [None Reported] : none reported [Average] : average [WNL] : within normal limits [FreeTextEntry1] : Feels better than last session because her mother is now with her [FreeTextEntry8] : patient seems less anxious, less depressed [de-identified] : anxiety, fear brought on by negative experiences and trauma

## 2023-07-29 DIAGNOSIS — F33.2 MAJOR DEPRESSIVE DISORDER, RECURRENT SEVERE WITHOUT PSYCHOTIC FEATURES: ICD-10-CM

## 2023-08-14 ENCOUNTER — OUTPATIENT (OUTPATIENT)
Dept: OUTPATIENT SERVICES | Facility: HOSPITAL | Age: 33
LOS: 1 days | End: 2023-08-14
Payer: MEDICAID

## 2023-08-14 ENCOUNTER — APPOINTMENT (OUTPATIENT)
Dept: PSYCHIATRY | Facility: CLINIC | Age: 33
End: 2023-08-14

## 2023-08-14 DIAGNOSIS — F33.2 MAJOR DEPRESSIVE DISORDER, RECURRENT SEVERE WITHOUT PSYCHOTIC FEATURES: ICD-10-CM

## 2023-08-14 DIAGNOSIS — F41.1 GENERALIZED ANXIETY DISORDER: ICD-10-CM

## 2023-08-14 PROCEDURE — 90834 PSYTX W PT 45 MINUTES: CPT | Mod: 95

## 2023-08-14 NOTE — REASON FOR VISIT
[Patient preference] : as per patient preference [Continuing, patient not seen in-person within last 12 months (provide details below)] : Telehealth services are continuing, patient not seen in-person within last 12 months.  [Telehealth (audio & video) - Individual/Group] : This visit was provided via telehealth using real-time 2-way audio visual technology. [Other Location: e.g. Home (Enter Location, City,State)___] : The provider was located at [unfilled]. [Home] : The patient, [unfilled], was located at home, [unfilled], at the time of the visit. [Verbal consent obtained from patient/other participant(s)] : Verbal consent for telehealth/telephonic services obtained from patient/other participant(s) [Patient] : Patient [FreeTextEntry4] : 1100am [FreeTextEntry5] : 1225oq [FreeTextEntry1] : Psychotherapy follow up

## 2023-08-14 NOTE — PLAN
[FreeTextEntry2] : Objective A: Pt will explore and process feeling at every session, identifying at least 1 trigger of depression and anxiety, and review depression screenings and scales quarterly \par  Status of Objective: Initial New objective to help patient become aware of feelings and condition. \par  Objective B: Patient will learn and practice 2 coping skills in between scheduled session and comply with medication management medication recommendation. \par  \par  Status of Objective: Initial New objective to help patient learn and use coping skill. \par  Intervention(s): Therapist will help patient explore coping techniques, such as communication strategies, relaxation techniques. Therapist will help patient to expose and extinguish irrational beliefs that contribute to anxiety and depression. Therapist will assist patient in developing reality based, positive cognitive messages that will increase self-confidence and thereby decrease anxiety and depressive symptoms.\par  \par  Medication Management: every 1 months. \par  Individual Therapy: every 2 weeks. \par   [Motivational Interviewing] : Motivational Interviewing  [Supportive Therapy] : Supportive Therapy [Trauma Focused CBT] : Trauma Focused CBT [de-identified] : Patient was seen for individual therapy via telehealth video for follow up.   Patient shared that her week  has not been easy.  He mother suffered a stroke 2 days ago and is hospitalized at [facility]. And again, her SNAP was cut off and she has not received her money for this month.  She went to the [employer] office and was not able to fix the problem since a "supervisor did not want to approve the same thing that was done last month". She explained it is impossible to get anyone on the phone and they dont return messages either.  She feels stressed out.  Patient shared that she "caught a panic attack at the [employer] office and required for them to isolate her into a cubicle".  Discussed possible food pantries to go to in the next couple of days.  She knows that her plans to move to [place] may not be delayed because of her mother's condition.   Support provided.   [Return in ____ week(s)] : Return in [unfilled] week(s) [FreeTextEntry1] : Patient requested letter that her  will  to see if it assist with her getting some help via phone or by  visiting the office.  Discussed home health referral as she believes she had someone in the past.

## 2023-08-15 DIAGNOSIS — F33.2 MAJOR DEPRESSIVE DISORDER, RECURRENT SEVERE WITHOUT PSYCHOTIC FEATURES: ICD-10-CM

## 2023-08-15 DIAGNOSIS — F41.1 GENERALIZED ANXIETY DISORDER: ICD-10-CM

## 2023-08-22 ENCOUNTER — APPOINTMENT (OUTPATIENT)
Dept: PSYCHIATRY | Facility: CLINIC | Age: 33
End: 2023-08-22
Payer: MEDICAID

## 2023-08-22 ENCOUNTER — OUTPATIENT (OUTPATIENT)
Dept: OUTPATIENT SERVICES | Facility: HOSPITAL | Age: 33
LOS: 1 days | End: 2023-08-22
Payer: MEDICAID

## 2023-08-22 DIAGNOSIS — F33.1 MAJOR DEPRESSIVE DISORDER, RECURRENT, MODERATE: ICD-10-CM

## 2023-08-22 PROCEDURE — 99214 OFFICE O/P EST MOD 30 MIN: CPT | Mod: 95

## 2023-08-22 RX ORDER — SERTRALINE HYDROCHLORIDE 100 MG/1
100 TABLET, FILM COATED ORAL DAILY
Qty: 60 | Refills: 2 | Status: DISCONTINUED | COMMUNITY
Start: 2023-02-27 | End: 2023-08-22

## 2023-08-22 NOTE — PHYSICAL EXAM
[Slowed] : slowed [Cooperative] : cooperative [Depressed] : depressed [Anxious] : anxious [Constricted] : constricted [Clear] : clear [Linear/Goal Directed] : linear/goal directed [None] : none [Depressive] : depressive [Phobic] : phobic [None Reported] : none reported [Average] : average [WNL] : within normal limits [Mild] : mild [0] : 0 - Absent [1] : 1 - Mild [3] : 3 - Expresses feelings of discouragement, despair, pessimism about future, which can be dispelled [2] : 2 - Spontaneously indicates feelings of worthlessness [FreeTextEntry1] : looks sad [FreeTextEntry8] : depression

## 2023-08-22 NOTE — DISCUSSION/SUMMARY
[FreeTextEntry1] : Solo/tele session.  Pt has a lot of social and medical problems at this time. she didn't have food stamps for 1 m, her mom had CVA, she was Dx with glaucoma, she lost her 2 teeth, etc. Pt says that she felt very nauseous from zoloft and she stopped zoloft and benzos 2 weeks ago. Ambien stopped working and she doesn't sleep. she is with her mom in [place] now. compliance was encouraged. Risks of n/c were discussed. We will start lexapro and will increase ambien. Risks and benefits of lexapro were discussed. Pt also has ULLOA.  Pt says that her ULLOA is not related to the meds (it didn't become better after she stopped meds). She felt much worse without the meds (so, she knows now  that they work). Her anxiety, however, got worse after that.  Education and supportive therapy were provided. Pt's parents will be leaving again for [place]. Pt says that her parents try to buy a house in [place] and the pt will be moving with them. I told pt to start looking for a psychiatrist and a therapist in [place] now, because her case will be  closed in this event.  Spoke about her mom with [place], about her relations with the siblings and her childhood trauma. We will not add other meds this time. I will see her again in 2 weeks and, if she doesn't improve at all, we will consider adding abilify or wellburtin.   She also says that klonopin works and asks to continue to prescribe it.  Denies SI/HI/AH/PI. I will not start remeron this time, because pt is gaining weight and was told by her PCP that she needs to lose (135/5.1) Denies SI/HI/AH/PI.  Pt s 32 y/o F,  for 13 years, has 3 children 10 y/o F, 9 y/o M, and 5 y/o M. Domiciled all 5 people together in 1 room in a house renting  from her mother-in-law. Mother-in-law rents out rooms to strangers "she takes in immigrants".( Is in the process of getting her own place through housing [employer] but isn't sure when they will get approved.  works full time. No ACS involvement),   home schooling her 3 children in the last month, due to fear of them getting killed, she stated they went to [employer] and recently "so many shootouts in the parking lot" decided to home school her kids,  currently unemployed( due to Crohn's disease), with a complicated PMH(diagnosed with ulcerative colitis 7 years ago, and last year diagnosed with Crohn's, she had an abscess and had to have 2 surgeries), DVT (on eliquis), on prednisone for many years, no prior IPP, no SA, no DIPTI, never on psych meds or in psych care, until very recently, when dr. Chiu prescribed her 25 mg of zoloft  Never had a psychiatrist, no therapy. (though was in counseling in school).   Pt feels very depressed (10/10), with anhedonia, severe insomnia (for years sleeps only 2-3 h a day and then has severe day time sleepiness and inability to concentrate). Pt feels exhausted, but manages to take care of her family. Denies h/o franck (though she has family h/o BPD and also she reports some vague symptoms of hypomania, though they are not clear cut). Pt says that she feels very depressed and anxious for at least 4 y, but in the past 2-3 m, her depression and anxiety became worse due to a very poor social situation and insomnia. Pt couldn't tolerate zoloft (severe nausea). She also has glaucoma  1. Next appt in 2w tele 2. Does she tolerate lexapro (had severe nausea to zoloft) 3. I sent klonopin on 8/22 4. Labs results (I gave pt my fax) (she is supposed to see her PCP in the end of june) 5. Trazodone doesn't work. I didn't start remeron because pt is 135/5.1 (she wants to lose weight) 6. Pt stays at her mom's apt (mom is in [place] and comes back in August, to leave again for [place] in 1 week. Pt will go with her and, possibly, will relocate to [place], if parents buy a house up there. I told her that we will close her case in this event 7. How is mom (CVA) 8. How are pt's eyes

## 2023-08-22 NOTE — FAMILY HISTORY
[FreeTextEntry1] : Family composition:Sara is 33 y/o F,  for 13 years, has 3 children 10 y/o F, 9 y/o M, and 5 y/o M. Domiciled together in a house renting a room from her mother-in-law. \par  Family history and background: [place]. \par  Family relationship: good.\par  Pertinent Family Medical, MH and Substance Use History including Adult Child of Alcoholic and child of substance abuse status; history of cancer and heart disease:\par  Father: Headaches, heart disease, lung disease, diabetes.\par  Mother:Anemia, Asthma, high blood pressure, cancer, r/o BPD\par  Grandmother:Anemia, diabetes, Thyroid\par  Grandfather: Thyroid, Diabetes,\par  Sister: Menstrual Pain.\par  \par  Cultural Assessment:\par  Race/ethnicity: Camero[place]\par  Sexual identity: Female\par  Spirituality/Mormonism:no\par  \par   Normal vision: sees adequately in most situations; can see medication labels, newsprint

## 2023-08-22 NOTE — REASON FOR VISIT
[Number can be texted] : number can be texted [OK  to leave message] : OK  to leave message [Primary Care] : Primary Care [[facility] Provider/Facility] : [facility] Provider/Facility [Patient] : Patient [Prior Medical Records] : Prior Medical Records [FreeTextEntry3] : [email or web address] [FreeTextEntry5] : English [FreeTextEntry6] : Sara [FreeTextEntry2] : Anxiety/Depression [FreeTextEntry1] : Anxiety/Depression due to medical complications.

## 2023-08-22 NOTE — SOCIAL HISTORY
[FreeTextEntry1] : Employment history:Unemployed last 7 years, worked for [employer] in the past. \par  Developmental history: Speech delay, in Special Ed. \par  Social supports (friends, Volunteers, club, AA meeting, other meetings ) ?: no\par  Meaningful Activities: Home schools her 3 kids, takes them to park, museums. \par  \par  \par  \par   \par  \par  \par

## 2023-08-22 NOTE — HISTORY OF PRESENT ILLNESS
[Not Applicable] : Not applicable [FreeTextEntry1] : Solo/tele session.  Pt has a lot of social and medical problems at this time. she didn't have food stamps for 1 m, her mom had CVA, she was Dx with glaucoma, she lost her 2 teeth, etc. Pt says that she felt very nauseous from zoloft and she stopped zoloft and benzos 2 weeks ago. Ambien stopped working and she doesn't sleep. she is with her mom in [place] now. compliance was encouraged. Risks of n/c were discussed. We will start lexapro and will increase ambien. Risks and benefits of lexapro were discussed. Pt also has ULLOA.  Pt says that her ULLOA is not related to the meds (it didn't become better after she stopped meds). She felt much worse without the meds (so, she knows now  that they work). Her anxiety, however, got worse after that.  Education and supportive therapy were provided. Pt's parents will be leaving again for [place]. Pt says that her parents try to buy a house in [place] and the pt will be moving with them. I told pt to start looking for a psychiatrist and a therapist in [place] now, because her case will be  closed in this event.  Spoke about her mom with [place], about her relations with the siblings and her childhood trauma. We will not add other meds this time. I will see her again in 2 weeks and, if she doesn't improve at all, we will consider adding abilify or wellburtin.   She also says that klonopin works and asks to continue to prescribe it.  Denies SI/HI/AH/PI. I will not start remeron this time, because pt is gaining weight and was told by her PCP that she needs to lose (135/5.1) Denies SI/HI/AH/PI. [FreeTextEntry2] : Never had a psychiatrist, no therapy.   [FreeTextEntry3] : Dr Aram MD is her provider and 7 days ago started her on Zoloft 25 mg.

## 2023-08-22 NOTE — PAST MEDICAL HISTORY
[FreeTextEntry1] : \par  \par  diagnosed with ulcerative colitis 7 years ago, and last year diagnosed with Crohn's she had an abscess and had to have surgery. . Prednisone 40 mg, Eliquis 5 mg 2 x a day.  Patient also has asthma.   \par  Doesn't smoke, doesn't drink, denied substance use all together.\par  \par

## 2023-08-23 ENCOUNTER — APPOINTMENT (OUTPATIENT)
Dept: PSYCHIATRY | Facility: CLINIC | Age: 33
End: 2023-08-23

## 2023-08-23 DIAGNOSIS — F33.1 MAJOR DEPRESSIVE DISORDER, RECURRENT, MODERATE: ICD-10-CM

## 2023-09-05 ENCOUNTER — APPOINTMENT (OUTPATIENT)
Dept: CARDIOLOGY | Facility: CLINIC | Age: 33
End: 2023-09-05

## 2023-09-15 ENCOUNTER — OUTPATIENT (OUTPATIENT)
Dept: OUTPATIENT SERVICES | Facility: HOSPITAL | Age: 33
LOS: 1 days | End: 2023-09-15
Payer: MEDICAID

## 2023-09-15 ENCOUNTER — APPOINTMENT (OUTPATIENT)
Dept: PSYCHIATRY | Facility: CLINIC | Age: 33
End: 2023-09-15

## 2023-09-15 DIAGNOSIS — F41.1 GENERALIZED ANXIETY DISORDER: ICD-10-CM

## 2023-09-15 PROCEDURE — 90832 PSYTX W PT 30 MINUTES: CPT | Mod: 95

## 2023-09-16 DIAGNOSIS — F41.1 GENERALIZED ANXIETY DISORDER: ICD-10-CM

## 2023-09-21 ENCOUNTER — APPOINTMENT (OUTPATIENT)
Dept: PSYCHIATRY | Facility: CLINIC | Age: 33
End: 2023-09-21

## 2023-09-21 ENCOUNTER — OUTPATIENT (OUTPATIENT)
Dept: OUTPATIENT SERVICES | Facility: HOSPITAL | Age: 33
LOS: 1 days | End: 2023-09-21
Payer: MEDICAID

## 2023-09-21 ENCOUNTER — APPOINTMENT (OUTPATIENT)
Dept: SPEECH THERAPY | Facility: CLINIC | Age: 33
End: 2023-09-21

## 2023-09-21 DIAGNOSIS — H90.3 SENSORINEURAL HEARING LOSS, BILATERAL: ICD-10-CM

## 2023-09-21 PROCEDURE — 92593: CPT

## 2023-09-22 ENCOUNTER — APPOINTMENT (OUTPATIENT)
Dept: INTERNAL MEDICINE | Facility: CLINIC | Age: 33
End: 2023-09-22
Payer: MEDICAID

## 2023-09-22 ENCOUNTER — OUTPATIENT (OUTPATIENT)
Dept: OUTPATIENT SERVICES | Facility: HOSPITAL | Age: 33
LOS: 1 days | End: 2023-09-22
Payer: MEDICAID

## 2023-09-22 VITALS
HEIGHT: 61 IN | HEART RATE: 93 BPM | OXYGEN SATURATION: 98 % | TEMPERATURE: 96.7 F | BODY MASS INDEX: 23.26 KG/M2 | WEIGHT: 123.19 LBS | SYSTOLIC BLOOD PRESSURE: 122 MMHG | DIASTOLIC BLOOD PRESSURE: 80 MMHG

## 2023-09-22 DIAGNOSIS — D50.9 IRON DEFICIENCY ANEMIA, UNSPECIFIED: ICD-10-CM

## 2023-09-22 DIAGNOSIS — Z00.00 ENCOUNTER FOR GENERAL ADULT MEDICAL EXAMINATION WITHOUT ABNORMAL FINDINGS: ICD-10-CM

## 2023-09-22 PROCEDURE — 99214 OFFICE O/P EST MOD 30 MIN: CPT | Mod: GC

## 2023-09-22 PROCEDURE — 99214 OFFICE O/P EST MOD 30 MIN: CPT

## 2023-09-22 RX ORDER — ALBUTEROL SULFATE 90 UG/1
108 (90 BASE) INHALANT RESPIRATORY (INHALATION) EVERY 6 HOURS
Qty: 4 | Refills: 2 | Status: ACTIVE | COMMUNITY
Start: 2023-09-22 | End: 1900-01-01

## 2023-09-22 RX ORDER — APIXABAN 5 MG/1
5 TABLET, FILM COATED ORAL
Qty: 180 | Refills: 3 | Status: ACTIVE | COMMUNITY
Start: 2022-07-23 | End: 1900-01-01

## 2023-09-29 ENCOUNTER — APPOINTMENT (OUTPATIENT)
Dept: PSYCHIATRY | Facility: CLINIC | Age: 33
End: 2023-09-29

## 2023-09-29 DIAGNOSIS — D50.9 IRON DEFICIENCY ANEMIA, UNSPECIFIED: ICD-10-CM

## 2023-09-29 DIAGNOSIS — J45.909 UNSPECIFIED ASTHMA, UNCOMPLICATED: ICD-10-CM

## 2023-09-29 DIAGNOSIS — I82.409 ACUTE EMBOLISM AND THROMBOSIS OF UNSPECIFIED DEEP VEINS OF UNSPECIFIED LOWER EXTREMITY: ICD-10-CM

## 2023-09-29 DIAGNOSIS — K50.90 CROHN'S DISEASE, UNSPECIFIED, WITHOUT COMPLICATIONS: ICD-10-CM

## 2023-10-09 ENCOUNTER — OUTPATIENT (OUTPATIENT)
Dept: OUTPATIENT SERVICES | Facility: HOSPITAL | Age: 33
LOS: 1 days | End: 2023-10-09
Payer: MEDICAID

## 2023-10-09 ENCOUNTER — APPOINTMENT (OUTPATIENT)
Dept: PSYCHIATRY | Facility: CLINIC | Age: 33
End: 2023-10-09
Payer: MEDICAID

## 2023-10-09 DIAGNOSIS — F33.2 MAJOR DEPRESSIVE DISORDER, RECURRENT SEVERE WITHOUT PSYCHOTIC FEATURES: ICD-10-CM

## 2023-10-09 PROCEDURE — 99214 OFFICE O/P EST MOD 30 MIN: CPT | Mod: 95

## 2023-10-10 DIAGNOSIS — F33.2 MAJOR DEPRESSIVE DISORDER, RECURRENT SEVERE WITHOUT PSYCHOTIC FEATURES: ICD-10-CM

## 2023-10-11 ENCOUNTER — APPOINTMENT (OUTPATIENT)
Dept: PSYCHIATRY | Facility: CLINIC | Age: 33
End: 2023-10-11

## 2023-10-11 ENCOUNTER — OUTPATIENT (OUTPATIENT)
Dept: OUTPATIENT SERVICES | Facility: HOSPITAL | Age: 33
LOS: 1 days | End: 2023-10-11
Payer: MEDICAID

## 2023-10-11 DIAGNOSIS — F33.2 MAJOR DEPRESSIVE DISORDER, RECURRENT SEVERE WITHOUT PSYCHOTIC FEATURES: ICD-10-CM

## 2023-10-11 PROCEDURE — 90832 PSYTX W PT 30 MINUTES: CPT | Mod: 95

## 2023-10-12 DIAGNOSIS — F33.2 MAJOR DEPRESSIVE DISORDER, RECURRENT SEVERE WITHOUT PSYCHOTIC FEATURES: ICD-10-CM

## 2023-10-13 ENCOUNTER — APPOINTMENT (OUTPATIENT)
Dept: PSYCHIATRY | Facility: CLINIC | Age: 33
End: 2023-10-13

## 2023-10-16 ENCOUNTER — APPOINTMENT (OUTPATIENT)
Dept: SURGERY | Facility: CLINIC | Age: 33
End: 2023-10-16

## 2023-10-17 ENCOUNTER — OUTPATIENT (OUTPATIENT)
Dept: OUTPATIENT SERVICES | Facility: HOSPITAL | Age: 33
LOS: 1 days | End: 2023-10-17
Payer: MEDICAID

## 2023-10-17 ENCOUNTER — APPOINTMENT (OUTPATIENT)
Dept: PSYCHIATRY | Facility: CLINIC | Age: 33
End: 2023-10-17

## 2023-10-17 DIAGNOSIS — F41.1 GENERALIZED ANXIETY DISORDER: ICD-10-CM

## 2023-10-17 DIAGNOSIS — F33.2 MAJOR DEPRESSIVE DISORDER, RECURRENT SEVERE WITHOUT PSYCHOTIC FEATURES: ICD-10-CM

## 2023-10-17 PROCEDURE — 90832 PSYTX W PT 30 MINUTES: CPT | Mod: 95

## 2023-10-18 DIAGNOSIS — F41.1 GENERALIZED ANXIETY DISORDER: ICD-10-CM

## 2023-10-19 ENCOUNTER — APPOINTMENT (OUTPATIENT)
Dept: SPEECH THERAPY | Facility: CLINIC | Age: 33
End: 2023-10-19

## 2023-10-19 ENCOUNTER — OUTPATIENT (OUTPATIENT)
Dept: OUTPATIENT SERVICES | Facility: HOSPITAL | Age: 33
LOS: 1 days | End: 2023-10-19
Payer: MEDICAID

## 2023-10-19 DIAGNOSIS — H90.3 SENSORINEURAL HEARING LOSS, BILATERAL: ICD-10-CM

## 2023-10-19 PROCEDURE — V5260: CPT

## 2023-10-19 PROCEDURE — V5160: CPT

## 2023-10-20 ENCOUNTER — APPOINTMENT (OUTPATIENT)
Dept: PSYCHIATRY | Facility: CLINIC | Age: 33
End: 2023-10-20
Payer: MEDICAID

## 2023-10-20 ENCOUNTER — OUTPATIENT (OUTPATIENT)
Dept: OUTPATIENT SERVICES | Facility: HOSPITAL | Age: 33
LOS: 1 days | End: 2023-10-20
Payer: MEDICAID

## 2023-10-20 DIAGNOSIS — F33.2 MAJOR DEPRESSIVE DISORDER, RECURRENT SEVERE WITHOUT PSYCHOTIC FEATURES: ICD-10-CM

## 2023-10-20 PROCEDURE — 99214 OFFICE O/P EST MOD 30 MIN: CPT | Mod: 95

## 2023-10-21 DIAGNOSIS — F33.2 MAJOR DEPRESSIVE DISORDER, RECURRENT SEVERE WITHOUT PSYCHOTIC FEATURES: ICD-10-CM

## 2023-10-23 RX ORDER — PREDNISONE 20 MG/1
20 TABLET ORAL DAILY
Qty: 90 | Refills: 0 | Status: ACTIVE | COMMUNITY
Start: 1900-01-01 | End: 1900-01-01

## 2023-10-30 ENCOUNTER — APPOINTMENT (OUTPATIENT)
Dept: PSYCHIATRY | Facility: CLINIC | Age: 33
End: 2023-10-30

## 2023-10-30 ENCOUNTER — OUTPATIENT (OUTPATIENT)
Dept: OUTPATIENT SERVICES | Facility: HOSPITAL | Age: 33
LOS: 1 days | End: 2023-10-30

## 2023-10-30 DIAGNOSIS — F33.2 MAJOR DEPRESSIVE DISORDER, RECURRENT SEVERE WITHOUT PSYCHOTIC FEATURES: ICD-10-CM

## 2023-10-31 DIAGNOSIS — F33.2 MAJOR DEPRESSIVE DISORDER, RECURRENT SEVERE WITHOUT PSYCHOTIC FEATURES: ICD-10-CM

## 2023-10-31 DIAGNOSIS — I83.893 VARICOSE VEINS OF BILATERAL LOWER EXTREMITIES WITH OTHER COMPLICATIONS: ICD-10-CM

## 2023-11-01 ENCOUNTER — APPOINTMENT (OUTPATIENT)
Dept: VASCULAR SURGERY | Facility: CLINIC | Age: 33
End: 2023-11-01

## 2023-11-03 ENCOUNTER — OUTPATIENT (OUTPATIENT)
Dept: OUTPATIENT SERVICES | Facility: HOSPITAL | Age: 33
LOS: 1 days | End: 2023-11-03
Payer: MEDICAID

## 2023-11-03 ENCOUNTER — APPOINTMENT (OUTPATIENT)
Dept: PSYCHIATRY | Facility: CLINIC | Age: 33
End: 2023-11-03
Payer: MEDICAID

## 2023-11-03 DIAGNOSIS — F33.2 MAJOR DEPRESSIVE DISORDER, RECURRENT SEVERE WITHOUT PSYCHOTIC FEATURES: ICD-10-CM

## 2023-11-03 PROCEDURE — 99214 OFFICE O/P EST MOD 30 MIN: CPT | Mod: 95

## 2023-11-04 DIAGNOSIS — F33.2 MAJOR DEPRESSIVE DISORDER, RECURRENT SEVERE WITHOUT PSYCHOTIC FEATURES: ICD-10-CM

## 2023-12-04 ENCOUNTER — APPOINTMENT (OUTPATIENT)
Dept: PSYCHIATRY | Facility: CLINIC | Age: 33
End: 2023-12-04

## 2023-12-04 ENCOUNTER — APPOINTMENT (OUTPATIENT)
Dept: PSYCHIATRY | Facility: CLINIC | Age: 33
End: 2023-12-04
Payer: MEDICAID

## 2023-12-04 ENCOUNTER — OUTPATIENT (OUTPATIENT)
Dept: OUTPATIENT SERVICES | Facility: HOSPITAL | Age: 33
LOS: 1 days | End: 2023-12-04
Payer: MEDICAID

## 2023-12-04 DIAGNOSIS — F33.1 MAJOR DEPRESSIVE DISORDER, RECURRENT, MODERATE: ICD-10-CM

## 2023-12-04 DIAGNOSIS — F33.2 MAJOR DEPRESSIVE DISORDER, RECURRENT SEVERE WITHOUT PSYCHOTIC FEATURES: ICD-10-CM

## 2023-12-04 PROCEDURE — 90834 PSYTX W PT 45 MINUTES: CPT

## 2023-12-04 PROCEDURE — 99214 OFFICE O/P EST MOD 30 MIN: CPT | Mod: 95

## 2023-12-04 PROCEDURE — 99214 OFFICE O/P EST MOD 30 MIN: CPT | Mod: 25

## 2023-12-05 DIAGNOSIS — F33.2 MAJOR DEPRESSIVE DISORDER, RECURRENT SEVERE WITHOUT PSYCHOTIC FEATURES: ICD-10-CM

## 2023-12-05 DIAGNOSIS — F33.1 MAJOR DEPRESSIVE DISORDER, RECURRENT, MODERATE: ICD-10-CM

## 2023-12-18 ENCOUNTER — OUTPATIENT (OUTPATIENT)
Dept: OUTPATIENT SERVICES | Facility: HOSPITAL | Age: 33
LOS: 1 days | End: 2023-12-18
Payer: MEDICAID

## 2023-12-18 ENCOUNTER — APPOINTMENT (OUTPATIENT)
Dept: PSYCHIATRY | Facility: CLINIC | Age: 33
End: 2023-12-18

## 2023-12-18 DIAGNOSIS — F33.1 MAJOR DEPRESSIVE DISORDER, RECURRENT, MODERATE: ICD-10-CM

## 2023-12-18 PROCEDURE — 90832 PSYTX W PT 30 MINUTES: CPT

## 2023-12-18 NOTE — PLAN
[Cognitive and/or Behavior Therapy] : Cognitive and/or Behavior Therapy  [Exposure +/- Response Prevention] : Exposure +/- Response Prevention  [Supportive Therapy] : Supportive Therapy [Trauma Focused CBT] : Trauma Focused CBT [Return in ____ week(s)] : Return in [unfilled] week(s) [FreeTextEntry2] : Objective A: Pt will explore and process feeling at every session, identifying at least 1 trigger of depression and anxiety, and review depression screenings and scales quarterly \par  Status of Objective: Initial New objective to help patient become aware of feelings and condition. \par  Objective B: Patient will learn and practice 2 coping skills in between scheduled session and comply with medication management medication recommendation. \par  \par  Status of Objective: Initial New objective to help patient learn and use coping skill. \par  Intervention(s): Therapist will help patient explore coping techniques, such as communication strategies, relaxation techniques. Therapist will help patient to expose and extinguish irrational beliefs that contribute to anxiety and depression. Therapist will assist patient in developing reality based, positive cognitive messages that will increase self-confidence and thereby decrease anxiety and depressive symptoms.\par  \par  Medication Management: every 1 months. \par  Individual Therapy: every 2 weeks. \par   [de-identified] : Patient seen for telehealth video for psychotherapy follow up. Patient reported that after appearing in front of SS  she was granted 2 documents one for medical and one for psychiatric that needs to be submitted within 30 days.  She emailed document to be filled out by prescriber and it will be returned by mail to her. Medically, she had abscess drained recently and she has not been feeling well since then.  Patient moved to her father in law's house in [place] in [facility] which is almost 2 hours from her mother's house. Patient is adjusting to new living space and asked for address to be changed in the system. Her children are adjusting well as they have more space in their bedrooms, however patient now feels more overwhelmed "because I have no help and there is no washing machine in the house and if forces her to go to the laundromat".  Mother will be spending [place] with her although patient noted that their car is not working currently and  cannot take off.  Patient has a Health home from [facility], unsure of phone number. who is assisting her with scheduling medical appointments.  Active listening. Support provided.   [FreeTextEntry1] : . Fill out SS document for patient

## 2023-12-18 NOTE — PHYSICAL EXAM
[Cooperative] : cooperative [Depressed] : depressed [Anxious] : anxious [Constricted] : constricted [Clear] : clear [Linear/Goal Directed] : linear/goal directed [Depressive] : depressive [None Reported] : none reported [Average] : average [WNL] : within normal limits

## 2023-12-18 NOTE — REASON FOR VISIT
[Patient preference] : as per patient preference [Telehealth (audio & video) - Individual/Group] : This visit was provided via telehealth using real-time 2-way audio visual technology. [Other Location: e.g. Home (Enter Location, City,State)___] : The provider was located at [unfilled]. [Home] : The patient, [unfilled], was located at home, [unfilled], at the time of the visit. [Verbal consent obtained from patient/other participant(s)] : Verbal consent for telehealth/telephonic services obtained from patient/other participant(s) [Patient] : Patient [FreeTextEntry4] : 10:16am [FreeTextEntry5] : [id number] [FreeTextEntry1] : Psychotherapy follow up

## 2023-12-19 DIAGNOSIS — F33.1 MAJOR DEPRESSIVE DISORDER, RECURRENT, MODERATE: ICD-10-CM

## 2023-12-26 ENCOUNTER — APPOINTMENT (OUTPATIENT)
Dept: OBGYN | Facility: CLINIC | Age: 33
End: 2023-12-26

## 2024-01-02 ENCOUNTER — OUTPATIENT (OUTPATIENT)
Dept: OUTPATIENT SERVICES | Facility: HOSPITAL | Age: 34
LOS: 1 days | End: 2024-01-02
Payer: MEDICAID

## 2024-01-02 ENCOUNTER — APPOINTMENT (OUTPATIENT)
Dept: PSYCHIATRY | Facility: CLINIC | Age: 34
End: 2024-01-02
Payer: MEDICAID

## 2024-01-02 DIAGNOSIS — F33.2 MAJOR DEPRESSIVE DISORDER, RECURRENT SEVERE WITHOUT PSYCHOTIC FEATURES: ICD-10-CM

## 2024-01-02 DIAGNOSIS — F41.1 GENERALIZED ANXIETY DISORDER: ICD-10-CM

## 2024-01-02 DIAGNOSIS — F51.05 INSOMNIA DUE TO OTHER MENTAL DISORDER: ICD-10-CM

## 2024-01-02 PROCEDURE — 99213 OFFICE O/P EST LOW 20 MIN: CPT | Mod: 95

## 2024-01-02 PROCEDURE — 99214 OFFICE O/P EST MOD 30 MIN: CPT | Mod: 95

## 2024-01-02 NOTE — FAMILY HISTORY
[FreeTextEntry1] : Family composition:Sara is 31 y/o F,  for 13 years, has 3 children 10 y/o F, 7 y/o M, and 5 y/o M. Domiciled together in a house renting a room from her mother-in-law. \par  Family history and background: [place]. \par  Family relationship: good.\par  Pertinent Family Medical, MH and Substance Use History including Adult Child of Alcoholic and child of substance abuse status; history of cancer and heart disease:\par  Father: Headaches, heart disease, lung disease, diabetes.\par  Mother:Anemia, Asthma, high blood pressure, cancer, r/o BPD\par  Grandmother:Anemia, diabetes, Thyroid\par  Grandfather: Thyroid, Diabetes,\par  Sister: Menstrual Pain.\par  \par  Cultural Assessment:\par  Race/ethnicity: [place]\par  Sexual identity: Female\par  Spirituality/Religious:no\par  \par

## 2024-01-02 NOTE — REASON FOR VISIT
[Patient preference] : as per patient preference [Telehealth (audio & video) - Individual/Group] : This visit was provided via telehealth using real-time 2-way audio visual technology. [Medical Office: ([facility])___] : The provider was located at the medical office in [unfilled]. [Home] : The patient, [unfilled], was located at home, [unfilled], at the time of the visit. [Verbal consent obtained from patient/other participant(s)] : Verbal consent for telehealth/telephonic services obtained from patient/other participant(s) [Collateral without patient] : Collateral without patient [Number can be texted] : number can be texted [OK  to leave message] : OK  to leave message [Primary Care] : Primary Care [[facility] Provider/Facility] : [facility] Provider/Facility [Patient] : Patient [Prior Medical Records] : Prior Medical Records [TextBox_17] : Pt's  [FreeTextEntry3] : [email or web address] [FreeTextEntry5] : English [FreeTextEntry6] : Sara [FreeTextEntry2] : Anxiety/Depression [FreeTextEntry1] : Anxiety/Depression due to medical complications.

## 2024-01-02 NOTE — PHYSICAL EXAM
[Slowed] : slowed [Cooperative] : cooperative [Depressed] : depressed [Anxious] : anxious [Constricted] : constricted [Clear] : clear [Linear/Goal Directed] : linear/goal directed [None] : none [Depressive] : depressive [Phobic] : phobic [None Reported] : none reported [Average] : average [WNL] : within normal limits [Mild] : mild [0] : 0 - Absent [1] : 1 - Mild [3] : 3 - Expresses feelings of discouragement, despair, pessimism about future, which can be dispelled [2] : 2 - Spontaneously indicates feelings of worthlessness [FreeTextEntry8] : depressed

## 2024-01-02 NOTE — HISTORY OF PRESENT ILLNESS
[Not Applicable] : Not applicable [FreeTextEntry1] : Solo/tele session.  Pt says that she moved to a 3 bedroom apt in [place] and has a very difficult time. She has a significant worsening of her medical situation. she says that she has numerous clots in her legs and they are very painful. She also has severe swelling of her legs and this makes her pain even worse. She also says that her SS check didn't come on time to pay the rent and her  can't stay home with the kids and she can't   go to ER because they are home schooled and she can't leave them alone, etc. So, pt has a very difficult situation at this time. The matter was discussed at length with her and also I spoke to Mattie. Pt doesn't have her care established in [place] yet. She says that she is afraid to call medical offices to connect to specialists up there. Pt doesn't sleep well. She is somewhat overweight. I recommended to see her PCP for sleep study. She doesn't want. She says that she is stressed and denies any possible underlying medical causes of her insomnia. Pt takes remeron(trazodone didn't work). We will increase it to 30mg.  Spoke a lot about her medical issues and coping with them. Education and supportive therapy were provided. Pt says that she is under a lot of stress. Her mom is sick and getting worse after CVA.  Adamantly denies SI/HI/AH/PI/SP/intent. Pt's mom had CVA. Pt was Dx with glaucoma, she lost her 2 teeth, etc.  Pt says that klonopin works and asks to continue to prescribe it.  Denies SI/HI/AH/PI. Pt sats that she lost weight (125/5.1. Down from 135) Pt was instructed to call 911/go to ER in case of an emergency. [FreeTextEntry2] : Never had a psychiatrist, no therapy.   [FreeTextEntry3] : Dr Aram MD is her provider and 7 days ago started her on Zoloft 25 mg.

## 2024-01-02 NOTE — RISK ASSESSMENT
[No, patient denies ideation or behavior] : No, patient denies ideation or behavior [Clinical Interview] : Clinical Interview [Yes] : 1. Passive Ideation: Have you wished you were dead or wished you could go to sleep and not wake up? Yes [Mood disorder] : mood disorder [PTSD] : PTSD [Depressed mood/Anhedonia] : depressed mood/anhedonia [Hopelessness or despair] : hopelessness or despair [Global insomnia] : global insomnia [Severe anxiety, agitation or panic] : severe anxiety, agitation or panic [History of abuse/trauma] : history of abuse/trauma [Chronic pain/other acute medical condition] : chronic pain or other acute medical condition [Identifies reasons for living] : identifies reasons for living [Ability to cope with stress] : ability to cope with stress [Responsibility to children, family, or others] : responsibility to children, family, or others [Beloved pets] : beloved pets [None in the patient's lifetime] : None in the patient's lifetime [None Known] : none known [No known risk factors] : No known risk factors [Residential stability] : residential stability [Relationship stability] : relationship stability [No] : no [FreeTextEntry8] : Pt is not in imminent risk of hurting self or others at this time. However, her chronic risk of self-harm is elevated due to her social situation/problems , depression, impulsivity

## 2024-01-02 NOTE — DISCUSSION/SUMMARY
[FreeTextEntry1] : Solo/tele session.  Pt says that she moved to a 3 bedroom apt in [place] and has a very difficult time. She has a significant worsening of her medical situation. she says that she has numerous clots in her legs and they are very painful. She also has severe swelling of her legs and this makes her pain even worse. She also says that her SS check didn't come on time to pay the rent and her  can't stay home with the kids and she can't   go to ER because they are home schooled and she can't leave them alone, etc. So, pt has a very difficult situation at this time. The matter was discussed at length with her and also I spoke to Mattie. Pt doesn't have her care established in [place] yet. She says that she is afraid to call medical offices to connect to specialists up there. Pt doesn't sleep well. She is somewhat overweight. I recommended to see her PCP for sleep study. She doesn't want. She says that she is stressed and denies any possible underlying medical causes of her insomnia. Pt takes remeron(trazodone didn't work). We will increase it to 30mg.  Spoke a lot about her medical issues and coping with them. Education and supportive therapy were provided. Pt says that she is under a lot of stress. Her mom is sick and getting worse after CVA.  Adamantly denies SI/HI/AH/PI/SP/intent. Pt's mom had CVA. Pt was Dx with glaucoma, she lost her 2 teeth, etc.  Pt says that klonopin works and asks to continue to prescribe it.  Denies SI/HI/AH/PI. Pt sats that she lost weight (125/5.1. Down from 135) Pt was instructed to call 911/go to ER in case of an emergency.  Pt s 32 y/o F,  for 13 years, has 3 children 10 y/o F, 9 y/o M, and 5 y/o M., just moved to an apt in [place] (used to live all 5 people together in 1 room in a house renting from her mother-in-law. Mother-in-law rents out rooms to strangers "she takes in immigrants").  works full time. No ACS involvement),   home schooling her 3 children  due to fear of them getting killed, she stated they went to [employer] and recently "so many shootouts in the parking lot" decided to home school her kids,  currently unemployed( due to Crohn's disease), with a complicated PMH(diagnosed with ulcerative colitis 7 years ago, and last year diagnosed with Crohn's, she had an abscess and had to have 2 surgeries), DVT (on eliquis), on prednisone for many years, no prior IPP, no SA, no DIPTI, never on psych meds or in psych care, until very recently, when dr. Chiu prescribed her 25 mg of zoloft  Never had a psychiatrist, no therapy. (though was in counseling in school).   Denies h/o franck (though she has family h/o BPD and also she reports some vague symptoms of hypomania, though they are not clear cut). Pt says that she feels very depressed and anxious for at least 4 y, but in the past 2-3 m, her depression and anxiety became worse due to a very poor social situation and insomnia. Pt couldn't tolerate zoloft (severe nausea). She also has glaucoma  1. Next appt in 1m tele 2.Did she move to [place] 3. Mattie says that she moved into her dad's apt (pt says she has to pay?) 4. Does she have enough food 3. Does she need klonopin  4. Labs results (I gave pt my fax) (she is supposed to see her PCP in March) 5. Trazodone doesn't work. We increased remeron to 30mg this time for insomnia and depression. Does it work (pt is 125/5.1, down from 135lbs) 6. Pt has glaucoma

## 2024-01-02 NOTE — SOCIAL HISTORY
[FreeTextEntry1] : Employment history:Unemployed last 7 years, worked for [employer] in the past. \par  Developmental history: Speech delay, in Special Ed. \par  Social supports (friends, Volunteers, club, AA meeting, other meetings ) ?: no\par  Meaningful Activities: Home schools her 3 kids, takes them to park, museums. \par  \par  \par  \par   \par  \par  \par

## 2024-01-03 ENCOUNTER — APPOINTMENT (OUTPATIENT)
Dept: PSYCHIATRY | Facility: CLINIC | Age: 34
End: 2024-01-03

## 2024-01-03 ENCOUNTER — OUTPATIENT (OUTPATIENT)
Dept: OUTPATIENT SERVICES | Facility: HOSPITAL | Age: 34
LOS: 1 days | End: 2024-01-03
Payer: MEDICAID

## 2024-01-03 DIAGNOSIS — F33.2 MAJOR DEPRESSIVE DISORDER, RECURRENT SEVERE WITHOUT PSYCHOTIC FEATURES: ICD-10-CM

## 2024-01-03 DIAGNOSIS — F33.1 MAJOR DEPRESSIVE DISORDER, RECURRENT, MODERATE: ICD-10-CM

## 2024-01-03 PROCEDURE — 90834 PSYTX W PT 45 MINUTES: CPT

## 2024-01-03 NOTE — REASON FOR VISIT
[Patient preference] : as per patient preference [Access issues (e.g., transportation, impaired mobility, etc.)] : due to patient's access issues [Continuity of care] : to ensure continuity of care [Telehealth (audio & video) - Individual/Group] : This visit was provided via telehealth using real-time 2-way audio visual technology. [Other Location: e.g. Home (Enter Location, City,State)___] : The provider was located at [unfilled]. [Home] : The patient, [unfilled], was located at home, [unfilled], at the time of the visit. [Verbal consent obtained from patient/other participant(s)] : Verbal consent for telehealth/telephonic services obtained from patient/other participant(s) [FreeTextEntry4] : 115pm [FreeTextEntry5] : 2pm [Patient] : Patient [FreeTextEntry1] : Psychotherapy follow up

## 2024-01-03 NOTE — PLAN
[FreeTextEntry2] : Objective A: Pt will explore and process feeling at every session, identifying at least 1 trigger of depression and anxiety, and review depression screenings and scales quarterly \par  Status of Objective: Initial New objective to help patient become aware of feelings and condition. \par  Objective B: Patient will learn and practice 2 coping skills in between scheduled session and comply with medication management medication recommendation. \par  \par  Status of Objective: Initial New objective to help patient learn and use coping skill. \par  Intervention(s): Therapist will help patient explore coping techniques, such as communication strategies, relaxation techniques. Therapist will help patient to expose and extinguish irrational beliefs that contribute to anxiety and depression. Therapist will assist patient in developing reality based, positive cognitive messages that will increase self-confidence and thereby decrease anxiety and depressive symptoms.\par  \par  Medication Management: every 1 months. \par  Individual Therapy: every 2 weeks. \par   [Cognitive and/or Behavior Therapy] : Cognitive and/or Behavior Therapy  [Exposure +/- Response Prevention] : Exposure +/- Response Prevention  [Psychoeducation] : Psychoeducation  [Supportive Therapy] : Supportive Therapy [Trauma Focused CBT] : Trauma Focused CBT [de-identified] : Patient seen for telehealth video for psychotherapy follow up. Patient has not been feeling well emotionally since moving from her mother's home to shared a rented apartment with her father in law.  Even though they have more space now, she is more limited regarding resources as she does not drive. Clinician assisted her with finding local food pantries near her new location, advised her to update her HRA rent assistance for her to receive at new location and encouraged her to leave VM at food pantries attached to churches and inquire if a volunteer would deliver food.  [place]code [phone]. Patient is stressed out because her father has a pitbull and he does not take care of him, leaves him without food and sometimes "doesnt even come home to sleep and I have to take care of the dog".  He got a citation for having the dog outside w/o a leash and they are forced to walk the dog.   Her  is always at work, works long hours and comes home at night.  Patient is irritated when reminded that kids should be in school as she feels overwhelmed with everything going on.  She said kids now that they have their own bedrooms sometimes dont listen and go to sleep late and she cant fall asleep until they do.    Clinician encouraged prioritizing, reminded her of tenant rights and even if she is late with rent she will not be evicted as the city does not want families without homes and will work with landlords to keep them in their homes.   Mother will be spending Herb with her although patient noted that their car is not working currently and  cannot take off.  Patient has a Health home from [facility] who might be able to assist her - [facility] [phone] Case management.  Active listening. Support provided.  [Return in ____ week(s)] : Return in [unfilled] week(s)

## 2024-01-04 DIAGNOSIS — F33.2 MAJOR DEPRESSIVE DISORDER, RECURRENT SEVERE WITHOUT PSYCHOTIC FEATURES: ICD-10-CM

## 2024-01-09 ENCOUNTER — NON-APPOINTMENT (OUTPATIENT)
Age: 34
End: 2024-01-09

## 2024-01-10 ENCOUNTER — APPOINTMENT (OUTPATIENT)
Dept: PSYCHIATRY | Facility: CLINIC | Age: 34
End: 2024-01-10

## 2024-01-10 ENCOUNTER — OUTPATIENT (OUTPATIENT)
Dept: OUTPATIENT SERVICES | Facility: HOSPITAL | Age: 34
LOS: 1 days | End: 2024-01-10
Payer: MEDICAID

## 2024-01-10 DIAGNOSIS — F33.1 MAJOR DEPRESSIVE DISORDER, RECURRENT, MODERATE: ICD-10-CM

## 2024-01-10 DIAGNOSIS — F41.1 GENERALIZED ANXIETY DISORDER: ICD-10-CM

## 2024-01-10 PROCEDURE — 90832 PSYTX W PT 30 MINUTES: CPT

## 2024-01-11 DIAGNOSIS — F41.1 GENERALIZED ANXIETY DISORDER: ICD-10-CM

## 2024-01-15 NOTE — REASON FOR VISIT
[Patient preference] : as per patient preference [Access issues (e.g., transportation, impaired mobility, etc.)] : due to patient's access issues [Continuity of care] : to ensure continuity of care [Telehealth (audio & video) - Individual/Group] : This visit was provided via telehealth using real-time 2-way audio visual technology. [Other Location: e.g. Home (Enter Location, City,State)___] : The provider was located at [unfilled]. [Home] : The patient, [unfilled], was located at home, [unfilled], at the time of the visit. [Verbal consent obtained from patient/other participant(s)] : Verbal consent for telehealth/telephonic services obtained from patient/other participant(s) [Patient] : Patient [FreeTextEntry4] : 2pm [FreeTextEntry5] : 230pm [FreeTextEntry1] : Psychotherapy follow up

## 2024-01-15 NOTE — PLAN
[Cognitive and/or Behavior Therapy] : Cognitive and/or Behavior Therapy  [Exposure +/- Response Prevention] : Exposure +/- Response Prevention  [Psychoeducation] : Psychoeducation  [Supportive Therapy] : Supportive Therapy [Trauma Focused CBT] : Trauma Focused CBT [Return in ____ week(s)] : Return in [unfilled] week(s) [FreeTextEntry2] : Objective A: Pt will explore and process feeling at every session, identifying at least 1 trigger of depression and anxiety, and review depression screenings and scales quarterly \par  Status of Objective: Initial New objective to help patient become aware of feelings and condition. \par  Objective B: Patient will learn and practice 2 coping skills in between scheduled session and comply with medication management medication recommendation. \par  \par  Status of Objective: Initial New objective to help patient learn and use coping skill. \par  Intervention(s): Therapist will help patient explore coping techniques, such as communication strategies, relaxation techniques. Therapist will help patient to expose and extinguish irrational beliefs that contribute to anxiety and depression. Therapist will assist patient in developing reality based, positive cognitive messages that will increase self-confidence and thereby decrease anxiety and depressive symptoms.\par  \par  Medication Management: every 1 months. \par  Individual Therapy: every 2 weeks. \par   [de-identified] : Patient seen for telehealth video for psychotherapy follow up. Patient left message for therapist to inquire about forms for SS.  Clinician spoke to SW at [facility] and was informed that forms are usually sent to Medical records however she did not receive forms faxed a week ago by writer.   Clinician spoke with patient and explained this information.  She was encouraged to reach out to  to request extension to get records and call medical records to request them.  She reported went to the school to enroll her children and even though they are "not thrilled" she will assess how they do and decide   .Pt shared that she has medical appointments coming up for herself and does not want to miss them.    Patient has a Health home from [facility] who might be able to assist her - [facility] [phone] Case management.  Active listening. Feelings validated.  Support provided.

## 2024-01-15 NOTE — PHYSICAL EXAM
[Cooperative] : cooperative [Depressed] : depressed [Anxious] : anxious [Constricted] : constricted [Clear] : clear [Linear/Goal Directed] : linear/goal directed [Depressive] : depressive [None Reported] : none reported [Average] : average [Pressured] : pressured [WNL] : within normal limits [FreeTextEntry8] : Feels overwhelmed with new home, children, medical and financial problems.

## 2024-01-18 NOTE — ED ADULT NURSE NOTE - EAR DISTURBANCES
Pt called for writer, he was wondering when that lifevest people would come and it was explained that we were waiting for a confirmation time and that writer would keep him posted of updates. He then said \"I'm not waiting 8 hours for something I dont think I want. So I dont want the lifevest at this time I would like to follow up in the future about it.\" Cardiology and Attending notified.    Meka Gomez RN     normal

## 2024-01-19 ENCOUNTER — OUTPATIENT (OUTPATIENT)
Dept: OUTPATIENT SERVICES | Facility: HOSPITAL | Age: 34
LOS: 1 days | End: 2024-01-19
Payer: MEDICAID

## 2024-01-19 ENCOUNTER — APPOINTMENT (OUTPATIENT)
Dept: PSYCHIATRY | Facility: CLINIC | Age: 34
End: 2024-01-19

## 2024-01-19 DIAGNOSIS — F33.1 MAJOR DEPRESSIVE DISORDER, RECURRENT, MODERATE: ICD-10-CM

## 2024-01-19 DIAGNOSIS — F33.2 MAJOR DEPRESSIVE DISORDER, RECURRENT SEVERE WITHOUT PSYCHOTIC FEATURES: ICD-10-CM

## 2024-01-19 PROCEDURE — 90834 PSYTX W PT 45 MINUTES: CPT

## 2024-01-19 NOTE — PLAN
[Cognitive and/or Behavior Therapy] : Cognitive and/or Behavior Therapy  [Exposure +/- Response Prevention] : Exposure +/- Response Prevention  [Psychoeducation] : Psychoeducation  [Supportive Therapy] : Supportive Therapy [Return in ____ week(s)] : Return in [unfilled] week(s) [FreeTextEntry2] : Objective A: Pt will explore and process feeling at every session, identifying at least 1 trigger of depression and anxiety, and review depression screenings and scales quarterly \par  Status of Objective: Initial New objective to help patient become aware of feelings and condition. \par  Objective B: Patient will learn and practice 2 coping skills in between scheduled session and comply with medication management medication recommendation. \par  \par  Status of Objective: Initial New objective to help patient learn and use coping skill. \par  Intervention(s): Therapist will help patient explore coping techniques, such as communication strategies, relaxation techniques. Therapist will help patient to expose and extinguish irrational beliefs that contribute to anxiety and depression. Therapist will assist patient in developing reality based, positive cognitive messages that will increase self-confidence and thereby decrease anxiety and depressive symptoms.\par  \par  Medication Management: every 1 months. \par  Individual Therapy: every 2 weeks. \par   [de-identified] : Patient seen for telehealth video for psychotherapy follow up.  Patient reports feeling overwhelmed about trying to get to kids enrolled in the new school., not getting in touch with school and not having enough money at the end of the month for basic necessities.   only goes to work leaves all the responsibilities to patient who feels she can not keep up with demands.  Clinician called [employer] and obtained information and email needed for patient to request her children's school records.  She voiced feeling some relief for being able to request records as she has been trying to but gets interrupted all the time.  She is also instructed to call SSA office and talk to assigned representative to see who is legal representative assigned to her case.   Reported that she stopped taking her medications "because I was getting dizzy, forgetting". Patient has a Health home from [facility] who might be able to assist her - [facility] [phone] Case management.  Active listening. Feelings validated.  Support provided.

## 2024-01-19 NOTE — PHYSICAL EXAM
[Unkept] : unkept [Cooperative] : cooperative [Depressed] : depressed [Anxious] : anxious [Constricted] : constricted [Clear] : clear [Pressured] : pressured [Linear/Goal Directed] : linear/goal directed [Depressive] : depressive [None Reported] : none reported [Average] : average [WNL] : within normal limits [FreeTextEntry1] : feeling overwhelmed [FreeTextEntry8] : Feels overwhelmed with new home, children, medical and financial problems.

## 2024-01-19 NOTE — END OF VISIT
[Teletherapy Service Provided] : The services provided in this session were delivered via tele-therapy [Individual Psychotherapy for 38-52 minutes] : Individual Psychotherapy for 38 - 52 minutes

## 2024-01-19 NOTE — REASON FOR VISIT
[Patient preference] : as per patient preference [Access issues (e.g., transportation, impaired mobility, etc.)] : due to patient's access issues [Continuity of care] : to ensure continuity of care [Telehealth (audio & video) - Individual/Group] : This visit was provided via telehealth using real-time 2-way audio visual technology. [Other Location: e.g. Home (Enter Location, City,State)___] : The provider was located at [unfilled]. [Home] : The patient, [unfilled], was located at home, [unfilled], at the time of the visit. [Verbal consent obtained from patient/other participant(s)] : Verbal consent for telehealth/telephonic services obtained from patient/other participant(s) [Patient] : Patient [FreeTextEntry4] : 10:15am [FreeTextEntry5] : 11am [FreeTextEntry1] : Psychotherapy follow up

## 2024-01-20 DIAGNOSIS — F33.2 MAJOR DEPRESSIVE DISORDER, RECURRENT SEVERE WITHOUT PSYCHOTIC FEATURES: ICD-10-CM

## 2024-01-24 ENCOUNTER — OUTPATIENT (OUTPATIENT)
Dept: OUTPATIENT SERVICES | Facility: HOSPITAL | Age: 34
LOS: 1 days | End: 2024-01-24
Payer: MEDICAID

## 2024-01-24 ENCOUNTER — APPOINTMENT (OUTPATIENT)
Dept: PSYCHIATRY | Facility: CLINIC | Age: 34
End: 2024-01-24

## 2024-01-24 DIAGNOSIS — F33.1 MAJOR DEPRESSIVE DISORDER, RECURRENT, MODERATE: ICD-10-CM

## 2024-01-24 DIAGNOSIS — F41.1 GENERALIZED ANXIETY DISORDER: ICD-10-CM

## 2024-01-24 PROCEDURE — 90832 PSYTX W PT 30 MINUTES: CPT

## 2024-01-24 NOTE — REASON FOR VISIT
[Patient preference] : as per patient preference [Access issues (e.g., transportation, impaired mobility, etc.)] : due to patient's access issues [Continuity of care] : to ensure continuity of care [Telehealth (audio & video) - Individual/Group] : This visit was provided via telehealth using real-time 2-way audio visual technology. [Other Location: e.g. Home (Enter Location, City,State)___] : The provider was located at [unfilled]. [Home] : The patient, [unfilled], was located at home, [unfilled], at the time of the visit. [Verbal consent obtained from patient/other participant(s)] : Verbal consent for telehealth/telephonic services obtained from patient/other participant(s) [Patient] : Patient [FreeTextEntry5] : [id number] [FreeTextEntry4] : 10:17am [FreeTextEntry1] : Psychotherapy follow up

## 2024-01-24 NOTE — END OF VISIT
[Teletherapy Service Provided] : The services provided in this session were delivered via tele-therapy [Individual Psychotherapy for 16-37 minutes] : Individual Psychotherapy for 16-37 minutes

## 2024-01-24 NOTE — PLAN
[Cognitive and/or Behavior Therapy] : Cognitive and/or Behavior Therapy  [Psychoeducation] : Psychoeducation  [Exposure +/- Response Prevention] : Exposure +/- Response Prevention  [Supportive Therapy] : Supportive Therapy [FreeTextEntry2] : Objective A: Pt will explore and process feeling at every session, identifying at least 1 trigger of depression and anxiety, and review depression screenings and scales quarterly \par  Status of Objective: Initial New objective to help patient become aware of feelings and condition. \par  Objective B: Patient will learn and practice 2 coping skills in between scheduled session and comply with medication management medication recommendation. \par  \par  Status of Objective: Initial New objective to help patient learn and use coping skill. \par  Intervention(s): Therapist will help patient explore coping techniques, such as communication strategies, relaxation techniques. Therapist will help patient to expose and extinguish irrational beliefs that contribute to anxiety and depression. Therapist will assist patient in developing reality based, positive cognitive messages that will increase self-confidence and thereby decrease anxiety and depressive symptoms.\par  \par  Medication Management: every 1 months. \par  Individual Therapy: every 2 weeks. \par   [de-identified] : Patient seen for telehealth video for psychotherapy follow up. Patient reports that she has completed paperwork for school to enroll her children.  Discussed new schedule with kids and the need to start making changes to the bedtime, wake up and eating schedules.   Shared feeling tired as she is not sleeping well with worries, taking care of the kids and demands.  Medically, her fissure has burst twice in the last month when she says it usually only happens once.  She knows that her stress level is increased and she needs to work on it.  She is encouraged to prioritize her health and leave for a later time what can wait.   She said her  told her she received medical records for her medical diagnosis and now she is in need of her psychiatric part.  Patient has a Health home from [facility] who might be able to assist her - [facility] [phone] . Encouraged to ask her to set up transportation for her medical appointments until she can move her treatment close to home.  Case management.  Active listening. Feelings validated.  Support provided.  [Return in ____ week(s)] : Return in [unfilled] week(s) [FreeTextEntry1] : Reminded of appt with Dr. Canales

## 2024-01-25 DIAGNOSIS — F41.1 GENERALIZED ANXIETY DISORDER: ICD-10-CM

## 2024-02-01 ENCOUNTER — APPOINTMENT (OUTPATIENT)
Dept: PSYCHIATRY | Facility: CLINIC | Age: 34
End: 2024-02-01

## 2024-02-01 ENCOUNTER — APPOINTMENT (OUTPATIENT)
Dept: PSYCHIATRY | Facility: CLINIC | Age: 34
End: 2024-02-01
Payer: MEDICAID

## 2024-02-01 ENCOUNTER — OUTPATIENT (OUTPATIENT)
Dept: OUTPATIENT SERVICES | Facility: HOSPITAL | Age: 34
LOS: 1 days | End: 2024-02-01
Payer: MEDICAID

## 2024-02-01 DIAGNOSIS — F51.05 INSOMNIA DUE TO OTHER MENTAL DISORDER: ICD-10-CM

## 2024-02-01 DIAGNOSIS — F33.2 MAJOR DEPRESSIVE DISORDER, RECURRENT SEVERE WITHOUT PSYCHOTIC FEATURES: ICD-10-CM

## 2024-02-01 DIAGNOSIS — F41.1 GENERALIZED ANXIETY DISORDER: ICD-10-CM

## 2024-02-01 PROCEDURE — 99214 OFFICE O/P EST MOD 30 MIN: CPT | Mod: 95

## 2024-02-01 PROCEDURE — 99214 OFFICE O/P EST MOD 30 MIN: CPT

## 2024-02-01 RX ORDER — CLONAZEPAM 0.5 MG/1
0.5 TABLET ORAL DAILY
Qty: 30 | Refills: 0 | Status: DISCONTINUED | OUTPATIENT
Start: 2023-03-27 | End: 2024-02-01

## 2024-02-01 NOTE — REASON FOR VISIT
[Patient preference] : as per patient preference [Telehealth (audio & video) - Individual/Group] : This visit was provided via telehealth using real-time 2-way audio visual technology. [Medical Office: ([facility])___] : The provider was located at the medical office in [unfilled]. [Home] : The patient, [unfilled], was located at home, [unfilled], at the time of the visit. [Verbal consent obtained from patient/other participant(s)] : Verbal consent for telehealth/telephonic services obtained from patient/other participant(s) [Collateral without patient] : Collateral without patient [Number can be texted] : number can be texted [OK  to leave message] : OK  to leave message [Primary Care] : Primary Care [[facility] Provider/Facility] : [facility] Provider/Facility [Patient] : Patient [Prior Medical Records] : Prior Medical Records [TextBox_17] : Pt's  [FreeTextEntry3] : [email or web address] [FreeTextEntry5] : English [FreeTextEntry6] : Sara [FreeTextEntry2] : Anxiety/Depression [FreeTextEntry1] : Anxiety/Depression due to medical complications.

## 2024-02-01 NOTE — PHYSICAL EXAM
[Slowed] : slowed [Cooperative] : cooperative [Depressed] : depressed [Anxious] : anxious [Constricted] : constricted [Clear] : clear [Linear/Goal Directed] : linear/goal directed [None] : none [Depressive] : depressive [Phobic] : phobic [None Reported] : none reported [Average] : average [WNL] : within normal limits [Mild] : mild [FreeTextEntry8] : depressed [0] : 0 - Absent [1] : 1 - Mild [3] : 3 - Expresses feelings of discouragement, despair, pessimism about future, which can be dispelled [2] : 2 - Spontaneously indicates feelings of worthlessness

## 2024-02-01 NOTE — REASON FOR VISIT
[Patient preference] : as per patient preference [Telehealth (audio & video) - Individual/Group] : This visit was provided via telehealth using real-time 2-way audio visual technology. [Medical Office: ([facility])___] : The provider was located at the medical office in [unfilled]. [Home] : The patient, [unfilled], was located at home, [unfilled], at the time of the visit. [Verbal consent obtained from patient/other participant(s)] : Verbal consent for telehealth/telephonic services obtained from patient/other participant(s) [Collateral without patient] : Collateral without patient [TextBox_17] : Pt's  [Number can be texted] : number can be texted [OK  to leave message] : OK  to leave message [FreeTextEntry3] : [email or web address] [FreeTextEntry5] : English [FreeTextEntry6] : Sara [Primary Care] : Primary Care [[facility] Provider/Facility] : [facility] Provider/Facility [Patient] : Patient [Prior Medical Records] : Prior Medical Records [FreeTextEntry2] : Anxiety/Depression [FreeTextEntry1] : Anxiety/Depression due to medical complications.

## 2024-02-01 NOTE — DISCUSSION/SUMMARY
[FreeTextEntry1] : Solo/tele session.  Pt says that she moved to a 3 bedroom apt in [place] and has a very difficult time. She has a significant worsening of her medical situation. she says that she has numerous clots in her legs and they are very painful. She also has severe swelling of her legs and this makes her pain even worse. She also says that her SS check didn't come on time to pay the rent and her  can't stay home with the kids and she can't   go to ER because they are home schooled and she can't leave them alone, etc. So, pt has a very difficult situation at this time. The matter was discussed at length with her and also I spoke to Mattie. Pt doesn't have her care established in [place] yet. She says that she is afraid to call medical offices to connect to specialists up there. Pt doesn't sleep well. She is somewhat overweight. I recommended to see her PCP for sleep study. She doesn't want. She says that she is stressed and denies any possible underlying medical causes of her insomnia. Pt takes remeron(trazodone didn't work). We will increase it to 30mg.  Spoke a lot about her medical issues and coping with them. Education and supportive therapy were provided. Pt says that she is under a lot of stress. Her mom is sick and getting worse after CVA.  Adamantly denies SI/HI/AH/PI/SP/intent. Pt's mom had CVA. Pt was Dx with glaucoma, she lost her 2 teeth, etc.  Pt says that klonopin works and asks to continue to prescribe it.  Denies SI/HI/AH/PI. Pt sats that she lost weight (125/5.1. Down from 135) Pt was instructed to call 911/go to ER in case of an emergency.  Pt s 32 y/o F,  for 13 years, has 3 children 10 y/o F, 7 y/o M, and 5 y/o M., just moved to an apt in [place] (used to live all 5 people together in 1 room in a house renting from her mother-in-law. Mother-in-law rents out rooms to strangers "she takes in immigrants").  works full time. No ACS involvement),   home schooling her 3 children  due to fear of them getting killed, she stated they went to [employer] and recently "so many shootouts in the parking lot" decided to home school her kids,  currently unemployed( due to Crohn's disease), with a complicated PMH(diagnosed with ulcerative colitis 7 years ago, and last year diagnosed with Crohn's, she had an abscess and had to have 2 surgeries), DVT (on eliquis), on prednisone for many years, no prior IPP, no SA, no DIPTI, never on psych meds or in psych care, until very recently, when dr. Chiu prescribed her 25 mg of zoloft  Never had a psychiatrist, no therapy. (though was in counseling in school).   Denies h/o franck (though she has family h/o BPD and also she reports some vague symptoms of hypomania, though they are not clear cut). Pt says that she feels very depressed and anxious for at least 4 y, but in the past 2-3 m, her depression and anxiety became worse due to a very poor social situation and insomnia. Pt couldn't tolerate zoloft (severe nausea). She also has glaucoma  1. Next appt in 1m tele 2.Did she move to [place] 3. Mattie says that she moved into her dad's apt (pt says she has to pay?) 4. Does she have enough food 3. Does she need klonopin  4. Labs results (I gave pt my fax) (she is supposed to see her PCP in March) 5. Trazodone doesn't work. We increased remeron to 30mg this time for insomnia and depression. Does it work (pt is 125/5.1, down from 135lbs) 6. Pt has glaucoma

## 2024-02-01 NOTE — SOCIAL HISTORY
[FreeTextEntry1] : Employment history:Unemployed last 7 years, worked for [employer] in the past. \par  Developmental history: Speech delay, in Special Ed. \par  Social supports (friends, Volunteers, club, AA meeting, other meetings ) ?: no\par  Meaningful Activities: Home schools her 3 kids, takes them to park, museums. \par  \par  \par  \par   \par  \par  \par

## 2024-02-01 NOTE — SOCIAL HISTORY
[FreeTextEntry1] : Employment history:Unemployed last 7 years, worked for [employer] in the past. \par  Developmental history: Speech delay, in Special Ed. \par  Social supports (friends, Volunteers, club, AA meeting, other meetings ) ?: no\par  Meaningful Activities: Home schools her 3 kids, takes them to park, museums. \par  \par  \par  \par   \par  \par  \par

## 2024-02-01 NOTE — HISTORY OF PRESENT ILLNESS
[Not Applicable] : Not applicable [FreeTextEntry1] : Solo/tele session.  Pt was off all her meds because  she couldn't get it and she felt much worse. She got her meds and resumed them. Pt has a lot of overwhelming social problems along with medical problems that also got worse. Pt needs more social support and that was discussed. She doesn't take ambien and I will d/c it. She will continue other meds, including klonopin PRN. Pt says that she moved to a 3 bedroom apt in [place] and has a very difficult time. Pt says that she has numerous clots in her legs and they are very painful. She also has severe swelling of her legs and this makes her pain even worse. She also says that her SS check didn't come on time to pay the rent and her  can't stay home with the kids and she can't   go to ER because they are home schooled and she can't leave them alone, etc. So, pt has a very difficult situation at this time. The matter was discussed at length with her and also I spoke to Mattie. Pt doesn't have her care established in [place] yet. Her , Ainsley, made medical appts for her for June. No psych arrangements were made yet. Pt doesn't sleep well. She is somewhat overweight. I recommended to see her PCP for sleep study. She doesn't want. She says that she is stressed and denies any possible underlying medical causes of her insomnia. Pt takes remeron(trazodone didn't work).   Spoke a lot about her medical issues and coping with them. Education and supportive therapy were provided. Pt says that she is under a lot of stress. Her mom is sick and getting worse after CVA.  Adamantly denies SI/HI/AH/PI/SP/intent. Pt's mom had CVA. Pt was Dx with glaucoma, she lost her 2 teeth, etc.  Pt says that klonopin works and asks to continue to prescribe it.  Denies SI/HI/AH/PI. Pt sats that she lost weight (125/5.1. Down from 135) Pt was instructed to call 911/go to ER in case of an emergency. [FreeTextEntry2] : Never had a psychiatrist, no therapy.   [FreeTextEntry3] : Dr Aram MD is her provider and 7 days ago started her on Zoloft 25 mg.  Minoxidil Pregnancy And Lactation Text: This medication has not been assigned a Pregnancy Risk Category but animal studies failed to show danger with the topical medication. It is unknown if the medication is excreted in breast milk.

## 2024-02-01 NOTE — FAMILY HISTORY
[FreeTextEntry1] : Family composition:Sara is 33 y/o F,  for 13 years, has 3 children 10 y/o F, 9 y/o M, and 7 y/o M. Domiciled together in a house renting a room from her mother-in-law. \par  Family history and background: [place]. \par  Family relationship: good.\par  Pertinent Family Medical, MH and Substance Use History including Adult Child of Alcoholic and child of substance abuse status; history of cancer and heart disease:\par  Father: Headaches, heart disease, lung disease, diabetes.\par  Mother:Anemia, Asthma, high blood pressure, cancer, r/o BPD\par  Grandmother:Anemia, diabetes, Thyroid\par  Grandfather: Thyroid, Diabetes,\par  Sister: Menstrual Pain.\par  \par  Cultural Assessment:\par  Race/ethnicity: Bolivian\par  Sexual identity: Female\par  Spirituality/Hinduism:no\par  \par

## 2024-02-01 NOTE — HISTORY OF PRESENT ILLNESS
[FreeTextEntry1] : Solo/tele session.  Pt says that she moved to a 3 bedroom apt in [place] and has a very difficult time. She has a significant worsening of her medical situation. she says that she has numerous clots in her legs and they are very painful. She also has severe swelling of her legs and this makes her pain even worse. She also says that her SS check didn't come on time to pay the rent and her  can't stay home with the kids and she can't   go to ER because they are home schooled and she can't leave them alone, etc. So, pt has a very difficult situation at this time. The matter was discussed at length with her and also I spoke to Mattie. Pt doesn't have her care established in [place] yet. She says that she is afraid to call medical offices to connect to specialists up there. Pt doesn't sleep well. She is somewhat overweight. I recommended to see her PCP for sleep study. She doesn't want. She says that she is stressed and denies any possible underlying medical causes of her insomnia. Pt takes remeron(trazodone didn't work). We will increase it to 30mg.  Spoke a lot about her medical issues and coping with them. Education and supportive therapy were provided. Pt says that she is under a lot of stress. Her mom is sick and getting worse after CVA.  Adamantly denies SI/HI/AH/PI/SP/intent. Pt's mom had CVA. Pt was Dx with glaucoma, she lost her 2 teeth, etc.  Pt says that klonopin works and asks to continue to prescribe it.  Denies SI/HI/AH/PI. Pt sats that she lost weight (125/5.1. Down from 135) Pt was instructed to call 911/go to ER in case of an emergency. [Not Applicable] : Not applicable [FreeTextEntry2] : Never had a psychiatrist, no therapy.   [FreeTextEntry3] : Dr Aram MD is her provider and 7 days ago started her on Zoloft 25 mg.

## 2024-02-01 NOTE — DISCUSSION/SUMMARY
[FreeTextEntry1] : Solo/tele session.  Pt was off all her meds because  she couldn't get it and she felt much worse. She got her meds and resumed them. Pt has a lot of overwhelming social problems along with medical problems that also got worse. Pt needs more social support and that was discussed. She doesn't take ambien and I will d/c it. She will continue other meds, including klonopin PRN. Pt says that she moved to a 3 bedroom apt in [place] and has a very difficult time. Pt says that she has numerous clots in her legs and they are very painful. She also has severe swelling of her legs and this makes her pain even worse. She also says that her SS check didn't come on time to pay the rent and her  can't stay home with the kids and she can't   go to ER because they are home schooled and she can't leave them alone, etc. So, pt has a very difficult situation at this time. The matter was discussed at length with her and also I spoke to Mattie. Pt doesn't have her care established in [place] yet. Her , Ainsley, made medical appts for her for June. No psych arrangements were made yet. Pt doesn't sleep well. She is somewhat overweight. I recommended to see her PCP for sleep study. She doesn't want. She says that she is stressed and denies any possible underlying medical causes of her insomnia. Pt takes remeron(trazodone didn't work).   Spoke a lot about her medical issues and coping with them. Education and supportive therapy were provided. Pt says that she is under a lot of stress. Her mom is sick and getting worse after CVA.  Adamantly denies SI/HI/AH/PI/SP/intent. Pt's mom had CVA. Pt was Dx with glaucoma, she lost her 2 teeth, etc.  Pt says that klonopin works and asks to continue to prescribe it.  Denies SI/HI/AH/PI. Pt sats that she lost weight (125/5.1. Down from 135) Pt was instructed to call 911/go to ER in case of an emergency.  Pt s 32 y/o F,  for 13 years, has 3 children 10 y/o F, 9 y/o M, and 7 y/o M., just moved to an apt in [place] (used to live all 5 people together in 1 room in a house renting from her mother-in-law. Mother-in-law rents out rooms to strangers "she takes in immigrants").  works full time. No ACS involvement),   home schooling her 3 children  due to fear of them getting killed, she stated they went to [employer] and recently "so many shootouts in the parking lot" decided to home school her kids,  currently unemployed( due to Crohn's disease), with a complicated PMH(diagnosed with ulcerative colitis 7 years ago, and last year diagnosed with Crohn's, she had an abscess and had to have 2 surgeries), DVT (on eliquis), on prednisone for many years, no prior IPP, no SA, no DIPTI, never on psych meds or in psych care, until very recently, when dr. Chiu prescribed her 25 mg of zoloft  Never had a psychiatrist, no therapy. (though was in counseling in school).   Denies h/o franck (though she has family h/o BPD and also she reports some vague symptoms of hypomania, though they are not clear cut). Pt says that she feels very depressed and anxious for at least 4 y, but in the past 2-3 m, her depression and anxiety became worse due to a very poor social situation and insomnia. Pt couldn't tolerate zoloft (severe nausea). She also has glaucoma  1. Next appt in 1m tele 2.Pt pays 1050 for her apt 4. Does she have enough food 3. Does she need klonopin  4. Labs results (I gave pt my fax) (she is supposed to see her PCP in March) 5. Trazodone doesn't work. We increased remeron to 30mg this time for insomnia and depression. Does it work (pt is 125/5.1, down from 135lbs) 6. Pt has glaucoma

## 2024-02-01 NOTE — RISK ASSESSMENT
[No, patient denies ideation or behavior] : No, patient denies ideation or behavior [FreeTextEntry8] : Pt is not in imminent risk of hurting self or others at this time. However, her chronic risk of self-harm is elevated due to her social situation/problems , depression, impulsivity [Clinical Interview] : Clinical Interview [Yes] : 1. Passive Ideation: Have you wished you were dead or wished you could go to sleep and not wake up? Yes [Mood disorder] : mood disorder [PTSD] : PTSD [Depressed mood/Anhedonia] : depressed mood/anhedonia [Hopelessness or despair] : hopelessness or despair [Global insomnia] : global insomnia [Severe anxiety, agitation or panic] : severe anxiety, agitation or panic [History of abuse/trauma] : history of abuse/trauma [Chronic pain/other acute medical condition] : chronic pain or other acute medical condition [Identifies reasons for living] : identifies reasons for living [Ability to cope with stress] : ability to cope with stress [Responsibility to children, family, or others] : responsibility to children, family, or others [Beloved pets] : beloved pets [None in the patient's lifetime] : None in the patient's lifetime [None Known] : none known [No known risk factors] : No known risk factors [Residential stability] : residential stability [Relationship stability] : relationship stability [No] : no

## 2024-02-01 NOTE — FAMILY HISTORY
[FreeTextEntry1] : Family composition:Sara is 33 y/o F,  for 13 years, has 3 children 10 y/o F, 9 y/o M, and 5 y/o M. Domiciled together in a house renting a room from her mother-in-law. \par  Family history and background: [place]. \par  Family relationship: good.\par  Pertinent Family Medical, MH and Substance Use History including Adult Child of Alcoholic and child of substance abuse status; history of cancer and heart disease:\par  Father: Headaches, heart disease, lung disease, diabetes.\par  Mother:Anemia, Asthma, high blood pressure, cancer, r/o BPD\par  Grandmother:Anemia, diabetes, Thyroid\par  Grandfather: Thyroid, Diabetes,\par  Sister: Menstrual Pain.\par  \par  Cultural Assessment:\par  Race/ethnicity: Liberian\par  Sexual identity: Female\par  Spirituality/Restorationist:no\par  \par

## 2024-02-02 DIAGNOSIS — F51.05 INSOMNIA DUE TO OTHER MENTAL DISORDER: ICD-10-CM

## 2024-02-02 DIAGNOSIS — F41.1 GENERALIZED ANXIETY DISORDER: ICD-10-CM

## 2024-02-02 DIAGNOSIS — F33.2 MAJOR DEPRESSIVE DISORDER, RECURRENT SEVERE WITHOUT PSYCHOTIC FEATURES: ICD-10-CM

## 2024-02-05 ENCOUNTER — OUTPATIENT (OUTPATIENT)
Dept: OUTPATIENT SERVICES | Facility: HOSPITAL | Age: 34
LOS: 1 days | End: 2024-02-05
Payer: MEDICAID

## 2024-02-05 ENCOUNTER — APPOINTMENT (OUTPATIENT)
Dept: PSYCHIATRY | Facility: CLINIC | Age: 34
End: 2024-02-05

## 2024-02-05 DIAGNOSIS — F41.1 GENERALIZED ANXIETY DISORDER: ICD-10-CM

## 2024-02-05 DIAGNOSIS — F33.1 MAJOR DEPRESSIVE DISORDER, RECURRENT, MODERATE: ICD-10-CM

## 2024-02-05 PROCEDURE — 90832 PSYTX W PT 30 MINUTES: CPT

## 2024-02-05 NOTE — REASON FOR VISIT
[Patient preference] : as per patient preference [Access issues (e.g., transportation, impaired mobility, etc.)] : due to patient's access issues [Continuity of care] : to ensure continuity of care [Telehealth (audio & video) - Individual/Group] : This visit was provided via telehealth using real-time 2-way audio visual technology. [Other Location: e.g. Home (Enter Location, City,State)___] : The provider was located at [unfilled]. [Home] : The patient, [unfilled], was located at home, [unfilled], at the time of the visit. [Verbal consent obtained from patient/other participant(s)] : Verbal consent for telehealth/telephonic services obtained from patient/other participant(s) [Patient] : Patient [FreeTextEntry4] : 11am [FreeTextEntry5] : 1132am [FreeTextEntry1] : Psychotherapy follow up

## 2024-02-05 NOTE — PLAN
[Cognitive and/or Behavior Therapy] : Cognitive and/or Behavior Therapy  [Exposure +/- Response Prevention] : Exposure +/- Response Prevention  [Psychoeducation] : Psychoeducation  [Supportive Therapy] : Supportive Therapy [FreeTextEntry2] : Objective A: Pt will explore and process feeling at every session, identifying at least 1 trigger of depression and anxiety, and review depression screenings and scales quarterly \par  Status of Objective: Initial New objective to help patient become aware of feelings and condition. \par  Objective B: Patient will learn and practice 2 coping skills in between scheduled session and comply with medication management medication recommendation. \par  \par  Status of Objective: Initial New objective to help patient learn and use coping skill. \par  Intervention(s): Therapist will help patient explore coping techniques, such as communication strategies, relaxation techniques. Therapist will help patient to expose and extinguish irrational beliefs that contribute to anxiety and depression. Therapist will assist patient in developing reality based, positive cognitive messages that will increase self-confidence and thereby decrease anxiety and depressive symptoms.\par  \par  Medication Management: every 1 months. \par  Individual Therapy: every 2 weeks. \par   [de-identified] : Patient seen for telehealth video for psychotherapy follow up. Reported that she took kids to school today but was not able to leave them because she did not have their birth certificates with her and her  has to get them at his mother's house.  Will try again tomorrow.  Patient shared that they believe her mother had a relapse of her cancer because of how she is behaving now.  Father also was diagnosed with Dementia but according to patient he is following up with appointments.  He is having issues with remembering how to get home with he is driving.  Family is having a hard time now and with no one to help them..  Once her kids go to school she will be able to address her medical issues and help them a bit more.  Ebonie was able to schedule an appt with primary care close to her and will be seeing him on Friday.  Patient has a Health home from [facility] who might be able to assist her - Ebonie [phone] . Encouraged to ask her to set up transportation for her medical appointments until she can move her treatment close to home.  Case management.  Active listening. Feelings validated.  Support provided.  [Return in ____ week(s)] : Return in [unfilled] week(s)

## 2024-02-06 DIAGNOSIS — F41.1 GENERALIZED ANXIETY DISORDER: ICD-10-CM

## 2024-02-09 ENCOUNTER — LABORATORY RESULT (OUTPATIENT)
Age: 34
End: 2024-02-09

## 2024-02-09 ENCOUNTER — NON-APPOINTMENT (OUTPATIENT)
Age: 34
End: 2024-02-09

## 2024-02-09 ENCOUNTER — APPOINTMENT (OUTPATIENT)
Dept: INTERNAL MEDICINE | Facility: CLINIC | Age: 34
End: 2024-02-09
Payer: MEDICAID

## 2024-02-09 VITALS
HEIGHT: 61 IN | WEIGHT: 123 LBS | DIASTOLIC BLOOD PRESSURE: 72 MMHG | SYSTOLIC BLOOD PRESSURE: 111 MMHG | BODY MASS INDEX: 23.22 KG/M2 | HEART RATE: 102 BPM | RESPIRATION RATE: 12 BRPM | TEMPERATURE: 97.8 F | OXYGEN SATURATION: 96 %

## 2024-02-09 DIAGNOSIS — Z00.00 ENCOUNTER FOR GENERAL ADULT MEDICAL EXAMINATION W/OUT ABNORMAL FINDINGS: ICD-10-CM

## 2024-02-09 PROCEDURE — 99385 PREV VISIT NEW AGE 18-39: CPT

## 2024-02-09 RX ORDER — SULFAMETHOXAZOLE AND TRIMETHOPRIM 800; 160 MG/1; MG/1
800-160 TABLET ORAL TWICE DAILY
Qty: 20 | Refills: 0 | Status: DISCONTINUED | COMMUNITY
Start: 2021-09-09 | End: 2024-02-09

## 2024-02-13 NOTE — HISTORY OF PRESENT ILLNESS
[de-identified] : 33 years old female with history of IBD /ulcerative colitis/Crohn's disease, patient on long term steroids, asthma, DVTs on Eliquis/history of rectal abscess status post multiple I&D's /generalized anxiety disorder/panic attacks who presents to establish care. Patient following with psychiatrist regularly.  She is not following currently with any gastroenterologist.  She continues to have loose bowel movements, denies any blood in the stool. 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

## 2024-02-13 NOTE — REVIEW OF SYSTEMS
[Abdominal Pain] : no abdominal pain [Nausea] : no nausea [Diarrhea] : diarrhea [Vomiting] : no vomiting [Melena] : no melena [Negative] : Psychiatric

## 2024-02-13 NOTE — PHYSICAL EXAM
[No Acute Distress] : no acute distress [Well Nourished] : well nourished [Well Developed] : well developed [Well-Appearing] : well-appearing [Normal Sclera/Conjunctiva] : normal sclera/conjunctiva [PERRL] : pupils equal round and reactive to light [EOMI] : extraocular movements intact [Normal Outer Ear/Nose] : the outer ears and nose were normal in appearance [Normal Oropharynx] : the oropharynx was normal [Normal TMs] : both tympanic membranes were normal [No JVD] : no jugular venous distention [No Lymphadenopathy] : no lymphadenopathy [Supple] : supple [Thyroid Normal, No Nodules] : the thyroid was normal and there were no nodules present [No Respiratory Distress] : no respiratory distress  [No Accessory Muscle Use] : no accessory muscle use [Clear to Auscultation] : lungs were clear to auscultation bilaterally [Normal Rate] : normal rate  [Regular Rhythm] : with a regular rhythm [Normal S1, S2] : normal S1 and S2 [No Murmur] : no murmur heard [No Carotid Bruits] : no carotid bruits [Pedal Pulses Present] : the pedal pulses are present [No Edema] : there was no peripheral edema [Soft] : abdomen soft [Non Tender] : non-tender [Non-distended] : non-distended [No Masses] : no abdominal mass palpated [Normal Bowel Sounds] : normal bowel sounds [Normal Posterior Cervical Nodes] : no posterior cervical lymphadenopathy [Normal Anterior Cervical Nodes] : no anterior cervical lymphadenopathy [No CVA Tenderness] : no CVA  tenderness [No Spinal Tenderness] : no spinal tenderness [No Joint Swelling] : no joint swelling [No Rash] : no rash [Grossly Normal Strength/Tone] : grossly normal strength/tone [No Focal Deficits] : no focal deficits [Normal Gait] : normal gait [Deep Tendon Reflexes (DTR)] : deep tendon reflexes were 2+ and symmetric [Normal Affect] : the affect was normal [Normal Insight/Judgement] : insight and judgment were intact

## 2024-02-13 NOTE — HEALTH RISK ASSESSMENT
[0] : 1) Little interest or pleasure doing things: Not at all (0) [1] : 2) Feeling down, depressed, or hopeless for several days (1) [PHQ-2 Negative - No further assessment needed] : PHQ-2 Negative - No further assessment needed [No] : No [PapSmearDate] : 2022 [ColonoscopyDate] : 2022 [Never] : Never

## 2024-02-16 ENCOUNTER — APPOINTMENT (OUTPATIENT)
Dept: PSYCHIATRY | Facility: CLINIC | Age: 34
End: 2024-02-16

## 2024-02-16 NOTE — DISCUSSION/SUMMARY
[FreeTextEntry1] : Patient was unable to keep session as she was in pharmacy picking up meds and phone was dying. Her children started in person  school a week ago.   Rescheduled in 2 weeks upon clinician's return from time off.

## 2024-02-22 ENCOUNTER — APPOINTMENT (OUTPATIENT)
Dept: INTERNAL MEDICINE | Facility: CLINIC | Age: 34
End: 2024-02-22
Payer: MEDICAID

## 2024-02-22 DIAGNOSIS — K51.90 ULCERATIVE COLITIS, UNSPECIFIED, W/OUT COMPLICATIONS: ICD-10-CM

## 2024-02-22 DIAGNOSIS — R79.89 OTHER SPECIFIED ABNORMAL FINDINGS OF BLOOD CHEMISTRY: ICD-10-CM

## 2024-02-22 LAB
25(OH)D3 SERPL-MCNC: 12.4 NG/ML
ALBUMIN SERPL ELPH-MCNC: 3.8 G/DL
ALP BLD-CCNC: 39 U/L
ALT SERPL-CCNC: 25 U/L
ANION GAP SERPL CALC-SCNC: 11 MMOL/L
APPEARANCE: ABNORMAL
AST SERPL-CCNC: 23 U/L
BASOPHILS # BLD AUTO: 0.02 K/UL
BASOPHILS NFR BLD AUTO: 0.1 %
BILIRUB SERPL-MCNC: 0.2 MG/DL
BILIRUBIN URINE: NEGATIVE
BLOOD URINE: NEGATIVE
BUN SERPL-MCNC: 22 MG/DL
CALCIUM SERPL-MCNC: 8.7 MG/DL
CHLORIDE SERPL-SCNC: 105 MMOL/L
CHOLEST SERPL-MCNC: 276 MG/DL
CO2 SERPL-SCNC: 25 MMOL/L
COLOR: YELLOW
CREAT SERPL-MCNC: 1.03 MG/DL
EGFR: 73 ML/MIN/1.73M2
EOSINOPHIL # BLD AUTO: 0 K/UL
EOSINOPHIL NFR BLD AUTO: 0 %
ESTIMATED AVERAGE GLUCOSE: 114 MG/DL
GLUCOSE QUALITATIVE U: 250 MG/DL
GLUCOSE SERPL-MCNC: 104 MG/DL
HBA1C MFR BLD HPLC: 5.6 %
HCT VFR BLD CALC: 37.3 %
HDLC SERPL-MCNC: 89 MG/DL
HGB BLD-MCNC: 11.9 G/DL
IMM GRANULOCYTES NFR BLD AUTO: 1.1 %
KETONES URINE: NEGATIVE MG/DL
LDLC SERPL CALC-MCNC: 177 MG/DL
LEUKOCYTE ESTERASE URINE: NEGATIVE
LYMPHOCYTES # BLD AUTO: 0.45 K/UL
LYMPHOCYTES NFR BLD AUTO: 3.2 %
MAN DIFF?: NORMAL
MCHC RBC-ENTMCNC: 27.7 PG
MCHC RBC-ENTMCNC: 31.9 GM/DL
MCV RBC AUTO: 86.9 FL
MONOCYTES # BLD AUTO: 0.17 K/UL
MONOCYTES NFR BLD AUTO: 1.2 %
NEUTROPHILS # BLD AUTO: 13.37 K/UL
NEUTROPHILS NFR BLD AUTO: 94.4 %
NITRITE URINE: NEGATIVE
NONHDLC SERPL-MCNC: 187 MG/DL
PH URINE: 8
PLATELET # BLD AUTO: 251 K/UL
POTASSIUM SERPL-SCNC: 4.4 MMOL/L
PROT SERPL-MCNC: 6.5 G/DL
PROTEIN URINE: 30 MG/DL
RBC # BLD: 4.29 M/UL
RBC # FLD: 17.2 %
SODIUM SERPL-SCNC: 140 MMOL/L
SPECIFIC GRAVITY URINE: 1.03
TRIGL SERPL-MCNC: 63 MG/DL
TSH SERPL-ACNC: 0.14 UIU/ML
UROBILINOGEN URINE: 0.2 MG/DL
VIT B12 SERPL-MCNC: 1268 PG/ML
WBC # FLD AUTO: 14.17 K/UL

## 2024-02-22 PROCEDURE — 99214 OFFICE O/P EST MOD 30 MIN: CPT

## 2024-02-22 RX ORDER — ERGOCALCIFEROL 1.25 MG/1
1.25 MG CAPSULE ORAL
Qty: 6 | Refills: 1 | Status: ACTIVE | COMMUNITY
Start: 2024-02-22 | End: 1900-01-01

## 2024-02-22 NOTE — HISTORY OF PRESENT ILLNESS
[Home] : at home, [unfilled] , at the time of the visit. [Medical Office: ([facility])___] : at the medical office located in  [de-identified] : Sara Godwin is a 33 years old female with history of IBD /ulcerative colitis/Crohn's disease, patient on long term steroids, asthma, DVTs on Eliquis/history of rectal abscess status post multiple I&D's /generalized anxiety disorder/panic attacks who presents for a follow up on recent abn  bloodwork via TELEHEALTH.  Recent bloodwork showed that WBC was elevated, and she has Low TSH, but T4 normal. Patient states that she has not been following with a hematologist. She has been long term steroids and has a fhx of elevated WBC (mother). Pt also has elevated cholesterol and low Vit D. She admits that she has not been exercising frequently or watching her diet closely. She is not currently taking any Vit D OTC supplements. She denies any chest pain, shortness of breath, dizziness at present. She has an appointment to see gastroenterologist tomorrow for ulcerative colitis.

## 2024-02-22 NOTE — REVIEW OF SYSTEMS
[Negative] : Heme/Lymph [Abdominal Pain] : no abdominal pain [Diarrhea] : diarrhea [Vomiting] : no vomiting

## 2024-02-22 NOTE — PLAN
[FreeTextEntry1] : See plan  Low TSH, but T4 normal Likely due to chronic steroid use Will monitor TFTs / endo referral  Elevated WBC Likely due to ulcerated colitis, Crohn's disease, chronic steroid use Pt will see GI tomorrow  High Cholesterol need low fat diet/ exercise/ repeat fasting lipids in 1 month   Low vit D see plan  Al lab results d/w pt

## 2024-02-23 ENCOUNTER — LABORATORY RESULT (OUTPATIENT)
Age: 34
End: 2024-02-23

## 2024-02-23 ENCOUNTER — APPOINTMENT (OUTPATIENT)
Dept: GASTROENTEROLOGY | Facility: CLINIC | Age: 34
End: 2024-02-23
Payer: MEDICAID

## 2024-02-23 VITALS
BODY MASS INDEX: 23.22 KG/M2 | WEIGHT: 123 LBS | RESPIRATION RATE: 12 BRPM | SYSTOLIC BLOOD PRESSURE: 94 MMHG | OXYGEN SATURATION: 99 % | DIASTOLIC BLOOD PRESSURE: 70 MMHG | HEART RATE: 101 BPM | HEIGHT: 61 IN

## 2024-02-23 DIAGNOSIS — J45.909 UNSPECIFIED ASTHMA, UNCOMPLICATED: ICD-10-CM

## 2024-02-23 DIAGNOSIS — R79.89 OTHER SPECIFIED ABNORMAL FINDINGS OF BLOOD CHEMISTRY: ICD-10-CM

## 2024-02-23 DIAGNOSIS — K52.9 NONINFECTIVE GASTROENTERITIS AND COLITIS, UNSPECIFIED: ICD-10-CM

## 2024-02-23 DIAGNOSIS — K50.90 CROHN'S DISEASE, UNSPECIFIED, W/OUT COMPLICATIONS: ICD-10-CM

## 2024-02-23 DIAGNOSIS — I82.409 ACUTE EMBOLISM AND THROMBOSIS OF UNSPECIFIED DEEP VEINS OF UNSPECIFIED LOWER EXTREMITY: ICD-10-CM

## 2024-02-23 DIAGNOSIS — E78.00 PURE HYPERCHOLESTEROLEMIA, UNSPECIFIED: ICD-10-CM

## 2024-02-23 PROCEDURE — 99204 OFFICE O/P NEW MOD 45 MIN: CPT | Mod: 25

## 2024-02-25 ENCOUNTER — TRANSCRIPTION ENCOUNTER (OUTPATIENT)
Age: 34
End: 2024-02-25

## 2024-02-25 LAB
ALBUMIN SERPL ELPH-MCNC: 4.4 G/DL
ALP BLD-CCNC: 43 U/L
ALT SERPL-CCNC: 89 U/L
ANION GAP SERPL CALC-SCNC: 13 MMOL/L
AST SERPL-CCNC: 40 U/L
BILIRUB SERPL-MCNC: 0.2 MG/DL
BUN SERPL-MCNC: 33 MG/DL
CALCIUM SERPL-MCNC: 9.6 MG/DL
CHLORIDE SERPL-SCNC: 107 MMOL/L
CO2 SERPL-SCNC: 25 MMOL/L
CREAT SERPL-MCNC: 1.02 MG/DL
CRP SERPL-MCNC: 4 MG/L
EGFR: 74 ML/MIN/1.73M2
FERRITIN SERPL-MCNC: 19 NG/ML
GLUCOSE SERPL-MCNC: 72 MG/DL
HBV SURFACE AG SER QL: NONREACTIVE
HCV AB SER QL: NONREACTIVE
HCV S/CO RATIO: 0.06 S/CO
IRON SATN MFR SERPL: 6 %
IRON SERPL-MCNC: 24 UG/DL
POTASSIUM SERPL-SCNC: 4.1 MMOL/L
PROT SERPL-MCNC: 6.5 G/DL
SODIUM SERPL-SCNC: 144 MMOL/L
TIBC SERPL-MCNC: 385 UG/DL
UIBC SERPL-MCNC: 360 UG/DL
VIT B12 SERPL-MCNC: >2000 PG/ML

## 2024-02-25 NOTE — HISTORY OF PRESENT ILLNESS
[FreeTextEntry1] : 32 yo female, hx of Crohns disease, transferring care from GI in [place]. Has been hospitalized with Crohns approx 2 years ago in . Hx of anxiety/depression. First dx with crohns/uc 7 years. hx of DVT in 2021 when presented to [facility] with perirectal abscess s/p I and D. Had left popliteal vein DVT and has been on eliquis since then  Previous GI, Dr. Mccurdy at [facility] documents ? noncompliance with care, colonoscopy and need for MRE. Pt has two young children and was homeschooling then. Reports two "surgeries" for abscess, but likey I and D as no abdominal scars.   Has been maintained on Prednisone 40 mg for "years" per pt rx by PCP. Does not recall biologics--she thinks was not approved by insurance. Has 5 bms a day, soft and nocturnal sxs. No fever or chills. hx of IBD in sibling per pt

## 2024-02-25 NOTE — PHYSICAL EXAM

## 2024-02-25 NOTE — ASSESSMENT
[FreeTextEntry1] : 32 yo female, hx of Crohns disease, transferring care from GI in [place]. Has been hospitalized with Crohns approx 2 years ago in . Hx of anxiety/depression. First dx with crohns/uc 7 years. hx of DVT in 2021 when presented to [facility] with perirectal abscess s/p I and D. Had left popliteal vein DVT and has been on eliquis since then  Previous GI, Dr. Mccurdy at [facility] documents ? noncompliance with care, colonoscopy and need for MRE Reportedly had pan colitis in past. Pt has two young children and was homeschooling then. Reports two "surgeries" for abscess, but likey I and D as no abdominal scars.   Has been maintained on Prednisone 40 mg for "years" per pt rx by PCP. Does not recall biologics--she thinks was not approved by insurance. Has 5 bms a day, soft and nocturnal sxs. No fever or chills. Pt has had at least two intrabdominal, perirectal abscesses, and needs assessment with CAT scan enterography prior to receiving biologics to exclude persistent abscess. Has vague abdominal pain, mostly lower abdomen. IMP: 1. crohn's colitis 2. Steroid dependency  PLAN: 1. cbc, cmp, crp bsag, hcv, b12, iron studies, Quantiferon gold 2. CT enterography at [facility] 3. RTO 4 weeks 4. Renewed prednisone 40 mg daily 5 As d/w pt, will require colonoscopy and will transition to biologic once imaging and colonoscopy performed.

## 2024-02-25 NOTE — REVIEW OF SYSTEMS
[As Noted in HPI] : as noted in HPI [Feeling Poorly] : feeling poorly [Negative] : Heme/Lymph [FreeTextEntry7] : soft bm

## 2024-02-26 LAB
M TB IFN-G BLD-IMP: NEGATIVE
QUANTIFERON TB PLUS MITOGEN MINUS NIL: 4.3 IU/ML
QUANTIFERON TB PLUS NIL: 0.05 IU/ML
QUANTIFERON TB PLUS TB1 MINUS NIL: 0.05 IU/ML
QUANTIFERON TB PLUS TB2 MINUS NIL: 0.05 IU/ML

## 2024-02-27 ENCOUNTER — TRANSCRIPTION ENCOUNTER (OUTPATIENT)
Age: 34
End: 2024-02-27

## 2024-03-01 ENCOUNTER — APPOINTMENT (OUTPATIENT)
Dept: PSYCHIATRY | Facility: CLINIC | Age: 34
End: 2024-03-01
Payer: MEDICAID

## 2024-03-01 ENCOUNTER — OUTPATIENT (OUTPATIENT)
Dept: OUTPATIENT SERVICES | Facility: HOSPITAL | Age: 34
LOS: 1 days | End: 2024-03-01
Payer: MEDICAID

## 2024-03-01 DIAGNOSIS — F33.2 MAJOR DEPRESSIVE DISORDER, RECURRENT SEVERE WITHOUT PSYCHOTIC FEATURES: ICD-10-CM

## 2024-03-01 DIAGNOSIS — F41.1 GENERALIZED ANXIETY DISORDER: ICD-10-CM

## 2024-03-01 DIAGNOSIS — F33.1 MAJOR DEPRESSIVE DISORDER, RECURRENT, MODERATE: ICD-10-CM

## 2024-03-01 PROCEDURE — 90834 PSYTX W PT 45 MINUTES: CPT

## 2024-03-01 PROCEDURE — 99214 OFFICE O/P EST MOD 30 MIN: CPT | Mod: 95

## 2024-03-01 PROCEDURE — 99214 OFFICE O/P EST MOD 30 MIN: CPT

## 2024-03-01 NOTE — HISTORY OF PRESENT ILLNESS
[Not Applicable] : Not applicable [FreeTextEntry2] : Never had a psychiatrist, no therapy.   [FreeTextEntry1] : Solo/tele session. We had a lot of technical difficulties. Pt is in severe pain. She also has significant motor limitations due to her illness. She doesn't have help from her parents anymore. Her  is at work. She is alone in pain and needs assistance. I called her , Kerry and discuss pt's eligibility for HA. Kerry told that pt is supposed to make an appt and see her PCP and obtain letter of medical necessity. I dictated pt and she wrote everything down. I encouraged her to call to her PCP ASAP.  Pt has a lot of overwhelming social problems along with medical problems that also got worse. Pt needs more social support and that was discussed.  Pt takes remeron(trazodone didn't work). She says that she feels overly sedated . I told her to try to decrease remeron to 15mg. Risks of her other meds were discussed. She doesn't take ambien and I will d/c it. She will continue other meds, including klonopin PRN. Pt doesn't have her care established in [place] yet. Her , Kerry, made medical appts for her for June. No psych arrangements were made yet. Pt doesn't sleep well. She is somewhat overweight. I recommended to see her PCP for sleep study. She doesn't want. She says that she is stressed and denies any possible underlying medical causes of her insomnia.  Spoke a lot about her medical issues and coping with them. Education and supportive therapy were provided. Pt says that she is under a lot of stress. Her mom is sick and getting worse after CVA.  Adamantly denies SI/HI/AH/PI/SP/intent. Pt's mom had CVA. Pt was Dx with glaucoma, she lost her 2 teeth, etc.  Pt says that klonopin works and asks to continue to prescribe it.  Denies SI/HI/AH/PI. Pt says that she lost weight (125/5.1. Down from 135) Pt was instructed to call 911/go to ER in case of an emergency. [FreeTextEntry3] : Dr Aram MD is her provider and 7 days ago started her on Zoloft 25 mg.

## 2024-03-01 NOTE — REASON FOR VISIT
[Patient preference] : as per patient preference [Telehealth (audio & video) - Individual/Group] : This visit was provided via telehealth using real-time 2-way audio visual technology. [Medical Office: ([facility])___] : The provider was located at the medical office in [unfilled]. [Verbal consent obtained from patient/other participant(s)] : Verbal consent for telehealth/telephonic services obtained from patient/other participant(s) [Home] : The patient, [unfilled], was located at home, [unfilled], at the time of the visit. [Collateral without patient] : Collateral without patient [Number can be texted] : number can be texted [OK  to leave message] : OK  to leave message [[facility] Provider/Facility] : [facility] Provider/Facility [Primary Care] : Primary Care [Prior Medical Records] : Prior Medical Records [Patient] : Patient [TextBox_17] : Pt's  [FreeTextEntry3] : [email or web address] [FreeTextEntry5] : English [FreeTextEntry6] : Sara [FreeTextEntry2] : Anxiety/Depression [FreeTextEntry1] : Anxiety/Depression due to medical complications.

## 2024-03-01 NOTE — RISK ASSESSMENT
[No, patient denies ideation or behavior] : No, patient denies ideation or behavior [Clinical Interview] : Clinical Interview [Yes] : 1. Passive Ideation: Have you wished you were dead or wished you could go to sleep and not wake up? Yes [Mood disorder] : mood disorder [Depressed mood/Anhedonia] : depressed mood/anhedonia [PTSD] : PTSD [Global insomnia] : global insomnia [Hopelessness or despair] : hopelessness or despair [History of abuse/trauma] : history of abuse/trauma [Severe anxiety, agitation or panic] : severe anxiety, agitation or panic [Chronic pain/other acute medical condition] : chronic pain or other acute medical condition [Identifies reasons for living] : identifies reasons for living [Responsibility to children, family, or others] : responsibility to children, family, or others [Ability to cope with stress] : ability to cope with stress [None in the patient's lifetime] : None in the patient's lifetime [Beloved pets] : beloved pets [None Known] : none known [No known risk factors] : No known risk factors [Residential stability] : residential stability [Relationship stability] : relationship stability [No] : no [FreeTextEntry8] : Pt is not in imminent risk of hurting self or others at this time. However, her chronic risk of self-harm is elevated due to her social situation/problems , depression, impulsivity

## 2024-03-01 NOTE — DISCUSSION/SUMMARY
[FreeTextEntry1] : Solo/tele session. We had a lot of technical difficulties. Pt is in severe pain. She also has significant motor limitations due to her illness. She doesn't have help from her parents anymore. Her  is at work. She is alone in pain and needs assistance. I called her , Kerry and discuss pt's eligibility for HA. Kerry told that pt is supposed to make an appt and see her PCP and obtain letter of medical necessity. I dictated pt and she wrote everything down. I encouraged her to call to her PCP ASAP.  Pt has a lot of overwhelming social problems along with medical problems that also got worse. Pt needs more social support and that was discussed.  Pt takes remeron(trazodone didn't work). She says that she feels overly sedated . I told her to try to decrease remeron to 15mg. Risks of her other meds were discussed. She doesn't take ambien and I will d/c it. She will continue other meds, including klonopin PRN. Pt doesn't have her care established in [place] yet. Her , Kerry, made medical appts for her for June. No psych arrangements were made yet. Pt doesn't sleep well. She is somewhat overweight. I recommended to see her PCP for sleep study. She doesn't want. She says that she is stressed and denies any possible underlying medical causes of her insomnia.  Spoke a lot about her medical issues and coping with them. Education and supportive therapy were provided. Pt says that she is under a lot of stress. Her mom is sick and getting worse after CVA.  Adamantly denies SI/HI/AH/PI/SP/intent. Pt's mom had CVA. Pt was Dx with glaucoma, she lost her 2 teeth, etc.  Pt says that klonopin works and asks to continue to prescribe it.  Denies SI/HI/AH/PI. Pt says that she lost weight (125/5.1. Down from 135) Pt was instructed to call 911/go to ER in case of an emergency.  Pt s 32 y/o F,  for 13 years, has 3 children 10 y/o F, 7 y/o M, and 5 y/o M., just moved to an apt in [place] (used to live all 5 people together in 1 room in a house renting from her mother-in-law. Mother-in-law rents out rooms to strangers "she takes in immigrants").  works full time. No ACS involvement),   home schooling her 3 children  due to fear of them getting killed, she stated they went to [employer] and recently "so many shootouts in the parking lot" decided to home school her kids,  currently unemployed( due to Crohn's disease), with a complicated PMH(diagnosed with ulcerative colitis 7 years ago, and last year diagnosed with Crohn's, she had an abscess and had to have 2 surgeries), DVT (on eliquis), on prednisone for many years, no prior IPP, no SA, no DIPTI, never on psych meds or in psych care, until very recently, when dr. Chiu prescribed her 25 mg of zoloft  Never had a psychiatrist, no therapy. (though was in counseling in school).   Denies h/o franck (though she has family h/o BPD and also she reports some vague symptoms of hypomania, though they are not clear cut). Pt says that she feels very depressed and anxious for at least 4 y, but in the past 2-3 m, her depression and anxiety became worse due to a very poor social situation and insomnia. Pt couldn't tolerate zoloft (severe nausea). She also has glaucoma  1. Next appt in 1m tele 2.Pt pays 1050 for her apt 4. Does she have enough food 3. Does she need klonopin for anxiety 4. Labs results (I gave pt my fax) (she is supposed to see her PCP in March) 5. Trazodone doesn't work.PT FEELS OVERLY SEDATED. I TOLD HER TO DECRESE REMERON TO 15MG AND MOVE LEXAPRO TO QHS.  DOES SHE FEEL BETTER (pt is 125/5.1, down from 135lbs) 6. Pt has glaucoma 7.  I SPOKE TO KERRY (HER ) REGARDING HA. PT HEARED WHAT KERRY WAS SAYING AND I ALSO DISCUSSED THIS WITH THE PT. pT NEEDS TO CALL HER NEW PCP AND ASK FOR A LETTER OF NECESSITY. DID SHE ?

## 2024-03-01 NOTE — PHYSICAL EXAM
[Cooperative] : cooperative [Slowed] : slowed [Depressed] : depressed [Anxious] : anxious [Constricted] : constricted [Linear/Goal Directed] : linear/goal directed [Clear] : clear [Depressive] : depressive [None] : none [Phobic] : phobic [None Reported] : none reported [Average] : average [Mild] : mild [WNL] : within normal limits [0] : 0 - Absent [1] : 1 - Mild suspicion [2] : 2 - Spontaneously indicates feelings of worthlessness [3] : 3 - Expresses feelings of discouragement, despair, pessimism about future, which can be dispelled [FreeTextEntry8] : depressed

## 2024-03-01 NOTE — FAMILY HISTORY
[FreeTextEntry1] : Family composition:Sara is 31 y/o F,  for 13 years, has 3 children 10 y/o F, 7 y/o M, and 5 y/o M. Domiciled together in a house renting a room from her mother-in-law. \par  Family history and background: [place]. \par  Family relationship: good.\par  Pertinent Family Medical, MH and Substance Use History including Adult Child of Alcoholic and child of substance abuse status; history of cancer and heart disease:\par  Father: Headaches, heart disease, lung disease, diabetes.\par  Mother:Anemia, Asthma, high blood pressure, cancer, r/o BPD\par  Grandmother:Anemia, diabetes, Thyroid\par  Grandfather: Thyroid, Diabetes,\par  Sister: Menstrual Pain.\par  \par  Cultural Assessment:\par  Race/ethnicity: Montenegrin\par  Sexual identity: Female\par  Spirituality/Islam:no\par  \par

## 2024-03-01 NOTE — SOCIAL HISTORY
[FreeTextEntry1] : Employment history:Unemployed last 7 years, worked for [employer] in the past. \par  Developmental history: Speech delay, in Special Ed. \par  Social supports (friends, Volunteers, club, AA meeting, other meetings ) ?: no\par  Meaningful Activities: Home schools her 3 kids, takes them to park, museums. \par  \par  \par  \par   \par  \par  \par

## 2024-03-02 DIAGNOSIS — F41.1 GENERALIZED ANXIETY DISORDER: ICD-10-CM

## 2024-03-04 NOTE — END OF VISIT
[Individual Psychotherapy for 16-37 minutes] : Individual Psychotherapy for 16-37 minutes [Teletherapy Service Provided] : The services provided in this session were delivered via tele-therapy [Individual Psychotherapy for 38-52 minutes] : Individual Psychotherapy for 38 - 52 minutes

## 2024-03-04 NOTE — REASON FOR VISIT
[Patient preference] : as per patient preference [Access issues (e.g., transportation, impaired mobility, etc.)] : due to patient's access issues [Continuity of care] : to ensure continuity of care [Telehealth (audio & video) - Individual/Group] : This visit was provided via telehealth using real-time 2-way audio visual technology. [Home] : The patient, [unfilled], was located at home, [unfilled], at the time of the visit. [Other Location: e.g. Home (Enter Location, City,State)___] : The provider was located at [unfilled]. [Verbal consent obtained from patient/other participant(s)] : Verbal consent for telehealth/telephonic services obtained from patient/other participant(s) [Patient] : Patient [FreeTextEntry4] : [id number] [FreeTextEntry5] : [id number] [FreeTextEntry1] : Psychotherapy follow up

## 2024-03-04 NOTE — PLAN
[Exposure +/- Response Prevention] : Exposure +/- Response Prevention  [Cognitive and/or Behavior Therapy] : Cognitive and/or Behavior Therapy  [Psychoeducation] : Psychoeducation  [Supportive Therapy] : Supportive Therapy [Return in ____ week(s)] : Return in [unfilled] week(s) [FreeTextEntry2] : Objective A: Pt will explore and process feeling at every session, identifying at least 1 trigger of depression and anxiety, and review depression screenings and scales quarterly \par  Status of Objective: Initial New objective to help patient become aware of feelings and condition. \par  Objective B: Patient will learn and practice 2 coping skills in between scheduled session and comply with medication management medication recommendation. \par  \par  Status of Objective: Initial New objective to help patient learn and use coping skill. \par  Intervention(s): Therapist will help patient explore coping techniques, such as communication strategies, relaxation techniques. Therapist will help patient to expose and extinguish irrational beliefs that contribute to anxiety and depression. Therapist will assist patient in developing reality based, positive cognitive messages that will increase self-confidence and thereby decrease anxiety and depressive symptoms.\par  \par  Medication Management: every 1 months. \par  Individual Therapy: every 2 weeks. \par   [de-identified] : Patient seen for telehealth video for psychotherapy follow up. Children are back in school, but have been getting sick due to exposure to germs, therefore patient has been getting sick as well.   Patient saw her new gastroenterologist and primary doctor and feels please with their interventions.   Needs Gyn  Mother is still sick but will be visiting her this weekend.  Father is also diagnosed with dementia and now unable to drive.  He is forgetful.  Their car is also broken.  Patient admits that she has been more stressed than usual and this has led for her physical illnesses have worsen including gastrointestinal issues.  Psychoeducation provided regarding nutrition, stress and body inflammation.  Patient has a Health home from [facility] who might be able to assist her - E[facility] [phone] . Encouraged to ask her to set up transportation for her medical appointments until she can move her treatment close to home.   Active listening. Feelings validated.  Support provided.

## 2024-03-13 ENCOUNTER — OUTPATIENT (OUTPATIENT)
Dept: OUTPATIENT SERVICES | Facility: HOSPITAL | Age: 34
LOS: 1 days | End: 2024-03-13
Payer: MEDICAID

## 2024-03-13 ENCOUNTER — APPOINTMENT (OUTPATIENT)
Dept: PSYCHIATRY | Facility: CLINIC | Age: 34
End: 2024-03-13

## 2024-03-13 DIAGNOSIS — F41.1 GENERALIZED ANXIETY DISORDER: ICD-10-CM

## 2024-03-13 DIAGNOSIS — F33.1 MAJOR DEPRESSIVE DISORDER, RECURRENT, MODERATE: ICD-10-CM

## 2024-03-13 PROCEDURE — 90834 PSYTX W PT 45 MINUTES: CPT

## 2024-03-13 NOTE — PHYSICAL EXAM
[Unkept] : unkept [Cooperative] : cooperative [Depressed] : depressed [Constricted] : constricted [Anxious] : anxious [Clear] : clear [Pressured] : pressured [Linear/Goal Directed] : linear/goal directed [Depressive] : depressive [None Reported] : none reported [Average] : average [FreeTextEntry1] : observed with braided hair [WNL] : within normal limits

## 2024-03-13 NOTE — REASON FOR VISIT
[Telehealth (audio & video) - Individual/Group] : This visit was provided via telehealth using real-time 2-way audio visual technology. [Other Location: e.g. Home (Enter Location, City,State)___] : The provider was located at [unfilled]. [Home] : The patient, [unfilled], was located at home, [unfilled], at the time of the visit. [Verbal consent obtained from patient/other participant(s)] : Verbal consent for telehealth/telephonic services obtained from patient/other participant(s) [FreeTextEntry4] : [id number] [Patient] : Patient [FreeTextEntry5] : 11:50am [FreeTextEntry1] : Psychotherapy follow up

## 2024-03-13 NOTE — PLAN
[FreeTextEntry2] : Objective A: Pt will explore and process feeling at every session, identifying at least 1 trigger of depression and anxiety, and review depression screenings and scales quarterly \par  Status of Objective: Initial New objective to help patient become aware of feelings and condition. \par  Objective B: Patient will learn and practice 2 coping skills in between scheduled session and comply with medication management medication recommendation. \par  \par  Status of Objective: Initial New objective to help patient learn and use coping skill. \par  Intervention(s): Therapist will help patient explore coping techniques, such as communication strategies, relaxation techniques. Therapist will help patient to expose and extinguish irrational beliefs that contribute to anxiety and depression. Therapist will assist patient in developing reality based, positive cognitive messages that will increase self-confidence and thereby decrease anxiety and depressive symptoms.\par  \par  Medication Management: every 1 months. \par  Individual Therapy: every 2 weeks. \par   [Cognitive and/or Behavior Therapy] : Cognitive and/or Behavior Therapy  [Exposure +/- Response Prevention] : Exposure +/- Response Prevention  [Psychoeducation] : Psychoeducation  [Supportive Therapy] : Supportive Therapy [de-identified] : Patient seen for telehealth video for psychotherapy follow up. Children are back in school and she is focusing on her needs. Session focused on her diet and finding recipes and food that will help heal her gut and avoid those foods that promote inflammation.  Discussed tendencies to "always choose cheese and foods that are hard to swap".  Discussed relationship with food. Explored patient's attachments to different foods that although growing up with them, given current diagnosis she needs to limit or avoid altogether.  Patient voiced her ability to try to swap regular eggs to egg whites to help reduce her cholesterol.   Encouraged flexibility in trying new foods, retraining her palate and her brain associations with comfort foods. Patient has a Health home from [facility] who might be able to assist her - Ebonie [phone] .   Active listening. Feelings validated.  Support provided.  [Return in ____ week(s)] : Return in [unfilled] week(s)

## 2024-03-14 DIAGNOSIS — F41.1 GENERALIZED ANXIETY DISORDER: ICD-10-CM

## 2024-03-22 ENCOUNTER — APPOINTMENT (OUTPATIENT)
Dept: INTERNAL MEDICINE | Facility: CLINIC | Age: 34
End: 2024-03-22

## 2024-03-25 ENCOUNTER — APPOINTMENT (OUTPATIENT)
Dept: PSYCHIATRY | Facility: CLINIC | Age: 34
End: 2024-03-25

## 2024-04-10 ENCOUNTER — OUTPATIENT (OUTPATIENT)
Dept: OUTPATIENT SERVICES | Facility: HOSPITAL | Age: 34
LOS: 1 days | End: 2024-04-10
Payer: MEDICAID

## 2024-04-10 ENCOUNTER — APPOINTMENT (OUTPATIENT)
Dept: PSYCHIATRY | Facility: CLINIC | Age: 34
End: 2024-04-10

## 2024-04-10 DIAGNOSIS — F41.1 GENERALIZED ANXIETY DISORDER: ICD-10-CM

## 2024-04-10 DIAGNOSIS — F33.2 MAJOR DEPRESSIVE DISORDER, RECURRENT SEVERE WITHOUT PSYCHOTIC FEATURES: ICD-10-CM

## 2024-04-10 PROCEDURE — 90834 PSYTX W PT 45 MINUTES: CPT

## 2024-04-10 NOTE — REASON FOR VISIT
[Patient preference] : as per patient preference [Telehealth (audio & video) - Individual/Group] : This visit was provided via telehealth using real-time 2-way audio visual technology. [Other Location: e.g. Home (Enter Location, City,State)___] : The provider was located at [unfilled]. [Home] : The patient, [unfilled], was located at home, [unfilled], at the time of the visit. [Verbal consent obtained from patient/other participant(s)] : Verbal consent for telehealth/telephonic services obtained from patient/other participant(s) [Patient] : Patient [FreeTextEntry4] : [id number] [FreeTextEntry5] : 10am [FreeTextEntry1] : Psychotherapy follow up

## 2024-04-10 NOTE — PHYSICAL EXAM
[Unkept] : unkept [Cooperative] : cooperative [Depressed] : depressed [Anxious] : anxious [Constricted] : constricted [Clear] : clear [Pressured] : pressured [Linear/Goal Directed] : linear/goal directed [Depressive] : depressive [None Reported] : none reported [Average] : average [WNL] : within normal limits [FreeTextEntry1] : observed with braided hair

## 2024-04-10 NOTE — PLAN
[Cognitive and/or Behavior Therapy] : Cognitive and/or Behavior Therapy  [Exposure +/- Response Prevention] : Exposure +/- Response Prevention  [Psychoeducation] : Psychoeducation  [Supportive Therapy] : Supportive Therapy [Return in ____ week(s)] : Return in [unfilled] week(s) [FreeTextEntry2] : Objective A: Pt will explore and process feeling at every session, identifying at least 1 trigger of depression and anxiety, and review depression screenings and scales quarterly \par  Status of Objective: Initial New objective to help patient become aware of feelings and condition. \par  Objective B: Patient will learn and practice 2 coping skills in between scheduled session and comply with medication management medication recommendation. \par  \par  Status of Objective: Initial New objective to help patient learn and use coping skill. \par  Intervention(s): Therapist will help patient explore coping techniques, such as communication strategies, relaxation techniques. Therapist will help patient to expose and extinguish irrational beliefs that contribute to anxiety and depression. Therapist will assist patient in developing reality based, positive cognitive messages that will increase self-confidence and thereby decrease anxiety and depressive symptoms.\par  \par  Medication Management: every 1 months. \par  Individual Therapy: every 2 weeks. \par   [de-identified] : Patient seen for telehealth video for psychotherapy follow up. Children are back in school and she is focusing on her needs. They are doing good and daughter was accepted to a middle school 2 blocks from her house which she is happy about.  Session focused on her fall and hurting her leg which had "me out of commission, my  had to take off and mother had to come an help her..  She believes she was approved for Social security benefits but unsure if its SSI or SSD and she also got letter saying that the amount was 0, meanwhile they stopped her SNAP benefits and now she has none of the help she was getting before applying.  She is still struggling financially and they only rely on her husbands income.  Patient has a Health home from [facility] who might be able to assist her - E[facility]ie [phone] .   Case management, Active listening. Feelings validated.  Support provided.

## 2024-04-11 DIAGNOSIS — F41.1 GENERALIZED ANXIETY DISORDER: ICD-10-CM

## 2024-04-12 ENCOUNTER — OUTPATIENT (OUTPATIENT)
Dept: OUTPATIENT SERVICES | Facility: HOSPITAL | Age: 34
LOS: 1 days | End: 2024-04-12
Payer: MEDICAID

## 2024-04-12 ENCOUNTER — APPOINTMENT (OUTPATIENT)
Dept: PSYCHIATRY | Facility: CLINIC | Age: 34
End: 2024-04-12
Payer: MEDICAID

## 2024-04-12 DIAGNOSIS — F33.2 MAJOR DEPRESSIVE DISORDER, RECURRENT SEVERE WITHOUT PSYCHOTIC FEATURES: ICD-10-CM

## 2024-04-12 PROCEDURE — 99214 OFFICE O/P EST MOD 30 MIN: CPT | Mod: 95

## 2024-04-12 RX ORDER — MIRTAZAPINE 30 MG/1
30 TABLET, FILM COATED ORAL
Qty: 30 | Refills: 2 | Status: DISCONTINUED | COMMUNITY
Start: 2023-11-03 | End: 2024-04-12

## 2024-04-12 RX ORDER — CLONAZEPAM 0.5 MG/1
0.5 TABLET ORAL DAILY
Qty: 30 | Refills: 0 | Status: ACTIVE | COMMUNITY
Start: 2024-02-01 | End: 1900-01-01

## 2024-04-12 NOTE — SOCIAL HISTORY
[FreeTextEntry1] : Employment history:Unemployed last 7 years, worked for [employer] in the past. \par  Developmental history: Speech delay, in Special Ed. \par  Social supports (friends, Volunteers, club, AA meeting, other meetings ) ?: no\par  Meaningful Activities: Home schools her 3 kids, takes them to park, museums. \par  \par  \par  \par   \par  \par  \par

## 2024-04-12 NOTE — DISCUSSION/SUMMARY
[FreeTextEntry1] : Solo/tele session.  Pt says that she didn't start looking for a psychiatrist in [place]. I told her to call her insurance and get names. I also told her again that she needs to find services in [place] because her services here will be closed (she moved to [place]). We had had this discussion many times but pt never tried to make an appt in [place]. Pt fell recently and sustained multiple bruises as she takes eliquis.  Pt stopped remeron and lexapro because she needed to get up early to get her kids ready for school. She feels more anxious and depressed. I told her to resume lexapro and compliance was highly encouraged. Pt got SSI, but she lost her foodstamps and doesn't have enough money for food. Pt is in severe pain. She also has significant motor limitations due to her illness. She doesn't have help from her parents anymore. Her  is at work. She is alone in pain and needs assistance. I called her , Kerry and discuss pt's eligibility for HA. Kerry told that pt is supposed to make an appt and see her PCP and obtain letter of medical necessity. I dictated pt and she wrote everything down. I encouraged her to call to her PCP ASAP.  Pt has a lot of overwhelming social problems along with medical problems that also got worse. Pt needs more social support and that was discussed. She doesn't take ambien and I will d/c it. She will continue other meds, including klonopin PRN. Pt doesn't have her care established in [place] yet. Her , Kerry, made medical appts for her for June. No psych arrangements were made yet. Pt is somewhat overweight. I recommended to see her PCP for sleep study. She doesn't want. She says that she is stressed and denies any possible underlying medical causes of her insomnia.  Spoke a lot about her medical issues and coping with them. Education and supportive therapy were provided. Pt says that she is under a lot of stress. Her mom is sick and getting worse after CVA.  Adamantly denies SI/HI/AH/PI/SP/intent. Pt's mom had CVA. Pt was Dx with glaucoma, she lost her 2 teeth, etc.  Pt says that klonopin works and asks to continue to prescribe it.  Denies SI/HI/AH/PI. Pt says that she lost weight (125/5.1. Down from 135)  Pt s 34 y/o F,  for 13 years, has 3 children 10 y/o F, 7 y/o M, and 7 y/o M., just moved to an apt in [place] (used to live all 5 people together in 1 room in a house renting from her mother-in-law. Mother-in-law rents out rooms to strangers "she takes in immigrants").  works full time. No ACS involvement),   home schooling her 3 children  due to fear of them getting killed, she stated they went to [employer] and recently "so many shootouts in the parking lot" decided to home school her kids,  currently unemployed( due to Crohn's disease), with a complicated PMH(diagnosed with ulcerative colitis 7 years ago, and last year diagnosed with Crohn's, she had an abscess and had to have 2 surgeries), DVT (on eliquis), on prednisone for many years, no prior IPP, no SA, no DIPTI, never on psych meds or in psych care, until very recently, when dr. Chiu prescribed her 25 mg of zoloft  Never had a psychiatrist, no therapy. (though was in counseling in school).   Denies h/o franck (though she has family h/o BPD and also she reports some vague symptoms of hypomania, though they are not clear cut). Pt says that she feels very depressed and anxious for at least 4 y, but in the past 2-3 m, her depression and anxiety became worse due to a very poor social situation and insomnia. Pt couldn't tolerate zoloft (severe nausea). She also has glaucoma  1. Next appt in 1m tele 2.Pt pays 1050 for her apt 4. Does she have enough food 3. Does she need klonopin for anxiety 4. Labs results (I gave pt my fax) (she is supposed to see her PCP in March) 5. Trazodone doesn't work.PT FEELS OVERLY SEDATED. PT STOPPED REMERON AND LEXAPRO. I TOLD HER TO RESUME LEXAPRO AND TAKE IT QHS.  DOES SHE FEEL BETTER (pt is 125/5.1, down from 135lbs) 6. Pt has glaucoma 7. PT'S  KERRY IS NO LONGER HER  BECAUSE PT MOVED AND HER CASE WAS CLOSED. DID PT MAKE AN APPT WITH PSYCH IN [facility]/

## 2024-04-12 NOTE — HISTORY OF PRESENT ILLNESS
[Not Applicable] : Not applicable [FreeTextEntry1] : Solo/tele session.  Pt says that she didn't start looking for a psychiatrist in [place]. I told her to call her insurance and get names. I also told her again that she needs to find services in [place] because her services here will be closed (she moved to [place]). We had had this discussion many times but pt never tried to make an appt in [place]. Pt fell recently and sustained multiple bruises as she takes eliquis.  Pt stopped remeron and lexapro because she needed to get up early to get her kids ready for school. She feels more anxious and depressed. I told her to resume lexapro and compliance was highly encouraged. Pt got SSI, but she lost her foodstamps and doesn't have enough money for food. Pt is in severe pain. She also has significant motor limitations due to her illness. She doesn't have help from her parents anymore. Her  is at work. She is alone in pain and needs assistance. I called her , Kerry and discuss pt's eligibility for HA. Kerry told that pt is supposed to make an appt and see her PCP and obtain letter of medical necessity. I dictated pt and she wrote everything down. I encouraged her to call to her PCP ASAP.  Pt has a lot of overwhelming social problems along with medical problems that also got worse. Pt needs more social support and that was discussed. She doesn't take ambien and I will d/c it. She will continue other meds, including klonopin PRN. Pt doesn't have her care established in [place] yet. Her , Kerry, made medical appts for her for June. No psych arrangements were made yet. Pt is somewhat overweight. I recommended to see her PCP for sleep study. She doesn't want. She says that she is stressed and denies any possible underlying medical causes of her insomnia.  Spoke a lot about her medical issues and coping with them. Education and supportive therapy were provided. Pt says that she is under a lot of stress. Her mom is sick and getting worse after CVA.  Adamantly denies SI/HI/AH/PI/SP/intent. Pt's mom had CVA. Pt was Dx with glaucoma, she lost her 2 teeth, etc.  Pt says that klonopin works and asks to continue to prescribe it.  Denies SI/HI/AH/PI. Pt says that she lost weight (125/5.1. Down from 135) [FreeTextEntry2] : Never had a psychiatrist, no therapy.   [FreeTextEntry3] : Dr Aram MD is her provider and 7 days ago started her on Zoloft 25 mg.

## 2024-04-12 NOTE — FAMILY HISTORY
[FreeTextEntry1] : Family composition:Sara is 31 y/o F,  for 13 years, has 3 children 10 y/o F, 9 y/o M, and 5 y/o M. Domiciled together in a house renting a room from her mother-in-law. \par  Family history and background: [place]. \par  Family relationship: good.\par  Pertinent Family Medical, MH and Substance Use History including Adult Child of Alcoholic and child of substance abuse status; history of cancer and heart disease:\par  Father: Headaches, heart disease, lung disease, diabetes.\par  Mother:Anemia, Asthma, high blood pressure, cancer, r/o BPD\par  Grandmother:Anemia, diabetes, Thyroid\par  Grandfather: Thyroid, Diabetes,\par  Sister: Menstrual Pain.\par  \par  Cultural Assessment:\par  Race/ethnicity: Turkish\par  Sexual identity: Female\par  Spirituality/Jain:no\par  \par

## 2024-04-12 NOTE — REASON FOR VISIT
[Patient preference] : as per patient preference [Telehealth (audio & video) - Individual/Group] : This visit was provided via telehealth using real-time 2-way audio visual technology. [Medical Office: ([facility])___] : The provider was located at the medical office in [unfilled]. [Home] : The patient, [unfilled], was located at home, [unfilled], at the time of the visit. [Verbal consent obtained from patient/other participant(s)] : Verbal consent for telehealth/telephonic services obtained from patient/other participant(s) [Collateral without patient] : Collateral without patient [Number can be texted] : number can be texted [OK  to leave message] : OK  to leave message [Primary Care] : Primary Care [[facility] Provider/Facility] : [facility] Provider/Facility [Patient] : Patient [Prior Medical Records] : Prior Medical Records [TextBox_17] : Pt's  [FreeTextEntry3] : [email or web address] [FreeTextEntry5] : English [FreeTextEntry6] : Sara [FreeTextEntry2] : Anxiety/Depression [FreeTextEntry1] : Anxiety/Depression due to medical complications.

## 2024-04-13 DIAGNOSIS — F33.2 MAJOR DEPRESSIVE DISORDER, RECURRENT SEVERE WITHOUT PSYCHOTIC FEATURES: ICD-10-CM

## 2024-04-24 ENCOUNTER — OUTPATIENT (OUTPATIENT)
Dept: OUTPATIENT SERVICES | Facility: HOSPITAL | Age: 34
LOS: 1 days | End: 2024-04-24
Payer: MEDICAID

## 2024-04-24 ENCOUNTER — APPOINTMENT (OUTPATIENT)
Dept: OBGYN | Facility: CLINIC | Age: 34
End: 2024-04-24

## 2024-04-24 ENCOUNTER — APPOINTMENT (OUTPATIENT)
Dept: PSYCHIATRY | Facility: CLINIC | Age: 34
End: 2024-04-24

## 2024-04-24 DIAGNOSIS — F41.1 GENERALIZED ANXIETY DISORDER: ICD-10-CM

## 2024-04-24 PROCEDURE — 90834 PSYTX W PT 45 MINUTES: CPT

## 2024-04-24 NOTE — PHYSICAL EXAM
[Cooperative] : cooperative [Depressed] : depressed [Anxious] : anxious [Constricted] : constricted [Clear] : clear [Pressured] : pressured [Linear/Goal Directed] : linear/goal directed [Depressive] : depressive [None Reported] : none reported [Average] : average [WNL] : within normal limits [FreeTextEntry1] : observed with braided hair [de-identified] : tearful

## 2024-04-24 NOTE — PLAN
[FreeTextEntry2] : Objective A: Pt will explore and process feeling at every session, identifying at least 1 trigger of depression and anxiety, and review depression screenings and scales quarterly \par  Status of Objective: Initial New objective to help patient become aware of feelings and condition. \par  Objective B: Patient will learn and practice 2 coping skills in between scheduled session and comply with medication management medication recommendation. \par  \par  Status of Objective: Initial New objective to help patient learn and use coping skill. \par  Intervention(s): Therapist will help patient explore coping techniques, such as communication strategies, relaxation techniques. Therapist will help patient to expose and extinguish irrational beliefs that contribute to anxiety and depression. Therapist will assist patient in developing reality based, positive cognitive messages that will increase self-confidence and thereby decrease anxiety and depressive symptoms.\par  \par  Medication Management: every 1 months. \par  Individual Therapy: every 2 weeks. \par   [Cognitive and/or Behavior Therapy] : Cognitive and/or Behavior Therapy  [Exposure +/- Response Prevention] : Exposure +/- Response Prevention  [Psychoeducation] : Psychoeducation  [Supportive Therapy] : Supportive Therapy [de-identified] : Patient seen for telehealth video for psychotherapy follow up. Her Children are out on Spring break  and she has them all day and had to miss some medical appointments.    Patient became upset when exposure and ways to establish support systems was discussed as she said that she "does not trust anyone, I dont want to make friends, I am this way and Im not going to change".   Besides school she does not want anyone watching her children. She feels she is dying with all her illnesses, and it is not worth it for to invest in "suffering with anxiety trying to find some social supports".  Patient shared that prescriber asked her to find a provider near her current house, clinician explained emergency cases and need for being close by where resources are knowns.  Clinician will assist patient in finding new providers.  Encouraged to utilize  as well.  Session focused on her fall and hurting her leg which had "me out of commission, my  had to take off and mother had to come an help her..  She plans to appeal her SSD case.  She explained issues with her father in law bringing "his 22 y/o Bipolar gf" to the house.  She explained that they moved to the house with the understanding that he was going to move out and now he does not want to.   Patient feels trapped and unable to make decisions because of her housing/financial  instability.   She said that  cannot take off from work.  Patient has a Health home from [facility] who might be able to assist her - [place] [phone] .   Case management, Active listening. Feelings validated.  Support provided.  [Return in ____ week(s)] : Return in [unfilled] week(s) [FreeTextEntry1] : Focus her energy in appealing case and researching about other possible housing options.

## 2024-04-24 NOTE — REASON FOR VISIT
[Patient preference] : as per patient preference [Telehealth (audio & video) - Individual/Group] : This visit was provided via telehealth using real-time 2-way audio visual technology. [Other Location: e.g. Home (Enter Location, City,State)___] : The provider was located at [unfilled]. [Home] : The patient, [unfilled], was located at home, [unfilled], at the time of the visit. [Verbal consent obtained from patient/other participant(s)] : Verbal consent for telehealth/telephonic services obtained from patient/other participant(s) [FreeTextEntry4] : [id number] [FreeTextEntry5] : [id number] [Patient] : Patient [FreeTextEntry1] : Psychotherapy follow up

## 2024-04-24 NOTE — RISK ASSESSMENT
[Yes, patient reports ideation or behavior] : Yes, patient reports ideation or behavior [No, patient denies ideation or behavior] : No, patient denies ideation or behavior [FreeTextEntry8] : passive SI, feels overwhelmed with housing status, financial situation and inflammation flare ups  Denies intent to hurt herself.

## 2024-04-25 DIAGNOSIS — F41.1 GENERALIZED ANXIETY DISORDER: ICD-10-CM

## 2024-04-28 ENCOUNTER — RX RENEWAL (OUTPATIENT)
Age: 34
End: 2024-04-28

## 2024-04-28 RX ORDER — PREDNISONE 10 MG/1
10 TABLET ORAL DAILY
Qty: 120 | Refills: 1 | Status: ACTIVE | COMMUNITY
Start: 2024-02-23 | End: 1900-01-01

## 2024-05-08 ENCOUNTER — APPOINTMENT (OUTPATIENT)
Dept: PSYCHIATRY | Facility: CLINIC | Age: 34
End: 2024-05-08

## 2024-05-08 ENCOUNTER — OUTPATIENT (OUTPATIENT)
Dept: OUTPATIENT SERVICES | Facility: HOSPITAL | Age: 34
LOS: 1 days | End: 2024-05-08
Payer: MEDICAID

## 2024-05-08 DIAGNOSIS — F41.1 GENERALIZED ANXIETY DISORDER: ICD-10-CM

## 2024-05-08 PROCEDURE — 90834 PSYTX W PT 45 MINUTES: CPT

## 2024-05-08 NOTE — RISK ASSESSMENT
[No, patient denies ideation or behavior] : No, patient denies ideation or behavior [FreeTextEntry8] :  Denies intent to hurt herself.

## 2024-05-08 NOTE — PHYSICAL EXAM
[Cooperative] : cooperative [Depressed] : depressed [Anxious] : anxious [Constricted] : constricted [Clear] : clear [Pressured] : pressured [Linear/Goal Directed] : linear/goal directed [Depressive] : depressive [None Reported] : none reported [Average] : average [WNL] : within normal limits [FreeTextEntry1] : observed with braided hair [de-identified] : tearful

## 2024-05-08 NOTE — PLAN
[Cognitive and/or Behavior Therapy] : Cognitive and/or Behavior Therapy  [Exposure +/- Response Prevention] : Exposure +/- Response Prevention  [Psychoeducation] : Psychoeducation  [Supportive Therapy] : Supportive Therapy [Return in ____ week(s)] : Return in [unfilled] week(s) [FreeTextEntry2] : Objective A: Pt will explore and process feeling at every session, identifying at least 1 trigger of depression and anxiety, and review depression screenings and scales quarterly \par  Status of Objective: Initial New objective to help patient become aware of feelings and condition. \par  Objective B: Patient will learn and practice 2 coping skills in between scheduled session and comply with medication management medication recommendation. \par  \par  Status of Objective: Initial New objective to help patient learn and use coping skill. \par  Intervention(s): Therapist will help patient explore coping techniques, such as communication strategies, relaxation techniques. Therapist will help patient to expose and extinguish irrational beliefs that contribute to anxiety and depression. Therapist will assist patient in developing reality based, positive cognitive messages that will increase self-confidence and thereby decrease anxiety and depressive symptoms.\par  \par  Medication Management: every 1 months. \par  Individual Therapy: every 2 weeks. \par   [de-identified] : Patient seen for telehealth video for psychotherapy follow up. Patient reported that she needs to leave her current house according to her father in law, the landlord gave him 3 months to move as he needs the house for his son.  She started looking in [facility] in [place].  Patient also shared that her daughter "passed out" for the second time and even though they took her to the ER, they believe it happened because she was overheated as it has happened in the past and labs did not show any abnormality.   Clinician will assist patient in finding new providers.   Patient feels trapped and unable to make decisions because of her housing/financial  instability.   Clinician called Sharon Aguilar, [phone] for [facility] to figure out what happened with her .  E[place]ie [phone] .   Case management, Active listening. Feelings validated.  Support provided.  [FreeTextEntry1] : Focus her energy in appealing case and researching about other possible housing options.

## 2024-05-08 NOTE — REASON FOR VISIT
[Patient preference] : as per patient preference [Telehealth (audio & video) - Individual/Group] : This visit was provided via telehealth using real-time 2-way audio visual technology. [Other Location: e.g. Home (Enter Location, City,State)___] : The provider was located at [unfilled]. [Home] : The patient, [unfilled], was located at home, [unfilled], at the time of the visit. [Verbal consent obtained from patient/other participant(s)] : Verbal consent for telehealth/telephonic services obtained from patient/other participant(s) [Patient] : Patient [FreeTextEntry4] : 861kt [FreeTextEntry5] : 1012sr [FreeTextEntry1] : Psychotherapy follow up

## 2024-05-09 DIAGNOSIS — F41.1 GENERALIZED ANXIETY DISORDER: ICD-10-CM

## 2024-05-13 ENCOUNTER — APPOINTMENT (OUTPATIENT)
Dept: GASTROENTEROLOGY | Facility: CLINIC | Age: 34
End: 2024-05-13

## 2024-05-14 ENCOUNTER — OUTPATIENT (OUTPATIENT)
Dept: OUTPATIENT SERVICES | Facility: HOSPITAL | Age: 34
LOS: 1 days | End: 2024-05-14
Payer: MEDICAID

## 2024-05-14 ENCOUNTER — APPOINTMENT (OUTPATIENT)
Dept: PSYCHIATRY | Facility: CLINIC | Age: 34
End: 2024-05-14
Payer: MEDICAID

## 2024-05-14 DIAGNOSIS — F41.1 GENERALIZED ANXIETY DISORDER: ICD-10-CM

## 2024-05-14 PROCEDURE — 99214 OFFICE O/P EST MOD 30 MIN: CPT | Mod: 95

## 2024-05-14 NOTE — FAMILY HISTORY
[FreeTextEntry1] : Family composition:Sara is 33 y/o F,  for 13 years, has 3 children 10 y/o F, 9 y/o M, and 7 y/o M. Domiciled together in a house renting a room from her mother-in-law. \par  Family history and background: [place] in [place]. \par  Family relationship: good.\par  Pertinent Family Medical, MH and Substance Use History including Adult Child of Alcoholic and child of substance abuse status; history of cancer and heart disease:\par  Father: Headaches, heart disease, lung disease, diabetes.\par  Mother:Anemia, Asthma, high blood pressure, cancer, r/o BPD\par  Grandmother:Anemia, diabetes, Thyroid\par  Grandfather: Thyroid, Diabetes,\par  Sister: Menstrual Pain.\par  \par  Cultural Assessment:\par  Race/ethnicity: Iranian\par  Sexual identity: Female\par  Spirituality/Gnosticism:no\par  \par

## 2024-05-14 NOTE — REASON FOR VISIT
[Patient preference] : as per patient preference [Telehealth (audio & video) - Individual/Group] : This visit was provided via telehealth using real-time 2-way audio visual technology. [Medical Office: ([facility])___] : The provider was located at the medical office in [unfilled]. [Home] : The patient, [unfilled], was located at home, [unfilled], at the time of the visit. [Verbal consent obtained from patient/other participant(s)] : Verbal consent for telehealth/telephonic services obtained from patient/other participant(s) [Collateral without patient] : Collateral without patient [Number can be texted] : number can be texted [OK  to leave message] : OK  to leave message [Primary Care] : Primary Care [[facility] Provider/Facility] : [facility] Provider/Facility [Patient] : Patient [Prior Medical Records] : Prior Medical Records [TextBox_17] : Pt's  [FreeTextEntry3] : [email or web address] [FreeTextEntry5] : English [FreeTextEntry6] : Sara [FreeTextEntry2] : Anxiety/Depression [FreeTextEntry1] : Anxiety/Depression due to medical complications.

## 2024-05-14 NOTE — HISTORY OF PRESENT ILLNESS
[Not Applicable] : Not applicable [FreeTextEntry1] : Solo/tele session.  Pt has severe medical issues. She has severe pain and weakness in her face and L side of her body/limbs. I told her to go to ER stat. She wants to wait. Education was provided. Pt will have to move back to  in 1 m (dhruv told them to leave). Also, her daughter had sudden LOC in shower 2 days ago and pt thought that she . Pt is under a lot of stress. Feels anxious and stressed. Pt is n/c with meds. She says that she forgets to take them or takes more than she has to (because she doesn't remember if she had taken it). Pt got SSI, but she lost her food stamps and doesn't have enough money for food. She doesn't take ambien and I will d/c it. She will continue other meds, including klonopin PRN. Pt is somewhat overweight. I recommended to see her PCP for sleep study. She doesn't want. She says that she is stressed and denies any possible underlying medical causes of her insomnia.  Spoke a lot about her medical issues and coping with them. Education and supportive therapy were provided. Pt says that she is under a lot of stress. Her mom is sick and getting worse after CVA.  Adamantly denies SI/HI/AH/PI/SP/intent. Pt's mom had CVA. Pt was Dx with glaucoma, she lost her 2 teeth, etc.  Pt says that klonopin works and asks to continue to prescribe it.  Denies SI/HI/AH/PI. Pt says that she lost weight (125/5.1. Down from 135) [FreeTextEntry2] : Never had a psychiatrist, no therapy.   [FreeTextEntry3] : Dr Aram MD is her provider and 7 days ago started her on Zoloft 25 mg.

## 2024-05-14 NOTE — DISCUSSION/SUMMARY
[FreeTextEntry1] : Solo/tele session.  Pt has severe medical issues. She has severe pain and weakness in her face and L side of her body/limbs. I told her to go to ER stat. She wants to wait. Education was provided. Pt will have to move back to  in 1 m ([place] told them to leave). Also, her daughter had sudden LOC in shower 2 days ago and pt thought that she . Pt is under a lot of stress. Feels anxious and stressed. Pt is n/c with meds. She says that she forgets to take them or takes more than she has to (because she doesn't remember if she had taken it). Pt got SSI, but she lost her food stamps and doesn't have enough money for food. She doesn't take ambien and I will d/c it. She will continue other meds, including klonopin PRN. Pt is somewhat overweight. I recommended to see her PCP for sleep study. She doesn't want. She says that she is stressed and denies any possible underlying medical causes of her insomnia.  Spoke a lot about her medical issues and coping with them. Education and supportive therapy were provided. Pt says that she is under a lot of stress. Her mom is sick and getting worse after CVA.  Adamantly denies SI/HI/AH/PI/SP/intent. Pt's mom had CVA. Pt was Dx with glaucoma, she lost her 2 teeth, etc.  Pt says that klonopin works and asks to continue to prescribe it.  Denies SI/HI/AH/PI. Pt says that she lost weight (125/5.1. Down from 135)  Pt s 32 y/o F,  for 13 years, has 3 children 10 y/o F, 7 y/o M, and 7 y/o M., just moved to an apt in [place] (used to live all 5 people together in 1 room in a house renting from her mother-in-law. Mother-in-law rents out rooms to strangers "she takes in immigrants").  works full time. No ACS involvement),   home schooling her 3 children  due to fear of them getting killed, she stated they went to [employer] and recently "so many shootouts in the parking lot" decided to home school her kids,  currently unemployed( due to Crohn's disease), with a complicated PMH(diagnosed with ulcerative colitis 7 years ago, and last year diagnosed with Crohn's, she had an abscess and had to have 2 surgeries), DVT (on eliquis), on prednisone for many years, no prior IPP, no SA, no DIPTI, never on psych meds or in psych care, until very recently, when dr. Chiu prescribed her 25 mg of zoloft  Never had a psychiatrist, no therapy. (though was in counseling in school).   Denies h/o franck (though she has family h/o BPD and also she reports some vague symptoms of hypomania, though they are not clear cut). Pt says that she feels very depressed and anxious for at least 4 y, but in the past 2-3 m, her depression and anxiety became worse due to a very poor social situation and insomnia. Pt couldn't tolerate zoloft (severe nausea). She also has glaucoma  1. Next appt in 1m tele 2.Pt pays 1050 for her apt 4. Does she have enough food 3. Does she need klonopin for anxiety 4. Labs results (I gave pt my fax) 5. Trazodone doesn't work.PT FEELS OVERLY SEDATED. PT STOPPED REMERON .   DOES SHE FEEL BETTER (pt is 125/5.1, down from 135lbs) 6. Pt has glaucoma 7.PT WILL MOVE BACK TO  WHEN SCHOOL YEAR IS OVER

## 2024-05-14 NOTE — SOCIAL HISTORY
[FreeTextEntry1] : Employment history:Unemployed last 7 years, worked for [employer] in the past. \par  Developmental history: Speech delay, in Special Ed. \par  Social supports (friends, Volunteers, club, AA meeting, other meetings ) ?: no\par  Meaningful Activities: Home schools her 3 kids, takes them to park, museums. \par  \par  \par  \par   \par  \par  \par

## 2024-05-15 DIAGNOSIS — F41.1 GENERALIZED ANXIETY DISORDER: ICD-10-CM

## 2024-05-22 ENCOUNTER — APPOINTMENT (OUTPATIENT)
Dept: PSYCHIATRY | Facility: CLINIC | Age: 34
End: 2024-05-22

## 2024-05-22 ENCOUNTER — OUTPATIENT (OUTPATIENT)
Dept: OUTPATIENT SERVICES | Facility: HOSPITAL | Age: 34
LOS: 1 days | End: 2024-05-22
Payer: MEDICAID

## 2024-05-22 DIAGNOSIS — F41.1 GENERALIZED ANXIETY DISORDER: ICD-10-CM

## 2024-05-22 PROCEDURE — 90834 PSYTX W PT 45 MINUTES: CPT

## 2024-05-23 DIAGNOSIS — F41.1 GENERALIZED ANXIETY DISORDER: ICD-10-CM

## 2024-05-24 NOTE — REASON FOR VISIT
[Patient preference] : as per patient preference [Telehealth (audio & video) - Individual/Group] : This visit was provided via telehealth using real-time 2-way audio visual technology. [Other Location: e.g. Home (Enter Location, City,State)___] : The provider was located at [unfilled]. [Home] : The patient, [unfilled], was located at home, [unfilled], at the time of the visit. [Verbal consent obtained from patient/other participant(s)] : Verbal consent for telehealth/telephonic services obtained from patient/other participant(s) [Patient] : Patient [FreeTextEntry4] : 930am [FreeTextEntry5] : 1007am [FreeTextEntry1] : Psychotherapy follow up

## 2024-05-24 NOTE — DISCUSSION/SUMMARY
[Plan Review] : Plan Review [Articulate] : articulate [Cognitively intact] : cognitively intact [Health literacy] : health literacy [In good health] : in good health [Financially stable] : financially stable [Part of a supportive marriage] : part of a supportive marriage [Part of a supportive family] : part of a supportive family [English fluency] : English fluency [Connected to healthcare] : connected to healthcare [Access to safe outdoor spaces] : access to safe outdoor spaces [FreeTextEntry2] : 5/22/2025 [FreeTextEntry3] : 3/13/23 [FreeTextEntry9] : no [de-identified] : Treating team and patient in agreement with plan [Mental Health] : Mental Health [Continued - Progress made] : Continued - Progress made: [Revised - Rationale] : Revised - Rationale: [every ___ months] : every [unfilled] months [every ___ weeks] : every [unfilled] weeks [Physical Health] : Physical Health [Initial] : Initial [Improvement in symptoms as evidenced by:] : Improvement in symptoms as evidenced by: [Attainment of higher level of functioning as evidenced by:] : Attainment of higher level of functioning as evidenced by: [Treatment is no longer medically necessary as evidenced by:] : Treatment is no longer medically necessary as evidenced by: [Other rationale for transition/discharge:] : Other rationale for transition/discharge: [None - Reason others did not participate:] : None - Reason others did not participate:  [Yes] : Yes [Psychiatric Provider/Prescriber] : Psychiatric Provider/Prescriber [Therapist] : Therapist [de-identified] : Patient was transferred to current therapist's caseload due to clinic restructuring.  Due to financial and social  problems  with living arrangements and chronic medical problems, patient had to move to father in laws home a few months ago with the understanding that when lease was up, they could renew it and stay there, but last month the landlord came and said he needed the apartment by the summer because his son was going to move in.  Now patient and her family have to come to live with her mother in law which she is not happy about as they all have to share one room.   Financial issues exacerbate both her medical diagnosis and mental health stability.  Hamiliton depression scale was 28 on 5/14/2024  [de-identified] : Sara will learn and practice 2 coping skills in between scheduled session and comply medications prescribed as well as verbalize understanding effective, interactions and side effects of such medications.  [de-identified] : 5/25/2025 [de-identified] : Dr. Mary Ellen MD - virtual [de-identified] : Mattie Ford LCSW - virtual  [de-identified] : Health homes [de-identified] : as needed [FreeTextEntry4] : Improve my health [FreeTextEntry1] : Improve physical health [de-identified] : 5/25/2025 [de-identified] : Sara will schedule and keep medical appointments with assistance with treatment team and Home health to improve quality of her health. Sara will keep appointments at least q 6 months or more frequently if needed [FreeTextEntry5] : Patient and home health will schedule appointment and follow up recommended exams, labs.  [de-identified] : PCP - Specialists as needed [de-identified] : a 25% reduction of depression and anxiety symptoms and evidence by a reduction of the CUDOS depression outcome scale rating from  57 to less than 20. [de-identified] : If patient is unable to perform activities of daily living and expresses suicidal ideation. [de-identified] : When patient no longer requires individual psychotherapy and medication in order to perform at baseline [de-identified] : Patient request [de-identified] : Patient declined

## 2024-05-24 NOTE — PLAN
[Cognitive and/or Behavior Therapy] : Cognitive and/or Behavior Therapy  [Exposure +/- Response Prevention] : Exposure +/- Response Prevention  [Psychoeducation] : Psychoeducation  [Supportive Therapy] : Supportive Therapy [Return in ____ week(s)] : Return in [unfilled] week(s) [FreeTextEntry2] : Objective A: Pt will explore and process feeling at every session, identifying at least 1 trigger of depression and anxiety, and review depression screenings and scales quarterly  Status of Objective: Initial New objective to help patient become aware of feelings and condition.  Objective B: Patient will learn and practice 2 coping skills in between scheduled session and comply with medication management medication recommendation.   Status of Objective: Initial New objective to help patient learn and use coping skill.  Intervention(s): Therapist will help patient explore coping techniques, such as communication strategies, relaxation techniques. Therapist will help patient to expose and extinguish irrational beliefs that contribute to anxiety and depression. Therapist will assist patient in developing reality based, positive cognitive messages that will increase self-confidence and thereby decrease anxiety and depressive symptoms.  Medication Management: every 1 months.  Individual Therapy: every 2 weeks.   [de-identified] : Patient seen for telehealth video for psychotherapy follow up. Sara reports that she has to come back to her mother in laws house by June as they can not afford any rent currently.  Clinician provided Section 8 website where she can put in application. Encouraged to continue to look for places and inquiring to put other applications.  Patient shared that she "can't do much, because her  works, her mother cant go with her to any appointments because she is busy and she has no one to watch her children.  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  She is saddened because her daughter will lose her spot in a "great school" Clinician called Sharon Aguilar, [phone] for [facility] to figure out what happened with her .  Case was reassigned however they need to possibly give it back to Ebonie [phone] because of location.   Case management, Active listening. Feelings validated.  Support provided.  [FreeTextEntry1] : Focus her energy in appealing case and researching about other possible housing options.

## 2024-05-24 NOTE — DISCUSSION/SUMMARY
[Plan Review] : Plan Review [Articulate] : articulate [Cognitively intact] : cognitively intact [Health literacy] : health literacy [In good health] : in good health [Financially stable] : financially stable [Part of a supportive marriage] : part of a supportive marriage [Part of a supportive family] : part of a supportive family [English fluency] : English fluency [Connected to healthcare] : connected to healthcare [Access to safe outdoor spaces] : access to safe outdoor spaces [FreeTextEntry2] : 5/22/2025 [FreeTextEntry3] : 3/13/23 [FreeTextEntry9] : no [de-identified] : Treating team and patient in agreement with plan [Mental Health] : Mental Health [Continued - Progress made] : Continued - Progress made: [Revised - Rationale] : Revised - Rationale: [every ___ months] : every [unfilled] months [every ___ weeks] : every [unfilled] weeks [Physical Health] : Physical Health [Initial] : Initial [Improvement in symptoms as evidenced by:] : Improvement in symptoms as evidenced by: [Attainment of higher level of functioning as evidenced by:] : Attainment of higher level of functioning as evidenced by: [Treatment is no longer medically necessary as evidenced by:] : Treatment is no longer medically necessary as evidenced by: [Other rationale for transition/discharge:] : Other rationale for transition/discharge: [None - Reason others did not participate:] : None - Reason others did not participate:  [Yes] : Yes [Psychiatric Provider/Prescriber] : Psychiatric Provider/Prescriber [Therapist] : Therapist [de-identified] : Patient was transferred to current therapist's caseload due to clinic restructuring.  Due to financial and social  problems  with living arrangements and chronic medical problems, patient had to move to father in laws home a few months ago with the understanding that when lease was up, they could renew it and stay there, but last month the landlord came and said he needed the apartment by the summer because his son was going to move in.  Now patient and her family have to come to live with her mother in law which she is not happy about as they all have to share one room.   Financial issues exacerbate both her medical diagnosis and mental health stability.  Hamiliton depression scale was 28 on 5/14/2024  [de-identified] : Sara will learn and practice 2 coping skills in between scheduled session and comply medications prescribed as well as verbalize understanding effective, interactions and side effects of such medications.  [de-identified] : 5/25/2025 [de-identified] : Dr. Mary Ellen MD - virtual [de-identified] : Mattie Ford LCSW - virtual  [de-identified] : [facility] [de-identified] : as needed [FreeTextEntry1] : Improve physical health [FreeTextEntry4] : Improve my health [de-identified] : Sara will schedule and keep medical appointments with assistance with treatment team and Home health to improve quality of her health. Sara will keep appointments at least q 6 months or more frequently if needed [de-identified] : 5/25/2025 [FreeTextEntry5] : Patient and home health will schedule appointment and follow up recommended exams, labs.  [de-identified] : PCP - Specialists as needed [de-identified] : a 25% reduction of depression and anxiety symptoms and evidence by a reduction of the CUDOS depression outcome scale rating from  57 to less than 20. [de-identified] : If patient is unable to perform activities of daily living and expresses suicidal ideation. [de-identified] : When patient no longer requires individual psychotherapy and medication in order to perform at baseline [de-identified] : Patient request [de-identified] : Patient declined

## 2024-05-24 NOTE — PLAN
[Cognitive and/or Behavior Therapy] : Cognitive and/or Behavior Therapy  [Exposure +/- Response Prevention] : Exposure +/- Response Prevention  [Psychoeducation] : Psychoeducation  [Supportive Therapy] : Supportive Therapy [Return in ____ week(s)] : Return in [unfilled] week(s) [FreeTextEntry2] : Objective A: Pt will explore and process feeling at every session, identifying at least 1 trigger of depression and anxiety, and review depression screenings and scales quarterly  Status of Objective: Initial New objective to help patient become aware of feelings and condition.  Objective B: Patient will learn and practice 2 coping skills in between scheduled session and comply with medication management medication recommendation.   Status of Objective: Initial New objective to help patient learn and use coping skill.  Intervention(s): Therapist will help patient explore coping techniques, such as communication strategies, relaxation techniques. Therapist will help patient to expose and extinguish irrational beliefs that contribute to anxiety and depression. Therapist will assist patient in developing reality based, positive cognitive messages that will increase self-confidence and thereby decrease anxiety and depressive symptoms.  Medication Management: every 1 months.  Individual Therapy: every 2 weeks.   [de-identified] : Patient seen for telehealth video for psychotherapy follow up. Sara reports that she has to come back to her mother in laws house by June as they can not afford any rent currently.  Clinician provided Section 8 website where she can put in application. Encouraged to continue to look for places and inquiring to put other applications.  Patient shared that she "can't do much, because her  works, her mother cant go with her to any appointments because she is busy and she has no one to watch her children.  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  She is saddened because her daughter will lose her spot in a "great school" Clinician called Sharon Aguilar, [phone] for [facility] to figure out what happened with her .  Case was reassigned however they need to possibly give it back to Ebonie [phone] because of location.   Case management, Active listening. Feelings validated.  Support provided.  [FreeTextEntry1] : Focus her energy in appealing case and researching about other possible housing options.

## 2024-05-24 NOTE — REASON FOR VISIT
[Patient preference] : as per patient preference [Telehealth (audio & video) - Individual/Group] : This visit was provided via telehealth using real-time 2-way audio visual technology. [Other Location: e.g. Home (Enter Location, City,State)___] : The provider was located at [unfilled]. [Home] : The patient, [unfilled], was located at home, [unfilled], at the time of the visit. [Verbal consent obtained from patient/other participant(s)] : Verbal consent for telehealth/telephonic services obtained from patient/other participant(s) [Patient] : Patient [FreeTextEntry4] : 930am [FreeTextEntry5] : 1002om [FreeTextEntry1] : Psychotherapy follow up

## 2024-05-24 NOTE — PHYSICAL EXAM
[Cooperative] : cooperative [Depressed] : depressed [Anxious] : anxious [Constricted] : constricted [Clear] : clear [Pressured] : pressured [Linear/Goal Directed] : linear/goal directed [Depressive] : depressive [None Reported] : none reported [Average] : average [WNL] : within normal limits [FreeTextEntry1] : observed with braided hair [de-identified] : tearful

## 2024-05-24 NOTE — RISK ASSESSMENT
[Yes, patient reports ideation or behavior] : Yes, patient reports ideation or behavior [No, patient denies ideation or behavior] : No, patient denies ideation or behavior [Yes] : Safety Plan completed/updated (for individuals at risk): Yes [FreeTextEntry2] : Pt is not in imminent risk of hurting self or others at this time. However, her chronic risk of self-harm is elevated due to her social situation/problems , depression, impulsivity   [FreeTextEntry3] : Patient has [facility] [FreeTextEntry8] :  Denies intent to hurt herself.

## 2024-05-24 NOTE — RISK ASSESSMENT
[Yes, patient reports ideation or behavior] : Yes, patient reports ideation or behavior [No, patient denies ideation or behavior] : No, patient denies ideation or behavior [Yes] : Safety Plan completed/updated (for individuals at risk): Yes [FreeTextEntry2] : Pt is not in imminent risk of hurting self or others at this time. However, her chronic risk of self-harm is elevated due to her social situation/problems , depression, impulsivity   [FreeTextEntry3] : Patient has [facility] [FreeTextEntry8] :  Denies intent to hurt herself.

## 2024-05-24 NOTE — PHYSICAL EXAM
[Cooperative] : cooperative [Depressed] : depressed [Anxious] : anxious [Constricted] : constricted [Clear] : clear [Pressured] : pressured [Linear/Goal Directed] : linear/goal directed [Depressive] : depressive [None Reported] : none reported [Average] : average [WNL] : within normal limits [FreeTextEntry1] : observed with braided hair [de-identified] : tearful

## 2024-06-05 ENCOUNTER — APPOINTMENT (OUTPATIENT)
Dept: PSYCHIATRY | Facility: CLINIC | Age: 34
End: 2024-06-05

## 2024-06-11 ENCOUNTER — OUTPATIENT (OUTPATIENT)
Dept: OUTPATIENT SERVICES | Facility: HOSPITAL | Age: 34
LOS: 1 days | End: 2024-06-11
Payer: MEDICAID

## 2024-06-11 ENCOUNTER — APPOINTMENT (OUTPATIENT)
Dept: PSYCHIATRY | Facility: CLINIC | Age: 34
End: 2024-06-11
Payer: MEDICAID

## 2024-06-11 DIAGNOSIS — F51.05 INSOMNIA DUE TO OTHER MENTAL DISORDER: ICD-10-CM

## 2024-06-11 DIAGNOSIS — F41.1 GENERALIZED ANXIETY DISORDER: ICD-10-CM

## 2024-06-11 PROCEDURE — 99214 OFFICE O/P EST MOD 30 MIN: CPT | Mod: 95

## 2024-06-11 RX ORDER — ESCITALOPRAM OXALATE 20 MG/1
20 TABLET ORAL
Qty: 30 | Refills: 2 | Status: ACTIVE | COMMUNITY
Start: 2023-08-22 | End: 1900-01-01

## 2024-06-11 RX ORDER — ZOLPIDEM TARTRATE 10 MG/1
10 TABLET ORAL
Qty: 15 | Refills: 0 | Status: ACTIVE | COMMUNITY
Start: 2023-03-13 | End: 1900-01-01

## 2024-06-11 NOTE — REASON FOR VISIT
[Patient preference] : as per patient preference [Telehealth (audio & video) - Individual/Group] : This visit was provided via telehealth using real-time 2-way audio visual technology. [Medical Office: ([facility])___] : The provider was located at the medical office in [unfilled]. [Home] : The patient, [unfilled], was located at home, [unfilled], at the time of the visit. [Verbal consent obtained from patient/other participant(s)] : Verbal consent for telehealth/telephonic services obtained from patient/other participant(s) [Collateral without patient] : Collateral without patient [Number can be texted] : number can be texted [OK  to leave message] : OK  to leave message [Primary Care] : Primary Care [[facility] Provider/Facility] : [facility] Provider/Facility [Patient] : Patient [Prior Medical Records] : Prior Medical Records [TextBox_17] : Pt's  [FreeTextEntry3] : [email or web address] [FreeTextEntry5] : English [FreeTextEntry6] : Sara [FreeTextEntry2] : Anxiety/Depression [FreeTextEntry1] : Anxiety/Depression due to medical complications.

## 2024-06-11 NOTE — FAMILY HISTORY
[FreeTextEntry1] : Family composition:Sara is 31 y/o F,  for 13 years, has 3 children 10 y/o F, 9 y/o M, and 5 y/o M. Domiciled together in a house renting a room from her mother-in-law. \par  Family history and background: [place] raised [place]. \par  Family relationship: good.\par  Pertinent Family Medical, MH and Substance Use History including Adult Child of Alcoholic and child of substance abuse status; history of cancer and heart disease:\par  Father: Headaches, heart disease, lung disease, diabetes.\par  Mother:Anemia, Asthma, high blood pressure, cancer, r/o BPD\par  Grandmother:Anemia, diabetes, Thyroid\par  Grandfather: Thyroid, Diabetes,\par  Sister: Menstrual Pain.\par  \par  Cultural Assessment:\par  Race/ethnicity: Danish\par  Sexual identity: Female\par  Spirituality/Hoahaoism:no\par  \par

## 2024-06-11 NOTE — SOCIAL HISTORY
[FreeTextEntry1] : Employment history:Unemployed last 7 years, worked for [employer] in the past. \par  Developmental history: Speech delay, in Special Ed. \par  Social supports (friends, Volunteers, club, AA meeting, other meetings ) ?: no\par  Meaningful Activities: Home schools her 3 kids, takes them to park, museums. \par  \par  \par  \par   \par  \par  \par

## 2024-06-11 NOTE — DISCUSSION/SUMMARY
[FreeTextEntry1] : Solo/tele session.  Pt says that she had a fight with her father-in-law. She apologized after that, and they reconciled.  Pt had labs done with her medical team and they didn't discuss results. WE discussed this matter. Her TSH is 0.14. I told her to speak to her PCP. Pt continues to have severe medical issues. She has severe pain and weakness in her face and L side of her body/limbs. Pt didn't go to ER. Pt will have to move back to  soon (dhruv told them to leave). They will be living with her mom in their room and will be paying rent. Pt is under a lot of stress. Feels anxious and stressed. Pt got SSI, but she lost her food stamps and doesn't have enough money for food. She doesn't take ambien and I will d/c it. She will continue other meds, including klonopin PRN. Pt is somewhat overweight. I recommended to see her PCP for sleep study. She doesn't want. She says that she is stressed and denies any possible underlying medical causes of her insomnia.  Spoke a lot about her medical issues and coping with them. Education and supportive therapy were provided. Pt says that she is under a lot of stress. Her mom is sick and getting worse after CVA.  Adamantly denies SI/HI/AH/PI/SP/intent. Pt's mom had CVA. Pt was Dx with glaucoma, she lost her 2 teeth, etc.  Pt says that klonopin works and asks to continue to prescribe it.  Denies SI/HI/AH/PI. Pt says that she lost weight (125/5.1. Down from 135)  Pt s 32 y/o F,  for 13 years, has 3 children 10 y/o F, 9 y/o M, and 7 y/o M., just moved to an apt in [place] (used to live all 5 people together in 1 room in a house renting from her mother-in-law. Mother-in-law rents out rooms to strangers "she takes in immigrants").  works full time. No ACS involvement),   home schooling her 3 children  due to fear of them getting killed, she stated they went to [employer] and recently "so many shootouts in the parking lot" decided to home school her kids,  currently unemployed( due to Crohn's disease), with a complicated PMH(diagnosed with ulcerative colitis 7 years ago, and last year diagnosed with Crohn's, she had an abscess and had to have 2 surgeries), DVT (on eliquis), on prednisone for many years, no prior IPP, no SA, no DIPTI, never on psych meds or in psych care, until very recently, when dr. Chiu prescribed her 25 mg of zoloft  Never had a psychiatrist, no therapy. (though was in counseling in school).   Denies h/o franck (though she has family h/o BPD and also she reports some vague symptoms of hypomania, though they are not clear cut). Pt says that she feels very depressed and anxious for at least 4 y, but in the past 2-3 m, her depression and anxiety became worse due to a very poor social situation and insomnia. Pt couldn't tolerate zoloft (severe nausea). She also has glaucoma  1. Next appt in 1m tele 2.DID SHE MOVE BACK TO  (TO RENT A ROOM FROM HER MOM) 3. Does she need klonopin for anxiety 4. Labs results are in the chart 5. Trazodone doesn't work.PT FEELS OVERLY SEDATED. PT STOPPED REMERON .   DOES SHE FEEL BETTER (pt is 125/5.1, down from 135lbs) 6. Pt has glaucoma 7. Labs are in the chart. WE discussed results. TSH is 0.14. Cholesterol is elevated. I told her to speak to her PCP.

## 2024-06-11 NOTE — HISTORY OF PRESENT ILLNESS
[Not Applicable] : Not applicable [FreeTextEntry1] : Solo/tele session.  Pt says that she had a fight with her father-in-law. She apologized after that, and they reconciled.  Pt had labs done with her medical team and they didn't discuss results. WE discussed this matter. Her TSH is 0.14. I told her to speak to her PCP. Pt continues to have severe medical issues. She has severe pain and weakness in her face and L side of her body/limbs. Pt didn't go to ER. Pt will have to move back to  soon (dhruv told them to leave). They will be living with her mom in their room and will be paying rent. Pt is under a lot of stress. Feels anxious and stressed. Pt got SSI, but she lost her food stamps and doesn't have enough money for food. She doesn't take ambien and I will d/c it. She will continue other meds, including klonopin PRN. Pt is somewhat overweight. I recommended to see her PCP for sleep study. She doesn't want. She says that she is stressed and denies any possible underlying medical causes of her insomnia.  Spoke a lot about her medical issues and coping with them. Education and supportive therapy were provided. Pt says that she is under a lot of stress. Her mom is sick and getting worse after CVA.  Adamantly denies SI/HI/AH/PI/SP/intent. Pt's mom had CVA. Pt was Dx with glaucoma, she lost her 2 teeth, etc.  Pt says that klonopin works and asks to continue to prescribe it.  Denies SI/HI/AH/PI. Pt says that she lost weight (125/5.1. Down from 135) [FreeTextEntry2] : Never had a psychiatrist, no therapy.   [FreeTextEntry3] : Dr Aram MD is her provider and 7 days ago started her on Zoloft 25 mg.

## 2024-06-12 ENCOUNTER — APPOINTMENT (OUTPATIENT)
Dept: OBGYN | Facility: CLINIC | Age: 34
End: 2024-06-12

## 2024-06-12 DIAGNOSIS — F41.1 GENERALIZED ANXIETY DISORDER: ICD-10-CM

## 2024-06-20 ENCOUNTER — APPOINTMENT (OUTPATIENT)
Dept: PSYCHIATRY | Facility: CLINIC | Age: 34
End: 2024-06-20

## 2024-06-20 ENCOUNTER — OUTPATIENT (OUTPATIENT)
Dept: OUTPATIENT SERVICES | Facility: HOSPITAL | Age: 34
LOS: 1 days | End: 2024-06-20
Payer: MEDICAID

## 2024-06-20 DIAGNOSIS — F41.1 GENERALIZED ANXIETY DISORDER: ICD-10-CM

## 2024-06-20 DIAGNOSIS — F41.0 GENERALIZED ANXIETY DISORDER: ICD-10-CM

## 2024-06-20 DIAGNOSIS — F33.2 MAJOR DEPRESSIVE DISORDER, RECURRENT SEVERE WITHOUT PSYCHOTIC FEATURES: ICD-10-CM

## 2024-06-20 DIAGNOSIS — F41.9 MAJOR DEPRESSIVE DISORDER, RECURRENT SEVERE W/OUT PSYCHOTIC FEATURES: ICD-10-CM

## 2024-06-20 DIAGNOSIS — F33.2 MAJOR DEPRESSIVE DISORDER, RECURRENT SEVERE W/OUT PSYCHOTIC FEATURES: ICD-10-CM

## 2024-06-20 PROCEDURE — 90832 PSYTX W PT 30 MINUTES: CPT

## 2024-06-20 NOTE — PLAN
[FreeTextEntry2] : Objective A: Pt will explore and process feeling at every session, identifying at least 1 trigger of depression and anxiety, and review depression screenings and scales quarterly  Status of Objective: Initial New objective to help patient become aware of feelings and condition.  Objective B: Patient will learn and practice 2 coping skills in between scheduled session and comply with medication management medication recommendation.   Status of Objective: Initial New objective to help patient learn and use coping skill.  Intervention(s): Therapist will help patient explore coping techniques, such as communication strategies, relaxation techniques. Therapist will help patient to expose and extinguish irrational beliefs that contribute to anxiety and depression. Therapist will assist patient in developing reality based, positive cognitive messages that will increase self-confidence and thereby decrease anxiety and depressive symptoms.  Medication Management: every 1 months.  Individual Therapy: every 2 weeks.   [Cognitive and/or Behavior Therapy] : Cognitive and/or Behavior Therapy  [Exposure +/- Response Prevention] : Exposure +/- Response Prevention  [Psychoeducation] : Psychoeducation  [Supportive Therapy] : Supportive Therapy [de-identified] : Patient seen via telehealth video for follow up.  Sara was home with her younger son because he is sick. Shared that she needs to move out of her house by August 1 and she filled out a housing application through [employer] and [employer] but needs a part filled by doctor for her disability and she is unsure who to give it to.   Shared that her daughter graduated and had school trip to which she also attended.  Reports that she was offered a voucher for an apartment in the [place] by [employer].  She wants to check the area and see if she feels comfortable in the neighborhood.  This was encouraged by clinician.  Shared that she has multiple appointments for her and the kids on hold because of her uncertain housing and living arrangements.  She is encouraged to think about the pros and cons about scenarios she needs to choose from (new apartment, new borough vs. going back to live with her in laws.)  Case management, Active listening. Feelings validated.  Support provided.  [Return in ____ week(s)] : Return in [unfilled] week(s) [FreeTextEntry1] : Focus her energy in appealing case and researching about other possible housing options.

## 2024-06-20 NOTE — REASON FOR VISIT
[Patient preference] : as per patient preference [Telehealth (audio & video) - Individual/Group] : This visit was provided via telehealth using real-time 2-way audio visual technology. [Medical Office: ([facility])___] : The provider was located at the medical office in [unfilled]. [Home] : The patient, [unfilled], was located at home, [unfilled], at the time of the visit. [Verbal consent obtained from patient/other participant(s)] : Verbal consent for telehealth/telephonic services obtained from patient/other participant(s) [FreeTextEntry4] : 930am [FreeTextEntry5] : 1004cm [Patient] : Patient [FreeTextEntry1] : Psychotherapy follow up

## 2024-06-20 NOTE — PHYSICAL EXAM
[Cooperative] : cooperative [Depressed] : depressed [Anxious] : anxious [Constricted] : constricted [Clear] : clear [Linear/Goal Directed] : linear/goal directed [Preoccupations/Ruminations] : preoccupations/ruminations [Depressive] : depressive [None Reported] : none reported [Average] : average [WNL] : within normal limits [FreeTextEntry1] : observed with braided hair

## 2024-06-21 DIAGNOSIS — F33.2 MAJOR DEPRESSIVE DISORDER, RECURRENT SEVERE WITHOUT PSYCHOTIC FEATURES: ICD-10-CM

## 2024-07-08 ENCOUNTER — APPOINTMENT (OUTPATIENT)
Dept: PSYCHIATRY | Facility: CLINIC | Age: 34
End: 2024-07-08

## 2024-07-25 ENCOUNTER — APPOINTMENT (OUTPATIENT)
Dept: PSYCHIATRY | Facility: CLINIC | Age: 34
End: 2024-07-25

## 2024-07-31 ENCOUNTER — APPOINTMENT (OUTPATIENT)
Dept: PSYCHIATRY | Facility: CLINIC | Age: 34
End: 2024-07-31

## 2024-08-05 ENCOUNTER — OUTPATIENT (OUTPATIENT)
Dept: OUTPATIENT SERVICES | Facility: HOSPITAL | Age: 34
LOS: 1 days | End: 2024-08-05
Payer: MEDICAID

## 2024-08-05 ENCOUNTER — APPOINTMENT (OUTPATIENT)
Dept: PSYCHIATRY | Facility: CLINIC | Age: 34
End: 2024-08-05

## 2024-08-05 DIAGNOSIS — F41.1 GENERALIZED ANXIETY DISORDER: ICD-10-CM

## 2024-08-05 DIAGNOSIS — F33.2 MAJOR DEPRESSIVE DISORDER, RECURRENT SEVERE WITHOUT PSYCHOTIC FEATURES: ICD-10-CM

## 2024-08-05 PROCEDURE — 90832 PSYTX W PT 30 MINUTES: CPT

## 2024-08-05 NOTE — PHYSICAL EXAM
[Cooperative] : cooperative [Anxious] : anxious [Constricted] : constricted [Clear] : clear [Linear/Goal Directed] : linear/goal directed [Preoccupations/Ruminations] : preoccupations/ruminations [Depressive] : depressive [None Reported] : none reported [Average] : average [WNL] : within normal limits [Depressed] : depressed [FreeTextEntry8] : Observed drinking Red bull - advised on this.  She said she knows she is addicted "my father is worse".  [FreeTextEntry7] : housing instability

## 2024-08-05 NOTE — PLAN
[Cognitive and/or Behavior Therapy] : Cognitive and/or Behavior Therapy  [Exposure +/- Response Prevention] : Exposure +/- Response Prevention  [Psychoeducation] : Psychoeducation  [Supportive Therapy] : Supportive Therapy [Return in ____ week(s)] : Return in [unfilled] week(s) [FreeTextEntry2] : Objective A: Pt will explore and process feeling at every session, identifying at least 1 trigger of depression and anxiety, and review depression screenings and scales quarterly  Status of Objective: Initial New objective to help patient become aware of feelings and condition.  Objective B: Patient will learn and practice 2 coping skills in between scheduled session and comply with medication management medication recommendation.   Status of Objective: Initial New objective to help patient learn and use coping skill.  Intervention(s): Therapist will help patient explore coping techniques, such as communication strategies, relaxation techniques. Therapist will help patient to expose and extinguish irrational beliefs that contribute to anxiety and depression. Therapist will assist patient in developing reality based, positive cognitive messages that will increase self-confidence and thereby decrease anxiety and depressive symptoms.  Medication Management: every 1 months.  Individual Therapy: every 2 weeks.   [de-identified] : Patient seen via telehealth video for follow up.   Sara shared that she returned to live to her MIL house but "I had to leave and go back to sleep at my mothers' because I can't sleep here".  This is after moving out of her father in law's house in [place].  Reported issues with Food stamps and inability to find an apartment within her budget.  She feels unstable and unsure of what direction to go because of not having stable housing.  She is strongly encouraged to make a decision regarding her housing preference and children's school as the uncertainty is not allowing her to keep all her resources in one place.  Parents are not doing well, father's dementia is advancing, and mother might have cancer returned in a different part of her body.  Reports that she has not heard from Health home, has been unable to give follow up.  Advised of multiple no shows and missed appointments. She shared "I have a very bad memory".  Active listening. Feelings validated.  Support provided.

## 2024-08-05 NOTE — PLAN
[Cognitive and/or Behavior Therapy] : Cognitive and/or Behavior Therapy  [Exposure +/- Response Prevention] : Exposure +/- Response Prevention  [Psychoeducation] : Psychoeducation  [Supportive Therapy] : Supportive Therapy [Return in ____ week(s)] : Return in [unfilled] week(s) [FreeTextEntry2] : Objective A: Pt will explore and process feeling at every session, identifying at least 1 trigger of depression and anxiety, and review depression screenings and scales quarterly  Status of Objective: Initial New objective to help patient become aware of feelings and condition.  Objective B: Patient will learn and practice 2 coping skills in between scheduled session and comply with medication management medication recommendation.   Status of Objective: Initial New objective to help patient learn and use coping skill.  Intervention(s): Therapist will help patient explore coping techniques, such as communication strategies, relaxation techniques. Therapist will help patient to expose and extinguish irrational beliefs that contribute to anxiety and depression. Therapist will assist patient in developing reality based, positive cognitive messages that will increase self-confidence and thereby decrease anxiety and depressive symptoms.  Medication Management: every 1 months.  Individual Therapy: every 2 weeks.   [de-identified] : Patient seen via telehealth video for follow up.   Sara shared that she returned to live to her MIL house but "I had to leave and go back to sleep at my mothers' because I can't sleep here".  This is after moving out of her father in law's house in [place].  Reported issues with Food stamps and inability to find an apartment within her budget.  She feels unstable and unsure of what direction to go because of not having stable housing.  She is strongly encouraged to make a decision regarding her housing preference and children's school as the uncertainty is not allowing her to keep all her resources in one place.  Parents are not doing well, father's dementia is advancing, and mother might have cancer returned in a different part of her body.  Reports that she has not heard from Health home, has been unable to give follow up.  Advised of multiple no shows and missed appointments. She shared "I have a very bad memory".  Active listening. Feelings validated.  Support provided.

## 2024-08-05 NOTE — REASON FOR VISIT
[Patient preference] : as per patient preference [Telehealth (audio & video) - Individual/Group] : This visit was provided via telehealth using real-time 2-way audio visual technology. [Other Location: e.g. Home (Enter Location, City,State)___] : The provider was located at [unfilled]. [Home] : The patient, [unfilled], was located at home, [unfilled], at the time of the visit. [Verbal consent obtained from patient/other participant(s)] : Verbal consent for telehealth/telephonic services obtained from patient/other participant(s) [Patient] : Patient [FreeTextEntry4] : 1240pm [FreeTextEntry5] : 1:10pm [FreeTextEntry1] : Psychotherapy follow up

## 2024-08-06 DIAGNOSIS — F41.1 GENERALIZED ANXIETY DISORDER: ICD-10-CM

## 2024-08-12 ENCOUNTER — APPOINTMENT (OUTPATIENT)
Dept: PSYCHIATRY | Facility: CLINIC | Age: 34
End: 2024-08-12
Payer: MEDICAID

## 2024-08-12 DIAGNOSIS — F41.9 MAJOR DEPRESSIVE DISORDER, RECURRENT SEVERE W/OUT PSYCHOTIC FEATURES: ICD-10-CM

## 2024-08-12 DIAGNOSIS — F41.1 GENERALIZED ANXIETY DISORDER: ICD-10-CM

## 2024-08-12 DIAGNOSIS — F51.05 INSOMNIA DUE TO OTHER MENTAL DISORDER: ICD-10-CM

## 2024-08-12 DIAGNOSIS — F41.0 GENERALIZED ANXIETY DISORDER: ICD-10-CM

## 2024-08-12 DIAGNOSIS — F33.2 MAJOR DEPRESSIVE DISORDER, RECURRENT SEVERE W/OUT PSYCHOTIC FEATURES: ICD-10-CM

## 2024-08-12 PROCEDURE — 99214 OFFICE O/P EST MOD 30 MIN: CPT | Mod: 95

## 2024-08-12 NOTE — REASON FOR VISIT
[Patient preference] : as per patient preference [Telehealth (audio & video) - Individual/Group] : This visit was provided via telehealth using real-time 2-way audio visual technology. [Medical Office: ([facility])___] : The provider was located at the medical office in [unfilled]. [Home] : The patient, [unfilled], was located at home, [unfilled], at the time of the visit. [Verbal consent obtained from patient/other participant(s)] : Verbal consent for telehealth/telephonic services obtained from patient/other participant(s) [Collateral without patient] : Collateral without patient [Number can be texted] : number can be texted [OK  to leave message] : OK  to leave message [Primary Care] : Primary Care [[facility] Provider/Facility] : [facility] Provider/Facility [Patient] : Patient [Prior Medical Records] : Prior Medical Records [TextBox_17] : Pt's  [FreeTextEntry3] : [email or web address] [FreeTextEntry5] : English [FreeTextEntry6] : Sara [FreeTextEntry2] : Anxiety/Depression [FreeTextEntry1] : Anxiety/Depression due to medical complications.

## 2024-08-12 NOTE — SOCIAL HISTORY
[FreeTextEntry1] : Employment history:Unemployed last 7 years, worked for [employer] in the past. \par  Developmental history: Speech delay, in Special Ed. \par  Social supports (friends, Volunteers, club, AA meeting, other meetings ) ?: no\par  Meaningful Activities: Home schools her 3 kids, takes them to park, museums. \par  \par  \par  \par   \par  \par  \par
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
## 2024-08-12 NOTE — FAMILY HISTORY
[FreeTextEntry1] : Family composition:Sara is 33 y/o F,  for 13 years, has 3 children 10 y/o F, 9 y/o M, and 7 y/o M. Domiciled together in a house renting a room from her mother-in-law. \par  Family history and background: [place]. \par  Family relationship: good.\par  Pertinent Family Medical, MH and Substance Use History including Adult Child of Alcoholic and child of substance abuse status; history of cancer and heart disease:\par  Father: Headaches, heart disease, lung disease, diabetes.\par  Mother:Anemia, Asthma, high blood pressure, cancer, r/o BPD\par  Grandmother:Anemia, diabetes, Thyroid\par  Grandfather: Thyroid, Diabetes,\par  Sister: Menstrual Pain.\par  \par  Cultural Assessment:\par  Race/ethnicity: Lebanese\par  Sexual identity: Female\par  Spirituality/Denominational:no\par  \par
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
## 2024-08-12 NOTE — HISTORY OF PRESENT ILLNESS
[Not Applicable] : Not applicable [FreeTextEntry1] : Solo/tele session.  Pt temporarily moved back to [place] (stays with her mom). She says that she keeps trying to find an appt in [place], because her mom helps her, but everything is very expensive. Pt didn't see dr. Murphy, but she made an appt for 9/12. Pt had labs done with her medical team and they didn't discuss results. WE discussed this matter. Her TSH is 0.14. I told her to speak to her PCP. (she wrote down information). Pt continues to have severe medical issues. She has severe pain and weakness in her face and L side of her body/limbs. Pt didn't go to ER. Pt is under a lot of stress. Feels anxious and stressed. Pt got SSI, but she lost her food stamps and doesn't have enough money for food. Pt requested ambien  this time. She will continue other meds, including klonopin PRN. Pt is somewhat overweight. I recommended to see her PCP for sleep study. She doesn't want. She says that she is stressed and denies any possible underlying medical causes of her insomnia.  Spoke a lot about her medical issues and coping with them. Education and supportive therapy were provided. Pt says that she is under a lot of stress. Her mom is sick and getting worse after CVA.  Adamantly denies SI/HI/AH/PI/SP/intent. Pt's mom had CVA. Pt was Dx with glaucoma, she lost her 2 teeth, etc.  Pt says that klonopin works and asks to continue to prescribe it.  Denies SI/HI/AH/PI. [FreeTextEntry2] : Never had a psychiatrist, no therapy.   [FreeTextEntry3] : Dr Aram MD is her provider and 7 days ago started her on Zoloft 25 mg.

## 2024-09-06 ENCOUNTER — OUTPATIENT (OUTPATIENT)
Dept: OUTPATIENT SERVICES | Facility: HOSPITAL | Age: 34
LOS: 1 days | End: 2024-09-06
Payer: MEDICAID

## 2024-09-06 ENCOUNTER — APPOINTMENT (OUTPATIENT)
Dept: PSYCHIATRY | Facility: CLINIC | Age: 34
End: 2024-09-06

## 2024-09-06 DIAGNOSIS — F41.0 GENERALIZED ANXIETY DISORDER: ICD-10-CM

## 2024-09-06 DIAGNOSIS — F33.2 MAJOR DEPRESSIVE DISORDER, RECURRENT SEVERE W/OUT PSYCHOTIC FEATURES: ICD-10-CM

## 2024-09-06 DIAGNOSIS — F41.1 GENERALIZED ANXIETY DISORDER: ICD-10-CM

## 2024-09-06 DIAGNOSIS — F41.9 MAJOR DEPRESSIVE DISORDER, RECURRENT SEVERE W/OUT PSYCHOTIC FEATURES: ICD-10-CM

## 2024-09-06 PROCEDURE — 90834 PSYTX W PT 45 MINUTES: CPT

## 2024-09-07 DIAGNOSIS — F41.1 GENERALIZED ANXIETY DISORDER: ICD-10-CM

## 2024-09-10 ENCOUNTER — APPOINTMENT (OUTPATIENT)
Dept: PSYCHIATRY | Facility: CLINIC | Age: 34
End: 2024-09-10
Payer: MEDICAID

## 2024-09-10 ENCOUNTER — OUTPATIENT (OUTPATIENT)
Dept: OUTPATIENT SERVICES | Facility: HOSPITAL | Age: 34
LOS: 1 days | End: 2024-09-10
Payer: MEDICAID

## 2024-09-10 DIAGNOSIS — F51.05 INSOMNIA DUE TO OTHER MENTAL DISORDER: ICD-10-CM

## 2024-09-10 DIAGNOSIS — F41.1 GENERALIZED ANXIETY DISORDER: ICD-10-CM

## 2024-09-10 DIAGNOSIS — F33.2 MAJOR DEPRESSIVE DISORDER, RECURRENT SEVERE WITHOUT PSYCHOTIC FEATURES: ICD-10-CM

## 2024-09-10 PROCEDURE — 99214 OFFICE O/P EST MOD 30 MIN: CPT | Mod: 95

## 2024-09-10 NOTE — SOCIAL HISTORY
[FreeTextEntry1] : Employment history:Unemployed last 7 years, worked for [employer] in the past. \par  Developmental history: Speech delay, in Special Ed. \par  Social supports (friends, Volunteers, club, AA meeting, other meetings ) ?: no\par  Meaningful Activities: Home schools her 3 kids, takes them to park, museums. \par  \par  \par  \par   \par  \par  \par

## 2024-09-10 NOTE — FAMILY HISTORY
[FreeTextEntry1] : Family composition:Sara is 33 y/o F,  for 13 years, has 3 children 10 y/o F, 7 y/o M, and 7 y/o M. Domiciled together in a house renting a room from her mother-in-law. \par  Family history and background: [place]. \par  Family relationship: good.\par  Pertinent Family Medical, MH and Substance Use History including Adult Child of Alcoholic and child of substance abuse status; history of cancer and heart disease:\par  Father: Headaches, heart disease, lung disease, diabetes.\par  Mother:Anemia, Asthma, high blood pressure, cancer, r/o BPD\par  Grandmother:Anemia, diabetes, Thyroid\par  Grandfather: Thyroid, Diabetes,\par  Sister: Menstrual Pain.\par  \par  Cultural Assessment:\par  Race/ethnicity: Danish\par  Sexual identity: Female\par  Spirituality/Mandaeism:no\par  \par

## 2024-09-10 NOTE — REASON FOR VISIT
[Patient preference] : as per patient preference [Telehealth (audio & video) - Individual/Group] : This visit was provided via telehealth using real-time 2-way audio visual technology. [Medical Office: ([facility])___] : The provider was located at the medical office in [unfilled]. [Home] : The patient, [unfilled], was located at home, [unfilled], at the time of the visit. [Verbal consent obtained from patient/other participant(s)] : Verbal consent for telehealth/telephonic services obtained from patient/other participant(s) [Collateral without patient] : Collateral without patient [Number can be texted] : number can be texted [OK  to leave message] : OK  to leave message [Primary Care] : Primary Care [Health system Provider/Facility] : Health system Provider/Facility [Patient] : Patient [Prior Medical Records] : Prior Medical Records [TextBox_17] : Pt's  [FreeTextEntry3] : [email or web address] [FreeTextEntry5] : English [FreeTextEntry6] : Sara [FreeTextEntry2] : Anxiety/Depression [FreeTextEntry1] : Anxiety/Depression due to medical complications.
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
## 2024-09-11 DIAGNOSIS — F51.05 INSOMNIA DUE TO OTHER MENTAL DISORDER: ICD-10-CM

## 2024-09-11 DIAGNOSIS — F41.1 GENERALIZED ANXIETY DISORDER: ICD-10-CM

## 2024-09-11 DIAGNOSIS — F33.2 MAJOR DEPRESSIVE DISORDER, RECURRENT SEVERE WITHOUT PSYCHOTIC FEATURES: ICD-10-CM

## 2024-09-12 ENCOUNTER — APPOINTMENT (OUTPATIENT)
Dept: PSYCHIATRY | Facility: CLINIC | Age: 34
End: 2024-09-12

## 2024-09-13 NOTE — REASON FOR VISIT
[Patient preference] : as per patient preference [Telehealth (audio & video) - Individual/Group] : This visit was provided via telehealth using real-time 2-way audio visual technology. [Other Location: e.g. Home (Enter Location, City,State)___] : The provider was located at [unfilled]. [Home] : The patient, [unfilled], was located at home, [unfilled], at the time of the visit. [Participant(s) identity verified] : Participant(s) identity verified. [Patient] : Patient [FreeTextEntry4] : 11am [FreeTextEntry5] : [id number] [FreeTextEntry1] : Psychotherapy follow up

## 2024-09-13 NOTE — REASON FOR VISIT
[Patient preference] : as per patient preference [Telehealth (audio & video) - Individual/Group] : This visit was provided via telehealth using real-time 2-way audio visual technology. [Other Location: e.g. Home (Enter Location, City,State)___] : The provider was located at [unfilled]. [Home] : The patient, [unfilled], was located at home, [unfilled], at the time of the visit. [Participant(s) identity verified] : Participant(s) identity verified. [Patient] : Patient [FreeTextEntry4] : 11am [FreeTextEntry5] : [id number] [FreeTextEntry1] : Psychotherapy follow up

## 2024-09-13 NOTE — PHYSICAL EXAM
[Cooperative] : cooperative [Depressed] : depressed [Anxious] : anxious [Constricted] : constricted [Clear] : clear [Linear/Goal Directed] : linear/goal directed [Preoccupations/Ruminations] : preoccupations/ruminations [Depressive] : depressive [None Reported] : none reported [Average] : average [WNL] : within normal limits [Confused] : confused [FreeTextEntry7] : housing instability
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
## 2024-09-13 NOTE — REASON FOR VISIT
[Patient preference] : as per patient preference [Telehealth (audio & video) - Individual/Group] : This visit was provided via telehealth using real-time 2-way audio visual technology. [Other Location: e.g. Home (Enter Location, City,State)___] : The provider was located at [unfilled]. [Home] : The patient, [unfilled], was located at home, [unfilled], at the time of the visit. [Participant(s) identity verified] : Participant(s) identity verified. [Patient] : Patient [FreeTextEntry4] : 11am [FreeTextEntry5] : [id number] [FreeTextEntry1] : Psychotherapy follow up

## 2024-09-18 ENCOUNTER — APPOINTMENT (OUTPATIENT)
Dept: PSYCHIATRY | Facility: CLINIC | Age: 34
End: 2024-09-18

## 2024-09-18 ENCOUNTER — OUTPATIENT (OUTPATIENT)
Dept: OUTPATIENT SERVICES | Facility: HOSPITAL | Age: 34
LOS: 1 days | End: 2024-09-18
Payer: MEDICAID

## 2024-09-18 DIAGNOSIS — F41.1 GENERALIZED ANXIETY DISORDER: ICD-10-CM

## 2024-09-18 DIAGNOSIS — F33.2 MAJOR DEPRESSIVE DISORDER, RECURRENT SEVERE WITHOUT PSYCHOTIC FEATURES: ICD-10-CM

## 2024-09-18 PROCEDURE — 90832 PSYTX W PT 30 MINUTES: CPT

## 2024-09-18 NOTE — PLAN
[FreeTextEntry2] : Objective A: Pt will explore and process feeling at every session, identifying at least 1 trigger of depression and anxiety, and review depression screenings and scales quarterly  Status of Objective: Initial New objective to help patient become aware of feelings and condition.  Objective B: Patient will learn and practice 2 coping skills in between scheduled session and comply with medication management medication recommendation.   Status of Objective: Initial New objective to help patient learn and use coping skill.  Intervention(s): Therapist will help patient explore coping techniques, such as communication strategies, relaxation techniques. Therapist will help patient to expose and extinguish irrational beliefs that contribute to anxiety and depression. Therapist will assist patient in developing reality based, positive cognitive messages that will increase self-confidence and thereby decrease anxiety and depressive symptoms.  Medication Management: every 1 months.  Individual Therapy: every 2 weeks.   [Cognitive and/or Behavior Therapy] : Cognitive and/or Behavior Therapy  [Exposure +/- Response Prevention] : Exposure +/- Response Prevention  [Psychoeducation] : Psychoeducation  [Supportive Therapy] : Supportive Therapy [de-identified] : Patient seen via telehealth video for follow up.   Sara presents calmer today, reports that she needs children are in school and she needs to work on applying for assistance with housing.  Session focused on address case management needs.  Writer left message for [employer] -for housing assistance as she needs an appointment to bring her documents and process an application, message says that appointments have a 6-8 week long wait.  Writer also reached out to Gracia Aguilar from [facility] to identify provider and follow with services needed. Patient believes that Charla is still her Health home worker.  Patient discussed needing to pay for storage but also needing items from it, however lack of transportation and  working all day makes tasks delayed.  She shared "I have a very bad memory".  Active listening. Feelings validated.  Support provided.  [Return in ____ week(s)] : Return in [unfilled] week(s) [FreeTextEntry1] : 's new number Ben [phone] cell. [facility] # [phone] - [facility]   /  [place] [phone]

## 2024-09-18 NOTE — REASON FOR VISIT
[Patient preference] : as per patient preference [Telehealth (audio & video) - Individual/Group] : This visit was provided via telehealth using real-time 2-way audio visual technology. [Other Location: e.g. Home (Enter Location, City,State)___] : The provider was located at [unfilled]. [Home] : The patient, [unfilled], was located at home, [unfilled], at the time of the visit. [Verbal consent obtained from patient/other participant(s)] : Verbal consent for telehealth/telephonic services obtained from patient/other participant(s) [FreeTextEntry4] : 1144dd [FreeTextEntry5] : 1220pm [Patient] : Patient [FreeTextEntry1] : Psychotherapy follow up

## 2024-09-18 NOTE — PHYSICAL EXAM
[Cooperative] : cooperative [Depressed] : depressed [Anxious] : anxious [Constricted] : constricted [Clear] : clear [Linear/Goal Directed] : linear/goal directed [Preoccupations/Ruminations] : preoccupations/ruminations [Depressive] : depressive [None Reported] : none reported [Average] : average [WNL] : within normal limits [FreeTextEntry1] : reports she has lost weight due to stress [FreeTextEntry7] : housing instability

## 2024-09-19 DIAGNOSIS — F41.1 GENERALIZED ANXIETY DISORDER: ICD-10-CM

## 2024-09-23 ENCOUNTER — OUTPATIENT (OUTPATIENT)
Dept: OUTPATIENT SERVICES | Facility: HOSPITAL | Age: 34
LOS: 1 days | End: 2024-09-23
Payer: MEDICAID

## 2024-09-23 ENCOUNTER — APPOINTMENT (OUTPATIENT)
Dept: PSYCHIATRY | Facility: CLINIC | Age: 34
End: 2024-09-23

## 2024-09-23 DIAGNOSIS — F41.9 MAJOR DEPRESSIVE DISORDER, RECURRENT SEVERE W/OUT PSYCHOTIC FEATURES: ICD-10-CM

## 2024-09-23 DIAGNOSIS — F41.0 GENERALIZED ANXIETY DISORDER: ICD-10-CM

## 2024-09-23 DIAGNOSIS — F41.1 GENERALIZED ANXIETY DISORDER: ICD-10-CM

## 2024-09-23 DIAGNOSIS — F33.2 MAJOR DEPRESSIVE DISORDER, RECURRENT SEVERE W/OUT PSYCHOTIC FEATURES: ICD-10-CM

## 2024-09-23 PROCEDURE — 90832 PSYTX W PT 30 MINUTES: CPT

## 2024-09-23 NOTE — REASON FOR VISIT
[Patient preference] : as per patient preference [Telehealth (audio & video) - Individual/Group] : This visit was provided via telehealth using real-time 2-way audio visual technology. [Other Location: e.g. Home (Enter Location, City,State)___] : The provider was located at [unfilled]. [Home] : The patient, [unfilled], was located at home, [unfilled], at the time of the visit. [Verbal consent obtained from patient/other participant(s)] : Verbal consent for telehealth/telephonic services obtained from patient/other participant(s) [FreeTextEntry4] : 526nl [FreeTextEntry5] : 1010sg [Patient] : Patient [FreeTextEntry1] : Psychotherapy follow up

## 2024-09-23 NOTE — PLAN
[FreeTextEntry2] : Objective A: Pt will explore and process feeling at every session, identifying at least 1 trigger of depression and anxiety, and review depression screenings and scales quarterly  Status of Objective: Initial New objective to help patient become aware of feelings and condition.  Objective B: Patient will learn and practice 2 coping skills in between scheduled session and comply with medication management medication recommendation.   Status of Objective: Initial New objective to help patient learn and use coping skill.  Intervention(s): Therapist will help patient explore coping techniques, such as communication strategies, relaxation techniques. Therapist will help patient to expose and extinguish irrational beliefs that contribute to anxiety and depression. Therapist will assist patient in developing reality based, positive cognitive messages that will increase self-confidence and thereby decrease anxiety and depressive symptoms.  Medication Management: every 1 months.  Individual Therapy: every 2 weeks.   [Cognitive and/or Behavior Therapy] : Cognitive and/or Behavior Therapy  [Exposure +/- Response Prevention] : Exposure +/- Response Prevention  [Psychoeducation] : Psychoeducation  [Supportive Therapy] : Supportive Therapy [de-identified] : Patient seen via telehealth video for follow up.  Joined late.  Sara presents calmer today, shared that Health home called her and will be working on transportation for her.   Noel returned called and told her to call back in 8 weeks if she does not hear from them.  Writer advised patient to have papers/documents needed for housing ready and available for immediate use.   She will have PCP fill out document for disability on her appt on 10/18.  Session focused on address case management needs.   She is fighting her SNAP case and hopefully will receive money by tomorrow. She shared "I have a very bad memory". Active listening. Feelings validated.  Support provided.  [Return in ____ week(s)] : Return in [unfilled] week(s) [FreeTextEntry1] : 's new number Ben [phone] cell. [facility] # [phone] - [facility]   /  [place] [phone]

## 2024-09-23 NOTE — PHYSICAL EXAM
[Cooperative] : cooperative [Depressed] : depressed [Anxious] : anxious [Constricted] : constricted [Clear] : clear [Linear/Goal Directed] : linear/goal directed [Preoccupations/Ruminations] : preoccupations/ruminations [Depressive] : depressive [None Reported] : none reported [Average] : average [WNL] : within normal limits [FreeTextEntry1] : reports she has lost weight due to stress [FreeTextEntry8] : feels a bit less stressed as kids are in school  [FreeTextEntry7] : housing instability

## 2024-09-24 DIAGNOSIS — F41.1 GENERALIZED ANXIETY DISORDER: ICD-10-CM

## 2024-10-07 ENCOUNTER — APPOINTMENT (OUTPATIENT)
Dept: PSYCHIATRY | Facility: CLINIC | Age: 34
End: 2024-10-07

## 2024-10-07 NOTE — ED PROVIDER NOTE - ATTENDING WITH...
Subjective  Chief Complaint   Patient presents with    Other     6 month     HPI:  Aline Gallegos is a 78 y.o. male.    Hypertension-patient reports he has had some lightheadedness so he began cutting his amlodipine in half.    Prediabetes-patient reports after he saw his A1c of 6.1, he threw away some of his sugary snacks.    Bereavement-patient does report that son can still make tear up.  He does socialize and talks to his daughter daily.  He does not feel like he needs any additional assistance at this time.    Past Medical History:   Diagnosis Date    Arthritis     Mcleod's esophagus 9/2/2020    BPH (benign prostatic hyperplasia) 9/2/2020    CAD (coronary artery disease)     Chronic back pain     Depression 16 June 2023    Death of spouse    Diverticular disease of colon 9/2/2020    ED (erectile dysfunction)     GERD (gastroesophageal reflux disease) 9/2/2020    HTN (hypertension)     Hyperlipidemia 9/2/2020    Lumbar disc herniation     Obstructive sleep apnea 9/2/2020    CPAP    Peyronie's disease      Current Outpatient Medications on File Prior to Visit   Medication Sig Dispense Refill    lisinopril (PRINIVIL;ZESTRIL) 40 MG tablet TAKE 1 TABLET DAILY 90 tablet 3    [START ON 10/14/2024] tamsulosin (FLOMAX) 0.4 MG capsule Take 1 capsule by mouth daily 90 capsule 3    omega-3 acid ethyl esters (LOVAZA) 1 g capsule Take 2 capsules by mouth 2 times daily 360 capsule 3    atorvastatin (LIPITOR) 10 MG tablet Take 1 tablet by mouth daily 90 tablet 1    Vitamin E 400 units TABS Take 400 Units by mouth 2 times daily      sildenafil (VIAGRA) 100 MG tablet Take 1 tablet by mouth daily as needed for Erectile Dysfunction Do not exceed 100 mg in 24 hours 30 tablet 1    ascorbic acid (VITAMIN C) 500 MG tablet Take by mouth      aspirin 81 MG chewable tablet Take 1 tablet by mouth daily      vitamin D 25 MCG (1000 UT) CAPS Take by mouth      cyanocobalamin 500 MCG tablet Take 1 tablet by mouth daily       No current  No ACP

## 2024-10-16 ENCOUNTER — APPOINTMENT (OUTPATIENT)
Dept: PSYCHIATRY | Facility: CLINIC | Age: 34
End: 2024-10-16

## 2024-10-16 ENCOUNTER — OUTPATIENT (OUTPATIENT)
Dept: OUTPATIENT SERVICES | Facility: HOSPITAL | Age: 34
LOS: 1 days | End: 2024-10-16
Payer: MEDICAID

## 2024-10-16 DIAGNOSIS — F33.2 MAJOR DEPRESSIVE DISORDER, RECURRENT SEVERE W/OUT PSYCHOTIC FEATURES: ICD-10-CM

## 2024-10-16 DIAGNOSIS — F51.05 INSOMNIA DUE TO OTHER MENTAL DISORDER: ICD-10-CM

## 2024-10-16 DIAGNOSIS — F41.9 MAJOR DEPRESSIVE DISORDER, RECURRENT SEVERE W/OUT PSYCHOTIC FEATURES: ICD-10-CM

## 2024-10-16 DIAGNOSIS — F41.1 GENERALIZED ANXIETY DISORDER: ICD-10-CM

## 2024-10-16 PROCEDURE — 90832 PSYTX W PT 30 MINUTES: CPT

## 2024-10-17 DIAGNOSIS — F41.1 GENERALIZED ANXIETY DISORDER: ICD-10-CM

## 2024-10-18 ENCOUNTER — APPOINTMENT (OUTPATIENT)
Dept: INTERNAL MEDICINE | Facility: CLINIC | Age: 34
End: 2024-10-18
Payer: MEDICAID

## 2024-10-18 ENCOUNTER — OUTPATIENT (OUTPATIENT)
Dept: OUTPATIENT SERVICES | Facility: HOSPITAL | Age: 34
LOS: 1 days | End: 2024-10-18
Payer: MEDICAID

## 2024-10-18 VITALS
BODY MASS INDEX: 21.52 KG/M2 | SYSTOLIC BLOOD PRESSURE: 121 MMHG | TEMPERATURE: 97.9 F | OXYGEN SATURATION: 99 % | DIASTOLIC BLOOD PRESSURE: 81 MMHG | HEART RATE: 86 BPM | WEIGHT: 114 LBS | HEIGHT: 61 IN

## 2024-10-18 DIAGNOSIS — J45.909 UNSPECIFIED ASTHMA, UNCOMPLICATED: ICD-10-CM

## 2024-10-18 DIAGNOSIS — E78.00 PURE HYPERCHOLESTEROLEMIA, UNSPECIFIED: ICD-10-CM

## 2024-10-18 DIAGNOSIS — I82.409 ACUTE EMBOLISM AND THROMBOSIS OF UNSPECIFIED DEEP VEINS OF UNSPECIFIED LOWER EXTREMITY: ICD-10-CM

## 2024-10-18 DIAGNOSIS — H40.9 UNSPECIFIED GLAUCOMA: ICD-10-CM

## 2024-10-18 DIAGNOSIS — F41.1 GENERALIZED ANXIETY DISORDER: ICD-10-CM

## 2024-10-18 DIAGNOSIS — Z00.00 ENCOUNTER FOR GENERAL ADULT MEDICAL EXAMINATION WITHOUT ABNORMAL FINDINGS: ICD-10-CM

## 2024-10-18 DIAGNOSIS — R07.9 CHEST PAIN, UNSPECIFIED: ICD-10-CM

## 2024-10-18 DIAGNOSIS — K50.90 CROHN'S DISEASE, UNSPECIFIED, W/OUT COMPLICATIONS: ICD-10-CM

## 2024-10-18 DIAGNOSIS — K51.90 ULCERATIVE COLITIS, UNSPECIFIED, W/OUT COMPLICATIONS: ICD-10-CM

## 2024-10-18 DIAGNOSIS — Z79.52 LONG TERM (CURRENT) USE OF SYSTEMIC STEROIDS: ICD-10-CM

## 2024-10-18 DIAGNOSIS — F41.0 GENERALIZED ANXIETY DISORDER: ICD-10-CM

## 2024-10-18 DIAGNOSIS — K52.9 NONINFECTIVE GASTROENTERITIS AND COLITIS, UNSPECIFIED: ICD-10-CM

## 2024-10-18 PROCEDURE — G2211 COMPLEX E/M VISIT ADD ON: CPT | Mod: NC

## 2024-10-18 PROCEDURE — 99214 OFFICE O/P EST MOD 30 MIN: CPT

## 2024-10-23 DIAGNOSIS — F41.1 GENERALIZED ANXIETY DISORDER: ICD-10-CM

## 2024-10-23 DIAGNOSIS — I82.409 ACUTE EMBOLISM AND THROMBOSIS OF UNSPECIFIED DEEP VEINS OF UNSPECIFIED LOWER EXTREMITY: ICD-10-CM

## 2024-10-23 DIAGNOSIS — K52.9 NONINFECTIVE GASTROENTERITIS AND COLITIS, UNSPECIFIED: ICD-10-CM

## 2024-10-23 DIAGNOSIS — E78.00 PURE HYPERCHOLESTEROLEMIA, UNSPECIFIED: ICD-10-CM

## 2024-10-23 DIAGNOSIS — H40.9 UNSPECIFIED GLAUCOMA: ICD-10-CM

## 2024-10-23 DIAGNOSIS — K51.90 ULCERATIVE COLITIS, UNSPECIFIED, WITHOUT COMPLICATIONS: ICD-10-CM

## 2024-10-23 DIAGNOSIS — Z79.52 LONG TERM (CURRENT) USE OF SYSTEMIC STEROIDS: ICD-10-CM

## 2024-10-23 DIAGNOSIS — J45.909 UNSPECIFIED ASTHMA, UNCOMPLICATED: ICD-10-CM

## 2024-10-23 DIAGNOSIS — K50.90 CROHN'S DISEASE, UNSPECIFIED, WITHOUT COMPLICATIONS: ICD-10-CM

## 2024-10-28 ENCOUNTER — APPOINTMENT (OUTPATIENT)
Dept: PSYCHIATRY | Facility: CLINIC | Age: 34
End: 2024-10-28

## 2024-10-29 ENCOUNTER — OUTPATIENT (OUTPATIENT)
Dept: OUTPATIENT SERVICES | Facility: HOSPITAL | Age: 34
LOS: 1 days | End: 2024-10-29
Payer: MEDICAID

## 2024-10-29 ENCOUNTER — APPOINTMENT (OUTPATIENT)
Dept: PSYCHIATRY | Facility: CLINIC | Age: 34
End: 2024-10-29
Payer: MEDICAID

## 2024-10-29 DIAGNOSIS — F51.05 INSOMNIA DUE TO OTHER MENTAL DISORDER: ICD-10-CM

## 2024-10-29 DIAGNOSIS — F33.1 MAJOR DEPRESSIVE DISORDER, RECURRENT, MODERATE: ICD-10-CM

## 2024-10-29 PROCEDURE — 99214 OFFICE O/P EST MOD 30 MIN: CPT | Mod: 95

## 2024-10-30 DIAGNOSIS — F33.1 MAJOR DEPRESSIVE DISORDER, RECURRENT, MODERATE: ICD-10-CM

## 2024-11-01 ENCOUNTER — NON-APPOINTMENT (OUTPATIENT)
Age: 34
End: 2024-11-01

## 2024-11-04 ENCOUNTER — OUTPATIENT (OUTPATIENT)
Dept: OUTPATIENT SERVICES | Facility: HOSPITAL | Age: 34
LOS: 1 days | End: 2024-11-04
Payer: MEDICAID

## 2024-11-04 ENCOUNTER — NON-APPOINTMENT (OUTPATIENT)
Age: 34
End: 2024-11-04

## 2024-11-04 ENCOUNTER — APPOINTMENT (OUTPATIENT)
Dept: PSYCHIATRY | Facility: CLINIC | Age: 34
End: 2024-11-04

## 2024-11-04 DIAGNOSIS — F33.2 MAJOR DEPRESSIVE DISORDER, RECURRENT SEVERE W/OUT PSYCHOTIC FEATURES: ICD-10-CM

## 2024-11-04 DIAGNOSIS — F41.9 MAJOR DEPRESSIVE DISORDER, RECURRENT SEVERE W/OUT PSYCHOTIC FEATURES: ICD-10-CM

## 2024-11-04 DIAGNOSIS — F41.1 GENERALIZED ANXIETY DISORDER: ICD-10-CM

## 2024-11-04 DIAGNOSIS — F41.0 GENERALIZED ANXIETY DISORDER: ICD-10-CM

## 2024-11-04 PROCEDURE — 90834 PSYTX W PT 45 MINUTES: CPT

## 2024-11-05 DIAGNOSIS — F41.1 GENERALIZED ANXIETY DISORDER: ICD-10-CM

## 2024-11-08 ENCOUNTER — APPOINTMENT (OUTPATIENT)
Dept: GASTROENTEROLOGY | Facility: CLINIC | Age: 34
End: 2024-11-08

## 2024-11-08 ENCOUNTER — OUTPATIENT (OUTPATIENT)
Dept: OUTPATIENT SERVICES | Facility: HOSPITAL | Age: 34
LOS: 1 days | End: 2024-11-08

## 2024-11-08 VITALS
DIASTOLIC BLOOD PRESSURE: 59 MMHG | BODY MASS INDEX: 21.71 KG/M2 | SYSTOLIC BLOOD PRESSURE: 110 MMHG | HEIGHT: 61 IN | HEART RATE: 90 BPM | OXYGEN SATURATION: 99 % | WEIGHT: 115 LBS

## 2024-11-08 DIAGNOSIS — K52.9 NONINFECTIVE GASTROENTERITIS AND COLITIS, UNSPECIFIED: ICD-10-CM

## 2024-11-08 DIAGNOSIS — K50.90 CROHN'S DISEASE, UNSPECIFIED, W/OUT COMPLICATIONS: ICD-10-CM

## 2024-11-08 DIAGNOSIS — Z00.00 ENCOUNTER FOR GENERAL ADULT MEDICAL EXAMINATION WITHOUT ABNORMAL FINDINGS: ICD-10-CM

## 2024-11-08 DIAGNOSIS — K51.90 ULCERATIVE COLITIS, UNSPECIFIED, W/OUT COMPLICATIONS: ICD-10-CM

## 2024-11-08 DIAGNOSIS — K61.1 RECTAL ABSCESS: ICD-10-CM

## 2024-11-08 PROCEDURE — 99213 OFFICE O/P EST LOW 20 MIN: CPT

## 2024-11-08 RX ORDER — ENOXAPARIN SODIUM 100 MG/ML
100 INJECTION, SOLUTION SUBCUTANEOUS TWICE DAILY
Qty: 2 | Refills: 0 | Status: ACTIVE | COMMUNITY
Start: 2024-11-08 | End: 1900-01-01

## 2024-11-08 RX ORDER — POLYETHYLENE GLYCOL 3350 AND ELECTROLYTES WITH LEMON FLAVOR 236; 22.74; 6.74; 5.86; 2.97 G/4L; G/4L; G/4L; G/4L; G/4L
236 POWDER, FOR SOLUTION ORAL
Qty: 1 | Refills: 0 | Status: ACTIVE | COMMUNITY
Start: 2024-11-08 | End: 1900-01-01

## 2024-11-12 ENCOUNTER — APPOINTMENT (OUTPATIENT)
Dept: PSYCHIATRY | Facility: CLINIC | Age: 34
End: 2024-11-12

## 2024-11-19 NOTE — DISCHARGE NOTE PROVIDER - NSCORESITESY/N_GEN_A_CORE_RD
Patient called in with a question regarding RSV and the vaccination. Requesting a call back from nurse staff.     Please advise     
Patient has had  sometimes phlegm sometimes dry cough, sinus congestion, sore throat for 4-5 days. No fever, has not taken, no chills, No nausea, no vomitting.     Scheduled for 11/20/24 at 10:20  
No

## 2024-11-20 ENCOUNTER — APPOINTMENT (OUTPATIENT)
Dept: CV DIAGNOSITCS | Facility: HOSPITAL | Age: 34
End: 2024-11-20

## 2024-11-25 ENCOUNTER — OUTPATIENT (OUTPATIENT)
Dept: OUTPATIENT SERVICES | Facility: HOSPITAL | Age: 34
LOS: 1 days | End: 2024-11-25
Payer: MEDICAID

## 2024-11-25 ENCOUNTER — APPOINTMENT (OUTPATIENT)
Dept: PSYCHIATRY | Facility: CLINIC | Age: 34
End: 2024-11-25

## 2024-11-25 DIAGNOSIS — F33.2 MAJOR DEPRESSIVE DISORDER, RECURRENT SEVERE WITHOUT PSYCHOTIC FEATURES: ICD-10-CM

## 2024-11-25 DIAGNOSIS — F41.0 GENERALIZED ANXIETY DISORDER: ICD-10-CM

## 2024-11-25 DIAGNOSIS — F41.9 MAJOR DEPRESSIVE DISORDER, RECURRENT SEVERE W/OUT PSYCHOTIC FEATURES: ICD-10-CM

## 2024-11-25 DIAGNOSIS — F41.1 GENERALIZED ANXIETY DISORDER: ICD-10-CM

## 2024-11-25 DIAGNOSIS — F33.2 MAJOR DEPRESSIVE DISORDER, RECURRENT SEVERE W/OUT PSYCHOTIC FEATURES: ICD-10-CM

## 2024-11-25 PROCEDURE — 90832 PSYTX W PT 30 MINUTES: CPT

## 2024-11-26 ENCOUNTER — APPOINTMENT (OUTPATIENT)
Dept: PSYCHIATRY | Facility: CLINIC | Age: 34
End: 2024-11-26

## 2024-11-26 DIAGNOSIS — F33.2 MAJOR DEPRESSIVE DISORDER, RECURRENT SEVERE WITHOUT PSYCHOTIC FEATURES: ICD-10-CM

## 2024-11-26 DIAGNOSIS — F41.1 GENERALIZED ANXIETY DISORDER: ICD-10-CM

## 2024-12-10 ENCOUNTER — NON-APPOINTMENT (OUTPATIENT)
Age: 34
End: 2024-12-10

## 2024-12-13 ENCOUNTER — APPOINTMENT (OUTPATIENT)
Dept: PSYCHIATRY | Facility: CLINIC | Age: 34
End: 2024-12-13

## 2024-12-13 ENCOUNTER — OUTPATIENT (OUTPATIENT)
Dept: OUTPATIENT SERVICES | Facility: HOSPITAL | Age: 34
LOS: 1 days | End: 2024-12-13
Payer: MEDICAID

## 2024-12-13 DIAGNOSIS — F33.2 MAJOR DEPRESSIVE DISORDER, RECURRENT SEVERE WITHOUT PSYCHOTIC FEATURES: ICD-10-CM

## 2024-12-13 DIAGNOSIS — F41.1 GENERALIZED ANXIETY DISORDER: ICD-10-CM

## 2024-12-13 PROCEDURE — 90832 PSYTX W PT 30 MINUTES: CPT

## 2024-12-14 DIAGNOSIS — F33.2 MAJOR DEPRESSIVE DISORDER, RECURRENT SEVERE WITHOUT PSYCHOTIC FEATURES: ICD-10-CM

## 2024-12-17 ENCOUNTER — APPOINTMENT (OUTPATIENT)
Dept: OBGYN | Facility: CLINIC | Age: 34
End: 2024-12-17
Payer: MEDICAID

## 2024-12-17 ENCOUNTER — NON-APPOINTMENT (OUTPATIENT)
Age: 34
End: 2024-12-17

## 2024-12-17 ENCOUNTER — LABORATORY RESULT (OUTPATIENT)
Age: 34
End: 2024-12-17

## 2024-12-17 ENCOUNTER — OUTPATIENT (OUTPATIENT)
Dept: OUTPATIENT SERVICES | Facility: HOSPITAL | Age: 34
LOS: 1 days | End: 2024-12-17
Payer: MEDICAID

## 2024-12-17 VITALS — SYSTOLIC BLOOD PRESSURE: 115 MMHG | DIASTOLIC BLOOD PRESSURE: 73 MMHG | BODY MASS INDEX: 22.86 KG/M2 | WEIGHT: 121 LBS

## 2024-12-17 DIAGNOSIS — Z00.00 ENCOUNTER FOR GENERAL ADULT MEDICAL EXAMINATION W/OUT ABNORMAL FINDINGS: ICD-10-CM

## 2024-12-17 DIAGNOSIS — Z00.00 ENCOUNTER FOR GENERAL ADULT MEDICAL EXAMINATION WITHOUT ABNORMAL FINDINGS: ICD-10-CM

## 2024-12-17 PROCEDURE — 87389 HIV-1 AG W/HIV-1&-2 AB AG IA: CPT

## 2024-12-17 PROCEDURE — 87591 N.GONORRHOEAE DNA AMP PROB: CPT

## 2024-12-17 PROCEDURE — 99385 PREV VISIT NEW AGE 18-39: CPT

## 2024-12-17 PROCEDURE — 99213 OFFICE O/P EST LOW 20 MIN: CPT | Mod: 25

## 2024-12-17 PROCEDURE — 86803 HEPATITIS C AB TEST: CPT

## 2024-12-17 PROCEDURE — 88142 CYTOPATH C/V THIN LAYER: CPT

## 2024-12-17 PROCEDURE — 87340 HEPATITIS B SURFACE AG IA: CPT

## 2024-12-17 PROCEDURE — 86780 TREPONEMA PALLIDUM: CPT

## 2024-12-17 PROCEDURE — 87491 CHLMYD TRACH DNA AMP PROBE: CPT

## 2024-12-17 PROCEDURE — 81025 URINE PREGNANCY TEST: CPT

## 2024-12-17 PROCEDURE — 87661 TRICHOMONAS VAGINALIS AMPLIF: CPT

## 2024-12-17 PROCEDURE — 99213 OFFICE O/P EST LOW 20 MIN: CPT

## 2024-12-17 PROCEDURE — 84702 CHORIONIC GONADOTROPIN TEST: CPT

## 2024-12-17 PROCEDURE — 99459 PELVIC EXAMINATION: CPT

## 2024-12-18 DIAGNOSIS — Z00.00 ENCOUNTER FOR GENERAL ADULT MEDICAL EXAMINATION WITHOUT ABNORMAL FINDINGS: ICD-10-CM

## 2024-12-18 DIAGNOSIS — N64.4 MASTODYNIA: ICD-10-CM

## 2024-12-19 DIAGNOSIS — Z00.00 ENCOUNTER FOR GENERAL ADULT MEDICAL EXAMINATION WITHOUT ABNORMAL FINDINGS: ICD-10-CM

## 2024-12-19 LAB
HBV SURFACE AG SER QL: NONREACTIVE
HCG SERPL-MCNC: <1 MIU/ML
HCG UR QL: NEGATIVE
HCV AB SER QL: NONREACTIVE
HCV S/CO RATIO: 0.05 COI
HIV1+2 AB SPEC QL IA.RAPID: NONREACTIVE
T PALLIDUM AB SER QL IA: NEGATIVE

## 2024-12-20 ENCOUNTER — OUTPATIENT (OUTPATIENT)
Dept: OUTPATIENT SERVICES | Facility: HOSPITAL | Age: 34
LOS: 1 days | End: 2024-12-20
Payer: MEDICAID

## 2024-12-20 ENCOUNTER — APPOINTMENT (OUTPATIENT)
Dept: PSYCHIATRY | Facility: CLINIC | Age: 34
End: 2024-12-20
Payer: MEDICAID

## 2024-12-20 DIAGNOSIS — Z00.00 ENCOUNTER FOR GENERAL ADULT MEDICAL EXAMINATION WITHOUT ABNORMAL FINDINGS: ICD-10-CM

## 2024-12-20 DIAGNOSIS — F51.05 INSOMNIA DUE TO OTHER MENTAL DISORDER: ICD-10-CM

## 2024-12-20 DIAGNOSIS — F33.2 MAJOR DEPRESSIVE DISORDER, RECURRENT SEVERE WITHOUT PSYCHOTIC FEATURES: ICD-10-CM

## 2024-12-20 DIAGNOSIS — F33.1 MAJOR DEPRESSIVE DISORDER, RECURRENT, MODERATE: ICD-10-CM

## 2024-12-20 PROCEDURE — 99214 OFFICE O/P EST MOD 30 MIN: CPT | Mod: 95

## 2024-12-21 DIAGNOSIS — F33.2 MAJOR DEPRESSIVE DISORDER, RECURRENT SEVERE WITHOUT PSYCHOTIC FEATURES: ICD-10-CM

## 2024-12-21 DIAGNOSIS — F33.1 MAJOR DEPRESSIVE DISORDER, RECURRENT, MODERATE: ICD-10-CM

## 2024-12-23 ENCOUNTER — APPOINTMENT (OUTPATIENT)
Dept: PSYCHIATRY | Facility: CLINIC | Age: 34
End: 2024-12-23

## 2024-12-29 LAB — CYTOLOGY CVX/VAG DOC THIN PREP: NORMAL

## 2025-01-06 NOTE — PHYSICAL EXAM
Spoke to pt and informed her that [employer] Denied her Camzyos stating she has other insurance coverage.      Pt now states her [employer] is Primary.     [employer] Is Primary Insurance not [employer]  ID: [id number]  Group:  [id number]    Please complete PA for Camzyos 5mg for HOCM     ICD:  I42.1         [Cooperative] : cooperative [Depressed] : depressed [Anxious] : anxious [Constricted] : constricted [Clear] : clear [Linear/Goal Directed] : linear/goal directed [Depressive] : depressive [None Reported] : none reported [Average] : average [WNL] : within normal limits [FreeTextEntry8] : patient's mother had a stroke 2 days ago and is admitted to [facility] [de-identified] : anxiety, fears for her mother

## 2025-01-17 ENCOUNTER — OUTPATIENT (OUTPATIENT)
Dept: OUTPATIENT SERVICES | Facility: HOSPITAL | Age: 35
LOS: 1 days | End: 2025-01-17
Payer: MEDICAID

## 2025-01-17 ENCOUNTER — APPOINTMENT (OUTPATIENT)
Dept: PSYCHIATRY | Facility: CLINIC | Age: 35
End: 2025-01-17
Payer: MEDICAID

## 2025-01-17 DIAGNOSIS — F41.9 MAJOR DEPRESSIVE DISORDER, RECURRENT SEVERE W/OUT PSYCHOTIC FEATURES: ICD-10-CM

## 2025-01-17 DIAGNOSIS — F33.2 MAJOR DEPRESSIVE DISORDER, RECURRENT SEVERE W/OUT PSYCHOTIC FEATURES: ICD-10-CM

## 2025-01-17 DIAGNOSIS — F33.1 MAJOR DEPRESSIVE DISORDER, RECURRENT, MODERATE: ICD-10-CM

## 2025-01-17 DIAGNOSIS — F51.05 INSOMNIA DUE TO OTHER MENTAL DISORDER: ICD-10-CM

## 2025-01-17 DIAGNOSIS — F40.01 AGORAPHOBIA WITH PANIC DISORDER: ICD-10-CM

## 2025-01-17 PROCEDURE — 99214 OFFICE O/P EST MOD 30 MIN: CPT | Mod: 95

## 2025-01-17 PROCEDURE — 98007 SYNCH AUDIO-VIDEO EST HI 40: CPT

## 2025-01-18 DIAGNOSIS — F33.1 MAJOR DEPRESSIVE DISORDER, RECURRENT, MODERATE: ICD-10-CM

## 2025-01-22 ENCOUNTER — OUTPATIENT (OUTPATIENT)
Dept: OUTPATIENT SERVICES | Facility: HOSPITAL | Age: 35
LOS: 1 days | End: 2025-01-22
Payer: MEDICAID

## 2025-01-22 ENCOUNTER — APPOINTMENT (OUTPATIENT)
Dept: INTERNAL MEDICINE | Facility: CLINIC | Age: 35
End: 2025-01-22
Payer: MEDICAID

## 2025-01-22 ENCOUNTER — APPOINTMENT (OUTPATIENT)
Dept: PSYCHIATRY | Facility: CLINIC | Age: 35
End: 2025-01-22

## 2025-01-22 ENCOUNTER — LABORATORY RESULT (OUTPATIENT)
Age: 35
End: 2025-01-22

## 2025-01-22 VITALS
DIASTOLIC BLOOD PRESSURE: 90 MMHG | HEART RATE: 104 BPM | OXYGEN SATURATION: 98 % | HEIGHT: 61 IN | SYSTOLIC BLOOD PRESSURE: 124 MMHG | WEIGHT: 122 LBS | TEMPERATURE: 97.8 F | BODY MASS INDEX: 23.03 KG/M2

## 2025-01-22 DIAGNOSIS — F41.1 GENERALIZED ANXIETY DISORDER: ICD-10-CM

## 2025-01-22 DIAGNOSIS — K52.9 NONINFECTIVE GASTROENTERITIS AND COLITIS, UNSPECIFIED: ICD-10-CM

## 2025-01-22 DIAGNOSIS — F41.0 GENERALIZED ANXIETY DISORDER: ICD-10-CM

## 2025-01-22 DIAGNOSIS — I82.409 ACUTE EMBOLISM AND THROMBOSIS OF UNSPECIFIED DEEP VEINS OF UNSPECIFIED LOWER EXTREMITY: ICD-10-CM

## 2025-01-22 DIAGNOSIS — K51.90 ULCERATIVE COLITIS, UNSPECIFIED, W/OUT COMPLICATIONS: ICD-10-CM

## 2025-01-22 DIAGNOSIS — J45.909 UNSPECIFIED ASTHMA, UNCOMPLICATED: ICD-10-CM

## 2025-01-22 DIAGNOSIS — E78.00 PURE HYPERCHOLESTEROLEMIA, UNSPECIFIED: ICD-10-CM

## 2025-01-22 DIAGNOSIS — F33.2 MAJOR DEPRESSIVE DISORDER, RECURRENT SEVERE WITHOUT PSYCHOTIC FEATURES: ICD-10-CM

## 2025-01-22 DIAGNOSIS — Z00.00 ENCOUNTER FOR GENERAL ADULT MEDICAL EXAMINATION WITHOUT ABNORMAL FINDINGS: ICD-10-CM

## 2025-01-22 DIAGNOSIS — R07.9 CHEST PAIN, UNSPECIFIED: ICD-10-CM

## 2025-01-22 LAB
ALBUMIN SERPL ELPH-MCNC: 4.2 G/DL
ALP BLD-CCNC: 51 U/L
ALT SERPL-CCNC: 29 U/L
ANION GAP SERPL CALC-SCNC: 14 MMOL/L
AST SERPL-CCNC: 21 U/L
BILIRUB SERPL-MCNC: 0.2 MG/DL
BUN SERPL-MCNC: 25 MG/DL
CALCIUM SERPL-MCNC: 9.4 MG/DL
CHLORIDE SERPL-SCNC: 101 MMOL/L
CHOLEST SERPL-MCNC: 242 MG/DL
CO2 SERPL-SCNC: 26 MMOL/L
CREAT SERPL-MCNC: 0.9 MG/DL
EGFR: 85 ML/MIN/1.73M2
ESTIMATED AVERAGE GLUCOSE: 114 MG/DL
GLUCOSE SERPL-MCNC: 110 MG/DL
HBA1C MFR BLD HPLC: 5.6 %
HCT VFR BLD CALC: 37.7 %
HDLC SERPL-MCNC: 88 MG/DL
HGB BLD-MCNC: 11.3 G/DL
LDLC SERPL CALC-MCNC: 142 MG/DL
MCHC RBC-ENTMCNC: 25.2 PG
MCHC RBC-ENTMCNC: 30 G/DL
MCV RBC AUTO: 84.2 FL
NONHDLC SERPL-MCNC: 154 MG/DL
PLATELET # BLD AUTO: 342 K/UL
PMV BLD AUTO: 0 /100 WBCS
PMV BLD: 11 FL
POTASSIUM SERPL-SCNC: 4.3 MMOL/L
PROT SERPL-MCNC: 6.9 G/DL
RBC # BLD: 4.48 M/UL
RBC # FLD: 17.2 %
SODIUM SERPL-SCNC: 141 MMOL/L
TRIGL SERPL-MCNC: 71 MG/DL
TSH SERPL-ACNC: 0.26 UIU/ML
WBC # FLD AUTO: 10.98 K/UL

## 2025-01-22 PROCEDURE — 90832 PSYTX W PT 30 MINUTES: CPT

## 2025-01-22 PROCEDURE — 99214 OFFICE O/P EST MOD 30 MIN: CPT

## 2025-01-22 PROCEDURE — G2211 COMPLEX E/M VISIT ADD ON: CPT | Mod: NC

## 2025-01-22 PROCEDURE — 71046 X-RAY EXAM CHEST 2 VIEWS: CPT | Mod: 26

## 2025-01-22 PROCEDURE — 71046 X-RAY EXAM CHEST 2 VIEWS: CPT

## 2025-01-22 RX ORDER — PANTOPRAZOLE 40 MG/1
40 TABLET, DELAYED RELEASE ORAL DAILY
Qty: 90 | Refills: 1 | Status: ACTIVE | COMMUNITY
Start: 2025-01-22 | End: 1900-01-01

## 2025-01-22 RX ORDER — LIDOCAINE 40 MG/G
4 PATCH TOPICAL
Qty: 3 | Refills: 0 | Status: ACTIVE | COMMUNITY
Start: 2025-01-22 | End: 1900-01-01

## 2025-01-23 DIAGNOSIS — E78.00 PURE HYPERCHOLESTEROLEMIA, UNSPECIFIED: ICD-10-CM

## 2025-01-23 DIAGNOSIS — I82.409 ACUTE EMBOLISM AND THROMBOSIS OF UNSPECIFIED DEEP VEINS OF UNSPECIFIED LOWER EXTREMITY: ICD-10-CM

## 2025-01-23 DIAGNOSIS — F41.1 GENERALIZED ANXIETY DISORDER: ICD-10-CM

## 2025-01-23 DIAGNOSIS — J45.909 UNSPECIFIED ASTHMA, UNCOMPLICATED: ICD-10-CM

## 2025-01-23 DIAGNOSIS — K51.90 ULCERATIVE COLITIS, UNSPECIFIED, WITHOUT COMPLICATIONS: ICD-10-CM

## 2025-01-23 DIAGNOSIS — K52.9 NONINFECTIVE GASTROENTERITIS AND COLITIS, UNSPECIFIED: ICD-10-CM

## 2025-01-23 DIAGNOSIS — R07.9 CHEST PAIN, UNSPECIFIED: ICD-10-CM

## 2025-01-23 LAB
APPEARANCE: CLEAR
BILIRUBIN URINE: NEGATIVE
BLOOD URINE: ABNORMAL
COLOR: YELLOW
GLUCOSE QUALITATIVE U: 100 MG/DL
KETONES URINE: NEGATIVE MG/DL
LEUKOCYTE ESTERASE URINE: NEGATIVE
NITRITE URINE: NEGATIVE
PH URINE: 7
PROTEIN URINE: NORMAL MG/DL
SPECIFIC GRAVITY URINE: 1.02
UROBILINOGEN URINE: 0.2 MG/DL

## 2025-02-04 NOTE — PRE-OP CHECKLIST - IDENTIFICATION BAND VERIFIED
Anesthesia Pre Eval Note    Anesthesia ROS/Med Hx    Overall Review:  EKG was reviewed     Anesthetic Complication History:    Patient does not have a history of anesthetic complications      Pulmonary Review:  Patient does not have a pulmonary history      Neuro/Psych Review:  Patient does not have a neuro/psych history         Cardiovascular Review:   Comments: Denies symptoms of active cardiac conditions.   Exercise tolerance: good (>4 METS)    GI/HEPATIC/RENAL Review:  Patient does not have a GI/hepatic/renalhistory       End/Other Review:  Comments: Uterine polyp  Additional Results:      ALLERGIES:   -- Amoxicillin -- HIVES   Last Labs        Component                Value               Date/Time                  WBC                      6.3                 01/13/2025 1655            RBC                      4.49                01/13/2025 1655            HGB                      13.4                01/13/2025 1655            HCT                      39.0                01/13/2025 1655            MCV                      86.9                01/13/2025 1655            MCH                      29.8                01/13/2025 1655            MCHC                     34.4                01/13/2025 1655            RDW-CV                   11.9                01/13/2025 1655            Sodium                   140                 01/13/2025 1655            Potassium                3.9                 01/13/2025 1655            Chloride                 106                 01/13/2025 1655            Carbon Dioxide           26                  01/13/2025 1655            Glucose                  81                  01/13/2025 1655            BUN                      14                  01/13/2025 1655            Creatinine               0.76                01/13/2025 1655            Glomerular Filtrati*     >90                 01/13/2025 1655            Calcium                  9.9                 01/13/2025 1655         
   PLT                      301                 01/13/2025 0653        Past Medical History:  No date: Allergy  No date: Subserous leiomyoma of uterus  No date: Thickened endometrium  No date: Uterine polyp  Past Surgical History:  No date: Breast biopsy; Left  No date: Root canal  No date: Tonsillectomy   Prior to Admission medications :  Medication miSOPROStol (Cytotec) 200 MCG tablet, Sig Place 2 tablets inside the cheek the night before the procedure. Then place 2 tablets inside the cheek morning of theprocedure., Start Date 12/2/24, End Date 2/4/25, Taking? , Authorizing Provider Mariaelena Hubbard, DO    Medication VITAMIN D, CHOLECALCIFEROL, PO, Sig Take 2,000 Units by mouth daily., Start Date , End Date , Taking? , Authorizing Provider Provider, Outside         Patient Vitals for the past 24 hrs:   BP Temp Temp src Pulse Resp SpO2 Height Weight   02/04/25 1413 (!) 154/75 36 °C (96.8 °F) Temporal 70 16 100 % -- --   02/04/25 1356 -- 36.4 °C (97.5 °F) Temporal 66 16 97 % -- --   02/04/25 1346 -- -- -- 65 16 98 % -- --   02/04/25 1336 -- -- -- 63 15 97 % -- --   02/04/25 1326 134/68 -- -- 62 15 99 % -- --   02/04/25 1316 -- -- -- 64 15 98 % -- --   02/04/25 1306 -- -- -- 63 14 98 % -- --   02/04/25 1256 132/69 36.2 °C (97.2 °F) Temporal 66 14 100 % -- --   02/04/25 1246 138/78 -- -- 64 15 98 % -- --   02/04/25 1236 138/78 -- -- (!) 59 15 98 % -- --   02/04/25 1226 (!) 124/110 -- -- 64 16 98 % -- --   02/04/25 1216 (!) 119/91 36.4 °C (97.5 °F) Temporal 66 18 100 % -- --   02/04/25 0912 (!) 162/96 36 °C (96.8 °F) Temporal 82 16 99 % 5' 4.5\" (1.638 m) 62.9 kg (138 lb 10.7 oz)       Social history reviewed:  Social History     Tobacco Use   Smoking Status Former    Types: Cigars   Smokeless Tobacco Never   Tobacco Comments    During her 20s-30s, only had total of 5 cigars on holidays        E-Cigarette/Vaping Substances & Devices    E-Cigarette/Vaping Use Never Used     Nicotine No     THC No     CBD No     
Flavoring No     Disposable No     Pre-filled or Refillable Cartridge No     Refillable Tank No     Pre-filled Pod No        Social History     Substance and Sexual Activity   Alcohol Use Yes    Comment: very rare           Relevant Problems   No relevant active problems       Physical Exam     Airway   Mallampati: IV  TM Distance: >3 FB  Neck ROM: Full  TMJ Mobility: Good    Cardiovascular    Cardio Rhythm: Regular  Cardio Rate: Normal    Head Assessment  Head assessment: Normocephalic    General Assessment  General Assessment: Alert and oriented    Dental Exam    Patient has:  Denied broken/chipped/loose teeth    Pulmonary Exam    Patient Demonstrates:  Non-labored Breathing      Anesthesia Plan:    ASA Status: 2  Anesthesia Type: General    Preferred Airway Type: LMA  Maintenance: Inhalational  Premedication: IV      Post-op Pain Management: Per Surgeon      Consent/Risks Discussed Statement:  The proposed anesthetic plan, including its risks and benefits, have been discussed with the Patient along with the risks and benefits of alternatives. Questions were encouraged and answered and the patient and/or representative understands and agrees to proceed.        I discussed with the patient (and/or patient's legal representative) the risks and benefits of the proposed anesthesia plan, General, which may include services performed by other anesthesia providers.    Alternative anesthesia plans, if available, were reviewed with the patient (and/or patient's legal representative). Discussion has been held with the patient (and/or patient's legal representative) regarding risks of anesthesia, which include allergic reaction, aspiration, death, post-op intubation, need for blood transfusion and intra-operative awareness and emergent situations that may require change in anesthesia plan.    The patient (and/or patient's legal representative) has indicated understanding, his/her questions have been answered, and he/she 
wishes to proceed with the planned anesthetic.    Blood Products: Not Anticipated    Comments  Plan Comments: Risks vs benefits discussed including anesthetic plan pt wishes to proceed.     
done
done

## 2025-02-05 ENCOUNTER — APPOINTMENT (OUTPATIENT)
Dept: PSYCHIATRY | Facility: CLINIC | Age: 35
End: 2025-02-05

## 2025-02-09 ENCOUNTER — EMERGENCY (EMERGENCY)
Facility: HOSPITAL | Age: 35
LOS: 0 days | Discharge: HOME | End: 2025-02-09
Attending: EMERGENCY MEDICINE
Payer: MEDICAID

## 2025-02-09 VITALS
TEMPERATURE: 99 F | SYSTOLIC BLOOD PRESSURE: 130 MMHG | RESPIRATION RATE: 18 BRPM | HEART RATE: 98 BPM | DIASTOLIC BLOOD PRESSURE: 75 MMHG | OXYGEN SATURATION: 98 %

## 2025-02-09 DIAGNOSIS — Z88.0 ALLERGY STATUS TO PENICILLIN: ICD-10-CM

## 2025-02-09 DIAGNOSIS — K08.89 OTHER SPECIFIED DISORDERS OF TEETH AND SUPPORTING STRUCTURES: ICD-10-CM

## 2025-02-09 PROCEDURE — 99283 EMERGENCY DEPT VISIT LOW MDM: CPT | Mod: 25

## 2025-02-09 PROCEDURE — 96372 THER/PROPH/DIAG INJ SC/IM: CPT

## 2025-02-09 PROCEDURE — 99283 EMERGENCY DEPT VISIT LOW MDM: CPT

## 2025-02-09 RX ORDER — KETOROLAC TROMETHAMINE 30 MG/ML
60 INJECTION, SOLUTION INTRAMUSCULAR; INTRAVENOUS ONCE
Refills: 0 | Status: DISCONTINUED | OUTPATIENT
Start: 2025-02-09 | End: 2025-02-09

## 2025-02-09 RX ORDER — KETOROLAC TROMETHAMINE 30 MG/ML
1 INJECTION, SOLUTION INTRAMUSCULAR; INTRAVENOUS
Qty: 15 | Refills: 0
Start: 2025-02-09 | End: 2025-02-13

## 2025-02-09 RX ORDER — CLINDAMYCIN PHOSPHATE 150 MG/ML
1 VIAL (ML) INJECTION
Qty: 21 | Refills: 0
Start: 2025-02-09 | End: 2025-02-15

## 2025-02-09 RX ADMIN — KETOROLAC TROMETHAMINE 60 MILLIGRAM(S): 30 INJECTION, SOLUTION INTRAMUSCULAR; INTRAVENOUS at 09:44

## 2025-02-13 ENCOUNTER — APPOINTMENT (OUTPATIENT)
Dept: OPHTHALMOLOGY | Facility: CLINIC | Age: 35
End: 2025-02-13

## 2025-02-13 ENCOUNTER — OUTPATIENT (OUTPATIENT)
Dept: OUTPATIENT SERVICES | Facility: HOSPITAL | Age: 35
LOS: 1 days | End: 2025-02-13
Payer: MEDICAID

## 2025-02-13 DIAGNOSIS — H53.8 OTHER VISUAL DISTURBANCES: ICD-10-CM

## 2025-02-13 PROCEDURE — 92004 COMPRE OPH EXAM NEW PT 1/>: CPT

## 2025-02-19 ENCOUNTER — EMERGENCY (EMERGENCY)
Facility: HOSPITAL | Age: 35
LOS: 0 days | Discharge: HOME | End: 2025-02-19
Attending: EMERGENCY MEDICINE
Payer: MEDICAID

## 2025-02-19 VITALS
RESPIRATION RATE: 18 BRPM | OXYGEN SATURATION: 99 % | SYSTOLIC BLOOD PRESSURE: 115 MMHG | DIASTOLIC BLOOD PRESSURE: 80 MMHG | WEIGHT: 131.62 LBS | HEIGHT: 61 IN | HEART RATE: 100 BPM | TEMPERATURE: 99 F

## 2025-02-19 PROCEDURE — 99283 EMERGENCY DEPT VISIT LOW MDM: CPT | Mod: 25

## 2025-02-19 PROCEDURE — 96372 THER/PROPH/DIAG INJ SC/IM: CPT

## 2025-02-19 RX ORDER — KETOROLAC TROMETHAMINE 10 MG
30 TABLET ORAL ONCE
Refills: 0 | Status: DISCONTINUED | OUTPATIENT
Start: 2025-02-19 | End: 2025-02-19

## 2025-02-19 NOTE — ED ADULT TRIAGE NOTE - CHIEF COMPLAINT QUOTE
Pt. c/o dental pain to B/L upper teeth. Pt. had a cracked tooth on the R upper side last week and missed her appt, but her L upper tooth cracked yesterday.

## 2025-02-19 NOTE — ED PROVIDER NOTE - PATIENT PORTAL LINK FT
You can access the FollowMyHealth Patient Portal offered by [facility] by registering at the following website: [email or web address]. By joining [facility]’s FollowMyHealth portal, you will also be able to view your health information using other applications (apps) compatible with our system.

## 2025-02-19 NOTE — ED PROVIDER NOTE - PHYSICAL EXAMINATION
VITAL SIGNS: I have reviewed nursing notes and confirm.  CONSTITUTIONAL: Patient is in no acute distress.  SKIN: Skin exam is warm and dry, no acute rash.  HEAD: Normocephalic; atraumatic.  EYES: EOM intact; conjunctiva and sclera clear.  ENT: No nasal discharge; airway clear.  Poor dentition, cracked right upper molar #4 with dental ttp, no gingival swelling/abscess   CARD: Regular rate and rhythm.  RESP: Clear to auscultation bilaterally. No wheezes, rales or rhonchi.  ABD: Soft; non-distended   EXT: Normal ROM. No edema.   NEURO: Alert, oriented. Grossly unremarkable. No focal deficits.  PSYCH: Cooperative, appropriate.

## 2025-02-19 NOTE — ED PROVIDER NOTE - NS ED ROS FT
Review of Systems:  	•	CONSTITUTIONAL - no fever, no diaphoresis, no chills  	•	SKIN - no rash  	•	HEMATOLOGIC - no bleeding, no bruising  	•	EYES - no eye pain, no blurry vision  	•	ENT - no congestion, +dental pain  	•	RESPIRATORY - no shortness of breath, no cough  	•	CARDIAC - no chest pain, no palpitations  	•	GI - no abd pain, no nausea, no vomiting, no diarrhea, no constipation  	•	GENITO-URINARY - no dysuria; no hematuria, no increased urinary frequency  	•	MUSCULOSKELETAL - no joint paint, no swelling, no redness  	•	NEUROLOGIC - no weakness, no headache   	All other ROS are negative except as documented in HPI.

## 2025-02-19 NOTE — ED PROVIDER NOTE - ATTENDING APP SHARED VISIT CONTRIBUTION OF CARE
34-year-old female past medical history of Crohn's disease on prednisone presents with dental pain.  Patient was seen in ED on February 9 for dental pain and completed a course of clindamycin.  Patient states she missed her appointment on February 17 at the dental clinic.  States her right upper tooth has been cracked for over 1 week.  Left upper tooth cracked yesterday tiny piece while eating.  No fever.  No swelling.  No discharge.  No chest pain shortness of breath.  Tolerating p.o.    On exam, AFVSS, Well appearing, No acute distress, NCAT, EOMI, PERRLA, MMM, Neck supple, right upper tooth cracked, no pulp exposed, nontender, no gum swelling, left upper tooth unable to visualize cracked, no laxity, mild tenderness, no gum swelling, AAOx3, No Focal Deficits, No LE edema or calf TTP,    A/P; dental pain, no abscess, will DC home pain medication given in ED, DC home follow-up in dental clinic as outpatient 1 to 2 weeks, strict turn precautions

## 2025-02-19 NOTE — ED PROVIDER NOTE - NSFOLLOWUPINSTRUCTIONS_ED_ALL_ED_FT
You presented to the ER due to dental pain.  Follow up with a dentist for further management.  For pain control rotate between ibuprofen and tylenol at home.    Our Emergency Department Referral Coordinators will be reaching out to you in the next 24-48 hours from 9:00am to 5:00pm with a follow up appointment. Please expect a phone call from the hospital in that time frame. If you do not receive a call or if you have any questions or concerns, you can reach them at [phone] or [phone].

## 2025-02-19 NOTE — ED PROVIDER NOTE - OBJECTIVE STATEMENT
33 yo F with PMH of Crohn's disease on prednisone presents to the ER with c/o dental pain. Was in this ER 10 days ago for right upper molar pain 2/2 cracked tooth, discharged on Clindamycin which pt endorsed compliance with, though missed her dental appointment 2 days ago and still having pain. States yesterday when eating she chipped a piece off her left upper molar. Denies fevers or other complaints.

## 2025-02-19 NOTE — ED PROVIDER NOTE - CLINICAL SUMMARY MEDICAL DECISION MAKING FREE TEXT BOX
No
34-year-old female past medical history of Crohn's disease on prednisone presents with dental pain.  Patient was seen in ED on February 9 for dental pain and completed a course of clindamycin.  Patient states she missed her appointment on February 17 at the dental clinic.  States her right upper tooth has been cracked for over 1 week.  Left upper tooth cracked yesterday tiny piece while eating.  No fever.  No swelling.  No discharge.  No chest pain shortness of breath.  Tolerating p.o.    On exam, AFVSS, Well appearing, No acute distress, NCAT, EOMI, PERRLA, MMM, Neck supple, right upper tooth cracked, no pulp exposed, nontender, no gum swelling, left upper tooth unable to visualize cracked, no laxity, mild tenderness, no gum swelling, AAOx3, No Focal Deficits, No LE edema or calf TTP,    A/P; dental pain, no abscess, will DC home pain medication given in ED, DC home follow-up in dental clinic as outpatient 1 to 2 weeks, strict turn precautions

## 2025-02-21 DIAGNOSIS — K03.81 CRACKED TOOTH: ICD-10-CM

## 2025-02-21 DIAGNOSIS — K08.89 OTHER SPECIFIED DISORDERS OF TEETH AND SUPPORTING STRUCTURES: ICD-10-CM

## 2025-02-21 DIAGNOSIS — Z88.0 ALLERGY STATUS TO PENICILLIN: ICD-10-CM

## 2025-02-21 DIAGNOSIS — K50.90 CROHN'S DISEASE, UNSPECIFIED, WITHOUT COMPLICATIONS: ICD-10-CM

## 2025-02-24 ENCOUNTER — APPOINTMENT (OUTPATIENT)
Dept: PSYCHIATRY | Facility: CLINIC | Age: 35
End: 2025-02-24

## 2025-02-24 ENCOUNTER — OUTPATIENT (OUTPATIENT)
Dept: OUTPATIENT SERVICES | Facility: HOSPITAL | Age: 35
LOS: 1 days | End: 2025-02-24

## 2025-02-24 DIAGNOSIS — F33.2 MAJOR DEPRESSIVE DISORDER, RECURRENT SEVERE WITHOUT PSYCHOTIC FEATURES: ICD-10-CM

## 2025-02-24 DIAGNOSIS — F41.1 GENERALIZED ANXIETY DISORDER: ICD-10-CM

## 2025-02-25 DIAGNOSIS — F41.1 GENERALIZED ANXIETY DISORDER: ICD-10-CM

## 2025-02-25 DIAGNOSIS — F33.2 MAJOR DEPRESSIVE DISORDER, RECURRENT SEVERE WITHOUT PSYCHOTIC FEATURES: ICD-10-CM

## 2025-03-03 ENCOUNTER — INPATIENT (INPATIENT)
Facility: HOSPITAL | Age: 35
LOS: 2 days | Discharge: HOME | DRG: 245 | End: 2025-03-06
Attending: STUDENT IN AN ORGANIZED HEALTH CARE EDUCATION/TRAINING PROGRAM | Admitting: STUDENT IN AN ORGANIZED HEALTH CARE EDUCATION/TRAINING PROGRAM
Payer: MEDICAID

## 2025-03-03 ENCOUNTER — OUTPATIENT (OUTPATIENT)
Dept: OUTPATIENT SERVICES | Facility: HOSPITAL | Age: 35
LOS: 1 days | End: 2025-03-03
Payer: MEDICAID

## 2025-03-03 ENCOUNTER — APPOINTMENT (OUTPATIENT)
Dept: PSYCHIATRY | Facility: CLINIC | Age: 35
End: 2025-03-03

## 2025-03-03 VITALS
OXYGEN SATURATION: 94 % | WEIGHT: 164.91 LBS | SYSTOLIC BLOOD PRESSURE: 137 MMHG | HEART RATE: 95 BPM | RESPIRATION RATE: 19 BRPM | HEIGHT: 61 IN | TEMPERATURE: 98 F | DIASTOLIC BLOOD PRESSURE: 82 MMHG

## 2025-03-03 DIAGNOSIS — F41.1 GENERALIZED ANXIETY DISORDER: ICD-10-CM

## 2025-03-03 DIAGNOSIS — F33.2 MAJOR DEPRESSIVE DISORDER, RECURRENT SEVERE WITHOUT PSYCHOTIC FEATURES: ICD-10-CM

## 2025-03-03 DIAGNOSIS — F41.9 MAJOR DEPRESSIVE DISORDER, RECURRENT SEVERE W/OUT PSYCHOTIC FEATURES: ICD-10-CM

## 2025-03-03 DIAGNOSIS — F51.05 INSOMNIA DUE TO OTHER MENTAL DISORDER: ICD-10-CM

## 2025-03-03 DIAGNOSIS — F33.2 MAJOR DEPRESSIVE DISORDER, RECURRENT SEVERE W/OUT PSYCHOTIC FEATURES: ICD-10-CM

## 2025-03-03 DIAGNOSIS — F41.0 GENERALIZED ANXIETY DISORDER: ICD-10-CM

## 2025-03-03 LAB
ALBUMIN SERPL ELPH-MCNC: 3.9 G/DL — SIGNIFICANT CHANGE UP (ref 3.5–5.2)
ALP SERPL-CCNC: 67 U/L — SIGNIFICANT CHANGE UP (ref 30–115)
ALT FLD-CCNC: 238 U/L — HIGH (ref 0–41)
ANION GAP SERPL CALC-SCNC: 14 MMOL/L — SIGNIFICANT CHANGE UP (ref 7–14)
ANISOCYTOSIS BLD QL: SLIGHT — SIGNIFICANT CHANGE UP
APPEARANCE UR: CLEAR — SIGNIFICANT CHANGE UP
AST SERPL-CCNC: 113 U/L — HIGH (ref 0–41)
BASOPHILS # BLD AUTO: 0 K/UL — SIGNIFICANT CHANGE UP (ref 0–0.2)
BASOPHILS NFR BLD AUTO: 0 % — SIGNIFICANT CHANGE UP (ref 0–1)
BILIRUB SERPL-MCNC: <0.2 MG/DL — SIGNIFICANT CHANGE UP (ref 0.2–1.2)
BILIRUB UR-MCNC: NEGATIVE — SIGNIFICANT CHANGE UP
BUN SERPL-MCNC: 26 MG/DL — HIGH (ref 10–20)
CALCIUM SERPL-MCNC: 8.8 MG/DL — SIGNIFICANT CHANGE UP (ref 8.4–10.5)
CHLORIDE SERPL-SCNC: 105 MMOL/L — SIGNIFICANT CHANGE UP (ref 98–110)
CO2 SERPL-SCNC: 22 MMOL/L — SIGNIFICANT CHANGE UP (ref 17–32)
COLOR SPEC: YELLOW — SIGNIFICANT CHANGE UP
CREAT SERPL-MCNC: 0.8 MG/DL — SIGNIFICANT CHANGE UP (ref 0.7–1.5)
DACRYOCYTES BLD QL SMEAR: SLIGHT — SIGNIFICANT CHANGE UP
DIFF PNL FLD: NEGATIVE — SIGNIFICANT CHANGE UP
EGFR: 98 ML/MIN/1.73M2 — SIGNIFICANT CHANGE UP
EGFR: 98 ML/MIN/1.73M2 — SIGNIFICANT CHANGE UP
ELLIPTOCYTES BLD QL SMEAR: SLIGHT — SIGNIFICANT CHANGE UP
EOSINOPHIL # BLD AUTO: 0 K/UL — SIGNIFICANT CHANGE UP (ref 0–0.7)
EOSINOPHIL NFR BLD AUTO: 0 % — SIGNIFICANT CHANGE UP (ref 0–8)
GLUCOSE SERPL-MCNC: 142 MG/DL — HIGH (ref 70–99)
GLUCOSE UR QL: NEGATIVE MG/DL — SIGNIFICANT CHANGE UP
HCT VFR BLD CALC: 31.5 % — LOW (ref 37–47)
HGB BLD-MCNC: 10 G/DL — LOW (ref 12–16)
KETONES UR-MCNC: NEGATIVE MG/DL — SIGNIFICANT CHANGE UP
LEUKOCYTE ESTERASE UR-ACNC: NEGATIVE — SIGNIFICANT CHANGE UP
LIDOCAIN IGE QN: 37 U/L — SIGNIFICANT CHANGE UP (ref 7–60)
LYMPHOCYTES # BLD AUTO: 0.61 K/UL — LOW (ref 1.2–3.4)
LYMPHOCYTES # BLD AUTO: 3.8 % — LOW (ref 20.5–51.1)
MANUAL SMEAR VERIFICATION: SIGNIFICANT CHANGE UP
MCHC RBC-ENTMCNC: 25.5 PG — LOW (ref 27–31)
MCHC RBC-ENTMCNC: 31.7 G/DL — LOW (ref 32–37)
MCV RBC AUTO: 80.4 FL — LOW (ref 81–99)
METAMYELOCYTES # FLD: 1 % — HIGH (ref 0–0)
METAMYELOCYTES NFR BLD: 1 % — HIGH (ref 0–0)
MICROCYTES BLD QL: SLIGHT — SIGNIFICANT CHANGE UP
MONOCYTES # BLD AUTO: 0.31 K/UL — SIGNIFICANT CHANGE UP (ref 0.1–0.6)
MONOCYTES NFR BLD AUTO: 1.9 % — SIGNIFICANT CHANGE UP (ref 1.7–9.3)
MYELOCYTES NFR BLD: 5.8 % — HIGH (ref 0–0)
NEUTROPHILS # BLD AUTO: 13.65 K/UL — HIGH (ref 1.4–6.5)
NEUTROPHILS NFR BLD AUTO: 84.6 % — HIGH (ref 42.2–75.2)
NITRITE UR-MCNC: NEGATIVE — SIGNIFICANT CHANGE UP
NT-PROBNP SERPL-SCNC: 387 PG/ML — HIGH (ref 0–300)
PH UR: 6.5 — SIGNIFICANT CHANGE UP (ref 5–8)
PLAT MORPH BLD: NORMAL — SIGNIFICANT CHANGE UP
PLATELET # BLD AUTO: 264 K/UL — SIGNIFICANT CHANGE UP (ref 130–400)
PMV BLD: 11.5 FL — HIGH (ref 7.4–10.4)
POIKILOCYTOSIS BLD QL AUTO: SLIGHT — SIGNIFICANT CHANGE UP
POLYCHROMASIA BLD QL SMEAR: SLIGHT — SIGNIFICANT CHANGE UP
POTASSIUM SERPL-MCNC: 4.3 MMOL/L — SIGNIFICANT CHANGE UP (ref 3.5–5)
POTASSIUM SERPL-SCNC: 4.3 MMOL/L — SIGNIFICANT CHANGE UP (ref 3.5–5)
PROT SERPL-MCNC: 5.8 G/DL — LOW (ref 6–8)
PROT UR-MCNC: SIGNIFICANT CHANGE UP MG/DL
RBC # BLD: 3.92 M/UL — LOW (ref 4.2–5.4)
RBC # FLD: 19 % — HIGH (ref 11.5–14.5)
RBC BLD AUTO: ABNORMAL
SMUDGE CELLS # BLD: PRESENT — SIGNIFICANT CHANGE UP
SODIUM SERPL-SCNC: 141 MMOL/L — SIGNIFICANT CHANGE UP (ref 135–146)
SP GR SPEC: 1.02 — SIGNIFICANT CHANGE UP (ref 1–1.03)
TROPONIN T, HIGH SENSITIVITY RESULT: 9 NG/L — SIGNIFICANT CHANGE UP (ref 6–13)
UROBILINOGEN FLD QL: 0.2 MG/DL — SIGNIFICANT CHANGE UP (ref 0.2–1)
VARIANT LYMPHS # BLD: 2.9 % — SIGNIFICANT CHANGE UP (ref 0–5)
VARIANT LYMPHS NFR BLD MANUAL: 2.9 % — SIGNIFICANT CHANGE UP (ref 0–5)
WBC # BLD: 16.14 K/UL — HIGH (ref 4.8–10.8)
WBC # FLD AUTO: 16.14 K/UL — HIGH (ref 4.8–10.8)

## 2025-03-03 PROCEDURE — 80053 COMPREHEN METABOLIC PANEL: CPT

## 2025-03-03 PROCEDURE — 87040 BLOOD CULTURE FOR BACTERIA: CPT

## 2025-03-03 PROCEDURE — 82962 GLUCOSE BLOOD TEST: CPT

## 2025-03-03 PROCEDURE — 82728 ASSAY OF FERRITIN: CPT

## 2025-03-03 PROCEDURE — 83735 ASSAY OF MAGNESIUM: CPT

## 2025-03-03 PROCEDURE — 76705 ECHO EXAM OF ABDOMEN: CPT

## 2025-03-03 PROCEDURE — 99223 1ST HOSP IP/OBS HIGH 75: CPT

## 2025-03-03 PROCEDURE — 84466 ASSAY OF TRANSFERRIN: CPT

## 2025-03-03 PROCEDURE — 82746 ASSAY OF FOLIC ACID SERUM: CPT

## 2025-03-03 PROCEDURE — 86140 C-REACTIVE PROTEIN: CPT

## 2025-03-03 PROCEDURE — 71275 CT ANGIOGRAPHY CHEST: CPT | Mod: MC

## 2025-03-03 PROCEDURE — 85025 COMPLETE CBC W/AUTO DIFF WBC: CPT

## 2025-03-03 PROCEDURE — 99285 EMERGENCY DEPT VISIT HI MDM: CPT

## 2025-03-03 PROCEDURE — 85730 THROMBOPLASTIN TIME PARTIAL: CPT

## 2025-03-03 PROCEDURE — 85652 RBC SED RATE AUTOMATED: CPT

## 2025-03-03 PROCEDURE — 93975 VASCULAR STUDY: CPT

## 2025-03-03 PROCEDURE — 80074 ACUTE HEPATITIS PANEL: CPT

## 2025-03-03 PROCEDURE — 83615 LACTATE (LD) (LDH) ENZYME: CPT

## 2025-03-03 PROCEDURE — 84550 ASSAY OF BLOOD/URIC ACID: CPT

## 2025-03-03 PROCEDURE — 90834 PSYTX W PT 45 MINUTES: CPT

## 2025-03-03 PROCEDURE — 83550 IRON BINDING TEST: CPT

## 2025-03-03 PROCEDURE — 80076 HEPATIC FUNCTION PANEL: CPT

## 2025-03-03 PROCEDURE — 93306 TTE W/DOPPLER COMPLETE: CPT

## 2025-03-03 PROCEDURE — 71045 X-RAY EXAM CHEST 1 VIEW: CPT | Mod: 26

## 2025-03-03 PROCEDURE — 36415 COLL VENOUS BLD VENIPUNCTURE: CPT

## 2025-03-03 PROCEDURE — 74177 CT ABD & PELVIS W/CONTRAST: CPT | Mod: 26

## 2025-03-03 PROCEDURE — 93010 ELECTROCARDIOGRAM REPORT: CPT

## 2025-03-03 PROCEDURE — 80048 BASIC METABOLIC PNL TOTAL CA: CPT

## 2025-03-03 PROCEDURE — 83540 ASSAY OF IRON: CPT

## 2025-03-03 PROCEDURE — 85610 PROTHROMBIN TIME: CPT

## 2025-03-03 PROCEDURE — 82607 VITAMIN B-12: CPT

## 2025-03-03 RX ADMIN — Medication 1000 MILLILITER(S): at 19:31

## 2025-03-03 RX ADMIN — Medication 4 MILLIGRAM(S): at 20:03

## 2025-03-03 RX ADMIN — Medication 2 MILLIGRAM(S): at 21:44

## 2025-03-03 NOTE — ED ADULT TRIAGE NOTE - NS ED NURSE AMBULANCES
History     Chief Complaint   Patient presents with     URI     has had URI sx , chest congestion for past week. waiting to get refills of both nebs and inhalers.      HPI  Ge Hansen is a 29 year old male with history of asthma, obesity hypoventilation syndrome, MAYDA who presents with complaints of persistent productive cough along with associated shortness of breath and wheezing over the past week and a half.  Patient states his symptoms of nasal congestion and rhinorrhea have improved however his symptoms have settled in his chest causing a productive cough.  Patient reports associated shortness of breath and wheezing along with chest tightness.  He states he has been using his rescue inhaler or Albuterol neb solution every 4 hours without improvement.  Patient denies fevers, chills, rash, neck pain/stiffness, or sore throat.  Patient is an every day smoker.      Problem List:    Patient Active Problem List    Diagnosis Date Noted     Morbid obesity (H) 09/13/2018     Priority: Medium     Elevated glucose 09/13/2018     Priority: Medium     Obesity hypoventilation syndrome (H) 09/13/2018     Priority: Medium     Some asthma hx too.         Tobacco use 09/13/2018     Priority: Medium     MAYDA (obstructive sleep apnea) 09/13/2018     Priority: Medium     No records.        Elevated blood pressure reading without diagnosis of hypertension 09/13/2018     Priority: Medium        Past Medical History:    History reviewed. No pertinent past medical history.    Past Surgical History:    History reviewed. No pertinent surgical history.    Family History:    No family history on file.    Social History:  Marital Status:  Single [1]  Social History   Substance Use Topics     Smoking status: Current Every Day Smoker     Packs/day: 1.00     Types: Cigarettes     Smokeless tobacco: Current User     Types: Chew, Snuff     Alcohol use Yes        Medications:      albuterol (2.5 MG/3ML) 0.083% neb solution   albuterol (PROAIR  "HFA/PROVENTIL HFA/VENTOLIN HFA) 108 (90 Base) MCG/ACT inhaler   predniSONE (DELTASONE) 20 MG tablet   albuterol (2.5 MG/3ML) 0.083% neb solution   albuterol (PROAIR HFA/PROVENTIL HFA/VENTOLIN HFA) 108 (90 Base) MCG/ACT inhaler   fluticasone-salmeterol (ADVAIR DISKUS) 250-50 MCG/DOSE diskus inhaler   HYDROcodone-acetaminophen (NORCO) 5-325 MG per tablet   order for [facility]   Respiratory Therapy Supplies (NEBULIZER) JENNY   zolpidem (AMBIEN) 5 MG tablet         Review of Systems   Constitutional: Negative.  Negative for chills and fever.   HENT: Positive for congestion and rhinorrhea.    Respiratory: Positive for cough, chest tightness, shortness of breath and wheezing.    Cardiovascular: Negative.  Negative for chest pain.   Gastrointestinal: Negative.    Musculoskeletal: Negative.    Skin: Negative.    Neurological: Negative.    All other systems reviewed and are negative.      Physical Exam   BP: (!) 179/91  Heart Rate: 100  Temp: 98  F (36.7  C)  Resp: 20  Height: 185.4 cm (6' 1\")  Weight: (!) 235.9 kg (520 lb)  SpO2: 96 %      Physical Exam   Constitutional: He is oriented to person, place, and time. He appears well-developed and well-nourished.  Non-toxic appearance. No distress.   HENT:   Head: Normocephalic and atraumatic.   Right Ear: Tympanic membrane, external ear and ear canal normal.   Left Ear: Tympanic membrane, external ear and ear canal normal.   Nose: Mucosal edema present.   Mouth/Throat: Uvula is midline, oropharynx is clear and moist and mucous membranes are normal. No oropharyngeal exudate, posterior oropharyngeal edema, posterior oropharyngeal erythema or tonsillar abscesses.   Eyes: Conjunctivae and EOM are normal. Pupils are equal, round, and reactive to light.   Neck: Normal range of motion. Neck supple. No rigidity.   Cardiovascular: Normal rate, regular rhythm and normal heart sounds.    Pulmonary/Chest: Effort normal. No accessory muscle usage or stridor. No respiratory distress. He has no " decreased breath sounds. He has wheezes (diffuse expiratory wheezing throughout lung fields). He has no rhonchi. He has no rales.   Musculoskeletal: Normal range of motion.   Lymphadenopathy:     He has no cervical adenopathy.   Neurological: He is alert and oriented to person, place, and time.   Skin: Skin is warm and dry. No rash noted.       ED Course     ED Course     Procedures    Results for orders placed or performed during the hospital encounter of 09/20/18 (from the past 24 hour(s))   XR Chest 2 Views    Narrative    CHEST TWO VIEWS  9/20/2018 12:46 PM     HISTORY: cough, wheezing;     COMPARISON: None.      Impression    IMPRESSION:   Heart and pulmonary vessels within normal limits. Lungs clear. No  pleural effusion.    GRETEL DALEY MD       Medications   ipratropium - albuterol 0.5 mg/2.5 mg/3 mL (DUONEB) neb solution 3 mL (3 mLs Nebulization Given 9/20/18 1236)       Assessments & Plan (with Medical Decision Making)     Pt is a 29 year old male with history of asthma, obesity hypoventilation syndrome, MAYDA who presents with complaints of persistent productive cough along with associated shortness of breath and wheezing over the past week and a half.  Patient states his symptoms of nasal congestion and rhinorrhea have improved however his symptoms have settled in his chest causing a productive cough.  Patient reports associated shortness of breath and wheezing along with chest tightness.  He states he has been using his rescue inhaler or Albuterol neb solution every 4 hours without improvement.  Pt is afebrile on arrival.  Exam as above.  Chest x-ray was obtained and was negative for pneumonia or acute pathology.  Discussed results with patient.  Patient was given a DuoNeb with improvement in his wheezing and breathing.  Return precautions were reviewed.  Hand-outs were provided.    Patient was sent with Prednisone burst and refills of Albuterol and neb solution and was instructed to follow-up with PCP  if no improvement in 5-7 days for continued care and management or sooner if new or worsening symptoms.  He is to return to the ED for persistent and/or worsening symptoms.  Patient expressed understanding of the diagnosis and plan and was discharged home in good condition.    I have reviewed the nursing notes.    I have reviewed the findings, diagnosis, plan and need for follow up with the patient.    Discharge Medication List as of 9/20/2018  1:30 PM      START taking these medications    Details   !! albuterol (2.5 MG/3ML) 0.083% neb solution Take 1 vial (2.5 mg) by nebulization every 4 hours as needed for shortness of breath / dyspnea or wheezing, Disp-1 Box, R-0, E-Prescribe      !! albuterol (PROAIR HFA/PROVENTIL HFA/VENTOLIN HFA) 108 (90 Base) MCG/ACT inhaler Inhale 2 puffs into the lungs every 6 hours as needed for shortness of breath / dyspnea or wheezing, Disp-1 Inhaler, R-0, E-Prescribe      predniSONE (DELTASONE) 20 MG tablet Take two tablets (= 40mg) each day for 5 (five) days, Disp-10 tablet, R-0, E-Prescribe       !! - Potential duplicate medications found. Please discuss with provider.          Final diagnoses:   Exacerbation of asthma, unspecified asthma severity, unspecified whether persistent   Upper respiratory tract infection, unspecified type       9/20/2018   [facility] EMERGENCY DEPARTMENT     Jesusita Vincent PA-C  09/20/18 1600     [facility]

## 2025-03-03 NOTE — ED ADULT TRIAGE NOTE - CHIEF COMPLAINT QUOTE
pt bibems for chest pain and SOB for 3 days, has b/l LE and UE edema. Pt on eliquis for clots. received 324 asa pta

## 2025-03-03 NOTE — ED PROVIDER NOTE - OBJECTIVE STATEMENT
34-year-old female with a past medical history of Crohn's, asthma, DVT on Eliquis presents complaining of chest pain, shortness of breath, nausea, abdominal pain for the past week.  Patient describes her chest pain as tightness in nature. pt experiencing nonbloody diarrhea and denies any vomiting. pt denies any other symptoms including fevers, chill, headache, recent illness/travel, cough. 34-year-old female with a past medical history of Crohn's/Ulcerative colitis , asthma, DVT on Eliquis presents complaining of chest pain, shortness of breath, nausea, abdominal pain for the past week.  Patient describes her chest pain as tightness in nature. pt experiencing nonbloody diarrhea and denies any vomiting. pt denies any other symptoms including fevers, chill, headache, recent illness/travel, cough.

## 2025-03-03 NOTE — H&P ADULT - ASSESSMENT
34-year-old female with a past medical history of Crohns disease, with prior hospitalization for perirectal abscess s/p I&D twice anxiety/depression, DVT on eliquis, presents complaining of chest pain, shortness of breath, nausea, abdominal pain for the past week.  Patient describes her chest pain as tightness in nature. pt experiencing nonbloody diarrhea and denies any vomiting. pt denies any other symptoms including fevers, chill, headache, recent illness/travel, cough.    #Chest Pain, SOB, Nausea 2/2 Suspected Demand Ischemia 2/2 Dehydration   #Diarrhea 2/2 Suspected UC Flare  - In ED, T 98.1, /82, HR 95, RR 19, SpO2 94% on RA.   - Labs: HgB 10.0 (baseline 12.8 in 2/2024), MCV 80.4, , .   - EKG: Sinus tachycardia, non-specific T-wave changes (improved from prior)   - CXR: No definite acute rib fracture. However, if there remains a suspicion, further evaluation with a noncontrast chest CT is recommended. New right cardiophrenic angle opacity, not seen on prior examinations. Findings may represent a benign prominent epicardial fat-pad. This can be better evaluated at the time of the above noncontrast chest CT.  - CT A/P with IV Con: Focal subcutaneous stranding at the level of the umbilicus. Recommend correlation with physical examination  - s/p 1L NS bolus, morphine 2+ 4 mg IV,   - pain control with morphine 4 mg IV q4h PRN for severe pain  - Zofran 4 mg IV q8h PRN for nausea/vomiting  - Monitor QTc  - Bowel regimen when diarrhea improves     #Transaminitis Possibly 2/2 PBC vs Other  - ,  on admission  - Consider Hepatology consult     #Acute on Chronic Microcytic Anemia  - HgB 10.0 (baseline 12.8 in 2/2024)  - Iron studies pending  - Monitor CBC  - Monitor BMs  - Keep active type and screen  - Keep HgB > 7    #DVT on Eliquis   - c/w home meds     #Anxiety/Depression  - c/w home meds                                            --------------------------------------------------------------    # DVT prophylaxis: Eliquis   # GI prophylaxis: PPI  # Diet: Regular   # Activity: IAT with Assistance   # Code status: Full Code  # Disposition:    Pending:  34-year-old female with a past medical history of Crohns disease, with prior hospitalization for perirectal abscess s/p I&D twice anxiety/depression, DVT on eliquis, presents complaining of chest pain, shortness of breath, nausea, abdominal pain for the past week.  Patient describes her chest pain as tightness in nature. pt experiencing nonbloody diarrhea and denies any vomiting. pt denies any other symptoms including fevers, chill, headache, recent illness/travel, cough.    #Diarrhea, Nausea 2/2 Suspected UC Flare  #UC/Crohn's Diagnosed 8 years ago   - In ED, T 98.1, /82, HR 95, RR 19, SpO2 94% on RA.   - Labs: HgB 10.0 (baseline 12.8 in 2/2024), MCV 80.4, , , albumin 3.9, tbili <0.2   - EKG: Sinus tachycardia, non-specific T-wave changes (improved from prior)   - CXR: No definite acute rib fracture. However, if there remains a suspicion, further evaluation with a noncontrast chest CT is recommended. New right cardiophrenic angle opacity, not seen on prior examinations. Findings may represent a benign prominent epicardial fat-pad. This can be better evaluated at the time of the above noncontrast chest CT.  - CT A/P with IV Con: Focal subcutaneous stranding at the level of the umbilicus. Recommend correlation with physical examination  - s/p 1L NS bolus, morphine 2+ 4 mg IV  - consider GI consult   - pain control with morphine 4 mg IV q4h PRN for severe pain  - Zofran 4 mg IV q8h PRN for nausea/vomiting  - Monitor QTc  - Bowel regimen when diarrhea improves     #Chest Pain, SOB, Generalized Edema Possibly 2/2 Undiagnosed HF Possibly 2/2 Chronic Inflammation   #Generalized Edema, Periorbital Edema   #R/o New HF   - Patient used to follow with cardiologist, unsure what work-up was done, last seen >1 year ago   - TTE pending   - Consider cardiology consult if indicated based on findings     #Transaminitis Possibly 2/2 Congestive Hepatopathy vs. PBC vs. Budhairi Syndrome   - , , Tbili < 0.2 on admission  - CT A/P with IV Con: Focal subcutaneous stranding at the level of the umbilicus.Recommend correlation with physical examination  - Consider CTA   - Consider hepatology consult     #Acute on Chronic Microcytic Anemia  - HgB 10.0 (baseline 12.8 in 2/2024)  - Iron studies pending  - Monitor CBC  - Monitor BMs  - Keep active type and screen  - Keep HgB > 7    #DVT on Eliquis   - patient reports family history of blood clots  - patient reports person history of clots in LE, UE and abdomen   - c/w home meds, Eliquis 5 mg BID   - consider hematology consult and testing for coagulopathy (Factor V Leiden, etc.)    #Anxiety/Depression  - c/w home meds                                            --------------------------------------------------------------    # DVT prophylaxis: Eliquis   # GI prophylaxis: PPI  # Diet: Regular   # Activity: IAT with Assistance   # Code status: Full Code  # Disposition:    Pending:  34-year-old female with a past medical history of Crohns disease, with prior hospitalization for perirectal abscess s/p I&D twice anxiety/depression, DVT on eliquis, presents complaining of chest pain, shortness of breath, nausea, abdominal pain for the past week.  Patient describes her chest pain as tightness in nature. pt experiencing nonbloody diarrhea and denies any vomiting. pt denies any other symptoms including fevers, chill, headache, recent illness/travel, cough.    #Diarrhea, Nausea 2/2 Suspected UC Flare  #Sinus tachycardia  #Leukocytosis 2/2 Active Inflammation/Infection vs. Chronic Steroids  #UC/Crohn's Diagnosed 8 years ago   - In ED, T 98.1, /82, HR 95, RR 19, SpO2 94% on RA.   - Labs: HgB 10.0 (baseline 12.8 in 2/2024), MCV 80.4, , , albumin 3.9, tbili <0.2   - EKG: Sinus tachycardia, non-specific T-wave changes (improved from prior)   - CXR: No definite acute rib fracture. However, if there remains a suspicion, further evaluation with a noncontrast chest CT is recommended. New right cardiophrenic angle opacity, not seen on prior examinations. Findings may represent a benign prominent epicardial fat-pad. This can be better evaluated at the time of the above noncontrast chest CT.  - CT A/P with IV Con: Focal subcutaneous stranding at the level of the umbilicus. Recommend correlation with physical examination  - s/p 1L NS bolus, morphine 2+ 4 mg IV  - consider GI consult   - pain control with morphine 4 mg IV q4h PRN for severe pain  - Zofran 4 mg IV q8h PRN for nausea/vomiting  - Monitor QTc  - Bowel regimen when diarrhea improves     #Chest Pain, SOB, Generalized Edema Possibly 2/2 Undiagnosed HF Possibly 2/2 Chronic Inflammation   #Generalized Edema, Periorbital Edema   #R/o New HF   - Patient used to follow with cardiologist, unsure what work-up was done, last seen >1 year ago   - TTE pending   - Consider cardiology consult if indicated based on findings     #Transaminitis Possibly 2/2 Congestive Hepatopathy vs. PBC vs. Budhairi Syndrome   - , , Tbili < 0.2 on admission  - CT A/P with IV Con: Focal subcutaneous stranding at the level of the umbilicus.Recommend correlation with physical examination  - Consider CTA   - Consider hepatology consult     #Acute on Chronic Microcytic Anemia  - HgB 10.0 (baseline 12.8 in 2/2024)  - Iron studies pending  - Monitor CBC  - Monitor BMs  - Keep active type and screen  - Keep HgB > 7    #DVT on Eliquis   - patient reports family history of blood clots  - patient reports person history of clots in LE, UE and abdomen   - c/w home meds, Eliquis 5 mg BID   - consider hematology consult and testing for coagulopathy (Factor V Leiden, etc.)    #Anxiety/Depression  - c/w home meds                                            --------------------------------------------------------------    # DVT prophylaxis: Eliquis   # GI prophylaxis: PPI  # Diet: Regular   # Activity: IAT with Assistance   # Code status: Full Code  # Disposition:    Pending:

## 2025-03-03 NOTE — ED ADULT NURSE NOTE - NSICDXPASTMEDICALHX_GEN_ALL_CORE_FT
PAST MEDICAL HISTORY:  Asthma     Crohns disease of small intestine     DVT (deep venous thrombosis)     Hemorrhoids

## 2025-03-03 NOTE — ED PROVIDER NOTE - WR ORDER ID 1
Symptoms started on Wednesday. Fever present today. Clear sputum and nasal secretions. Recommended Covid testing at home and OTC mucinex cold and Sinus or Sinus Max , hydration. Ashley verbalized understanding and is receptive to the plan. He is to contact our office as needed.     [id number]

## 2025-03-03 NOTE — ED PROVIDER NOTE - CLINICAL SUMMARY MEDICAL DECISION MAKING FREE TEXT BOX
The MDM was documented by me personally, Dr. Jonathon Pelayo, supervising attending and serves as my attending contribution to the care of the patient. I have personally performed a history and physical exam on this patient and personally directed the management of the patient.     pt has a hx of Cronh's/Ulcerative colitis, on prednisone for 8 years, who is here for worsening abdominal pain for the past week. also associated chest pain. she also states she is having abdominal distention     Vitals: HD stable, O2 stable  Gen: appropriate affect, no acute distress  Neuro: no focal defects, no sensory deficits  HEENT: EOMI, no JVD, normocephalic, atraumatic, no scleral icterus  Cardio: RRR, S1 S2 present, no murmurs, rubs, or gallops  Resp: lungs b/l CTA, chest wall intact  Abd: soft, abdominal distention, nontender  MSK: no gross joint abnormalities, no obvious swelling  Skin: no rashes, no wounds  heme: no ecchymosis or petechiae    EKG with no acute ischemic changes. CT with no obvious intraabdominal findings. Pt with elevated LFTs. pt is not on biologics. Pt elevated LFTs not seen on prior. Pt to be admitted to the hospital for elevated LFTs in the setting of ulcerative colitis and for GI evaluation. pt agreeable.

## 2025-03-03 NOTE — H&P ADULT - NSHPPHYSICALEXAM_GEN_ALL_CORE
T(C): 36.7 (03-03-25 @ 17:00), Max: 36.7 (03-03-25 @ 17:00)  HR: 94 (03-03-25 @ 20:38) (94 - 95)  BP: 115/75 (03-03-25 @ 20:38) (115/75 - 137/82)  RR: 18 (03-03-25 @ 20:38) (18 - 19)  SpO2: 96% (03-03-25 @ 20:38) (94% - 96%)    CONSTITUTIONAL: NAD  HEENT: NC/AT, EOMI  NECK: Supple, symmetric and without tracheal deviation   RESP: No respiratory distress, no use of accessory muscles. B/l air entry. No adventitious breath sounds  CARDIOVASCULAR: RRR, +S1S2  EXTREMITIES: No pedal edema b/l  GI: BS (+), Soft, NT, ND, no rebound, no guarding  MSK: Normal muscle strength/tone in UE & LE b/l  NEURO: Sensation intact in upper and lower extremities b/l to light touch   PSYCH: A&Ox3. Appropriate insight & judgement

## 2025-03-03 NOTE — H&P ADULT - NSHPLABSRESULTS_GEN_ALL_CORE
10.0   16.14 )-----------( 264      ( 03 Mar 2025 19:50 )             31.5   03-03    141  |  105  |  26[H]  ----------------------------<  142[H]  4.3   |  22  |  0.8    Ca    8.8      03 Mar 2025 19:50    TPro  5.8[L]  /  Alb  3.9  /  TBili  <0.2  /  DBili  x   /  AST  113[H]  /  ALT  238[H]  /  AlkPhos  67  03-03

## 2025-03-04 ENCOUNTER — RESULT REVIEW (OUTPATIENT)
Age: 35
End: 2025-03-04

## 2025-03-04 DIAGNOSIS — F41.1 GENERALIZED ANXIETY DISORDER: ICD-10-CM

## 2025-03-04 LAB
ALBUMIN SERPL ELPH-MCNC: 3.5 G/DL — SIGNIFICANT CHANGE UP (ref 3.5–5.2)
ALP SERPL-CCNC: 50 U/L — SIGNIFICANT CHANGE UP (ref 30–115)
ALT FLD-CCNC: 184 U/L — HIGH (ref 0–41)
ANION GAP SERPL CALC-SCNC: 10 MMOL/L — SIGNIFICANT CHANGE UP (ref 7–14)
AST SERPL-CCNC: 68 U/L — HIGH (ref 0–41)
BASOPHILS # BLD AUTO: 0.08 K/UL — SIGNIFICANT CHANGE UP (ref 0–0.2)
BASOPHILS NFR BLD AUTO: 0.5 % — SIGNIFICANT CHANGE UP (ref 0–1)
BILIRUB SERPL-MCNC: <0.2 MG/DL — SIGNIFICANT CHANGE UP (ref 0.2–1.2)
BUN SERPL-MCNC: 28 MG/DL — HIGH (ref 10–20)
CALCIUM SERPL-MCNC: 8.5 MG/DL — SIGNIFICANT CHANGE UP (ref 8.4–10.5)
CHLORIDE SERPL-SCNC: 105 MMOL/L — SIGNIFICANT CHANGE UP (ref 98–110)
CO2 SERPL-SCNC: 24 MMOL/L — SIGNIFICANT CHANGE UP (ref 17–32)
CREAT SERPL-MCNC: 0.8 MG/DL — SIGNIFICANT CHANGE UP (ref 0.7–1.5)
CRP SERPL-MCNC: 6.1 MG/L — HIGH
EGFR: 98 ML/MIN/1.73M2 — SIGNIFICANT CHANGE UP
EGFR: 98 ML/MIN/1.73M2 — SIGNIFICANT CHANGE UP
EOSINOPHIL # BLD AUTO: 0.08 K/UL — SIGNIFICANT CHANGE UP (ref 0–0.7)
EOSINOPHIL NFR BLD AUTO: 0.5 % — SIGNIFICANT CHANGE UP (ref 0–8)
ERYTHROCYTE [SEDIMENTATION RATE] IN BLOOD: 35 MM/HR — HIGH (ref 0–20)
GLUCOSE SERPL-MCNC: 78 MG/DL — SIGNIFICANT CHANGE UP (ref 70–99)
HCT VFR BLD CALC: 30.9 % — LOW (ref 37–47)
HGB BLD-MCNC: 9.5 G/DL — LOW (ref 12–16)
IMM GRANULOCYTES NFR BLD AUTO: 6.5 % — HIGH (ref 0.1–0.3)
LDH SERPL L TO P-CCNC: 310 — HIGH (ref 50–242)
LYMPHOCYTES # BLD AUTO: 17 % — LOW (ref 20.5–51.1)
LYMPHOCYTES # BLD AUTO: 2.52 K/UL — SIGNIFICANT CHANGE UP (ref 1.2–3.4)
MAGNESIUM SERPL-MCNC: 1.8 MG/DL — SIGNIFICANT CHANGE UP (ref 1.8–2.4)
MCHC RBC-ENTMCNC: 25.1 PG — LOW (ref 27–31)
MCHC RBC-ENTMCNC: 30.7 G/DL — LOW (ref 32–37)
MCV RBC AUTO: 81.7 FL — SIGNIFICANT CHANGE UP (ref 81–99)
MONOCYTES # BLD AUTO: 1.02 K/UL — HIGH (ref 0.1–0.6)
MONOCYTES NFR BLD AUTO: 6.9 % — SIGNIFICANT CHANGE UP (ref 1.7–9.3)
NEUTROPHILS # BLD AUTO: 10.19 K/UL — HIGH (ref 1.4–6.5)
NEUTROPHILS NFR BLD AUTO: 68.6 % — SIGNIFICANT CHANGE UP (ref 42.2–75.2)
NRBC BLD AUTO-RTO: 0 /100 WBCS — SIGNIFICANT CHANGE UP (ref 0–0)
PLATELET # BLD AUTO: 238 K/UL — SIGNIFICANT CHANGE UP (ref 130–400)
PMV BLD: 11 FL — HIGH (ref 7.4–10.4)
POTASSIUM SERPL-MCNC: 4 MMOL/L — SIGNIFICANT CHANGE UP (ref 3.5–5)
POTASSIUM SERPL-SCNC: 4 MMOL/L — SIGNIFICANT CHANGE UP (ref 3.5–5)
PROT SERPL-MCNC: 5.4 G/DL — LOW (ref 6–8)
RBC # BLD: 3.78 M/UL — LOW (ref 4.2–5.4)
RBC # FLD: 18.9 % — HIGH (ref 11.5–14.5)
SODIUM SERPL-SCNC: 139 MMOL/L — SIGNIFICANT CHANGE UP (ref 135–146)
URATE SERPL-MCNC: 3.4 MG/DL — SIGNIFICANT CHANGE UP (ref 2.5–7)
WBC # BLD: 14.86 K/UL — HIGH (ref 4.8–10.8)
WBC # FLD AUTO: 14.86 K/UL — HIGH (ref 4.8–10.8)

## 2025-03-04 PROCEDURE — 93975 VASCULAR STUDY: CPT | Mod: 26

## 2025-03-04 PROCEDURE — 99223 1ST HOSP IP/OBS HIGH 75: CPT

## 2025-03-04 PROCEDURE — 99232 SBSQ HOSP IP/OBS MODERATE 35: CPT

## 2025-03-04 PROCEDURE — 93306 TTE W/DOPPLER COMPLETE: CPT | Mod: 26

## 2025-03-04 PROCEDURE — 76705 ECHO EXAM OF ABDOMEN: CPT | Mod: 26,59

## 2025-03-04 RX ORDER — ONDANSETRON HCL/PF 4 MG/2 ML
4 VIAL (ML) INJECTION EVERY 8 HOURS
Refills: 0 | Status: DISCONTINUED | OUTPATIENT
Start: 2025-03-04 | End: 2025-03-06

## 2025-03-04 RX ORDER — CLONAZEPAM 0.5 MG/1
1 TABLET ORAL
Refills: 0 | DISCHARGE

## 2025-03-04 RX ORDER — APIXABAN 2.5 MG/1
5 TABLET, FILM COATED ORAL EVERY 12 HOURS
Refills: 0 | Status: DISCONTINUED | OUTPATIENT
Start: 2025-03-04 | End: 2025-03-06

## 2025-03-04 RX ORDER — PREDNISONE 20 MG/1
40 TABLET ORAL DAILY
Refills: 0 | Status: DISCONTINUED | OUTPATIENT
Start: 2025-03-04 | End: 2025-03-06

## 2025-03-04 RX ORDER — ESCITALOPRAM OXALATE 20 MG/1
1 TABLET ORAL
Refills: 0 | DISCHARGE

## 2025-03-04 RX ORDER — APIXABAN 2.5 MG/1
1 TABLET, FILM COATED ORAL
Refills: 0 | DISCHARGE

## 2025-03-04 RX ORDER — SULFAMETHOXAZOLE/TRIMETHOPRIM 800-160 MG
1 TABLET ORAL DAILY
Refills: 0 | Status: DISCONTINUED | OUTPATIENT
Start: 2025-03-04 | End: 2025-03-06

## 2025-03-04 RX ORDER — ESCITALOPRAM OXALATE 20 MG/1
20 TABLET ORAL DAILY
Refills: 0 | Status: DISCONTINUED | OUTPATIENT
Start: 2025-03-04 | End: 2025-03-06

## 2025-03-04 RX ORDER — CLONAZEPAM 0.5 MG/1
0.5 TABLET ORAL
Refills: 0 | Status: DISCONTINUED | OUTPATIENT
Start: 2025-03-04 | End: 2025-03-06

## 2025-03-04 RX ORDER — CLINDAMYCIN PHOSPHATE 150 MG/ML
300 VIAL (ML) INJECTION EVERY 8 HOURS
Refills: 0 | Status: DISCONTINUED | OUTPATIENT
Start: 2025-03-04 | End: 2025-03-06

## 2025-03-04 RX ADMIN — Medication 2 MILLIGRAM(S): at 07:56

## 2025-03-04 RX ADMIN — ESCITALOPRAM OXALATE 20 MILLIGRAM(S): 20 TABLET ORAL at 13:51

## 2025-03-04 RX ADMIN — Medication 2 MILLIGRAM(S): at 22:26

## 2025-03-04 RX ADMIN — Medication 300 MILLIGRAM(S): at 22:07

## 2025-03-04 RX ADMIN — APIXABAN 5 MILLIGRAM(S): 2.5 TABLET, FILM COATED ORAL at 07:05

## 2025-03-04 RX ADMIN — PREDNISONE 40 MILLIGRAM(S): 20 TABLET ORAL at 07:05

## 2025-03-04 RX ADMIN — Medication 2 MILLIGRAM(S): at 21:03

## 2025-03-04 RX ADMIN — Medication 300 MILLIGRAM(S): at 15:00

## 2025-03-04 RX ADMIN — APIXABAN 5 MILLIGRAM(S): 2.5 TABLET, FILM COATED ORAL at 17:14

## 2025-03-04 RX ADMIN — Medication 2 MILLIGRAM(S): at 15:00

## 2025-03-04 RX ADMIN — Medication 300 MILLIGRAM(S): at 07:05

## 2025-03-04 NOTE — PROGRESS NOTE ADULT - ASSESSMENT
34-year-old female with a past medical history of Crohns disease, with prior hospitalization for perirectal abscess s/p I&D twice anxiety/depression, DVT on eliquis, presents complaining of chest pain, shortness of breath, nausea, abdominal pain for the past week.  Patient describes her chest pain as tightness in nature. pt experiencing nonbloody diarrhea and denies any vomiting. pt denies any other symptoms including fevers, chill, headache, recent illness/travel, cough.    #Diarrhea, Nausea 2/2 Suspected UC Flare  #Sinus tachycardia  #Leukocytosis 2/2 Active Inflammation/Infection vs. Chronic Steroids  #UC/Crohn's Diagnosed 8 years ago   - In ED, T 98.1, /82, HR 95, RR 19, SpO2 94% on RA.   - Labs: HgB 10.0 (baseline 12.8 in 2/2024), MCV 80.4, , , albumin 3.9, tbili <0.2   - EKG: Sinus tachycardia, non-specific T-wave changes (improved from prior)   - CXR: No definite acute rib fracture. However, if there remains a suspicion, further evaluation with a noncontrast chest CT is recommended. New right cardiophrenic angle opacity, not seen on prior examinations. Findings may represent a benign prominent epicardial fat-pad. This can be better evaluated at the time of the above noncontrast chest CT.  - Possible Chest CT for f/u  - CT A/P with IV Con: Focal subcutaneous stranding at the level of the umbilicus. Recommend correlation with physical examination  - s/p 1L NS bolus, morphine 2+ 4 mg IV  - f/u GI recs   - pain control with morphine 4 mg IV q4h PRN for severe pain  - Zofran 4 mg IV q8h PRN for nausea/vomiting  - Monitor QTc  - Bowel regimen when diarrhea improves     #Chest Pain, SOB, Generalized Edema Possibly 2/2 Undiagnosed HF Possibly 2/2 Chronic Inflammation   #Generalized Edema, Periorbital Edema   #R/o New HF   - Patient used to follow with cardiologist, unsure what work-up was done, last seen >1 year ago   - TTE pending   - Consider cardiology consult if indicated based on findings     #Transaminitis Possibly 2/2 Congestive Hepatopathy vs. PBC vs. Budhairi Syndrome   - , , Tbili < 0.2 on admission  - CT A/P with IV Con: Focal subcutaneous stranding at the level of the umbilicus. Recommend correlation with physical examination  - RUQ US normal    #Acute on Chronic Microcytic Anemia  - HgB 10.0 (baseline 12.8 in 2/2024)  - Iron studies pending  - Monitor CBC  - Monitor BMs  - Keep active type and screen  - Keep HgB > 7    #DVT on Eliquis   - patient reports family history of blood clots  - patient reports person history of clots in LE, UE and abdomen   - c/w home meds, Eliquis 5 mg BID   - consider hematology consult and testing for coagulopathy (Factor V Leiden, etc.)    #Anxiety/Depression  - c/w home meds                                            --------------------------------------------------------------    # DVT prophylaxis: Eliquis   # GI prophylaxis: PPI  # Diet: Regular   # Activity: IAT with Assistance   # Code status: Full Code  # Disposition:    Pending:      34-year-old female with a past medical history of Crohns disease, with prior hospitalization for perirectal abscess s/p I&D twice anxiety/depression, DVT on eliquis, presents complaining of chest pain, shortness of breath, nausea, abdominal pain for the past week.  Patient describes her chest pain as tightness in nature. pt experiencing nonbloody diarrhea and denies any vomiting. pt denies any other symptoms including fevers, chill, headache, recent illness/travel, cough.    #Diarrhea, Nausea 2/2 Suspected UC Flare  #Sinus tachycardia  #Leukocytosis 2/2 Active Inflammation/Infection vs. Chronic Steroids  #UC/Crohn's Diagnosed 8 years ago   - In ED, T 98.1, /82, HR 95, RR 19, SpO2 94% on RA.   - Labs: HgB 10.0 (baseline 12.8 in 2/2024), MCV 80.4, , , albumin 3.9, tbili <0.2   - EKG: Sinus tachycardia, non-specific T-wave changes (improved from prior)   - CXR: Questionable left basilar opacity  - CT A/P with IV Con: Focal subcutaneous stranding at the level of the umbilicus. Recommend correlation with physical examination  - s/p 1L NS bolus, morphine 2+ 4 mg IV  - f/u GI recs   - f/u GI PCR and c-diff  - f/u ESR, CRP  - pt needs to be bridged to biologics as OP due to extensive side effects of long term use of steroids- per GI   - pain control with morphine 4 mg IV q4h PRN for severe pain  - Zofran 4 mg IV q8h PRN for nausea/vomiting  - Monitor QTc  - Bowel regimen when diarrhea improves     #Chest Pain, SOB, Generalized Edema Possibly 2/2 Undiagnosed HF Possibly 2/2 Chronic Inflammation   #Generalized Edema, Periorbital Edema   #R/o New HF   - Patient used to follow with cardiologist, unsure what work-up was done, last seen >1 year ago   - TTE pending: performed   - Consider cardiology consult if indicated based on findings     #Transaminitis Possibly 2/2 Congestive Hepatopathy vs. PBC vs. Budhairi Syndrome   - , , Tbili < 0.2 on admission  - CT A/P with IV Con: Focal subcutaneous stranding at the level of the umbilicus. Recommend correlation with physical examination  - RUQ US normal  - f/u hep panel     #Acute on Chronic Microcytic Anemia  - HgB 10.0 (baseline 12.8 in 2/2024)  - f/u Iron studies, B12, folate  - Monitor CBC  - Monitor BMs  - Keep active type and screen  - Keep HgB > 7    #DVT on Eliquis   - patient reports family history of blood clots  - patient reports person history of clots in LE, UE and abdomen   - c/w home meds, Eliquis 5 mg BID   - hematology consult and testing for coagulopathy (Factor V Leiden, etc.)    #Elevated metamyelocytes  - f/u heme/onc c/s     #Anxiety/Depression  - c/w home meds                                            --------------------------------------------------------------    # DVT prophylaxis: Eliquis   # GI prophylaxis: PPI  # Diet: Regular   # Activity: IAT with Assistance   # Code status: Full Code  # Disposition:    Pending:   - f/u heme/onc c/s , coags  - f/u BCx  - f/u ESR, CRP, hep panel, GI pcr, C-diff

## 2025-03-04 NOTE — ED ADULT NURSE REASSESSMENT NOTE - COMFORT CARE
assisted to bathroom
assisted to bathroom/plan of care explained/side rails up/treatment delay explained/wait time explained/warm blanket provided

## 2025-03-04 NOTE — CONSULT NOTE ADULT - ASSESSMENT
34-year-old female with a past medical history of Crohns disease, with prior hospitalization for perirectal abscess s/p I&D twice anxiety/depression, DVT on eliquis, presents complaining of chest pain, shortness of breath, nausea, abdominal pain, and diarrhea. GI consulted given hx of Crohn's.      # Abdominal pain/ Diarrhea  # Hx of Crohn's on chronic steroids  # Hx of perirectal abscess  # Microcytic Anemia  # DVT on Eliquis  # Transaminitis   - Hgb 10  - CT AP reviewed as above  - flex sig 2019 as above   - Patient was previously following in [facility], never followed consistently with a GI doctor  - Tried in the past 3 medications but does not remember the names and did not work        Plan  - Check GI pcr and C diff  - Check ERS and CRP   - HBV sAg HBV sAb, HBV cAB, Hep C Ab with reflex RNA, Hep A IgG  - RUQ US   - c/w home dose prednisone  - pt needs to be bridged to biologics as OP due to extensive side effects of long term use of steroids   34-year-old female with a past medical history of Crohns disease, with prior hospitalization for perirectal abscess s/p I&D twice anxiety/depression, DVT on eliquis, presents complaining of chest pain, shortness of breath, nausea, abdominal pain, and diarrhea. GI consulted given hx of Crohn's.      # Abdominal pain/ Diarrhea  # Hx of Crohn's on chronic steroids  # Hx of perirectal abscess  # Microcytic Anemia  # DVT on Eliquis  # Transaminitis   - Hgb 10  - CT AP reviewed as above  - flex sig 2019 as above   - Patient was previously following in [facility], never followed consistently with a GI doctor  - Tried in the past 3 medications but does not remember the names and did not work        Plan  - Check GI pcr and C diff  - Check ERS and CRP   - HBV sAg HBV sAb, HBV cAB, Hep C Ab with reflex RNA, Hep A IgG  - RUQ US   - c/w home dose prednisone  - pt needs to be bridged to biologics as OP due to extensive side effects of long term use of steroids  - pt needs an EGD/colonoscopy as OP

## 2025-03-04 NOTE — ED ADULT NURSE REASSESSMENT NOTE - NSFALLHARMRISKINTERV_ED_ALL_ED
Assistance OOB with selected safe patient handling equipment if applicable/Assistance with ambulation/Communicate risk of Fall with Harm to all staff, patient, and family/Monitor gait and stability/Provide visual cue: red socks, yellow wristband, yellow gown, etc/Reinforce activity limits and safety measures with patient and family/Bed in lowest position, wheels locked, appropriate side rails in place/Call bell, personal items and telephone in reach/Instruct patient to call for assistance before getting out of bed/chair/stretcher/Non-slip footwear applied when patient is off stretcher/[place] to call system/Physically safe environment - no spills, clutter or unnecessary equipment/Purposeful Proactive Rounding/Room/bathroom lighting operational, light cord in reach

## 2025-03-04 NOTE — PROGRESS NOTE ADULT - ATTENDING COMMENTS
34-year-old female with a past medical history of Crohns disease, with prior hospitalization for perirectal abscess s/p I&D twice anxiety/depression, DVT on eliquis, presents complaining of chest pain, shortness of breath, nausea, abdominal pain for the past week, admitted due to concern for IBD flare.     During my assessment patient endorsed that she started having bilateral LE pain in the posterior aspects of her calves and thighs present for the last 1 week, which eventually was followed by onset of diffuse abdominal pain, bloating and diarrhea. She feels that this pain has been progressing upwards towards her chest for the last three days. She also endorses SOBAE and orthopnea. No hemoptysis. Reports that the presence of SOB is new and unrelated to her flare.    Lab review notable for normal troponin, leukocytosis with L shift, as well as elevated immature white cell forms, in particular significant percentage of myelocytes much higher than the percentage of metamyelocytes. New hepatocellular injury also noted with AST/ALT elevation. Imaging reviewed, no notable findings with the exception of focal subcutaneous stranding at the level of the umbilicus, however no correlation with physical exam.      General: anxious appearing but comfortable in bed  HEENT: NCAT  Lungs: No increased WOB  CVS: RRR  Abdomen: distended, tender to palpation  Extremities: no edema, no muscle tenderness    #Probable Crohn's flare  Presentation with abdominal distention, diarrhea (no longer present but present at home) and leukocytosis raises concerns for Crohn's flare. Notably CT abdomen unremarkable for active inflammation, however not the most sensitive test for that assessment. Patient is on long term prednisone at home (40mg daily for 8 years) which we will continue. Favor to initiate Bactrim for PJP PPx given long-standing high-dose steroid use. GI consult for further recs      #Hepatocellular injury  AST/ALT elevation without ALP elevation. CT-AP, RUQUS with Doppler unremarkable. NO evidence of hypotension to suggest septic shock. No clear med culprits, however any type of medication can lead to hepatocellular injury. Will send viral hepatitis panel. GI consulted      #Shortness of breath at exertion  #Orthopnea  #Chest pain  Patient without history of CHF, last TTE back in 2022 without evidence of gross abnormalities, EF was WNL. EKG reviewed overall without convincing evidence of ACS or pericarditis, troponin WNL. Will order CTA PE to rule out PE given known history of DVT and also patient is considered to be in hypercoagulable state during Crohn's flare. Has not missed any doses of Eliquis. Will also order repeat TTE.    #Leukocytosis with L shift, presence of myelocytes  Could be in the setting of IBD flare with chronic steroid use contributing, however WBC appears to be much higher than baseline. UA unremarkable and no LUTS present. WIll assess lung parenchyma with CTA as above (also ordered for PE eval). Will obtain blood cultures but will monitor off Abx. Presence of myelocytes in higher proportion than metamyelocyte could indicate an underlying hematologic malignancy which would not be unlikely in this young patient, however there are no other signs or symptoms to suggest that (no prolonged weight loss or fatigue, no splenomegaly). Will ask heme/onc to evaluate patient.     #Anemia  Likely ACD, will send B12,Folate, Iron studies.     Rest per resident's note. My note supercedes for any discrepancies. 34-year-old female with a past medical history of Crohns disease, with prior hospitalization for perirectal abscess s/p I&D twice anxiety/depression, DVT on eliquis, presents complaining of chest pain, shortness of breath, nausea, abdominal pain for the past week, admitted due to concern for IBD flare.     During my assessment patient endorsed that she started having bilateral LE pain in the posterior aspects of her calves and thighs present for the last 1 week, which eventually was followed by onset of diffuse abdominal pain, bloating and diarrhea. She feels that this pain has been progressing upwards towards her chest for the last three days. She also endorses SOBAE and orthopnea. No hemoptysis. Reports that the presence of SOB is new and unrelated to her flare.    Lab review notable for normal troponin, leukocytosis with L shift, as well as elevated immature white cell forms, in particular significant percentage of myelocytes much higher than the percentage of metamyelocytes. New hepatocellular injury also noted with AST/ALT elevation. Imaging reviewed, no notable findings with the exception of focal subcutaneous stranding at the level of the umbilicus, however no correlation with physical exam.      General: anxious appearing but comfortable in bed  HEENT: NCAT  Lungs: No increased WOB  CVS: RRR  Abdomen: distended, tender to palpation  Extremities: no edema, no muscle tenderness    #Probable Crohn's flare  Presentation with abdominal distention, diarrhea (no longer present but present at home) and leukocytosis raises concerns for Crohn's flare. Notably CT abdomen unremarkable for active inflammation, however not the most sensitive test for that assessment. Patient is on long term prednisone at home (40mg daily for 8 years) which we will continue. Favor to initiate Bactrim for PJP PPx given long-standing high-dose steroid use. GI consult for further recs      #Hepatocellular injury  AST/ALT elevation without ALP elevation. CT-AP, RUQUS with Doppler unremarkable. NO evidence of hypotension to suggest septic shock. No clear med culprits, however any type of medication can lead to hepatocellular injury. Will send viral hepatitis panel. GI consulted      #Shortness of breath at exertion  #Orthopnea  #Chest pain  Patient without history of CHF, last TTE back in 2022 without evidence of gross abnormalities, EF was WNL. EKG reviewed overall without convincing evidence of ACS or pericarditis, troponin WNL. Will order CTA PE to rule out PE given known history of DVT and also patient is considered to be in hypercoagulable state during Crohn's flare. Has not missed any doses of Eliquis. Will also order repeat TTE.    #Leukocytosis with L shift, presence of myelocytes  Could be in the setting of IBD flare with chronic steroid use contributing, however WBC appears to be much higher than baseline. UA unremarkable and no LUTS present. WIll assess lung parenchyma with CTA as above (also ordered for PE eval). Will obtain blood cultures but will monitor off Abx. Presence of myelocytes in higher proportion than metamyelocyte could indicate an underlying hematologic malignancy which would not be unlikely in this young patient, however there are no other signs or symptoms to suggest that (no prolonged weight loss or fatigue, no splenomegaly). I discussed this with heme/onc (Dr Lynch) who reviewed the case and suggested that a hematologic malignancy is less likely at this point and that  chronic steroids can lead to a similar picture.    #Anemia  Likely ACD, will send B12,Folate, Iron studies.     Rest per resident's note. My note supercedes for any discrepancies.

## 2025-03-04 NOTE — PATIENT PROFILE ADULT - FALL HARM RISK - HARM RISK INTERVENTIONS

## 2025-03-04 NOTE — PATIENT PROFILE ADULT - FUNCTIONAL ASSESSMENT - DAILY ACTIVITY 5.
Detail Level: Zone
Plan: Isotretinoin restart.  1st pregnancy test given today.  Test was negative.  Isotretinoin 6 month lab order sheet given to patient. Labs needed prior to next months appt. Return to office in 1 month for 2nd pregnancy test and start. Pt re-registered in [facility] and can f/u on/after 11/14/2019
Initiate Treatment: Plexion cleanser- cleanse face BID
Continue Regimen: ampicillin 500 mg PO BID (for next month while we await start of isotretinoin)
3 = A little assistance

## 2025-03-05 LAB
ALBUMIN SERPL ELPH-MCNC: 3.8 G/DL — SIGNIFICANT CHANGE UP (ref 3.5–5.2)
ALP SERPL-CCNC: 65 U/L — SIGNIFICANT CHANGE UP (ref 30–115)
ALT FLD-CCNC: 167 U/L — HIGH (ref 0–41)
ANION GAP SERPL CALC-SCNC: 13 MMOL/L — SIGNIFICANT CHANGE UP (ref 7–14)
APTT BLD: 19.2 SEC — CRITICAL LOW (ref 27–39.2)
AST SERPL-CCNC: 46 U/L — HIGH (ref 0–41)
BASOPHILS # BLD AUTO: 0.15 K/UL — SIGNIFICANT CHANGE UP (ref 0–0.2)
BASOPHILS NFR BLD AUTO: 0.7 % — SIGNIFICANT CHANGE UP (ref 0–1)
BILIRUB SERPL-MCNC: 0.3 MG/DL — SIGNIFICANT CHANGE UP (ref 0.2–1.2)
BUN SERPL-MCNC: 24 MG/DL — HIGH (ref 10–20)
CALCIUM SERPL-MCNC: 8.6 MG/DL — SIGNIFICANT CHANGE UP (ref 8.4–10.5)
CHLORIDE SERPL-SCNC: 102 MMOL/L — SIGNIFICANT CHANGE UP (ref 98–110)
CO2 SERPL-SCNC: 23 MMOL/L — SIGNIFICANT CHANGE UP (ref 17–32)
CREAT SERPL-MCNC: 0.7 MG/DL — SIGNIFICANT CHANGE UP (ref 0.7–1.5)
EGFR: 115 ML/MIN/1.73M2 — SIGNIFICANT CHANGE UP
EGFR: 115 ML/MIN/1.73M2 — SIGNIFICANT CHANGE UP
EOSINOPHIL # BLD AUTO: 0.08 K/UL — SIGNIFICANT CHANGE UP (ref 0–0.7)
EOSINOPHIL NFR BLD AUTO: 0.4 % — SIGNIFICANT CHANGE UP (ref 0–8)
GLUCOSE SERPL-MCNC: 89 MG/DL — SIGNIFICANT CHANGE UP (ref 70–99)
HAV IGM SER-ACNC: SIGNIFICANT CHANGE UP
HBV CORE IGM SER-ACNC: SIGNIFICANT CHANGE UP
HBV SURFACE AG SER-ACNC: SIGNIFICANT CHANGE UP
HCT VFR BLD CALC: 36.4 % — LOW (ref 37–47)
HCV AB S/CO SERPL IA: 0.06 S/CO — SIGNIFICANT CHANGE UP (ref 0–0.79)
HCV AB SERPL-IMP: SIGNIFICANT CHANGE UP
HGB BLD-MCNC: 11.5 G/DL — LOW (ref 12–16)
IMM GRANULOCYTES NFR BLD AUTO: 5.6 % — HIGH (ref 0.1–0.3)
INR BLD: 0.88 RATIO — SIGNIFICANT CHANGE UP (ref 0.65–1.3)
IRON SATN MFR SERPL: 26 UG/DL — LOW (ref 35–150)
IRON SATN MFR SERPL: 7 % — LOW (ref 15–50)
LYMPHOCYTES # BLD AUTO: 10.6 % — LOW (ref 20.5–51.1)
LYMPHOCYTES # BLD AUTO: 2.12 K/UL — SIGNIFICANT CHANGE UP (ref 1.2–3.4)
MAGNESIUM SERPL-MCNC: 1.9 MG/DL — SIGNIFICANT CHANGE UP (ref 1.8–2.4)
MCHC RBC-ENTMCNC: 25.2 PG — LOW (ref 27–31)
MCHC RBC-ENTMCNC: 31.6 G/DL — LOW (ref 32–37)
MCV RBC AUTO: 79.8 FL — LOW (ref 81–99)
MONOCYTES # BLD AUTO: 1.19 K/UL — HIGH (ref 0.1–0.6)
MONOCYTES NFR BLD AUTO: 5.9 % — SIGNIFICANT CHANGE UP (ref 1.7–9.3)
NEUTROPHILS # BLD AUTO: 15.41 K/UL — HIGH (ref 1.4–6.5)
NEUTROPHILS NFR BLD AUTO: 76.8 % — HIGH (ref 42.2–75.2)
NRBC BLD AUTO-RTO: 0 /100 WBCS — SIGNIFICANT CHANGE UP (ref 0–0)
PLATELET # BLD AUTO: 292 K/UL — SIGNIFICANT CHANGE UP (ref 130–400)
PMV BLD: 11.3 FL — HIGH (ref 7.4–10.4)
POTASSIUM SERPL-MCNC: 4.3 MMOL/L — SIGNIFICANT CHANGE UP (ref 3.5–5)
POTASSIUM SERPL-SCNC: 4.3 MMOL/L — SIGNIFICANT CHANGE UP (ref 3.5–5)
PROT SERPL-MCNC: 5.8 G/DL — LOW (ref 6–8)
PROTHROM AB SERPL-ACNC: 10.3 SEC — SIGNIFICANT CHANGE UP (ref 9.95–12.87)
RBC # BLD: 4.56 M/UL — SIGNIFICANT CHANGE UP (ref 4.2–5.4)
RBC # FLD: 19.6 % — HIGH (ref 11.5–14.5)
SODIUM SERPL-SCNC: 138 MMOL/L — SIGNIFICANT CHANGE UP (ref 135–146)
TIBC SERPL-MCNC: 371 UG/DL — SIGNIFICANT CHANGE UP (ref 220–430)
TRANSFERRIN SERPL-MCNC: 312 MG/DL — SIGNIFICANT CHANGE UP (ref 200–360)
UIBC SERPL-MCNC: 345 UG/DL — SIGNIFICANT CHANGE UP (ref 110–370)
WBC # BLD: 20.07 K/UL — HIGH (ref 4.8–10.8)
WBC # FLD AUTO: 20.07 K/UL — HIGH (ref 4.8–10.8)

## 2025-03-05 PROCEDURE — 99232 SBSQ HOSP IP/OBS MODERATE 35: CPT

## 2025-03-05 PROCEDURE — 71275 CT ANGIOGRAPHY CHEST: CPT | Mod: 26

## 2025-03-05 PROCEDURE — 99222 1ST HOSP IP/OBS MODERATE 55: CPT

## 2025-03-05 RX ORDER — POLYETHYLENE GLYCOL 3350 17 G/17G
17 POWDER, FOR SOLUTION ORAL EVERY 24 HOURS
Refills: 0 | Status: DISCONTINUED | OUTPATIENT
Start: 2025-03-05 | End: 2025-03-06

## 2025-03-05 RX ORDER — FERROUS SULFATE 137(45) MG
325 TABLET, EXTENDED RELEASE ORAL DAILY
Refills: 0 | Status: DISCONTINUED | OUTPATIENT
Start: 2025-03-05 | End: 2025-03-06

## 2025-03-05 RX ORDER — SENNA 187 MG
2 TABLET ORAL AT BEDTIME
Refills: 0 | Status: DISCONTINUED | OUTPATIENT
Start: 2025-03-05 | End: 2025-03-06

## 2025-03-05 RX ORDER — POLYETHYLENE GLYCOL 3350 17 G/17G
17 POWDER, FOR SOLUTION ORAL ONCE
Refills: 0 | Status: COMPLETED | OUTPATIENT
Start: 2025-03-05 | End: 2025-03-05

## 2025-03-05 RX ADMIN — Medication 2 MILLIGRAM(S): at 05:50

## 2025-03-05 RX ADMIN — Medication 300 MILLIGRAM(S): at 13:48

## 2025-03-05 RX ADMIN — Medication 4 MILLIGRAM(S): at 04:49

## 2025-03-05 RX ADMIN — Medication 325 MILLIGRAM(S): at 11:54

## 2025-03-05 RX ADMIN — Medication 300 MILLIGRAM(S): at 21:35

## 2025-03-05 RX ADMIN — Medication 2 MILLIGRAM(S): at 12:57

## 2025-03-05 RX ADMIN — Medication 2 TABLET(S): at 21:35

## 2025-03-05 RX ADMIN — Medication 2 MILLIGRAM(S): at 03:38

## 2025-03-05 RX ADMIN — APIXABAN 5 MILLIGRAM(S): 2.5 TABLET, FILM COATED ORAL at 17:37

## 2025-03-05 RX ADMIN — PREDNISONE 40 MILLIGRAM(S): 20 TABLET ORAL at 05:22

## 2025-03-05 RX ADMIN — POLYETHYLENE GLYCOL 3350 17 GRAM(S): 17 POWDER, FOR SOLUTION ORAL at 11:54

## 2025-03-05 RX ADMIN — Medication 300 MILLIGRAM(S): at 05:22

## 2025-03-05 RX ADMIN — APIXABAN 5 MILLIGRAM(S): 2.5 TABLET, FILM COATED ORAL at 05:22

## 2025-03-05 RX ADMIN — Medication 2 MILLIGRAM(S): at 18:12

## 2025-03-05 RX ADMIN — Medication 1 TABLET(S): at 11:55

## 2025-03-05 RX ADMIN — ESCITALOPRAM OXALATE 20 MILLIGRAM(S): 20 TABLET ORAL at 11:55

## 2025-03-05 RX ADMIN — Medication 2 MILLIGRAM(S): at 11:57

## 2025-03-05 RX ADMIN — Medication 1 APPLICATION(S): at 11:58

## 2025-03-05 NOTE — CONSULT NOTE ADULT - ASSESSMENT
34-year-old female with a past medical history of Crohns disease, with prior hospitalization for perirectal abscess s/p I&D twice anxiety/depression, DVT on eliquis, presents complaining of chest pain, shortness of breath, nausea, abdominal pain for the past week.  Patient describes her chest pain as tightness in nature. pt experiencing nonbloody diarrhea and denies any vomiting. pt denies any other symptoms including fevers, chill, headache, recent illness/travel, cough. Patient is admitted for Crohns flare and transaminitis.     #Acute on Chronic Microcytic Anemia in the setting of Crohns Disease flare   - HgB 10.0 (baseline 12.8 in 2/2024)  - iron studies, ferritin, b12, and folate levels pending     #DVT on Eliquis   - patient reports family history of blood clots  - patient reports personal history of clots in LE, UE and abdomen   - currently on eliquis 5 mg BID since 2022   - LE Duplex in 2022:  DVT detected in left popliteal vein and appears mobile. DVT detected in left posterior tibial and peroneal veins. No DVT in right lower extremity.    #Elevated metamyelocytes  - f/u heme/onc c/s     Recommendations:   - Patient on steroids for Crohn's disease, currently admitted with flare. Elevated WBC count with metamyelocytes and myelocytes secondary to left shift in the setting of inflammation and chronic steroid use.   - Please check iron/TIBC, ferritin, B12, and folate for anemia    *incomplete note*  34-year-old female with a past medical history of Crohns disease, with prior hospitalization for perirectal abscess s/p I&D twice anxiety/depression, DVT on eliquis, presents complaining of chest pain, shortness of breath, nausea, abdominal pain for the past week.  Patient describes her chest pain as tightness in nature. pt experiencing nonbloody diarrhea and denies any vomiting. pt denies any other symptoms including fevers, chill, headache, recent illness/travel, cough. Patient is admitted for Crohns flare and transaminitis.     #Acute on Chronic Microcytic Anemia in the setting of Crohns Disease flare   - HgB 10.0 (baseline 12.8 in 2/2024)  - iron studies, ferritin, b12, and folate levels pending     #DVT on Eliquis   - patient reports family history of blood clots  - patient reports personal history of clots in LE, UE and abdomen   - currently on eliquis 5 mg BID since 2022   - LE Duplex in 2022:  DVT detected in left popliteal vein and appears mobile. DVT detected in left posterior tibial and peroneal veins. No DVT in right lower extremity.    #Elevated metamyelocytes  - patient on chronic steroids for crohns disease  - left shift on differential, no blasts   - CRP elevated 6.1, ESR elevated likely 2/2 to crohns flare     Recommendations:   - Patient on steroids for Crohn's disease, currently admitted with flare. Elevated WBC count with metamyelocytes and myelocytes secondary to left shift in the setting of inflammation and chronic steroid use.   - Please check iron/TIBC, ferritin, B12, and folate for anemia, denies blood in stool   - Given highly inflammatory state of Crohns disease this can be risk factor for her history of DVT, continue eliquis 5 mg BID  - outpatient follow up with Dr. Lynch once discharged.    *incomplete note*

## 2025-03-05 NOTE — CONSULT NOTE ADULT - SUBJECTIVE AND OBJECTIVE BOX
Refill Requested:   Disp Refills Start End    topiramate (TOPAMAX) 50 MG tablet 60 tablet 0 10/9/2023 --    Sig - Route: TAKE 1 TABLET BY MOUTH IN THE MORNING AND 1 IN THE EVENING - Oral        5-4-23 note verified  Follow Up: 3 months  No Show/Cancel: 8-4-23  Next visit: none    Not scheduled, message sent to [facility] to schedule appt.      Routed to provider      
Gastroenterology Consultation:    Patient is a 34y old  Female who presents with a chief complaint of Chest Pain (03 Mar 2025 23:32)        Admitted on: 03-03-25      HPI:  34-year-old female with a past medical history of Crohns disease, with prior hospitalization for perirectal abscess s/p I&D twice anxiety/depression, DVT on eliquis, presents complaining of chest pain, shortness of breath, nausea, abdominal pain, generalized swelling (including LE and periorbital edema) for the past week. Patient reports active perirectal abscess. Pt experiencing nonbloody diarrhea and denies any vomiting.     In ED, T 98.1, /82, HR 95, RR 19, SpO2 94% on RA.   Labs: HgB 10.0 (baseline 12.8 in 2/2024), MCV 80.4, WBC 16.0, , . pro-, albumin 3.9, tbili <0.2       CT A/P with IV Con: Focal subcutaneous stranding at the level of the umbilicus. Recommend   correlation with physical examination    s/p 1L NS bolus, morphine 2+ 4 mg IV,    (03 Mar 2025 23:32)        Prior EGD: none in system     Prior Colonoscopy:< from: Flexible Sigmoidoscopy (07.19.19 @ 12:15) >   The maximum extent of this study was 60cm.    The rectum appeared normal up until 15 cm. Biopsies of the rectum were taken.  However on retroflexion there was small (<1cm) ulceration in the perianal area.  No fistulas could be seen (Biopsy).    Between 15 and 20 cm, mild colitis (erythema) without guillaume ulceration was seen  (Biopsy).    From 20cm to 60cm, severe linear ulcerations with skipped areas and pseudopolyp  formationwithout bleeding was seen. Multiple biopsies were taken (Biopsy).    These findings are suggestive of IBD favoring Crohn's disease over ulcerative  colitis.     < end of copied text >    1. Colon biopsy at 55 cm:   - Fragments of colonic mucosa showing moderate architectural   changes, focal acute cryptitis and crypt abscess formation.   - No granulomata are seen.   - No dysplasia is seen.   2. Sigmoid biopsy:   - Fragments of colonic mucosa showing moderate architectural   changes.   - No granulomata are seen.   - No dysplasia is seen.   3. Rectum biopsy:   - Fragments of colorectal mucosa showing mild to moderate   architectural changes.   - No granulomata are seen.   - No dysplasia is seen.     PAST MEDICAL & SURGICAL HISTORY:  Asthma  Crohns disease of small intestine  Hemorrhoids  DVT (deep venous thrombosis)  No significant past surgical history        FAMILY HISTORY:  FH: inflammatory bowel disease  maternal cousin      Social History:  Tobacco: denies   Alcohol: denies   Drugs: marijuana use     Home Medications:  clindamycin 300 mg oral capsule: 1 cap(s) orally every 8 hours (04 Mar 2025 00:30)  clonazePAM 0.5 mg oral tablet: 1 tab(s) orally 2 times a day as needed for  anxiety (04 Mar 2025 00:30)  Eliquis 5 mg oral tablet: 1 tab(s) orally 2 times a day (04 Mar 2025 00:30)  escitalopram 20 mg oral tablet: 1 tab(s) orally once a day (04 Mar 2025 00:30)  predniSONE 20 mg oral tablet: 2 tab(s) orally once a day (07 Jan 2023 18:12)  predniSONE 20 mg oral tablet: 2 tab(s) orally once a day (04 Mar 2025 00:29)        MEDICATIONS  (STANDING):  apixaban 5 milliGRAM(s) Oral every 12 hours  clindamycin   Capsule 300 milliGRAM(s) Oral every 8 hours  escitalopram 20 milliGRAM(s) Oral daily  predniSONE   Tablet 40 milliGRAM(s) Oral daily    MEDICATIONS  (PRN):  clonazePAM  Tablet 0.5 milliGRAM(s) Oral two times a day PRN for anxiety  morphine  - Injectable 2 milliGRAM(s) IV Push every 6 hours PRN Severe Pain (7 - 10)  ondansetron Injectable 4 milliGRAM(s) IV Push every 8 hours PRN Nausea and/or Vomiting      Allergies  penicillin (Rash)      Review of Systems:   ENT/Mouth:  No Hearing Changes,  No Difficulty Swallowing  Eyes:  No Eye Pain, No Vision Changes  Cardiovascular:  No Chest Pain, No Palpitations  Respiratory:  No Cough, No Dyspnea  Gastrointestinal:  As described in HPI  Skin:  No Skin Lesions, No Jaundice  Neuro:  No Syncope, No Dizziness  Heme/Lymph:  No Bruising, No Bleeding.          Physical Examination:  T(C): 36.4 (03-04-25 @ 07:51), Max: 36.7 (03-03-25 @ 17:00)  HR: 70 (03-04-25 @ 07:51) (70 - 95)  BP: 125/79 (03-04-25 @ 07:51) (109/69 - 137/82)  RR: 20 (03-04-25 @ 07:51) (18 - 20)  SpO2: 97% (03-04-25 @ 07:51) (94% - 99%)  Height (cm): 154.9 (03-03-25 @ 17:00)  Weight (kg): 74.8 (03-03-25 @ 17:00)        GENERAL: AAOx3, no acute distress.  HEAD:  Atraumatic, Normocephalic  EYES: conjunctiva and sclera clear  NECK: Supple, no JVD or thyromegaly  CHEST/LUNG: Clear to auscultation bilaterally; No wheeze, rhonchi, or rales  HEART: Regular rate and rhythm; normal S1, S2, No murmurs.  ABDOMEN: Soft, nontender, nondistended; Bowel sounds present  NEUROLOGY: No asterixis or tremor.   SKIN: Intact, no jaundice        Data:                        9.5    14.86 )-----------( 238      ( 04 Mar 2025 08:21 )             30.9     Hgb Trend:  9.5  03-04-25 @ 08:21  10.0  03-03-25 @ 19:50      03-03    141  |  105  |  26[H]  ----------------------------<  142[H]  4.3   |  22  |  0.8    Ca    8.8      03 Mar 2025 19:50    TPro  5.8[L]  /  Alb  3.9  /  TBili  <0.2  /  DBili  x   /  AST  113[H]  /  ALT  238[H]  /  AlkPhos  67  03-03    Liver panel trend:  TBili <0.2   /      /      /   AlkP 67   /   Tptn 5.8   /   Alb 3.9    /   DBili --      03-03          Urinalysis with Rflx Culture (collected 03 Mar 2025 19:58)          Radiology:  CT Abdomen and Pelvis w/ IV Cont:   ACC: [id number] EXAM:  CT ABDOMEN AND PELVIS IC   ORDERED BY: JULIET HUBER     PROCEDURE DATE:  03/03/2025          INTERPRETATION:  CLINICAL INFORMATION: Abdominal pain    COMPARISON CT: 1/7/2023    CONTRAST/COMPLICATIONS:  IV Contrast: Omnipaque 350  100 cc administered   0 cc discarded  Oral Contrast: NONE      PROCEDURE:  CT of the Abdomen and Pelvis was performed.  Sagittal and coronal reformats were performed.    FINDINGS:  LOWER CHEST: Bibasilar subsegmental atelectasis/scarring.  LIVER: Within normal limits.  BILE DUCTS: There is no biliary ductal dilatation.  GALLBLADDER: Within normal limits.  SPLEEN: Within normal limits.  PANCREAS: Within normal limits.  ADRENALS: Within normal limits..  KIDNEYS/URETERS: Symmetric renal enhancement. No hydronephrosis.  VESSELS: Within normal limits.  RETROPERITONEUM/LYMPH NODES: No lymphadenopathy.  BLADDER: Within normal limits.  REPRODUCTIVE ORGANS: Unremarkable  PERITONEUM/MESENTERY/BOWEL: No bowel obstruction, ascites or free   intraperitoneal air. Normal caliber appendix  BONES/SOFT TISSUES: Degenerative changes of the spine.  Healing right rib   fractures. There is focal subcutaneous stranding at the level of the   umbilicus. Vertebral body hemangioma    IMPRESSION:  Focal subcutaneous stranding at the level of the umbilicus. Recommend   correlation with physical examination    --- End of Report ---            DAMIÁN ZULUAGA MD; Attending Radiologist  This document has been electronically signed. Mar  3 2025  9:18PM (03-03-25 @ 20:32)      
Hematology Consult Note    HPI:  34-year-old female with a past medical history of Crohns disease, with prior hospitalization for perirectal abscess s/p I&D twice anxiety/depression, DVT on eliquis, presents complaining of chest pain, shortness of breath, nausea, abdominal pain, generalized swelling (including LE and periorbital edema) for the past week.  Patient describes her chest pain as tightness in nature which is worse with stress which she has had for about 1 year. She used to see a cardiologist in [place] but has not established care with cardiologist after moving to . Patient also endorses family history of clots and recent diagnosis of DVT in UE as well as LE. Patient reports active perirectal abscess. Pt experiencing nonbloody diarrhea and denies any vomiting. Pt denies any other symptoms including fevers, chill, headache, recent illness/travel, cough, changes in appetite.     In ED, T 98.1, /82, HR 95, RR 19, SpO2 94% on RA.   Labs: HgB 10.0 (baseline 12.8 in 2/2024), MCV 80.4, WBC 16.0, , . pro-, albumin 3.9, tbili <0.2     EKG: Sinus tachycardia, non-specific T-wave changes (improved from prior)   CXR: No definite acute rib fracture.  However, if there remains a suspicion, further evaluation with a   noncontrast chest CT is recommended.  New right cardiophrenic angle opacity, not seen on prior examinations.   Findings may represent a benign prominent epicardial fat-pad.  This can be better evaluated at the time of the above noncontrast chest   CT.    CT A/P with IV Con: Focal subcutaneous stranding at the level of the umbilicus. Recommend   correlation with physical examination    s/p 1L NS bolus, morphine 2+ 4 mg IV,    (03 Mar 2025 23:32)      Allergies    penicillin (Rash)    Intolerances        MEDICATIONS  (STANDING):  apixaban 5 milliGRAM(s) Oral every 12 hours  clindamycin   Capsule 300 milliGRAM(s) Oral every 8 hours  escitalopram 20 milliGRAM(s) Oral daily  polyethylene glycol 3350 17 Gram(s) Oral every 24 hours  predniSONE   Tablet 40 milliGRAM(s) Oral daily  senna 2 Tablet(s) Oral at bedtime  trimethoprim  160 mG/sulfamethoxazole 800 mG 1 Tablet(s) Oral daily    MEDICATIONS  (PRN):  clonazePAM  Tablet 0.5 milliGRAM(s) Oral two times a day PRN for anxiety  morphine  - Injectable 2 milliGRAM(s) IV Push every 6 hours PRN Severe Pain (7 - 10)  ondansetron Injectable 4 milliGRAM(s) IV Push every 8 hours PRN Nausea and/or Vomiting      PAST MEDICAL & SURGICAL HISTORY:  Asthma      Crohns disease of small intestine      Hemorrhoids      DVT (deep venous thrombosis)      No significant past surgical history          FAMILY HISTORY:  FH: inflammatory bowel disease  maternal cousin        SOCIAL HISTORY: No EtOH, no tobacco    REVIEW OF SYSTEMS:    CONSTITUTIONAL: No weakness, fevers or chills  EYES/ENT: No visual changes;  No vertigo or throat pain   NECK: No pain or stiffness  RESPIRATORY: No cough, wheezing, hemoptysis; No shortness of breath  CARDIOVASCULAR: No chest pain or palpitations  GASTROINTESTINAL: No abdominal or epigastric pain. No nausea, vomiting, or hematemesis; No diarrhea or constipation. No melena or hematochezia.  GENITOURINARY: No dysuria, frequency or hematuria  NEUROLOGICAL: No numbness or weakness  SKIN: No itching, burning, rashes, or lesions   All other review of systems is negative unless indicated above.        T(F): 97.5 (03-05-25 @ 06:08), Max: 98.2 (03-04-25 @ 11:54)  HR: 70 (03-05-25 @ 06:08)  BP: 115/65 (03-05-25 @ 06:08)  RR: 18 (03-05-25 @ 06:08)  SpO2: 94% (03-05-25 @ 06:08)  Wt(kg): --    GENERAL: NAD, well-developed  HEAD:  Atraumatic, Normocephalic  EYES: EOMI, PERRLA, conjunctiva and sclera clear  NECK: Supple, No JVD  CHEST/LUNG: Clear to auscultation bilaterally; No wheeze  HEART: Regular rate and rhythm; No murmurs, rubs, or gallops  ABDOMEN: Soft, Nontender, Nondistended; Bowel sounds present  EXTREMITIES:  2+ Peripheral Pulses, No clubbing, cyanosis, or edema  NEUROLOGY: non-focal  SKIN: No rashes or lesions                          11.5   20.07 )-----------( 292      ( 05 Mar 2025 07:36 )             36.4       03-04    139  |  105  |  28[H]  ----------------------------<  78  4.0   |  24  |  0.8    Ca    8.5      04 Mar 2025 08:21  Mg     1.8     03-04    TPro  5.4[L]  /  Alb  3.5  /  TBili  <0.2  /  DBili  x   /  AST  68[H]  /  ALT  184[H]  /  AlkPhos  50  03-04      Lactate Dehydrogenase, Serum: 310 (03-04 @ 12:30)  Uric Acid: 3.4 mg/dL (03-04 @ 12:30)

## 2025-03-05 NOTE — PROGRESS NOTE ADULT - ASSESSMENT
#Diarrhea, Nausea 2/2 Suspected UC Flare  #Sinus tachycardia  #Leukocytosis 2/2 Active Inflammation/Infection vs. Chronic Steroids  #UC/Crohn's Diagnosed 8 years ago   - In ED, T 98.1, /82, HR 95, RR 19, SpO2 94% on RA.   - Labs: HgB 10.0 (baseline 12.8 in 2/2024), MCV 80.4, , , albumin 3.9, tbili <0.2   - EKG: Sinus tachycardia, non-specific T-wave changes (improved from prior)   - CXR: Questionable left basilar opacity  - CT A/P with IV Con: Focal subcutaneous stranding at the level of the umbilicus. Recommend correlation with physical examination  - s/p 1L NS bolus, morphine 2+ 4 mg IV  - pt needs to be bridged to biologics as OP due to extensive side effects of long term use of steroids- per GI   - pain control with morphine 4 mg IV q4h PRN for severe pain  - Zofran 4 mg IV q8h PRN for nausea/vomiting  - Monitor QTc    #Chest Pain, SOB, Generalized Edema Possibly 2/2 Undiagnosed HF Possibly 2/2 Chronic Inflammation   #Generalized Edema, Periorbital Edema   #R/o New HF   - Patient used to follow with cardiologist, unsure what work-up was done, last seen >1 year ago   - TTE pending:  LV Ejection Fraction by Fontana's Method with a biplane EF of 59 %., no atrial or ventricular dilation, no structural/valvular abnormalities     #Transaminitis Possibly 2/2 Congestive Hepatopathy vs. PBC vs. Budhairi Syndrome   - , , Tbili < 0.2 on admission  - CT A/P with IV Con: Focal subcutaneous stranding at the level of the umbilicus. Recommend correlation with physical examination  - RUQ US normal  - f/u hep panel     #Acute on Chronic Microcytic Anemia  - HgB 10.0 (baseline 12.8 in 2/2024)  - f/u Iron studies, B12, folate  - Monitor CBC  - Monitor BMs  - Keep active type and screen  - Keep HgB > 7    #DVT on Eliquis   - patient reports family history of blood clots  - patient reports person history of clots in LE, UE and abdomen   - c/w home meds, Eliquis 5 mg BID   - hematology consult     #Elevated metamyelocytes  -likely 2/2 to chronic steroid use, f/u with Heme/Onc OP     #Anxiety/Depression  - c/w home meds    There are no Wet Read(s) to document.

## 2025-03-05 NOTE — PROGRESS NOTE ADULT - ATTENDING COMMENTS
33 yo female with h/o Crohn's disease on prednisone presented with  abdominal pain. Concerning for Crohn's flare. Admitted for further management. Pt was seen by GI. Abdominal pain has improved.     Crohn's disease  -continue prednisone, planning to taper, transition to biologics outpt    Cushing's syndrome  -2/2 long term steroid use    Leukocytosis  -2/2 inflammation and steroid  -monitor    Anemia  -likely 2/2 chronic disease  -iron study was obtained  -hem/onc following    DVT  -conitnue eliquis

## 2025-03-05 NOTE — CONSULT NOTE ADULT - ATTENDING COMMENTS
We were asked to see the patient because of history of Crohn disease and elevated white blood cell count.  She is also on chronic steroids about 40 mg daily for several years.  Her CRP and ESR elevated and the differential is left shifted likely reactive.  She also has anemia which is likely due to chronic disease but we could rule out nutritional deficiencies as above.  I would recommend continue Eliquis indefinitely since patient has IBD but I instructed her to hold it if she is bleeding.    Can repeat CBC as outpatient to confirm resolution once flare resolves.  She is feeling somewhat better today.    Consider PCP prophylaxis depending on steroid use also to follow-up with GI for additional interventions as needed
Crohn disease on prednisone 40mg QD for maintenance. We recommend Outpatient follow up for crohns work up and switching from steroids given many risks of prednisone usage. GI MAP follow up upon.

## 2025-03-05 NOTE — PROGRESS NOTE ADULT - SUBJECTIVE AND OBJECTIVE BOX
SUBJECTIVE/OVERNIGHT EVENTS  Today is hospital day 1d. This morning patient was seen and examined at bedside, resting comfortably in bed. No acute or major events overnight.     MEDICATIONS  STANDING MEDICATIONS  apixaban 5 milliGRAM(s) Oral every 12 hours  clindamycin   Capsule 300 milliGRAM(s) Oral every 8 hours  escitalopram 20 milliGRAM(s) Oral daily  predniSONE   Tablet 40 milliGRAM(s) Oral daily    PRN MEDICATIONS  clonazePAM  Tablet 0.5 milliGRAM(s) Oral two times a day PRN  morphine  - Injectable 2 milliGRAM(s) IV Push every 6 hours PRN  ondansetron Injectable 4 milliGRAM(s) IV Push every 8 hours PRN    VITALS  T(F): 98.2 (03-04-25 @ 11:54), Max: 98.2 (03-04-25 @ 11:54)  HR: 94 (03-04-25 @ 11:54) (70 - 95)  BP: 99/64 (03-04-25 @ 11:54) (99/64 - 137/82)  RR: 19 (03-04-25 @ 11:54) (18 - 20)  SpO2: 97% (03-04-25 @ 11:54) (94% - 99%)    PHYSICAL EXAM  General: NAD, non-toxic appearing  HEENT: EOMi, no scleral icterus  CV: RRR, normal s1 and s2  Lungs: normal respiratory effort, CTAB  Abd: Soft, nontender, nondistended  Ext: no edema, warm, well perfused  Psych: A+Ox3, appropriate affect  Neuro: grossly non-focal, moving all extremities spontaneously  Skin: no rashes or lesions     LABS             9.5    14.86 )-----------( 238      ( 03-04-25 @ 08:21 )             30.9     139  |  105  |  28  -------------------------<  78   03-04-25 @ 08:21  4.0  |  24  |  0.8    Ca      8.5     03-04-25 @ 08:21  Mg     1.8     03-04-25 @ 08:21    TPro  5.4  /  Alb  3.5  /  TBili  <0.2  /  DBili  x   /  AST  68  /  ALT  184  /  AlkPhos  50  /  GGT  x     03-04-25 @ 08:21      Troponin T, High Sensitivity Result: 9 ng/L (03-03-25 @ 19:50)  Pro-Brain Natriuretic Peptide: 387 pg/mL (03-03-25 @ 19:50)    Urinalysis Basic - ( 04 Mar 2025 08:21 )    Color: x / Appearance: x / SG: x / pH: x  Gluc: 78 mg/dL / Ketone: x  / Bili: x / Urobili: x   Blood: x / Protein: x / Nitrite: x   Leuk Esterase: x / RBC: x / WBC x   Sq Epi: x / Non Sq Epi: x / Bacteria: x          Urinalysis with Rflx Culture (collected 03 Mar 2025 19:58)      IMAGING  
[facility]      SUBJECTIVE/OVERNIGHT EVENTS  Today is hospital day 2d. Patient was seen and examined today at bedside. No acute events overnight       MEDICATIONS  STANDING MEDICATIONS  apixaban 5 milliGRAM(s) Oral every 12 hours  chlorhexidine 2% Cloths 1 Application(s) Topical <User Schedule>  clindamycin   Capsule 300 milliGRAM(s) Oral every 8 hours  escitalopram 20 milliGRAM(s) Oral daily  ferrous    sulfate 325 milliGRAM(s) Oral daily  pantoprazole    Tablet 40 milliGRAM(s) Oral before breakfast  polyethylene glycol 3350 17 Gram(s) Oral every 24 hours  predniSONE   Tablet 40 milliGRAM(s) Oral daily  senna 2 Tablet(s) Oral at bedtime  trimethoprim  160 mG/sulfamethoxazole 800 mG 1 Tablet(s) Oral daily    PRN MEDICATIONS  clonazePAM  Tablet 0.5 milliGRAM(s) Oral two times a day PRN  morphine  - Injectable 2 milliGRAM(s) IV Push every 6 hours PRN  ondansetron Injectable 4 milliGRAM(s) IV Push every 8 hours PRN    VITALS  T(F): 97.5 (03-05-25 @ 06:08), Max: 98.1 (03-04-25 @ 18:58)  HR: 70 (03-05-25 @ 06:08) (70 - 79)  BP: 115/65 (03-05-25 @ 06:08) (110/70 - 115/65)  RR: 18 (03-05-25 @ 06:08) (18 - 18)  SpO2: 94% (03-05-25 @ 06:08) (94% - 97%)        PHYSICAL EXAM:  GENERAL: NAD, lying in bed comfortably  HEAD:  Atraumatic, Normocephalic  EYES: EOMI, PERRLA, conjunctiva and sclera clear  ENT: Moist mucous membranes  NECK: Supple, No JVD  CHEST/LUNG: Clear to auscultation bilaterally; No rales, rhonchi, wheezing, or rubs. Unlabored respirations  HEART: Regular rate and rhythm; No murmurs, rubs, or gallops  ABDOMEN: Bowel sounds present; Soft, Nontender, Nondistended. No hepatomegaly  EXTREMITIES:  2+ Peripheral Pulses, brisk capillary refill. No clubbing, cyanosis, or edema  NERVOUS SYSTEM:  Alert & Oriented X3, speech clear. No deficits   MSK: FROM all 4 extremities, full and equal strength  SKIN: No rashes or lesions      LABS             11.5   20.07 )-----------( 292      ( 03-05-25 @ 07:36 )             36.4     138  |  102  |  24  -------------------------<  89   03-05-25 @ 07:36  4.3  |  23  |  0.7    Ca      8.6     03-05-25 @ 07:36  Mg     1.9     03-05-25 @ 07:36    TPro  5.8  /  Alb  3.8  /  TBili  0.3  /  DBili  x   /  AST  46  /  ALT  167  /  AlkPhos  65  /  GGT  x     03-05-25 @ 07:36    PT/INR - ( 03-05-25 @ 07:36 )   PT: 10.30 sec;   INR: 0.88 ratio  PTT - ( 03-05-25 @ 07:36 )  PTT:19.2 sec    Troponin T, High Sensitivity Result: 9 ng/L (03-03-25 @ 19:50)  Pro-Brain Natriuretic Peptide: 387 pg/mL (03-03-25 @ 19:50)    Urinalysis Basic - ( 05 Mar 2025 07:36 )    Color: x / Appearance: x / SG: x / pH: x  Gluc: 89 mg/dL / Ketone: x  / Bili: x / Urobili: x   Blood: x / Protein: x / Nitrite: x   Leuk Esterase: x / RBC: x / WBC x   Sq Epi: x / Non Sq Epi: x / Bacteria: x          Urinalysis with Rflx Culture (collected 03 Mar 2025 19:58)      IMAGING
SUBJECTIVE/OVERNIGHT EVENTS  Today is hospital day 1d. This morning patient was seen and examined at bedside, resting comfortably in bed. No acute or major events overnight.  Pt continues to endorse mild pain in abdomen and legs, improved with pain medication.   Reports that CP is improved, and SOB is improving.       MEDICATIONS  STANDING MEDICATIONS  apixaban 5 milliGRAM(s) Oral every 12 hours  clindamycin   Capsule 300 milliGRAM(s) Oral every 8 hours  escitalopram 20 milliGRAM(s) Oral daily  predniSONE   Tablet 40 milliGRAM(s) Oral daily    PRN MEDICATIONS  clonazePAM  Tablet 0.5 milliGRAM(s) Oral two times a day PRN  morphine  - Injectable 2 milliGRAM(s) IV Push every 6 hours PRN  ondansetron Injectable 4 milliGRAM(s) IV Push every 8 hours PRN    VITALS  T(F): 97.5 (25 @ 07:51), Max: 98.1 (25 @ 17:00)  HR: 70 (25 @ 07:51) (70 - 95)  BP: 125/79 (25 @ 07:51) (109/69 - 137/82)  RR: 20 (25 @ 07:51) (18 - 20)  SpO2: 97% (25 @ 07:51) (94% - 99%)    PHYSICAL EXAM  General: NAD, non-toxic appearing  HEENT: EOMi, no scleral icterus  CV: RRR, normal s1 and s2  Lungs: normal respiratory effort, CTAB  Abd: Soft, distended, BS+, ttp in lower abdomen   Ext: b/l LE edema, warm, well perfused  Psych: A+Ox3, appropriate affect  Neuro: grossly non-focal, moving all extremities spontaneously  Skin: no rashes or lesions     LABS             9.5    14.86 )-----------( 238      ( 25 @ 08:21 )             30.9     141  |  105  |  26  -------------------------<  142   25 @ 19:50  4.3  |  22  |  0.8    Ca      8.8     25 @ 19:50    TPro  5.8  /  Alb  3.9  /  TBili  <0.2  /  DBili  x   /  AST  113  /  ALT  238  /  AlkPhos  67  /  GGT  x     25 @ 19:50      Troponin T, High Sensitivity Result: 9 ng/L (25 @ 19:50)  Pro-Brain Natriuretic Peptide: 387 pg/mL (25 @ 19:50)    Urinalysis Basic - ( 03 Mar 2025 19:58 )    Color: Yellow / Appearance: Clear / S.019 / pH: x  Gluc: x / Ketone: Negative mg/dL  / Bili: Negative / Urobili: 0.2 mg/dL   Blood: x / Protein: Trace mg/dL / Nitrite: Negative   Leuk Esterase: Negative / RBC: x / WBC x   Sq Epi: x / Non Sq Epi: x / Bacteria: x          Urinalysis with Rflx Culture (collected 03 Mar 2025 19:58)

## 2025-03-06 ENCOUNTER — NON-APPOINTMENT (OUTPATIENT)
Age: 35
End: 2025-03-06

## 2025-03-06 ENCOUNTER — TRANSCRIPTION ENCOUNTER (OUTPATIENT)
Age: 35
End: 2025-03-06

## 2025-03-06 VITALS
DIASTOLIC BLOOD PRESSURE: 84 MMHG | RESPIRATION RATE: 17 BRPM | OXYGEN SATURATION: 96 % | HEART RATE: 95 BPM | SYSTOLIC BLOOD PRESSURE: 136 MMHG | TEMPERATURE: 97 F

## 2025-03-06 PROBLEM — I82.409 ACUTE EMBOLISM AND THROMBOSIS OF UNSPECIFIED DEEP VEINS OF UNSPECIFIED LOWER EXTREMITY: Chronic | Status: ACTIVE | Noted: 2025-03-03

## 2025-03-06 LAB
ALBUMIN SERPL ELPH-MCNC: 4 G/DL — SIGNIFICANT CHANGE UP (ref 3.5–5.2)
ALP SERPL-CCNC: 68 U/L — SIGNIFICANT CHANGE UP (ref 30–115)
ALT FLD-CCNC: 133 U/L — HIGH (ref 0–41)
ANION GAP SERPL CALC-SCNC: 11 MMOL/L — SIGNIFICANT CHANGE UP (ref 7–14)
AST SERPL-CCNC: 39 U/L — SIGNIFICANT CHANGE UP (ref 0–41)
BASOPHILS # BLD AUTO: 0.06 K/UL — SIGNIFICANT CHANGE UP (ref 0–0.2)
BASOPHILS NFR BLD AUTO: 0.4 % — SIGNIFICANT CHANGE UP (ref 0–1)
BILIRUB DIRECT SERPL-MCNC: <0.2 MG/DL — SIGNIFICANT CHANGE UP (ref 0–0.3)
BILIRUB INDIRECT FLD-MCNC: >0.1 MG/DL — LOW (ref 0.2–1.2)
BILIRUB SERPL-MCNC: 0.3 MG/DL — SIGNIFICANT CHANGE UP (ref 0.2–1.2)
BUN SERPL-MCNC: 26 MG/DL — HIGH (ref 10–20)
CALCIUM SERPL-MCNC: 9 MG/DL — SIGNIFICANT CHANGE UP (ref 8.4–10.5)
CHLORIDE SERPL-SCNC: 102 MMOL/L — SIGNIFICANT CHANGE UP (ref 98–110)
CO2 SERPL-SCNC: 25 MMOL/L — SIGNIFICANT CHANGE UP (ref 17–32)
CREAT SERPL-MCNC: 0.9 MG/DL — SIGNIFICANT CHANGE UP (ref 0.7–1.5)
EGFR: 85 ML/MIN/1.73M2 — SIGNIFICANT CHANGE UP
EGFR: 85 ML/MIN/1.73M2 — SIGNIFICANT CHANGE UP
EOSINOPHIL # BLD AUTO: 0.04 K/UL — SIGNIFICANT CHANGE UP (ref 0–0.7)
EOSINOPHIL NFR BLD AUTO: 0.2 % — SIGNIFICANT CHANGE UP (ref 0–8)
FERRITIN SERPL-MCNC: 18 NG/ML — SIGNIFICANT CHANGE UP (ref 15–150)
FOLATE SERPL-MCNC: 14.4 NG/ML — SIGNIFICANT CHANGE UP
GLUCOSE SERPL-MCNC: 96 MG/DL — SIGNIFICANT CHANGE UP (ref 70–99)
HCT VFR BLD CALC: 38.2 % — SIGNIFICANT CHANGE UP (ref 37–47)
HGB BLD-MCNC: 11.7 G/DL — LOW (ref 12–16)
IMM GRANULOCYTES NFR BLD AUTO: 4.7 % — HIGH (ref 0.1–0.3)
LYMPHOCYTES # BLD AUTO: 1.06 K/UL — LOW (ref 1.2–3.4)
LYMPHOCYTES # BLD AUTO: 6.4 % — LOW (ref 20.5–51.1)
MCHC RBC-ENTMCNC: 24.6 PG — LOW (ref 27–31)
MCHC RBC-ENTMCNC: 30.6 G/DL — LOW (ref 32–37)
MCV RBC AUTO: 80.3 FL — LOW (ref 81–99)
MONOCYTES # BLD AUTO: 0.74 K/UL — HIGH (ref 0.1–0.6)
MONOCYTES NFR BLD AUTO: 4.5 % — SIGNIFICANT CHANGE UP (ref 1.7–9.3)
NEUTROPHILS # BLD AUTO: 13.84 K/UL — HIGH (ref 1.4–6.5)
NEUTROPHILS NFR BLD AUTO: 83.8 % — HIGH (ref 42.2–75.2)
NRBC BLD AUTO-RTO: 0 /100 WBCS — SIGNIFICANT CHANGE UP (ref 0–0)
PLATELET # BLD AUTO: 315 K/UL — SIGNIFICANT CHANGE UP (ref 130–400)
PMV BLD: 10.9 FL — HIGH (ref 7.4–10.4)
POTASSIUM SERPL-MCNC: 4.9 MMOL/L — SIGNIFICANT CHANGE UP (ref 3.5–5)
POTASSIUM SERPL-SCNC: 4.9 MMOL/L — SIGNIFICANT CHANGE UP (ref 3.5–5)
PROT SERPL-MCNC: 6.5 G/DL — SIGNIFICANT CHANGE UP (ref 6–8)
RBC # BLD: 4.76 M/UL — SIGNIFICANT CHANGE UP (ref 4.2–5.4)
RBC # FLD: 19.5 % — HIGH (ref 11.5–14.5)
SODIUM SERPL-SCNC: 138 MMOL/L — SIGNIFICANT CHANGE UP (ref 135–146)
VIT B12 SERPL-MCNC: 1401 PG/ML — HIGH (ref 232–1245)
WBC # BLD: 16.51 K/UL — HIGH (ref 4.8–10.8)
WBC # FLD AUTO: 16.51 K/UL — HIGH (ref 4.8–10.8)

## 2025-03-06 PROCEDURE — 99239 HOSP IP/OBS DSCHRG MGMT >30: CPT

## 2025-03-06 RX ORDER — POLYETHYLENE GLYCOL 3350 17 G/17G
17 POWDER, FOR SOLUTION ORAL
Qty: 510 | Refills: 0
Start: 2025-03-06 | End: 2025-04-04

## 2025-03-06 RX ORDER — CLINDAMYCIN PHOSPHATE 150 MG/ML
1 VIAL (ML) INJECTION
Refills: 0 | DISCHARGE

## 2025-03-06 RX ORDER — PREDNISONE 20 MG/1
2 TABLET ORAL
Refills: 0 | DISCHARGE

## 2025-03-06 RX ORDER — FERROUS SULFATE 137(45) MG
1 TABLET, EXTENDED RELEASE ORAL
Qty: 30 | Refills: 0
Start: 2025-03-06 | End: 2025-04-04

## 2025-03-06 RX ORDER — SULFAMETHOXAZOLE/TRIMETHOPRIM 800-160 MG
1 TABLET ORAL
Qty: 30 | Refills: 0
Start: 2025-03-06 | End: 2025-04-04

## 2025-03-06 RX ADMIN — Medication 300 MILLIGRAM(S): at 05:21

## 2025-03-06 RX ADMIN — Medication 2 MILLIGRAM(S): at 02:58

## 2025-03-06 RX ADMIN — APIXABAN 5 MILLIGRAM(S): 2.5 TABLET, FILM COATED ORAL at 05:21

## 2025-03-06 RX ADMIN — Medication 40 MILLIGRAM(S): at 05:22

## 2025-03-06 RX ADMIN — PREDNISONE 40 MILLIGRAM(S): 20 TABLET ORAL at 05:22

## 2025-03-06 RX ADMIN — ESCITALOPRAM OXALATE 20 MILLIGRAM(S): 20 TABLET ORAL at 11:17

## 2025-03-06 RX ADMIN — Medication 1 APPLICATION(S): at 05:30

## 2025-03-06 RX ADMIN — Medication 1 TABLET(S): at 11:17

## 2025-03-06 RX ADMIN — Medication 325 MILLIGRAM(S): at 11:17

## 2025-03-06 RX ADMIN — Medication 2 MILLIGRAM(S): at 10:11

## 2025-03-06 RX ADMIN — Medication 2 MILLIGRAM(S): at 03:28

## 2025-03-06 NOTE — DISCHARGE NOTE PROVIDER - CARE PROVIDERS DIRECT ADDRESSES
[email or web address]

## 2025-03-06 NOTE — DISCHARGE NOTE PROVIDER - PREFACE STATEMENT FOR MINUTES SPENT
I personally spent Melolabial Transposition Flap Text: The defect edges were debeveled with a #15 scalpel blade.  Given the location of the defect and the proximity to free margins a melolabial flap was deemed most appropriate.  Using a sterile surgical marker, an appropriate melolabial transposition flap was drawn incorporating the defect.    The area thus outlined was incised deep to adipose tissue with a #15 scalpel blade.  The skin margins were undermined to an appropriate distance in all directions utilizing iris scissors.

## 2025-03-06 NOTE — DISCHARGE NOTE PROVIDER - PROVIDER TOKENS
PROVIDER:[TOKEN:[[id number]],FOLLOWUP:[1 week]],PROVIDER:[TOKEN:[[id number]],FOLLOWUP:[1 month]]

## 2025-03-06 NOTE — DISCHARGE NOTE NURSING/CASE MANAGEMENT/SOCIAL WORK - NSDCPEFALRISK_GEN_ALL_CORE
For information on Fall & Injury Prevention, visit: [email or web address] OR  [email or web address] OR  https://www.cdc.gov/steadi/patient.html

## 2025-03-06 NOTE — DISCHARGE NOTE PROVIDER - CARE PROVIDER_API CALL
Jerry Lynch  Internal Medicine  [address] [place] [address]  Phone: [phone]  Fax: [phone]  Follow Up Time: 1 week    Erika Harrington  Gastroenterology  [facility] [place] [address]  Phone: [phone]  Fax: [phone]  Follow Up Time: 1 month

## 2025-03-06 NOTE — DISCHARGE NOTE PROVIDER - ATTENDING DISCHARGE PHYSICAL EXAMINATION:
T(C): 36.3 (03-06-25 @ 05:31), Max: 36.7 (03-05-25 @ 21:00)  HR: 95 (03-06-25 @ 05:31) (92 - 95)  BP: 136/84 (03-06-25 @ 05:31) (115/74 - 136/84)  RR: 17 (03-06-25 @ 05:31) (17 - 18)  SpO2: 96% (03-06-25 @ 05:31) (96% - 96%)    CONSTITUTIONAL: Round facies, facial hirsutism, no apparent distress  EYES: PERRLA and symmetric, EOMI   RESP: No respiratory distress, no use of accessory muscles; CTA b/l, no WRR  CV: RRR, +S1S2, no MRG; no JVD; no peripheral edema  GI: obese, soft, nondistended, nontender  MSK: Normal gait; No edema on b/l LE  SKIN: No rashes or ulcers noted; no subcutaneous nodules or induration palpable  NEURO: CN II-XII intact  PSYCH: Appropriate insight/judgment; A+O x 3, mood and affect appropriate

## 2025-03-06 NOTE — DISCHARGE NOTE PROVIDER - NSDCMRMEDTOKEN_GEN_ALL_CORE_FT
clindamycin 300 mg oral capsule: 1 cap(s) orally every 8 hours  clindamycin 300 mg oral capsule: 1 cap(s) orally every 8 hours  clonazePAM 0.5 mg oral tablet: 1 tab(s) orally 2 times a day as needed for  anxiety  Eliquis 5 mg oral tablet: 1 tab(s) orally 2 times a day  escitalopram 20 mg oral tablet: 1 tab(s) orally once a day  ketorolac 10 mg oral tablet: 1 tab(s) orally every 8 hours as needed for  moderate pain  predniSONE 20 mg oral tablet: 2 tab(s) orally once a day  predniSONE 20 mg oral tablet: 2 tab(s) orally once a day   clonazePAM 0.5 mg oral tablet: 1 tab(s) orally 2 times a day as needed for  anxiety  Eliquis 5 mg oral tablet: 1 tab(s) orally 2 times a day  escitalopram 20 mg oral tablet: 1 tab(s) orally once a day  predniSONE 20 mg oral tablet: 2 tab(s) orally once a day   clonazePAM 0.5 mg oral tablet: 1 tab(s) orally 2 times a day as needed for  anxiety  Eliquis 5 mg oral tablet: 1 tab(s) orally 2 times a day  escitalopram 20 mg oral tablet: 1 tab(s) orally once a day  ferrous sulfate 325 mg (65 mg elemental iron) oral tablet: 1 tab(s) orally once a day  pantoprazole 40 mg oral delayed release tablet: 1 tab(s) orally once a day (before a meal) Please continue until your are off of steroids  polyethylene glycol 3350 oral powder for reconstitution: 17 gram(s) orally every 24 hours as needed for  constipation  predniSONE 20 mg oral tablet: 2 tab(s) orally once a day  sulfamethoxazole-trimethoprim 800 mg-160 mg oral tablet: 1 tab(s) orally once a day Please continue to take until you are off steroids

## 2025-03-06 NOTE — DISCHARGE NOTE PROVIDER - NSDCCPCAREPLAN_GEN_ALL_CORE_FT
PRINCIPAL DISCHARGE DIAGNOSIS  Diagnosis: Intractable abdominal pain  Assessment and Plan of Treatment: You're abdominal pain is likely a result of your Crohns disease. We gave you medication to control your pain and recommend that you follow up with our GI doctors to adjust your medication regiment. They will also see you to perform an EGD/COLO      SECONDARY DISCHARGE DIAGNOSES  Diagnosis: Elevated LFTs  Assessment and Plan of Treatment:     
None

## 2025-03-06 NOTE — DISCHARGE NOTE PROVIDER - HOSPITAL COURSE
34-year-old female with a past medical history of Crohns disease, with prior hospitalization for perirectal abscess s/p I&D twice anxiety/depression, DVT on eliquis, presents complaining of chest pain, shortness of breath, nausea, abdominal pain, generalized swelling (including LE and periorbital edema) for the past week.  Patient describes her chest pain as tightness in nature which is worse with stress which she has had for about 1 year. She used to see a cardiologist in [place] but has not established care with cardiologist after moving to . Patient also endorses family history of clots and recent diagnosis of DVT in UE as well as LE. Patient reports active perirectal abscess. Pt experiencing nonbloody diarrhea and denies any vomiting. Pt denies any other symptoms including fevers, chill, headache, recent illness/travel, cough, changes in appetite.     In ED, T 98.1, /82, HR 95, RR 19, SpO2 94% on RA.   Labs: HgB 10.0 (baseline 12.8 in 2/2024), MCV 80.4, WBC 16.0, , . pro-, albumin 3.9, tbili <0.2     EKG: Sinus tachycardia, non-specific T-wave changes (improved from prior)   CXR: No definite acute rib fracture.  However, if there remains a suspicion, further evaluation with a   noncontrast chest CT is recommended.  New right cardiophrenic angle opacity, not seen on prior examinations.   Findings may represent a benign prominent epicardial fat-pad.  This can be better evaluated at the time of the above noncontrast chest   CT.    CT A/P with IV Con: Focal subcutaneous stranding at the level of the umbilicus. Recommend   correlation with physical examination      Plan    #Diarrhea, Nausea 2/2 Suspected UC Flare  #Sinus tachycardia  #Leukocytosis 2/2 Active Inflammation/Infection vs. Chronic Steroids  #UC/Crohn's Diagnosed 8 years ago   - In ED, T 98.1, /82, HR 95, RR 19, SpO2 94% on RA.   - Labs: HgB 10.0 (baseline 12.8 in 2/2024), MCV 80.4, , , albumin 3.9, tbili <0.2   - EKG: Sinus tachycardia, non-specific T-wave changes (improved from prior)   - CXR: Questionable left basilar opacity  - CT A/P with IV Con: Focal subcutaneous stranding at the level of the umbilicus. Recommend correlation with physical examination  - s/p 1L NS bolus, morphine 2+ 4 mg IV  - pt needs to be bridged to biologics as OP due to extensive side effects of long term use of steroids- per GI   - pain control with morphine 4 mg IV q4h PRN for severe pain  - Zofran 4 mg IV q8h PRN for nausea/vomiting  - Monitor QTc    #Chest Pain, SOB, Generalized Edema Possibly 2/2 Undiagnosed HF Possibly 2/2 Chronic Inflammation   #Generalized Edema, Periorbital Edema   #R/o New HF   - Patient used to follow with cardiologist, unsure what work-up was done, last seen >1 year ago   - TTE pending:  LV Ejection Fraction by Fontana's Method with a biplane EF of 59 %., no atrial or ventricular dilation, no structural/valvular abnormalities     #Transaminitis Possibly 2/2 Congestive Hepatopathy vs. PBC vs. Budhairi Syndrome   - , , Tbili < 0.2 on admission  - CT A/P with IV Con: Focal subcutaneous stranding at the level of the umbilicus. Recommend correlation with physical examination  - RUQ US normal  - f/u hep panel     #Acute on Chronic Microcytic Anemia  - HgB 10.0 (baseline 12.8 in 2/2024)  - f/u Iron studies, B12, folate  - Monitor CBC  - Monitor BMs  - Keep active type and screen  - Keep HgB > 7    #DVT on Eliquis   - patient reports family history of blood clots  - patient reports person history of clots in LE, UE and abdomen   - c/w home meds, Eliquis 5 mg BID   - hematology consult     #Elevated metamyelocytes  -likely 2/2 to chronic steroid use, f/u with Heme/Onc OP     #Anxiety/Depression  - c/w home meds      34-year-old female with a past medical history of Crohns disease, with prior hospitalization for perirectal abscess s/p I&D twice anxiety/depression, DVT on eliquis, presents complaining of chest pain, shortness of breath, nausea, abdominal pain, generalized swelling (including LE and periorbital edema) for the past week.  Patient describes her chest pain as tightness in nature which is worse with stress which she has had for about 1 year. She used to see a cardiologist in [place] but has not established care with cardiologist after moving to . Patient also endorses family history of clots and recent diagnosis of DVT in UE as well as LE. Patient reports active perirectal abscess. Pt experiencing nonbloody diarrhea and denies any vomiting. Pt denies any other symptoms including fevers, chill, headache, recent illness/travel, cough, changes in appetite.     In ED, T 98.1, /82, HR 95, RR 19, SpO2 94% on RA.   Labs: HgB 10.0 (baseline 12.8 in 2/2024), MCV 80.4, WBC 16.0, , . pro-, albumin 3.9, tbili <0.2     EKG: Sinus tachycardia, non-specific T-wave changes (improved from prior)   CXR: No definite acute rib fracture.  However, if there remains a suspicion, further evaluation with a   noncontrast chest CT is recommended.  New right cardiophrenic angle opacity, not seen on prior examinations.   Findings may represent a benign prominent epicardial fat-pad.  This can be better evaluated at the time of the above noncontrast chest   CT.    CT A/P with IV Con: Focal subcutaneous stranding at the level of the umbilicus. Recommend   correlation with physical examination      Plan    #Diarrhea, Nausea 2/2 Suspected UC Flare  #Sinus tachycardia  #Leukocytosis 2/2 Active Inflammation/Infection vs. Chronic Steroids  #UC/Crohn's Diagnosed 8 years ago   - In ED, T 98.1, /82, HR 95, RR 19, SpO2 94% on RA.   - Labs: HgB 10.0 (baseline 12.8 in 2/2024), MCV 80.4, , , albumin 3.9, tbili <0.2   - EKG: Sinus tachycardia, non-specific T-wave changes (improved from prior)   - CXR: Questionable left basilar opacity  - CT A/P with IV Con: Focal subcutaneous stranding at the level of the umbilicus.   - s/p 1L NS bolus, morphine 2+ 4 mg IV  -scheduled for outpatient EGD/COLO on 3/13  - Per GI pt needs to be bridged to biologics as OP due to extensive side effects of long term use of steroids, can be done through their OP clinic  - pain control with morphine 4 mg IV q4h PRN for severe pain  - Zofran 4 mg IV q8h PRN for nausea/vomiting      #Chest Pain, SOB, Generalized Edema Possibly 2/2 Undiagnosed HF Possibly 2/2 Chronic Inflammation   #Generalized Edema, Periorbital Edema   #R/o New HF   - TTE pending:  LV Ejection Fraction by Fontana's Method with a biplane EF of 59 %., no atrial or ventricular dilation, no structural/valvular abnormalities   -Edema most likely a result of chronic steroid use     #Transaminitis Possibly 2/2 Congestive Hepatopathy vs. PBC vs. Budhairi Syndrome   - , , Tbili < 0.2 on admission  - CT A/P with IV Con: Focal subcutaneous stranding at the level of the umbilicus. Recommend correlation with physical examination  - RUQ US normal  - f/u hep panel     #Acute on Chronic Microcytic Anemia  - HgB 10.0 (baseline 12.8 in 2/2024)  - f/u Iron studies, B12, folate  - Monitor CBC  - Monitor BMs  - Keep active type and screen  - Keep HgB > 7    #DVT on Eliquis   - patient reports family history of blood clots  - patient reports person history of clots in LE, UE and abdomen   - c/w home meds, Eliquis 5 mg BID   - hematology recommend OP f/u      #Elevated metamyelocytes  -likely 2/2 to chronic steroid use, f/u with Heme/Onc OP   -needs to be on PCP prophylaxis-> will send on Bactrim     #Anxiety/Depression  - c/w home meds

## 2025-03-06 NOTE — DISCHARGE NOTE NURSING/CASE MANAGEMENT/SOCIAL WORK - FINANCIAL ASSISTANCE
[facility] provides services at a reduced cost to those who are determined to be eligible through [facility]’s financial assistance program. Information regarding [facility]’s financial assistance program can be found by going to [email or web address] or by calling [phone].

## 2025-03-06 NOTE — DISCHARGE NOTE NURSING/CASE MANAGEMENT/SOCIAL WORK - PATIENT PORTAL LINK FT
You can access the FollowMyHealth Patient Portal offered by [facility] by registering at the following website: [email or web address]. By joining [facility]’s FollowMyHealth portal, you will also be able to view your health information using other applications (apps) compatible with our system.

## 2025-03-06 NOTE — DISCHARGE NOTE PROVIDER - NSDCFUSCHEDAPPT_GEN_ALL_CORE_FT
Gildardo Hyde  [facility] PreAdmits  Scheduled Appointment: 03/13/2025    Gildardo Hyde  [facility]  PSYCHIATRY  [address]  Scheduled Appointment: 03/13/2025    Alda Kang  [facility] PreAdmits  Scheduled Appointment: 03/14/2025    Alda Kang  [facility]  OPHTHALM  [address]   Scheduled Appointment: 03/14/2025     [facility] PreAdmits  Scheduled Appointment: 03/13/2025    Gildardo Hyde  [facility] PreAdmits  Scheduled Appointment: 03/13/2025    Gildardo Hyde  [facility]  PSYCHIATRY  [address]  Scheduled Appointment: 03/13/2025    Alda Kang  [facility] PreAdmits  Scheduled Appointment: 03/14/2025    Alda Kang  [facility]  OPHTHALM  [address]   Scheduled Appointment: 03/14/2025

## 2025-03-07 ENCOUNTER — TRANSCRIPTION ENCOUNTER (OUTPATIENT)
Age: 35
End: 2025-03-07

## 2025-03-13 ENCOUNTER — APPOINTMENT (OUTPATIENT)
Dept: PSYCHIATRY | Facility: CLINIC | Age: 35
End: 2025-03-13

## 2025-03-14 ENCOUNTER — APPOINTMENT (OUTPATIENT)
Dept: OPHTHALMOLOGY | Facility: CLINIC | Age: 35
End: 2025-03-14
Payer: MEDICAID

## 2025-03-14 ENCOUNTER — OUTPATIENT (OUTPATIENT)
Dept: OUTPATIENT SERVICES | Facility: HOSPITAL | Age: 35
LOS: 1 days | End: 2025-03-14
Payer: MEDICAID

## 2025-03-14 DIAGNOSIS — H53.8 OTHER VISUAL DISTURBANCES: ICD-10-CM

## 2025-03-14 PROCEDURE — 92012 INTRM OPH EXAM EST PATIENT: CPT

## 2025-03-18 ENCOUNTER — OUTPATIENT (OUTPATIENT)
Dept: OUTPATIENT SERVICES | Facility: HOSPITAL | Age: 35
LOS: 1 days | End: 2025-03-18
Payer: MEDICAID

## 2025-03-18 ENCOUNTER — APPOINTMENT (OUTPATIENT)
Dept: OPHTHALMOLOGY | Facility: CLINIC | Age: 35
End: 2025-03-18
Payer: MEDICAID

## 2025-03-18 DIAGNOSIS — H53.8 OTHER VISUAL DISTURBANCES: ICD-10-CM

## 2025-03-18 PROCEDURE — 99213 OFFICE O/P EST LOW 20 MIN: CPT | Mod: 25

## 2025-03-18 PROCEDURE — 92020 GONIOSCOPY: CPT

## 2025-03-18 PROCEDURE — 99213 OFFICE O/P EST LOW 20 MIN: CPT

## 2025-03-19 ENCOUNTER — APPOINTMENT (OUTPATIENT)
Dept: PSYCHIATRY | Facility: CLINIC | Age: 35
End: 2025-03-19

## 2025-03-19 ENCOUNTER — OUTPATIENT (OUTPATIENT)
Dept: OUTPATIENT SERVICES | Facility: HOSPITAL | Age: 35
LOS: 1 days | End: 2025-03-19
Payer: MEDICAID

## 2025-03-19 DIAGNOSIS — F33.2 MAJOR DEPRESSIVE DISORDER, RECURRENT SEVERE WITHOUT PSYCHOTIC FEATURES: ICD-10-CM

## 2025-03-19 DIAGNOSIS — F41.1 GENERALIZED ANXIETY DISORDER: ICD-10-CM

## 2025-03-19 PROCEDURE — 90834 PSYTX W PT 45 MINUTES: CPT

## 2025-03-20 DIAGNOSIS — F41.1 GENERALIZED ANXIETY DISORDER: ICD-10-CM

## 2025-03-21 DIAGNOSIS — D72.828 OTHER ELEVATED WHITE BLOOD CELL COUNT: ICD-10-CM

## 2025-03-21 DIAGNOSIS — H05.223 EDEMA OF BILATERAL ORBIT: ICD-10-CM

## 2025-03-21 DIAGNOSIS — D63.8 ANEMIA IN OTHER CHRONIC DISEASES CLASSIFIED ELSEWHERE: ICD-10-CM

## 2025-03-21 DIAGNOSIS — K76.1 CHRONIC PASSIVE CONGESTION OF LIVER: ICD-10-CM

## 2025-03-21 DIAGNOSIS — Z79.52 LONG TERM (CURRENT) USE OF SYSTEMIC STEROIDS: ICD-10-CM

## 2025-03-21 DIAGNOSIS — K50.90 CROHN'S DISEASE, UNSPECIFIED, WITHOUT COMPLICATIONS: ICD-10-CM

## 2025-03-21 DIAGNOSIS — R00.0 TACHYCARDIA, UNSPECIFIED: ICD-10-CM

## 2025-03-21 DIAGNOSIS — E24.2 DRUG-INDUCED CUSHING'S SYNDROME: ICD-10-CM

## 2025-03-21 DIAGNOSIS — Z88.0 ALLERGY STATUS TO PENICILLIN: ICD-10-CM

## 2025-03-21 DIAGNOSIS — T38.0X5A ADVERSE EFFECT OF GLUCOCORTICOIDS AND SYNTHETIC ANALOGUES, INITIAL ENCOUNTER: ICD-10-CM

## 2025-03-21 DIAGNOSIS — R74.01 ELEVATION OF LEVELS OF LIVER TRANSAMINASE LEVELS: ICD-10-CM

## 2025-03-21 DIAGNOSIS — Z86.718 PERSONAL HISTORY OF OTHER VENOUS THROMBOSIS AND EMBOLISM: ICD-10-CM

## 2025-03-21 DIAGNOSIS — Z79.01 LONG TERM (CURRENT) USE OF ANTICOAGULANTS: ICD-10-CM

## 2025-03-21 DIAGNOSIS — F32.A DEPRESSION, UNSPECIFIED: ICD-10-CM

## 2025-03-27 ENCOUNTER — OUTPATIENT (OUTPATIENT)
Dept: OUTPATIENT SERVICES | Facility: HOSPITAL | Age: 35
LOS: 1 days | End: 2025-03-27
Payer: MEDICAID

## 2025-03-27 ENCOUNTER — APPOINTMENT (OUTPATIENT)
Dept: PSYCHIATRY | Facility: CLINIC | Age: 35
End: 2025-03-27
Payer: MEDICAID

## 2025-03-27 DIAGNOSIS — H40.059 OCULAR HYPERTENSION, UNSPECIFIED EYE: ICD-10-CM

## 2025-03-27 DIAGNOSIS — H25.043 POSTERIOR SUBCAPSULAR POLAR AGE-RELATED CATARACT, BILATERAL: ICD-10-CM

## 2025-03-27 DIAGNOSIS — H05.20 UNSPECIFIED EXOPHTHALMOS: ICD-10-CM

## 2025-03-27 DIAGNOSIS — F41.1 GENERALIZED ANXIETY DISORDER: ICD-10-CM

## 2025-03-27 DIAGNOSIS — F51.05 INSOMNIA DUE TO OTHER MENTAL DISORDER: ICD-10-CM

## 2025-03-27 PROCEDURE — 98007 SYNCH AUDIO-VIDEO EST HI 40: CPT

## 2025-03-27 PROCEDURE — 99214 OFFICE O/P EST MOD 30 MIN: CPT | Mod: 95

## 2025-03-28 ENCOUNTER — APPOINTMENT (OUTPATIENT)
Dept: PSYCHIATRY | Facility: CLINIC | Age: 35
End: 2025-03-28

## 2025-03-28 DIAGNOSIS — F41.1 GENERALIZED ANXIETY DISORDER: ICD-10-CM

## 2025-03-31 ENCOUNTER — APPOINTMENT (OUTPATIENT)
Dept: PSYCHIATRY | Facility: CLINIC | Age: 35
End: 2025-03-31

## 2025-03-31 ENCOUNTER — OUTPATIENT (OUTPATIENT)
Dept: OUTPATIENT SERVICES | Facility: HOSPITAL | Age: 35
LOS: 1 days | End: 2025-03-31
Payer: MEDICAID

## 2025-03-31 DIAGNOSIS — F41.0 GENERALIZED ANXIETY DISORDER: ICD-10-CM

## 2025-03-31 DIAGNOSIS — F41.1 GENERALIZED ANXIETY DISORDER: ICD-10-CM

## 2025-03-31 DIAGNOSIS — F33.2 MAJOR DEPRESSIVE DISORDER, RECURRENT SEVERE WITHOUT PSYCHOTIC FEATURES: ICD-10-CM

## 2025-03-31 DIAGNOSIS — F41.9 MAJOR DEPRESSIVE DISORDER, RECURRENT SEVERE W/OUT PSYCHOTIC FEATURES: ICD-10-CM

## 2025-03-31 DIAGNOSIS — F33.2 MAJOR DEPRESSIVE DISORDER, RECURRENT SEVERE W/OUT PSYCHOTIC FEATURES: ICD-10-CM

## 2025-03-31 PROCEDURE — 90832 PSYTX W PT 30 MINUTES: CPT

## 2025-04-01 DIAGNOSIS — F41.1 GENERALIZED ANXIETY DISORDER: ICD-10-CM

## 2025-04-07 ENCOUNTER — NON-APPOINTMENT (OUTPATIENT)
Age: 35
End: 2025-04-07

## 2025-04-08 ENCOUNTER — NON-APPOINTMENT (OUTPATIENT)
Age: 35
End: 2025-04-08

## 2025-04-16 ENCOUNTER — APPOINTMENT (OUTPATIENT)
Age: 35
End: 2025-04-16

## 2025-04-24 ENCOUNTER — OUTPATIENT (OUTPATIENT)
Dept: OUTPATIENT SERVICES | Facility: HOSPITAL | Age: 35
LOS: 1 days | End: 2025-04-24
Payer: MEDICAID

## 2025-04-24 ENCOUNTER — APPOINTMENT (OUTPATIENT)
Dept: PSYCHIATRY | Facility: CLINIC | Age: 35
End: 2025-04-24
Payer: MEDICAID

## 2025-04-24 DIAGNOSIS — F51.05 INSOMNIA DUE TO OTHER MENTAL DISORDER: ICD-10-CM

## 2025-04-24 DIAGNOSIS — F41.1 GENERALIZED ANXIETY DISORDER: ICD-10-CM

## 2025-04-24 DIAGNOSIS — F33.2 MAJOR DEPRESSIVE DISORDER, RECURRENT SEVERE WITHOUT PSYCHOTIC FEATURES: ICD-10-CM

## 2025-04-24 PROCEDURE — 90833 PSYTX W PT W E/M 30 MIN: CPT | Mod: 95

## 2025-04-24 PROCEDURE — 99214 OFFICE O/P EST MOD 30 MIN: CPT | Mod: 95

## 2025-04-24 PROCEDURE — 98007 SYNCH AUDIO-VIDEO EST HI 40: CPT

## 2025-04-25 DIAGNOSIS — F41.1 GENERALIZED ANXIETY DISORDER: ICD-10-CM

## 2025-04-25 DIAGNOSIS — F33.2 MAJOR DEPRESSIVE DISORDER, RECURRENT SEVERE WITHOUT PSYCHOTIC FEATURES: ICD-10-CM

## 2025-04-25 DIAGNOSIS — F51.05 INSOMNIA DUE TO OTHER MENTAL DISORDER: ICD-10-CM

## 2025-04-28 ENCOUNTER — APPOINTMENT (OUTPATIENT)
Dept: PSYCHIATRY | Facility: CLINIC | Age: 35
End: 2025-04-28

## 2025-04-28 ENCOUNTER — OUTPATIENT (OUTPATIENT)
Dept: OUTPATIENT SERVICES | Facility: HOSPITAL | Age: 35
LOS: 1 days | End: 2025-04-28
Payer: MEDICAID

## 2025-04-28 ENCOUNTER — NON-APPOINTMENT (OUTPATIENT)
Age: 35
End: 2025-04-28

## 2025-04-28 DIAGNOSIS — F33.2 MAJOR DEPRESSIVE DISORDER, RECURRENT SEVERE WITHOUT PSYCHOTIC FEATURES: ICD-10-CM

## 2025-04-28 DIAGNOSIS — F41.1 GENERALIZED ANXIETY DISORDER: ICD-10-CM

## 2025-04-28 PROCEDURE — 90834 PSYTX W PT 45 MINUTES: CPT

## 2025-04-29 ENCOUNTER — NON-APPOINTMENT (OUTPATIENT)
Age: 35
End: 2025-04-29

## 2025-04-29 DIAGNOSIS — F41.1 GENERALIZED ANXIETY DISORDER: ICD-10-CM

## 2025-05-01 ENCOUNTER — APPOINTMENT (OUTPATIENT)
Dept: OPHTHALMOLOGY | Facility: CLINIC | Age: 35
End: 2025-05-01

## 2025-05-09 ENCOUNTER — APPOINTMENT (OUTPATIENT)
Dept: PSYCHIATRY | Facility: CLINIC | Age: 35
End: 2025-05-09

## 2025-05-09 ENCOUNTER — OUTPATIENT (OUTPATIENT)
Dept: OUTPATIENT SERVICES | Facility: HOSPITAL | Age: 35
LOS: 1 days | End: 2025-05-09
Payer: MEDICAID

## 2025-05-09 DIAGNOSIS — F41.1 GENERALIZED ANXIETY DISORDER: ICD-10-CM

## 2025-05-09 DIAGNOSIS — F33.2 MAJOR DEPRESSIVE DISORDER, RECURRENT SEVERE W/OUT PSYCHOTIC FEATURES: ICD-10-CM

## 2025-05-09 DIAGNOSIS — F41.9 MAJOR DEPRESSIVE DISORDER, RECURRENT SEVERE W/OUT PSYCHOTIC FEATURES: ICD-10-CM

## 2025-05-09 DIAGNOSIS — F41.0 GENERALIZED ANXIETY DISORDER: ICD-10-CM

## 2025-05-09 PROCEDURE — 90834 PSYTX W PT 45 MINUTES: CPT

## 2025-05-10 DIAGNOSIS — F41.1 GENERALIZED ANXIETY DISORDER: ICD-10-CM

## 2025-05-15 ENCOUNTER — NON-APPOINTMENT (OUTPATIENT)
Age: 35
End: 2025-05-15

## 2025-05-20 ENCOUNTER — APPOINTMENT (OUTPATIENT)
Dept: OPHTHALMOLOGY | Facility: CLINIC | Age: 35
End: 2025-05-20

## 2025-05-21 ENCOUNTER — APPOINTMENT (OUTPATIENT)
Age: 35
End: 2025-05-21

## 2025-05-23 ENCOUNTER — APPOINTMENT (OUTPATIENT)
Dept: PSYCHIATRY | Facility: CLINIC | Age: 35
End: 2025-05-23

## 2025-05-30 ENCOUNTER — OUTPATIENT (OUTPATIENT)
Dept: OUTPATIENT SERVICES | Facility: HOSPITAL | Age: 35
LOS: 1 days | End: 2025-05-30
Payer: MEDICAID

## 2025-05-30 ENCOUNTER — APPOINTMENT (OUTPATIENT)
Dept: PSYCHIATRY | Facility: CLINIC | Age: 35
End: 2025-05-30

## 2025-05-30 DIAGNOSIS — F41.1 GENERALIZED ANXIETY DISORDER: ICD-10-CM

## 2025-05-30 DIAGNOSIS — F51.05 INSOMNIA DUE TO OTHER MENTAL DISORDER: ICD-10-CM

## 2025-05-30 DIAGNOSIS — F41.9 MAJOR DEPRESSIVE DISORDER, RECURRENT SEVERE W/OUT PSYCHOTIC FEATURES: ICD-10-CM

## 2025-05-30 DIAGNOSIS — F33.2 MAJOR DEPRESSIVE DISORDER, RECURRENT SEVERE W/OUT PSYCHOTIC FEATURES: ICD-10-CM

## 2025-05-30 DIAGNOSIS — F41.0 GENERALIZED ANXIETY DISORDER: ICD-10-CM

## 2025-05-30 PROCEDURE — 90834 PSYTX W PT 45 MINUTES: CPT

## 2025-05-31 DIAGNOSIS — F41.1 GENERALIZED ANXIETY DISORDER: ICD-10-CM

## 2025-06-02 NOTE — PATIENT PROFILE ADULT - ABILITY TO HEAR (WITH HEARING AID OR HEARING APPLIANCE IF NORMALLY USED):
Left message for Asmita to call the office.  Need to reschedule her appt with Dr Barlow for 6/16.  Doctor won't be in the office on the 16th.  Can reschedule or transfer the call to the office.   Adequate: hears normal conversation without difficulty

## 2025-06-11 ENCOUNTER — APPOINTMENT (OUTPATIENT)
Dept: PSYCHIATRY | Facility: CLINIC | Age: 35
End: 2025-06-11

## 2025-06-26 ENCOUNTER — OUTPATIENT (OUTPATIENT)
Dept: OUTPATIENT SERVICES | Facility: HOSPITAL | Age: 35
LOS: 1 days | End: 2025-06-26
Payer: MEDICAID

## 2025-06-26 ENCOUNTER — APPOINTMENT (OUTPATIENT)
Dept: PSYCHIATRY | Facility: CLINIC | Age: 35
End: 2025-06-26
Payer: MEDICAID

## 2025-06-26 DIAGNOSIS — F33.2 MAJOR DEPRESSIVE DISORDER, RECURRENT SEVERE WITHOUT PSYCHOTIC FEATURES: ICD-10-CM

## 2025-06-26 DIAGNOSIS — F51.05 INSOMNIA DUE TO OTHER MENTAL DISORDER: ICD-10-CM

## 2025-06-26 DIAGNOSIS — F41.1 GENERALIZED ANXIETY DISORDER: ICD-10-CM

## 2025-06-26 PROCEDURE — 98007 SYNCH AUDIO-VIDEO EST HI 40: CPT

## 2025-06-26 PROCEDURE — 99214 OFFICE O/P EST MOD 30 MIN: CPT | Mod: 95

## 2025-06-26 PROCEDURE — 90833 PSYTX W PT W E/M 30 MIN: CPT | Mod: 95

## 2025-06-26 RX ORDER — ZOLPIDEM TARTRATE 10 MG/1
10 TABLET ORAL
Qty: 15 | Refills: 0 | Status: ACTIVE | COMMUNITY
Start: 2025-06-26 | End: 1900-01-01

## 2025-06-27 DIAGNOSIS — F33.2 MAJOR DEPRESSIVE DISORDER, RECURRENT SEVERE WITHOUT PSYCHOTIC FEATURES: ICD-10-CM

## 2025-06-27 DIAGNOSIS — F41.1 GENERALIZED ANXIETY DISORDER: ICD-10-CM

## 2025-06-27 DIAGNOSIS — F51.05 INSOMNIA DUE TO OTHER MENTAL DISORDER: ICD-10-CM

## 2025-07-01 ENCOUNTER — APPOINTMENT (OUTPATIENT)
Age: 35
End: 2025-07-01

## 2025-07-07 RX ORDER — TRAZODONE HYDROCHLORIDE 50 MG/1
50 TABLET ORAL
Qty: 90 | Refills: 1 | Status: ACTIVE | COMMUNITY
Start: 2025-07-07 | End: 1900-01-01

## 2025-07-10 ENCOUNTER — APPOINTMENT (OUTPATIENT)
Dept: PSYCHIATRY | Facility: CLINIC | Age: 35
End: 2025-07-10

## 2025-07-11 ENCOUNTER — APPOINTMENT (OUTPATIENT)
Dept: GASTROENTEROLOGY | Facility: CLINIC | Age: 35
End: 2025-07-11

## 2025-07-17 ENCOUNTER — OUTPATIENT (OUTPATIENT)
Dept: OUTPATIENT SERVICES | Facility: HOSPITAL | Age: 35
LOS: 1 days | End: 2025-07-17
Payer: MEDICAID

## 2025-07-17 ENCOUNTER — APPOINTMENT (OUTPATIENT)
Dept: PSYCHIATRY | Facility: CLINIC | Age: 35
End: 2025-07-17

## 2025-07-17 DIAGNOSIS — F33.2 MAJOR DEPRESSIVE DISORDER, RECURRENT SEVERE WITHOUT PSYCHOTIC FEATURES: ICD-10-CM

## 2025-07-17 DIAGNOSIS — F41.1 GENERALIZED ANXIETY DISORDER: ICD-10-CM

## 2025-07-17 PROCEDURE — 90832 PSYTX W PT 30 MINUTES: CPT

## 2025-07-18 DIAGNOSIS — F33.2 MAJOR DEPRESSIVE DISORDER, RECURRENT SEVERE WITHOUT PSYCHOTIC FEATURES: ICD-10-CM

## 2025-07-18 DIAGNOSIS — F41.1 GENERALIZED ANXIETY DISORDER: ICD-10-CM

## 2025-07-24 ENCOUNTER — APPOINTMENT (OUTPATIENT)
Dept: PSYCHIATRY | Facility: CLINIC | Age: 35
End: 2025-07-24
Payer: MEDICAID

## 2025-07-24 ENCOUNTER — OUTPATIENT (OUTPATIENT)
Dept: OUTPATIENT SERVICES | Facility: HOSPITAL | Age: 35
LOS: 1 days | End: 2025-07-24
Payer: MEDICAID

## 2025-07-24 DIAGNOSIS — F51.05 INSOMNIA DUE TO OTHER MENTAL DISORDER: ICD-10-CM

## 2025-07-24 DIAGNOSIS — F41.1 GENERALIZED ANXIETY DISORDER: ICD-10-CM

## 2025-07-24 PROCEDURE — 90833 PSYTX W PT W E/M 30 MIN: CPT | Mod: 95

## 2025-07-24 PROCEDURE — 99214 OFFICE O/P EST MOD 30 MIN: CPT | Mod: 95

## 2025-07-24 PROCEDURE — 98007 SYNCH AUDIO-VIDEO EST HI 40: CPT

## 2025-07-24 RX ORDER — HYDROXYZINE HYDROCHLORIDE 50 MG/1
50 TABLET ORAL
Qty: 30 | Refills: 0 | Status: ACTIVE | COMMUNITY
Start: 2025-07-24 | End: 1900-01-01

## 2025-07-25 ENCOUNTER — APPOINTMENT (OUTPATIENT)
Dept: PSYCHIATRY | Facility: CLINIC | Age: 35
End: 2025-07-25

## 2025-07-25 DIAGNOSIS — F41.1 GENERALIZED ANXIETY DISORDER: ICD-10-CM

## 2025-08-01 ENCOUNTER — APPOINTMENT (OUTPATIENT)
Dept: INTERNAL MEDICINE | Facility: CLINIC | Age: 35
End: 2025-08-01

## 2025-08-11 ENCOUNTER — APPOINTMENT (OUTPATIENT)
Dept: PSYCHIATRY | Facility: CLINIC | Age: 35
End: 2025-08-11

## 2025-08-11 ENCOUNTER — OUTPATIENT (OUTPATIENT)
Dept: OUTPATIENT SERVICES | Facility: HOSPITAL | Age: 35
LOS: 1 days | End: 2025-08-11
Payer: MEDICAID

## 2025-08-11 DIAGNOSIS — F41.1 GENERALIZED ANXIETY DISORDER: ICD-10-CM

## 2025-08-11 DIAGNOSIS — F33.2 MAJOR DEPRESSIVE DISORDER, RECURRENT SEVERE WITHOUT PSYCHOTIC FEATURES: ICD-10-CM

## 2025-08-11 PROCEDURE — 90834 PSYTX W PT 45 MINUTES: CPT

## 2025-08-12 DIAGNOSIS — F41.1 GENERALIZED ANXIETY DISORDER: ICD-10-CM

## 2025-08-20 ENCOUNTER — OUTPATIENT (OUTPATIENT)
Dept: OUTPATIENT SERVICES | Facility: HOSPITAL | Age: 35
LOS: 1 days | End: 2025-08-20
Payer: MEDICAID

## 2025-08-20 ENCOUNTER — APPOINTMENT (OUTPATIENT)
Dept: PSYCHIATRY | Facility: CLINIC | Age: 35
End: 2025-08-20
Payer: MEDICAID

## 2025-08-20 DIAGNOSIS — F41.1 GENERALIZED ANXIETY DISORDER: ICD-10-CM

## 2025-08-20 DIAGNOSIS — F51.05 INSOMNIA DUE TO OTHER MENTAL DISORDER: ICD-10-CM

## 2025-08-20 DIAGNOSIS — F33.2 MAJOR DEPRESSIVE DISORDER, RECURRENT SEVERE WITHOUT PSYCHOTIC FEATURES: ICD-10-CM

## 2025-08-20 DIAGNOSIS — F33.1 MAJOR DEPRESSIVE DISORDER, RECURRENT, MODERATE: ICD-10-CM

## 2025-08-20 PROCEDURE — 90833 PSYTX W PT W E/M 30 MIN: CPT | Mod: 95

## 2025-08-20 PROCEDURE — 99214 OFFICE O/P EST MOD 30 MIN: CPT | Mod: 95

## 2025-08-20 PROCEDURE — 98007 SYNCH AUDIO-VIDEO EST HI 40: CPT

## 2025-08-21 DIAGNOSIS — F41.1 GENERALIZED ANXIETY DISORDER: ICD-10-CM

## 2025-08-21 DIAGNOSIS — F33.2 MAJOR DEPRESSIVE DISORDER, RECURRENT SEVERE WITHOUT PSYCHOTIC FEATURES: ICD-10-CM

## 2025-08-21 DIAGNOSIS — F51.05 INSOMNIA DUE TO OTHER MENTAL DISORDER: ICD-10-CM

## 2025-08-25 ENCOUNTER — OUTPATIENT (OUTPATIENT)
Dept: OUTPATIENT SERVICES | Facility: HOSPITAL | Age: 35
LOS: 1 days | End: 2025-08-25
Payer: MEDICAID

## 2025-08-25 ENCOUNTER — APPOINTMENT (OUTPATIENT)
Dept: PSYCHIATRY | Facility: CLINIC | Age: 35
End: 2025-08-25

## 2025-08-25 DIAGNOSIS — F41.1 GENERALIZED ANXIETY DISORDER: ICD-10-CM

## 2025-08-25 DIAGNOSIS — F41.9 MAJOR DEPRESSIVE DISORDER, RECURRENT SEVERE W/OUT PSYCHOTIC FEATURES: ICD-10-CM

## 2025-08-25 DIAGNOSIS — F41.0 GENERALIZED ANXIETY DISORDER: ICD-10-CM

## 2025-08-25 DIAGNOSIS — F33.2 MAJOR DEPRESSIVE DISORDER, RECURRENT SEVERE W/OUT PSYCHOTIC FEATURES: ICD-10-CM

## 2025-08-25 PROCEDURE — 90834 PSYTX W PT 45 MINUTES: CPT

## 2025-08-26 DIAGNOSIS — F41.1 GENERALIZED ANXIETY DISORDER: ICD-10-CM

## 2025-08-29 ENCOUNTER — OUTPATIENT (OUTPATIENT)
Dept: OUTPATIENT SERVICES | Facility: HOSPITAL | Age: 35
LOS: 1 days | End: 2025-08-29
Payer: MEDICAID

## 2025-08-29 ENCOUNTER — APPOINTMENT (OUTPATIENT)
Dept: PSYCHIATRY | Facility: CLINIC | Age: 35
End: 2025-08-29

## 2025-08-29 DIAGNOSIS — F41.1 GENERALIZED ANXIETY DISORDER: ICD-10-CM

## 2025-08-29 PROCEDURE — 90834 PSYTX W PT 45 MINUTES: CPT

## 2025-08-30 DIAGNOSIS — F41.1 GENERALIZED ANXIETY DISORDER: ICD-10-CM

## 2025-09-05 ENCOUNTER — APPOINTMENT (OUTPATIENT)
Dept: PSYCHIATRY | Facility: CLINIC | Age: 35
End: 2025-09-05

## 2025-09-12 ENCOUNTER — APPOINTMENT (OUTPATIENT)
Dept: PSYCHIATRY | Facility: CLINIC | Age: 35
End: 2025-09-12

## 2025-09-12 DIAGNOSIS — F33.2 MAJOR DEPRESSIVE DISORDER, RECURRENT SEVERE W/OUT PSYCHOTIC FEATURES: ICD-10-CM

## 2025-09-12 DIAGNOSIS — F41.9 MAJOR DEPRESSIVE DISORDER, RECURRENT SEVERE W/OUT PSYCHOTIC FEATURES: ICD-10-CM

## 2025-09-12 DIAGNOSIS — F41.1 GENERALIZED ANXIETY DISORDER: ICD-10-CM

## 2025-09-12 DIAGNOSIS — F41.0 GENERALIZED ANXIETY DISORDER: ICD-10-CM

## 2025-09-17 ENCOUNTER — APPOINTMENT (OUTPATIENT)
Dept: PSYCHIATRY | Facility: CLINIC | Age: 35
End: 2025-09-17